# Patient Record
Sex: MALE | Race: BLACK OR AFRICAN AMERICAN | Employment: PART TIME | URBAN - METROPOLITAN AREA
[De-identification: names, ages, dates, MRNs, and addresses within clinical notes are randomized per-mention and may not be internally consistent; named-entity substitution may affect disease eponyms.]

---

## 2017-01-24 ENCOUNTER — APPOINTMENT (OUTPATIENT)
Dept: GENERAL RADIOLOGY | Age: 42
End: 2017-01-24
Attending: EMERGENCY MEDICINE
Payer: OTHER GOVERNMENT

## 2017-01-24 ENCOUNTER — APPOINTMENT (OUTPATIENT)
Dept: CT IMAGING | Age: 42
End: 2017-01-24
Attending: EMERGENCY MEDICINE
Payer: OTHER GOVERNMENT

## 2017-01-24 ENCOUNTER — HOSPITAL ENCOUNTER (EMERGENCY)
Age: 42
Discharge: HOME | End: 2017-01-24
Attending: EMERGENCY MEDICINE
Payer: OTHER GOVERNMENT

## 2017-01-24 ENCOUNTER — APPOINTMENT (OUTPATIENT)
Dept: ULTRASOUND IMAGING | Age: 42
End: 2017-01-24
Attending: EMERGENCY MEDICINE
Payer: OTHER GOVERNMENT

## 2017-01-24 VITALS
WEIGHT: 236.77 LBS | HEART RATE: 102 BPM | RESPIRATION RATE: 17 BRPM | OXYGEN SATURATION: 93 % | TEMPERATURE: 98.4 F | BODY MASS INDEX: 31.38 KG/M2 | HEIGHT: 73 IN | SYSTOLIC BLOOD PRESSURE: 119 MMHG | DIASTOLIC BLOOD PRESSURE: 76 MMHG

## 2017-01-24 DIAGNOSIS — E86.0 DEHYDRATION: ICD-10-CM

## 2017-01-24 DIAGNOSIS — K91.850 POUCHITIS (HCC): Primary | ICD-10-CM

## 2017-01-24 DIAGNOSIS — R10.84 ABDOMINAL PAIN, GENERALIZED: ICD-10-CM

## 2017-01-24 DIAGNOSIS — R79.89 ABNORMAL LFTS: ICD-10-CM

## 2017-01-24 DIAGNOSIS — R11.0 NAUSEA WITHOUT VOMITING: ICD-10-CM

## 2017-01-24 LAB
ALBUMIN SERPL BCP-MCNC: 3.4 G/DL (ref 3.5–5)
ALBUMIN/GLOB SERPL: 0.6 {RATIO} (ref 1.1–2.2)
ALP SERPL-CCNC: 595 U/L (ref 45–117)
ALT SERPL-CCNC: 92 U/L (ref 12–78)
ANION GAP BLD CALC-SCNC: 12 MMOL/L (ref 5–15)
AST SERPL W P-5'-P-CCNC: 92 U/L (ref 15–37)
BASOPHILS # BLD AUTO: 0 K/UL (ref 0–0.1)
BASOPHILS # BLD: 0 % (ref 0–1)
BILIRUB DIRECT SERPL-MCNC: 1 MG/DL (ref 0–0.2)
BILIRUB SERPL-MCNC: 2.4 MG/DL (ref 0.2–1)
BUN SERPL-MCNC: 10 MG/DL (ref 6–20)
BUN/CREAT SERPL: 10 (ref 12–20)
CALCIUM SERPL-MCNC: 8.8 MG/DL (ref 8.5–10.1)
CHLORIDE SERPL-SCNC: 103 MMOL/L (ref 97–108)
CO2 SERPL-SCNC: 26 MMOL/L (ref 21–32)
CREAT SERPL-MCNC: 0.99 MG/DL (ref 0.7–1.3)
EOSINOPHIL # BLD: 0 K/UL (ref 0–0.4)
EOSINOPHIL NFR BLD: 0 % (ref 0–7)
ERYTHROCYTE [DISTWIDTH] IN BLOOD BY AUTOMATED COUNT: 14 % (ref 11.5–14.5)
GLOBULIN SER CALC-MCNC: 6.1 G/DL (ref 2–4)
GLUCOSE SERPL-MCNC: 104 MG/DL (ref 65–100)
HCT VFR BLD AUTO: 42 % (ref 36.6–50.3)
HGB BLD-MCNC: 14.2 G/DL (ref 12.1–17)
LACTATE SERPL-SCNC: 1.3 MMOL/L (ref 0.4–2)
LIPASE SERPL-CCNC: 90 U/L (ref 73–393)
LYMPHOCYTES # BLD AUTO: 4 % (ref 12–49)
LYMPHOCYTES # BLD: 0.5 K/UL (ref 0.8–3.5)
MAGNESIUM SERPL-MCNC: 1.7 MG/DL (ref 1.6–2.4)
MCH RBC QN AUTO: 30.1 PG (ref 26–34)
MCHC RBC AUTO-ENTMCNC: 33.8 G/DL (ref 30–36.5)
MCV RBC AUTO: 89 FL (ref 80–99)
MONOCYTES # BLD: 0.5 K/UL (ref 0–1)
MONOCYTES NFR BLD AUTO: 4 % (ref 5–13)
NEUTS SEG # BLD: 11.5 K/UL (ref 1.8–8)
NEUTS SEG NFR BLD AUTO: 92 % (ref 32–75)
PLATELET # BLD AUTO: 175 K/UL (ref 150–400)
POTASSIUM SERPL-SCNC: 3.8 MMOL/L (ref 3.5–5.1)
PROT SERPL-MCNC: 9.5 G/DL (ref 6.4–8.2)
RBC # BLD AUTO: 4.72 M/UL (ref 4.1–5.7)
RBC MORPH BLD: ABNORMAL
SODIUM SERPL-SCNC: 141 MMOL/L (ref 136–145)
WBC # BLD AUTO: 12.5 K/UL (ref 4.1–11.1)

## 2017-01-24 PROCEDURE — 99284 EMERGENCY DEPT VISIT MOD MDM: CPT

## 2017-01-24 PROCEDURE — 87493 C DIFF AMPLIFIED PROBE: CPT | Performed by: SPECIALIST

## 2017-01-24 PROCEDURE — 82248 BILIRUBIN DIRECT: CPT | Performed by: EMERGENCY MEDICINE

## 2017-01-24 PROCEDURE — 74011636320 HC RX REV CODE- 636/320: Performed by: EMERGENCY MEDICINE

## 2017-01-24 PROCEDURE — 96361 HYDRATE IV INFUSION ADD-ON: CPT

## 2017-01-24 PROCEDURE — 85025 COMPLETE CBC W/AUTO DIFF WBC: CPT | Performed by: EMERGENCY MEDICINE

## 2017-01-24 PROCEDURE — 74011250636 HC RX REV CODE- 250/636: Performed by: EMERGENCY MEDICINE

## 2017-01-24 PROCEDURE — 74177 CT ABD & PELVIS W/CONTRAST: CPT

## 2017-01-24 PROCEDURE — 80053 COMPREHEN METABOLIC PANEL: CPT | Performed by: EMERGENCY MEDICINE

## 2017-01-24 PROCEDURE — 96376 TX/PRO/DX INJ SAME DRUG ADON: CPT

## 2017-01-24 PROCEDURE — 36415 COLL VENOUS BLD VENIPUNCTURE: CPT | Performed by: EMERGENCY MEDICINE

## 2017-01-24 PROCEDURE — 76705 ECHO EXAM OF ABDOMEN: CPT

## 2017-01-24 PROCEDURE — 83690 ASSAY OF LIPASE: CPT | Performed by: EMERGENCY MEDICINE

## 2017-01-24 PROCEDURE — 96375 TX/PRO/DX INJ NEW DRUG ADDON: CPT

## 2017-01-24 PROCEDURE — 96374 THER/PROPH/DIAG INJ IV PUSH: CPT

## 2017-01-24 PROCEDURE — 74022 RADEX COMPL AQT ABD SERIES: CPT

## 2017-01-24 PROCEDURE — 80074 ACUTE HEPATITIS PANEL: CPT | Performed by: EMERGENCY MEDICINE

## 2017-01-24 PROCEDURE — 83735 ASSAY OF MAGNESIUM: CPT | Performed by: EMERGENCY MEDICINE

## 2017-01-24 PROCEDURE — 83605 ASSAY OF LACTIC ACID: CPT | Performed by: EMERGENCY MEDICINE

## 2017-01-24 RX ORDER — HYDROMORPHONE HYDROCHLORIDE 1 MG/ML
1 INJECTION, SOLUTION INTRAMUSCULAR; INTRAVENOUS; SUBCUTANEOUS ONCE
Status: COMPLETED | OUTPATIENT
Start: 2017-01-24 | End: 2017-01-24

## 2017-01-24 RX ORDER — MORPHINE SULFATE 2 MG/ML
6 INJECTION, SOLUTION INTRAMUSCULAR; INTRAVENOUS
Status: COMPLETED | OUTPATIENT
Start: 2017-01-24 | End: 2017-01-24

## 2017-01-24 RX ORDER — CIPROFLOXACIN 500 MG/1
TABLET ORAL
Qty: 45 TAB | Refills: 0 | Status: SHIPPED | OUTPATIENT
Start: 2017-01-24 | End: 2017-01-24

## 2017-01-24 RX ORDER — ONDANSETRON 4 MG/1
4 TABLET, ORALLY DISINTEGRATING ORAL
Qty: 10 TAB | Refills: 0 | Status: SHIPPED | OUTPATIENT
Start: 2017-01-24 | End: 2017-03-30

## 2017-01-24 RX ORDER — ONDANSETRON 2 MG/ML
4 INJECTION INTRAMUSCULAR; INTRAVENOUS
Status: DISCONTINUED | OUTPATIENT
Start: 2017-01-24 | End: 2017-01-24 | Stop reason: HOSPADM

## 2017-01-24 RX ORDER — ONDANSETRON 2 MG/ML
4 INJECTION INTRAMUSCULAR; INTRAVENOUS
Status: COMPLETED | OUTPATIENT
Start: 2017-01-24 | End: 2017-01-24

## 2017-01-24 RX ORDER — SODIUM CHLORIDE 0.9 % (FLUSH) 0.9 %
10 SYRINGE (ML) INJECTION
Status: COMPLETED | OUTPATIENT
Start: 2017-01-24 | End: 2017-01-24

## 2017-01-24 RX ORDER — CIPROFLOXACIN 500 MG/1
TABLET ORAL
Qty: 45 TAB | Refills: 0 | Status: SHIPPED | OUTPATIENT
Start: 2017-01-24 | End: 2017-03-30

## 2017-01-24 RX ORDER — ONDANSETRON 4 MG/1
4 TABLET, ORALLY DISINTEGRATING ORAL
Qty: 10 TAB | Refills: 0 | Status: SHIPPED | OUTPATIENT
Start: 2017-01-24 | End: 2017-01-24

## 2017-01-24 RX ORDER — SODIUM CHLORIDE 9 MG/ML
50 INJECTION, SOLUTION INTRAVENOUS
Status: COMPLETED | OUTPATIENT
Start: 2017-01-24 | End: 2017-01-24

## 2017-01-24 RX ORDER — HYDROMORPHONE HYDROCHLORIDE 1 MG/ML
1 INJECTION, SOLUTION INTRAMUSCULAR; INTRAVENOUS; SUBCUTANEOUS
Status: DISCONTINUED | OUTPATIENT
Start: 2017-01-24 | End: 2017-01-24 | Stop reason: HOSPADM

## 2017-01-24 RX ADMIN — IOPAMIDOL 100 ML: 755 INJECTION, SOLUTION INTRAVENOUS at 12:19

## 2017-01-24 RX ADMIN — HYDROMORPHONE HYDROCHLORIDE 1 MG: 1 INJECTION, SOLUTION INTRAMUSCULAR; INTRAVENOUS; SUBCUTANEOUS at 12:57

## 2017-01-24 RX ADMIN — SODIUM CHLORIDE 2000 ML: 900 INJECTION, SOLUTION INTRAVENOUS at 10:54

## 2017-01-24 RX ADMIN — Medication 10 ML: at 12:20

## 2017-01-24 RX ADMIN — ONDANSETRON 4 MG: 2 INJECTION INTRAMUSCULAR; INTRAVENOUS at 10:53

## 2017-01-24 RX ADMIN — DIATRIZOATE MEGLUMINE AND DIATRIZOATE SODIUM 30 ML: 600; 100 SOLUTION ORAL; RECTAL at 10:53

## 2017-01-24 RX ADMIN — SODIUM CHLORIDE 50 ML/HR: 900 INJECTION, SOLUTION INTRAVENOUS at 12:19

## 2017-01-24 RX ADMIN — Medication 6 MG: at 10:54

## 2017-01-24 RX ADMIN — ONDANSETRON 4 MG: 2 INJECTION INTRAMUSCULAR; INTRAVENOUS at 12:57

## 2017-01-24 NOTE — ED PROVIDER NOTES
HPI Comments:   Juliet Slaughter is a 35 y.o. male with a hx of Crohn's colitis, UC, pancreatitis, PSC, and anemia presenting to the ED C/O sharp, periumbilical abd pain (5/80) which started 1 week ago and worsened today. Associated symptoms include nausea. Pt states this pain feels similar to his previous Crohn's flare ups. He was first dx'd with Crohn's 17 years ago and had a colon resection 15 years ago and then a reversal with a J-pouch. Pt is not currently taking steroids or a preventative autoimmune medication for his Crohn's. He called his GI's office earlier this morning and was told to come to the ED for evaluation. PSHx significant for appendectomy. He denies any recent unpasteurized food. Patient denies hematemesis, hematochezia, urinary symptoms, cough, SOB, CP, fever, mouth sores, testicle pain, hematuria, or any other symptoms or complaints. PCP: Joseline Vega MD  Gastroenterologist: Dr. Delbert Hameed    There are no other complaints, changes or physical findings at this time. Written by KRYSTEN Good, as dictated by Rock Paiz MD      The history is provided by the patient. No  was used.         Past Medical History:   Diagnosis Date    Chest pain 2/18/2013     as of 10/14/14 pt denies any CP since 2/13    [facility]'s [facility] ([facility]     Ill-defined condition      PSC     Iron deficiency anemia 2/18/2013    Pancreatitis     Pouchitis ([facility] 12/8/2011    PSC (primary sclerosing cholangitis)      Dr Domonique Toro [phone]    Ulcerative colitis [facility])        Past Surgical History:   Procedure Laterality Date    Hx mohs procedure       right    Pr colonoscopy w/biopsy single/multiple  12/8/2011          Pr egd transoral biopsy single/multiple  2/18/2013          Hx gi       ercp    Pr abdomen surgery proc unlisted  2002     colostomy reversal & J Pouch    Hx colectomy  2002     colostomy         Family History:   Problem Relation Age of Onset    Diabetes Mother    Andrews Barnes Hypertension Mother     Diabetes Father     Hypertension Father     No Known Problems Sister     No Known Problems Brother     No Known Problems Maternal Aunt     No Known Problems Maternal Uncle     No Known Problems Paternal Aunt     No Known Problems Paternal Uncle     No Known Problems Maternal Grandmother     No Known Problems Maternal Grandfather     No Known Problems Paternal Grandmother     No Known Problems Paternal Grandfather        Social History     Social History    Marital status: UNKNOWN     Spouse name: N/A    Number of children: N/A    Years of education: N/A     Occupational History    Not on file. Social History Main Topics    Smoking status: Never Smoker    Smokeless tobacco: Never Used    Alcohol use No    Drug use: No    Sexual activity: Yes     Partners: Female     Birth control/ protection: Condom     Other Topics Concern    Not on file     Social History Narrative         ALLERGIES: Cafergot [ergotamine-caffeine]    Review of Systems   Constitutional: Negative for fever. HENT: Negative for mouth sores. Respiratory: Negative for cough and shortness of breath. Cardiovascular: Negative for chest pain. Gastrointestinal: Positive for abdominal pain (periumbilical) and nausea. Negative for blood in stool. No hematemesis   Genitourinary: Negative. Negative for difficulty urinating, dysuria, frequency, hematuria and testicular pain. All other systems reviewed and are negative. Vitals:    01/24/17 1025 01/24/17 1245 01/24/17 1246 01/24/17 1315   BP: 134/86 117/74  119/76   Pulse: (!) 109  99 (!) 102   Resp: 15  16 17   Temp: 98.4 °F (36.9 °C)      SpO2: 100%  96% 93%   Weight:       Height:                Physical Exam     Vital signs and nursing notes reviewed    CONSTITUTIONAL: Alert, moderate distress; well-developed; well-nourished. Temperature of 98.4F. HEAD:  Normocephalic, atraumatic  EYES: PERRL; EOM's intact; Non-icteric sclera.   ENTM: Nose: no rhinorrhea; Throat: no erythema or exudate, mucous membranes moist  Neck:  Supple. trachea is midline. RESP: Chest clear, equal breath sounds. - W/R/R  CV: S1 and S2 WNL; No murmurs, gallops or rubs. 2+ radial and DP pulses bilaterally. GI: Mild distension, hypoactive bowel sounds, abd firm with generalized tenderness on palpation; No masses or organomegaly. : No costo-vertebral angle tenderness. BACK:  Non-tender, normal appearance  UPPER EXT:  Normal inspection. no joint or soft tissue swelling  LOWER EXT: No edema, no calf tenderness. NEURO: Alert and oriented x3, 5/5 strength and light touch sensation intact in bilateral upper and lower extremities. SKIN: No rashes; Warm and dry; Non-jaundice skin  PSYCH: Normal mood, normal affect     MDM  Number of Diagnoses or Management Options  Abdominal pain, generalized:   Abnormal LFTs:   Dehydration:   Nausea without vomiting:   Pouchitis [facility]):   Diagnosis management comments: 35 y.o. male with likely pouchitis in the setting of some non-compliance with medications recommended by GI who follow him for Crohn's vs. UC and primary sclerosing cholangitis. Improved with hydration and pain control here. Seen at the bedside by Dr. Pebbles Coats, his GI physician, recommends Cipro 500mg BID for 7 days, followed by once a day administration. Plan for discharge home. Amount and/or Complexity of Data Reviewed  Clinical lab tests: ordered and reviewed  Tests in the radiology section of CPT®: ordered and reviewed  Review and summarize past medical records: yes  Discuss the patient with other providers: yes (Gastroenterology)    Patient Progress  Patient progress: stable    Procedures    PROGRESS NOTE:   10:38 AM  Phoned radiology to inform them of concern for possible free air and to get pt for x-ray as soon as possible. Written by KRYSTEN Patricio, as dictated by Libby Holland MD.     PROGRESS NOTE:   11:17 AM  Pt in x-ray.   Written by Lory Leigh Greg Butler, ED Scribe, as dictated by Yoly Carrillo MD.     CONSULT NOTE:   Rebecca Paniagua MD spoke with Dr. Nydia Alejandro   Specialty: Gastroenterology  Discussed pt's hx, disposition, and available diagnostic and imaging results over the telephone. Reviewed care plans. Consulting physician agrees with plans as outlined. Dr. Nydia Alejandro will come see the pt to facilitate discharge. Written by Kendal Begum, ED Scribe, as dictated by Yoly Carrillo MD.     LABORATORY TESTS:  Recent Results (from the past 12 hour(s))   CBC WITH AUTOMATED DIFF    Collection Time: 01/24/17 10:35 AM   Result Value Ref Range    WBC 12.5 (H) 4.1 - 11.1 K/uL    RBC 4.72 4.10 - 5.70 M/uL    HGB 14.2 12.1 - 17.0 g/dL    HCT 42.0 36.6 - 50.3 %    MCV 89.0 80.0 - 99.0 FL    MCH 30.1 26.0 - 34.0 PG    MCHC 33.8 30.0 - 36.5 g/dL    RDW 14.0 11.5 - 14.5 %    PLATELET 592 697 - 084 K/uL    NEUTROPHILS 92 (H) 32 - 75 %    LYMPHOCYTES 4 (L) 12 - 49 %    MONOCYTES 4 (L) 5 - 13 %    EOSINOPHILS 0 0 - 7 %    BASOPHILS 0 0 - 1 %    ABS. NEUTROPHILS 11.5 (H) 1.8 - 8.0 K/UL    ABS. LYMPHOCYTES 0.5 (L) 0.8 - 3.5 K/UL    ABS. MONOCYTES 0.5 0.0 - 1.0 K/UL    ABS. EOSINOPHILS 0.0 0.0 - 0.4 K/UL    ABS. BASOPHILS 0.0 0.0 - 0.1 K/UL    RBC COMMENTS NORMOCYTIC, NORMOCHROMIC     METABOLIC PANEL, COMPREHENSIVE    Collection Time: 01/24/17 10:35 AM   Result Value Ref Range    Sodium 141 136 - 145 mmol/L    Potassium 3.8 3.5 - 5.1 mmol/L    Chloride 103 97 - 108 mmol/L    CO2 26 21 - 32 mmol/L    Anion gap 12 5 - 15 mmol/L    Glucose 104 (H) 65 - 100 mg/dL    BUN 10 6 - 20 MG/DL    Creatinine 0.99 0.70 - 1.30 MG/DL    BUN/Creatinine ratio 10 (L) 12 - 20      GFR est AA >60 >60 ml/min/1.73m2    GFR est non-AA >60 >60 ml/min/1.73m2    Calcium 8.8 8.5 - 10.1 MG/DL    Bilirubin, total 2.4 (H) 0.2 - 1.0 MG/DL    ALT 92 (H) 12 - 78 U/L    AST 92 (H) 15 - 37 U/L    Alk.  phosphatase 595 (H) 45 - 117 U/L    Protein, total 9.5 (H) 6.4 - 8.2 g/dL    Albumin 3.4 (L) 3.5 - 5.0 g/dL    Globulin 6.1 (H) 2.0 - 4.0 g/dL    A-G Ratio 0.6 (L) 1.1 - 2.2     LIPASE    Collection Time: 01/24/17 10:35 AM   Result Value Ref Range    Lipase 90 73 - 393 U/L   LACTIC ACID, PLASMA    Collection Time: 01/24/17 10:35 AM   Result Value Ref Range    Lactic acid 1.3 0.4 - 2.0 MMOL/L   MAGNESIUM    Collection Time: 01/24/17 10:35 AM   Result Value Ref Range    Magnesium 1.7 1.6 - 2.4 mg/dL   BILIRUBIN, DIRECT    Collection Time: 01/24/17 10:35 AM   Result Value Ref Range    Bilirubin, direct 1.0 (H) 0.0 - 0.2 MG/DL       IMAGING RESULTS:  US ABD LTD   Final Result   EXAM: US ABD LTD     INDICATION: Elevated liver enzymes.     COMPARISON: CT 1/24/2017.     TECHNIQUE: Limited abdominal ultrasound.     FINDINGS:      Liver: The liver is diffusely heterogeneous and mildly echogenic. No focal  liver lesion.      Main portal vein flow: Toward the liver.     Fluid: No ascites.     Gallbladder: There are a few small mobile gallstones. No gallbladder wall  thickening or pericholecystic fluid. Negative sonographic Park sign.      Bile ducts: Mild central intrahepatic biliary duct dilatation is again  demonstrated as on recent CT. The common bile duct measures 4 mm.     Pancreas: Not well seen due to bowel gas.     Kidneys: Right length: 11.7 cm. No hydronephrosis.     IMPRESSION  IMPRESSION:   1. There are a few small mobile gallstones without sonographic findings to  suggest acute cholecystitis. Mild central intrahepatic biliary duct dilatation  is noted as on recent CT. The common bile duct is nondilated.     2. Mild liver echogenicity and diffuse heterogeneity can be seen with hepatic  steatosis or other nonspecific liver disease. There is no focal liver mass.    Signed by      Signed Date/Time    Phone Pager     Taravarsha Bradley 1/24/2017 14:05 [phone]       CT ABD PELV W CONT   Final Result   INDICATION: Severe abdominal pain, fever, leukocytosis, Crohn's versus  ulcerative colitis inflammatory bowel disease. Previous complete colectomy and  J-pouch rectal reconstruction. Elevated liver enzymes, including alkaline  phosphatase.     COMPARISON: CT abdomen/pelvis on 11/27/2016     TECHNIQUE:   Following the uneventful intravenous administration of 100 cc Isovue-370, thin  axial images were obtained through the abdomen and pelvis. Coronal and sagittal  reconstructions were generated. Oral contrast was administered. CT dose  reduction was achieved through use of a standardized protocol tailored for this  examination and automatic exposure control for dose modulation.     FINDINGS:   LUNG BASES: Heterogeneous bibasilar partial atelectasis is increased. INCIDENTALLY IMAGED HEART AND MEDIASTINUM: Borderline cardiac size is unchanged. No pericardial effusion. LIVER: Intrahepatic biliary dilatation is unchanged. No solid hepatic mass or  evidence of hepatic abscess. GALLBLADDER: Distended. No evidence of cholecystitis. CBD is not dilated. SPLEEN: No mass. PANCREAS: No mass or ductal dilatation. ADRENALS: Unremarkable. KIDNEYS: Heterogeneous bilateral hypoattenuation and hypoenhancement are  unchanged in size and pattern. 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 No solid renal mass or hydronephrosis. STOMACH: Partially distended with enteric contrast and food products. SMALL BOWEL: Within normal limits post surgery. No evidence of active small  bowel inflammation. No fistula or abscess. COLON: Resected. J-pouch rectum is unchanged and within normal limits. APPENDIX: Resected. PERITONEUM: No ascites or pneumoperitoneum. RETROPERITONEUM: Numerous retroperitoneal lymph nodes are small and unchanged. Aorta is normal in caliber. REPRODUCTIVE ORGANS: Prostate gland is not enlarged. URINARY BLADDER: No mass or calculus. BONES: Unchanged. No evidence of sacroiliitis. ADDITIONAL COMMENTS: N/A     IMPRESSION  IMPRESSION:     1. No acute process on CT. No change. 2. Colectomy.  Postsurgical small bowel is within normal limits. 3. Unchanged heterogeneous hypoenhancement of the kidneys. In the stable chronic  setting, scarring is more likely than recurrent infection or neoplasm. 4. Unchanged intrahepatic biliary dilatation. Normal CBD. Consider proximal  biliary stricture versus chronic cholangitis. Signed by      Signed Date/Time    Phone Pager     SKYLER VALLES 1/24/2017 12:55 [phone]       XR ABD ACUTE W 1 V CHEST   Final Result   EXAM: XR ABD ACUTE W 1 V CHEST     INDICATION: Abdominal pain for one week, increasing today with nausea, history  of Crohn's     COMPARISON: 10/25/2016.     FINDINGS: The upright chest radiograph demonstrates clear lungs and normal  cardiac and mediastinal contours. There is no pleural effusion or free air under  the diaphragm.     Supine and upright views of the abdomen demonstrate a nonobstructive bowel gas  pattern. There is no free intraperitoneal air. No soft tissue masses or  pathologic calcifications are identified. The bones are within normal limits.     IMPRESSION  IMPRESSION: No acute abnormality.   Signed by      Signed Date/Time    Phone Pager     Juan Chatterjee 1/24/2017 11:22 [phone]           MEDICATIONS GIVEN:  Medications   HYDROmorphone (PF) (DILAUDID) injection 1 mg (not administered)   ondansetron (ZOFRAN) injection 4 mg (not administered)   morphine injection 6 mg (6 mg IntraVENous Given 1/24/17 1054)   ondansetron (ZOFRAN) injection 4 mg (4 mg IntraVENous Given 1/24/17 1053)   sodium chloride 0.9 % bolus infusion 2,000 mL (2,000 mL IntraVENous New Bag 1/24/17 1054)   diatrizoate meglumine-d.sodium (MD-GASTROVIEW,GASTROGRAFIN) 66-10 % contrast solution 30 mL (30 mL Oral Given 1/24/17 1053)   0.9% sodium chloride infusion (50 mL/hr IntraVENous New Bag 1/24/17 1219)   iopamidol (ISOVUE-370) 76 % injection 100 mL (100 mL IntraVENous Given 1/24/17 1219)   sodium chloride (NS) flush 10 mL (10 mL IntraVENous Given 1/24/17 1220)   HYDROmorphone (PF) (DILAUDID) injection 1 mg (1 mg IntraVENous Given 1/24/17 1257)   ondansetron (ZOFRAN) injection 4 mg (4 mg IntraVENous Given 1/24/17 1257)       IMPRESSION:  1. Pouchitis ([address]    2. Abnormal LFTs    3. Abdominal pain, generalized    4. Nausea without vomiting    5. Dehydration        PLAN:  1. Current Discharge Medication List      START taking these medications    Details   ciprofloxacin HCl (CIPRO) 500 mg tablet 500 mg PO BID X 1 week followed by 500 mg daily. Indications: pouchitis  Qty: 45 Tab, Refills: 0         CONTINUE these medications which have CHANGED    Details   ondansetron (ZOFRAN ODT) 4 mg disintegrating tablet Take 1 Tab by mouth every eight (8) hours as needed for Nausea. Qty: 10 Tab, Refills: 0         CONTINUE these medications which have NOT CHANGED    Details   oxyCODONE-acetaminophen (PERCOCET) 5-325 mg per tablet Take 1 Tab by mouth every four (4) hours as needed for Pain. Max Daily Amount: 6 Tabs. Qty: 20 Tab, Refills: 0           2. Follow-up Information     Follow up With Details Comments Shashank Gamez MD  Please see Dr. Bigg Garcia or his PA in the next 1-3 weeks. [address]   [phone]          Return to ED if worse     Discharge Note:  5:51 PM  The patient is ready for discharge. The patient's signs, symptoms, diagnosis, and discharge instruction have been discussed and the patient has conveyed their understanding. The patient is to follow up as recommended or return to the ER should their symptoms worsen. Plan has been discussed and the patient is in agreement. Written by Irineo Robles ED Scribe, as dictated by Ilsa Ibarra MD.     Attestation: This note is prepared by Irineo Robles, acting as Scribe for Ilsa Ibarra MD.    Ilsa Ibarra MD: The scribe's documentation has been prepared under my direction and personally reviewed by me in its entirety.  I confirm that the note above accurately reflects all work, treatment, procedures, and medical decision making performed by me.

## 2017-01-24 NOTE — PROGRESS NOTES
See dictated note  Check c diff.   I spoke with Dr Vielka Smallwood  [id number]    Home on cipro 500 bid x 1 week then daily    See me or Eri Washington pac within 3 weeks    [phone]    Pt aware    Laurel Garcia MD  5:27 PM  1/24/2017

## 2017-01-24 NOTE — DISCHARGE INSTRUCTIONS
You were seen here in the emergency department for abdominal pain, nausea and dehydration. Dr. Trace Yoder has recommended you start twice daily cipro for one week followed by once daily cipro. A prescription has been sent to your pharmacy. Dr. Trace Yoder would also like you to see either him or his PA, without fail in the next 1-3 weeks. Return for fever, uncontrolled pain, or other new concerning symptoms. Abdominal Pain: Care Instructions  Your Care Instructions    Abdominal pain has many possible causes. Some aren't serious and get better on their own in a few days. Others need more testing and treatment. If your pain continues or gets worse, you need to be rechecked and may need more tests to find out what is wrong. You may need surgery to correct the problem. Don't ignore new symptoms, such as fever, nausea and vomiting, urination problems, pain that gets worse, and dizziness. These may be signs of a more serious problem. Your doctor may have recommended a follow-up visit in the next 8 to 12 hours. If you are not getting better, you may need more tests or treatment. The doctor has checked you carefully, but problems can develop later. If you notice any problems or new symptoms, get medical treatment right away. Follow-up care is a key part of your treatment and safety. Be sure to make and go to all appointments, and call your doctor if you are having problems. It's also a good idea to know your test results and keep a list of the medicines you take. How can you care for yourself at home? · Rest until you feel better. · To prevent dehydration, drink plenty of fluids, enough so that your urine is light yellow or clear like water. Choose water and other caffeine-free clear liquids until you feel better. If you have kidney, heart, or liver disease and have to limit fluids, talk with your doctor before you increase the amount of fluids you drink.   · If your stomach is upset, eat mild foods, such as rice, dry toast or crackers, bananas, and applesauce. Try eating several small meals instead of two or three large ones. · Wait until 48 hours after all symptoms have gone away before you have spicy foods, alcohol, and drinks that contain caffeine. · Do not eat foods that are high in fat. · Avoid anti-inflammatory medicines such as aspirin, ibuprofen (Advil, Motrin), and naproxen (Aleve). These can cause stomach upset. Talk to your doctor if you take daily aspirin for another health problem. When should you call for help? Call 911 anytime you think you may need emergency care. For example, call if:  · You passed out (lost consciousness). · You pass maroon or very bloody stools. · You vomit blood or what looks like coffee grounds. · You have new, severe belly pain. Call your doctor now or seek immediate medical care if:  · Your pain gets worse, especially if it becomes focused in one area of your belly. · You have a new or higher fever. · Your stools are black and look like tar, or they have streaks of blood. · You have unexpected vaginal bleeding. · You have symptoms of a urinary tract infection. These may include:  ¨ Pain when you urinate. ¨ Urinating more often than usual.  ¨ Blood in your urine. · You are dizzy or lightheaded, or you feel like you may faint. Watch closely for changes in your health, and be sure to contact your doctor if:  · You are not getting better after 1 day (24 hours). Where can you learn more? Go to http://oscar-wilfrido.info/. Enter E286 in the search box to learn more about \"Abdominal Pain: Care Instructions. \"  Current as of: May 27, 2016  Content Version: 11.1  © 0762-0392 [employer]. Care instructions adapted under license by [employer] (which disclaims liability or warranty for this information).  If you have questions about a medical condition or this instruction, always ask your healthcare professional. [address] any warranty or liability for your use of this information.

## 2017-01-24 NOTE — ED NOTES
Patient resting comfortably in stretcher with call bell in reach, side rails x2, respirations even and unlabored. No complaints voiced at this time.

## 2017-01-24 NOTE — ED NOTES
MD has reviewed discharge instructions with the patient. The patient verbalized understanding. PIV removed.

## 2017-01-24 NOTE — ED TRIAGE NOTES
Patient presents ambulatory to the ED with complaint of abdominal pain, nausea, vomiting and diarrhea for about a week. Patient reports history of UC or Crohn's.

## 2017-01-25 LAB
C DIFF TOX GENS STL QL NAA+PROBE: POSITIVE
HAV IGM SERPL QL IA: NONREACTIVE
HBV CORE IGM SER QL: NONREACTIVE
HBV SURFACE AG SER QL: 0.12 INDEX
HBV SURFACE AG SER QL: NEGATIVE
HCV AB SERPL QL IA: NONREACTIVE
HCV COMMENT,HCGAC: NORMAL
SP1: NORMAL
SP2: NORMAL
SP3: NORMAL

## 2017-01-25 NOTE — CONSULTS
[address]   [address]       Name:  Frank Alejandra   MR#:  [id number]   :  1975   Account #:  [de-identified]    Date of Consultation:  2017   Date of Adm:  2017       REQUESTING PHYSICIAN: Dale Tee. Yelena Rangel MD    CHIEF COMPLAINT: I am asked to see the patient for abdominal pain   and abnormal CT scan. HISTORY OF PRESENT ILLNESS: The patient is 35years old. He is   status post ileal pouch anal anastomosis and has chronic pouchitis. He   has been prescribed floxin drugs, but does not take them reliably, last   took in November. His last colonoscopy 10/17/2014, which   demonstrated mild to moderate pouchitis in proximal pouch and severe   pouchitis in the distal pouch. Biopsies demonstrated nonspecific   chronic active enteritis in the small intestine, mild to moderate chronic   active proctitis in the pouch. The small bowel at that time was granular,   there was a stenosis with rectal examination. He also has primary sclerosing cholangitis and is followed by Dr. Maegan Madden. He describes \"it\" got worse over the last several weeks, this is lower   abdominal distention, frequent bowel movements, failure to pass flatus;   there was associated nausea; he has not been able to eat or drink   today. PAST MEDICAL/SURGICAL HISTORY: See above. Arthritis, prior joint   issues with steroids prior to the ulcerative colitis surgery. ALLERGIES: CAFERGOT. SOCIAL HISTORY: He is a nonsmoker, nondrinker, employed as a    and also is active in the community, coaches   basketball. REVIEW OF SYSTEMS: No chest pain, no shortness of breath. He felt   cold, but did not have chills or rigors. He has barely been able to eat   anything, he is nauseous even after water. PHYSICAL EXAMINATION   VITAL SIGNS: Blood pressure 119/76, pulse 102, respirations 17,   temperature 98.4.    GENERAL: Well-developed, well-nourished, appears his stated age. EYES: No icterus. Extraocular motions intact. CHEST: Clear to auscultation and percussion. CARDIOVASCULAR: Regular rate and rhythm. Tachycardia, no   abnormal sounds. ABDOMEN: Bowel sounds are present, but decreased with high-  pitched sounds and tinkles. He is full and distended in the right lower   abdomen and the left lower abdomen. RECTAL: Exam showed the stenosis of the anus. There is red/green   stool on the glove, I do not feel an impaction. I cannot insert the digital   exam more than about a centimeter past the stenosis. LABORATORY: Reviewed. White count 12.5, hemoglobin 14.2. Chemistry showed total bilirubin 2.4, total protein 9.5, albumin 3.4,   globulin 6.1, ALT 92, AST 92, alkaline phosphatase 595. The ALT,   AST, alkaline phosphatase are similar to 11/27/2016. The bilirubin is   elevated (prior below 1), BUN is 10. CT abdomen and pelvis today shows   partially distended stomach with enteric contrast and food (I note his   last meal was last evening); small bowel within normal limits, no   surgery, no active small bowel inflammation, no fistula or abscess; J-  pouch, rectum unchanged or within normal limits; final conclusions no   acute process on CT; colectomy; unchanged heterogeneous   enhancement of the kidneys, stable chronic scarring more likely   than infection or neoplasm, unchanged intrahepatic bile duct dilation,   normal common bile duct, consider proximal biliary stricture versus   chronic cholangitis, also of note the gallbladder was distended. ASSESSMENT AND PLAN   1. Recurrent pouchitis. I think pouchitis is the primary cause of his   symptoms, including constipation, diarrhea and pain. 2. Sclerosing cholangitis. 3. Elevated bilirubin. 4. Distended gallbladder, likely due to fasting. RECOMMENDATIONS   1. Check Clostridium difficile.    2. I am going to treat the pouchitis with Cipro twice a day for a week   and then once a day until we see him. 3. He will need a subsequent colonoscopy after the next visit. 4. I have encouraged him to take antibiotics and I have discussed the   mechanism of pouchitis in prior IBD patients who have undergone ileal   pouch anal anastomosis. 5. I have discussed the recommendations with Dr. Tiara Pierre. 6. We will see him in the office within 3 weeks (Miss Dalia Mendez or   myself). 7.  He will need to follow with Dr Mike Villagran  I thank Dr. Tiara Pierre for this interesting consultation.         MD MARI Dangelo / KRISTEL   D:  01/24/2017   17:35   T:  01/24/2017   18:25   Job #:  [id number]     Dr. Violeta Ramos at [address] [facility]

## 2017-01-26 NOTE — PROGRESS NOTES
Stopped Cipro and put on Flagyl. Advised the patient to contact the PCP and GI for follow up.   KASSANDRA

## 2017-02-13 ENCOUNTER — HOSPITAL ENCOUNTER (OUTPATIENT)
Dept: MRI IMAGING | Age: 42
Discharge: HOME | End: 2017-02-13
Attending: PHYSICIAN ASSISTANT
Payer: OTHER GOVERNMENT

## 2017-02-13 VITALS — BODY MASS INDEX: 30.61 KG/M2 | WEIGHT: 232 LBS

## 2017-02-13 DIAGNOSIS — K51.90: ICD-10-CM

## 2017-02-13 DIAGNOSIS — R17 ELEVATED BILIRUBIN: ICD-10-CM

## 2017-02-13 DIAGNOSIS — A04.72 CLOSTRIDIUM DIFFICILE COLITIS: ICD-10-CM

## 2017-02-13 DIAGNOSIS — R10.11 ABDOMINAL PAIN, RIGHT UPPER QUADRANT: ICD-10-CM

## 2017-02-13 DIAGNOSIS — K91.850 POUCHITIS (HCC): ICD-10-CM

## 2017-02-13 PROCEDURE — 74183 MRI ABD W/O CNTR FLWD CNTR: CPT

## 2017-02-13 PROCEDURE — 74011250636 HC RX REV CODE- 250/636: Performed by: PHYSICIAN ASSISTANT

## 2017-02-13 PROCEDURE — A9585 GADOBUTROL INJECTION: HCPCS | Performed by: PHYSICIAN ASSISTANT

## 2017-02-13 RX ADMIN — GADOBUTROL 10 ML: 604.72 INJECTION INTRAVENOUS at 09:38

## 2017-02-15 ENCOUNTER — HOSPITAL ENCOUNTER (OUTPATIENT)
Dept: GENERAL RADIOLOGY | Age: 42
Discharge: HOME | End: 2017-02-15
Payer: OTHER GOVERNMENT

## 2017-02-15 DIAGNOSIS — A04.72 INTESTINAL INFECTION DUE TO CLOSTRIDIUM DIFFICILE: ICD-10-CM

## 2017-02-15 DIAGNOSIS — K51.90 MILD CHRONIC ULCERATIVE COLITIS (HCC): ICD-10-CM

## 2017-02-15 PROCEDURE — 74020 XR ABD FLAT/ ERECT: CPT

## 2017-03-30 NOTE — PERIOP NOTES
[facility]  Ambulatory Surgery Unit  Pre-operative Instructions for Endo Procedures    Procedure Date  4/4/17            Tentative Arrival Time 7358      1. On the day of your procedure, please report to the Ambulatory Surgery Unit Registration Desk and sign in at your designated time. The Ambulatory Surgery Unit is located in [facility] on the [facility] side of the [facility] across from the [address] [place] [address]. Please have all of your health insurance cards and a photo ID. 2. You must have someone with you to drive you home, as you should not drive a car for 24 hours following anesthesia. Please make arrangements for a responsible adult friend or family member to stay with you for at least the first 24 hours after your procedure. 3. Do not have anything to eat or drink (including water, gum, mints, coffee, juice) after midnight   4/3/17. This may not apply to medications prescribed by your physician. (Please note below the special instructions with medications to take the morning of your procedure.)    4. If applicable, follow the clear liquid diet and bowel prep instructions provided by your physician's office. If you do not have this information, or have any questions, please contact your physician's office. 5. We recommend you do not drink any alcoholic beverages for 24 hours before and after your procedure. 6. Stop all Aspirin, non-steroidal anti-inflammatory drugs (i.e. Advil, Aleve), vitamins, and supplements as directed by your surgeon's office. **If you are currently taking Plavix, Coumadin, or other blood-thinning agents, contact your surgeon for instructions. **    7. In an effort to help prevent surgical site infection, we ask that you shower with an anti-bacterial soap (i.e. Dial or Safeguard) on the morning of your procedure. Do not apply any lotions, powders, or deodorants after showering. 8. Wear comfortable clothes. Wear glasses instead of contacts.  Do not bring any jewelry or money (other than copays or fees as instructed). Do not wear make-up, particularly mascara, the morning of your procedure. Wear your hair loose or down, no ponytails, buns, noe pins or clips. All body piercings must be removed. 9. You should understand that if you do not follow these instructions your procedure may be cancelled. If your physical condition changes (i.e. fever, cold or flu) please contact your surgeon as soon as possible. 10. It is important that you be on time. If a situation occurs where you may be late, or if you have any questions or problems, please call [phone]. 11. Your procedure time may be subject to change. You will receive a phone call the day prior to confirm your arrival time. Special Instructions:    MEDICATIONS TO TAKE THE MORNING OF SURGERY WITH A SIP OF WATER: none      I understand a pre-operative phone call will be made to verify my procedure time. In the event that I am not available, I give permission for a message to be left on my answering service and/or with another person?       Yes     (instructions given verbally during phone assessment- pt voiced understanding)     ___________________      ___________________      ___________________  (Signature of Patient)          (Witness)                   (Date and Time)

## 2017-04-03 ENCOUNTER — ANESTHESIA EVENT (OUTPATIENT)
Dept: SURGERY | Age: 42
End: 2017-04-03
Payer: OTHER GOVERNMENT

## 2017-04-04 ENCOUNTER — HOSPITAL ENCOUNTER (OUTPATIENT)
Age: 42
Setting detail: OUTPATIENT SURGERY
Discharge: HOME | End: 2017-04-04
Attending: SPECIALIST | Admitting: SPECIALIST
Payer: OTHER GOVERNMENT

## 2017-04-04 ENCOUNTER — ANESTHESIA (OUTPATIENT)
Dept: SURGERY | Age: 42
End: 2017-04-04
Payer: OTHER GOVERNMENT

## 2017-04-04 ENCOUNTER — SURGERY (OUTPATIENT)
Age: 42
End: 2017-04-04

## 2017-04-04 VITALS
BODY MASS INDEX: 30.62 KG/M2 | TEMPERATURE: 97.6 F | DIASTOLIC BLOOD PRESSURE: 77 MMHG | RESPIRATION RATE: 12 BRPM | SYSTOLIC BLOOD PRESSURE: 112 MMHG | HEART RATE: 78 BPM | WEIGHT: 231 LBS | HEIGHT: 73 IN | OXYGEN SATURATION: 98 %

## 2017-04-04 PROCEDURE — 88305 TISSUE EXAM BY PATHOLOGIST: CPT | Performed by: SPECIALIST

## 2017-04-04 PROCEDURE — 88342 IMHCHEM/IMCYTCHM 1ST ANTB: CPT | Performed by: SPECIALIST

## 2017-04-04 PROCEDURE — 74011000250 HC RX REV CODE- 250

## 2017-04-04 PROCEDURE — 74011250636 HC RX REV CODE- 250/636: Performed by: ANESTHESIOLOGY

## 2017-04-04 PROCEDURE — 76060000073 HC AMB SURG ANES FIRST 0.5 HR: Performed by: SPECIALIST

## 2017-04-04 PROCEDURE — 76210000040 HC AMBSU PH I REC FIRST 0.5 HR: Performed by: SPECIALIST

## 2017-04-04 PROCEDURE — 76030000002 HC AMB SURG OR TIME FIRST 0.: Performed by: SPECIALIST

## 2017-04-04 PROCEDURE — 77030019988 HC FCPS ENDOSC DISP BSC -B: Performed by: SPECIALIST

## 2017-04-04 PROCEDURE — 74011000250 HC RX REV CODE- 250: Performed by: ANESTHESIOLOGY

## 2017-04-04 PROCEDURE — 76210000046 HC AMBSU PH II REC FIRST 0.5 HR: Performed by: SPECIALIST

## 2017-04-04 PROCEDURE — 74011250636 HC RX REV CODE- 250/636

## 2017-04-04 RX ORDER — FENTANYL CITRATE 50 UG/ML
25 INJECTION, SOLUTION INTRAMUSCULAR; INTRAVENOUS
Status: DISCONTINUED | OUTPATIENT
Start: 2017-04-04 | End: 2017-04-04 | Stop reason: HOSPADM

## 2017-04-04 RX ORDER — ONDANSETRON 2 MG/ML
4 INJECTION INTRAMUSCULAR; INTRAVENOUS AS NEEDED
Status: DISCONTINUED | OUTPATIENT
Start: 2017-04-04 | End: 2017-04-04 | Stop reason: HOSPADM

## 2017-04-04 RX ORDER — SODIUM CHLORIDE, SODIUM LACTATE, POTASSIUM CHLORIDE, CALCIUM CHLORIDE 600; 310; 30; 20 MG/100ML; MG/100ML; MG/100ML; MG/100ML
25 INJECTION, SOLUTION INTRAVENOUS CONTINUOUS
Status: DISCONTINUED | OUTPATIENT
Start: 2017-04-04 | End: 2017-04-04 | Stop reason: HOSPADM

## 2017-04-04 RX ORDER — PROPOFOL 10 MG/ML
INJECTION, EMULSION INTRAVENOUS AS NEEDED
Status: DISCONTINUED | OUTPATIENT
Start: 2017-04-04 | End: 2017-04-04 | Stop reason: HOSPADM

## 2017-04-04 RX ORDER — SODIUM CHLORIDE 0.9 % (FLUSH) 0.9 %
5-10 SYRINGE (ML) INJECTION AS NEEDED
Status: DISCONTINUED | OUTPATIENT
Start: 2017-04-04 | End: 2017-04-04 | Stop reason: HOSPADM

## 2017-04-04 RX ORDER — LIDOCAINE HYDROCHLORIDE 10 MG/ML
0.1 INJECTION, SOLUTION EPIDURAL; INFILTRATION; INTRACAUDAL; PERINEURAL AS NEEDED
Status: DISCONTINUED | OUTPATIENT
Start: 2017-04-04 | End: 2017-04-04 | Stop reason: HOSPADM

## 2017-04-04 RX ORDER — LIDOCAINE HYDROCHLORIDE 20 MG/ML
INJECTION, SOLUTION EPIDURAL; INFILTRATION; INTRACAUDAL; PERINEURAL AS NEEDED
Status: DISCONTINUED | OUTPATIENT
Start: 2017-04-04 | End: 2017-04-04 | Stop reason: HOSPADM

## 2017-04-04 RX ORDER — DIPHENHYDRAMINE HYDROCHLORIDE 50 MG/ML
12.5 INJECTION, SOLUTION INTRAMUSCULAR; INTRAVENOUS AS NEEDED
Status: DISCONTINUED | OUTPATIENT
Start: 2017-04-04 | End: 2017-04-04 | Stop reason: HOSPADM

## 2017-04-04 RX ORDER — SODIUM CHLORIDE 0.9 % (FLUSH) 0.9 %
5-10 SYRINGE (ML) INJECTION EVERY 8 HOURS
Status: DISCONTINUED | OUTPATIENT
Start: 2017-04-04 | End: 2017-04-04 | Stop reason: HOSPADM

## 2017-04-04 RX ADMIN — LIDOCAINE HYDROCHLORIDE 0.1 ML: 10 INJECTION, SOLUTION EPIDURAL; INFILTRATION; INTRACAUDAL; PERINEURAL at 14:03

## 2017-04-04 RX ADMIN — PROPOFOL 100 MG: 10 INJECTION, EMULSION INTRAVENOUS at 16:17

## 2017-04-04 RX ADMIN — PROPOFOL 50 MG: 10 INJECTION, EMULSION INTRAVENOUS at 16:13

## 2017-04-04 RX ADMIN — PROPOFOL 50 MG: 10 INJECTION, EMULSION INTRAVENOUS at 16:09

## 2017-04-04 RX ADMIN — PROPOFOL 100 MG: 10 INJECTION, EMULSION INTRAVENOUS at 16:10

## 2017-04-04 RX ADMIN — SODIUM CHLORIDE, SODIUM LACTATE, POTASSIUM CHLORIDE, AND CALCIUM CHLORIDE 25 ML/HR: 600; 310; 30; 20 INJECTION, SOLUTION INTRAVENOUS at 14:02

## 2017-04-04 RX ADMIN — LIDOCAINE HYDROCHLORIDE 40 MG: 20 INJECTION, SOLUTION EPIDURAL; INFILTRATION; INTRACAUDAL; PERINEURAL at 16:07

## 2017-04-04 RX ADMIN — SODIUM CHLORIDE, SODIUM LACTATE, POTASSIUM CHLORIDE, AND CALCIUM CHLORIDE: 600; 310; 30; 20 INJECTION, SOLUTION INTRAVENOUS at 16:10

## 2017-04-04 RX ADMIN — PROPOFOL 50 MG: 10 INJECTION, EMULSION INTRAVENOUS at 16:08

## 2017-04-04 NOTE — ANESTHESIA POSTPROCEDURE EVALUATION
Post-Anesthesia Evaluation and Assessment    Patient: Oscar Silverio MRN: [id number]  SSN: [id number]    YOB: 1975  Age: 35 y.o. Sex: male       Cardiovascular Function/Vital Signs  Visit Vitals    /77    Pulse 78    Temp 36.4 °C (97.6 °F)    Resp 12    Ht 6' 0.5\" (1.842 m)    Wt 104.8 kg (231 lb)    SpO2 98%    BMI 30.9 kg/m2       Patient is status post general, total IV anesthesia anesthesia for Procedure(s):  COLONOSCOPY  ESOPHAGOGASTRODUODENOSCOPY (EGD)  ESOPHAGOGASTRODUODENAL (EGD) BIOPSY  COLON BIOPSY. Nausea/Vomiting: None    Postoperative hydration reviewed and adequate. Pain:  Pain Scale 1: Numeric (0 - 10) (04/04/17 1645)  Pain Intensity 1: 0 (04/04/17 1645)   Managed    Neurological Status:   Neuro (WDL): Within Defined Limits (04/04/17 1630)  Neuro  LUE Motor Response: Spontaneous  (04/04/17 1630)  LLE Motor Response: Spontaneous  (04/04/17 1630)  RUE Motor Response: Spontaneous  (04/04/17 1630)  RLE Motor Response: Spontaneous  (04/04/17 1630)   At baseline    Mental Status and Level of Consciousness: Arousable    Pulmonary Status:   O2 Device: Room air (04/04/17 1630)   Adequate oxygenation and airway patent    Complications related to anesthesia: None    Post-anesthesia assessment completed.  No concerns    Signed By: Leyla Galindo MD     April 4, 2017

## 2017-04-04 NOTE — IP AVS SNAPSHOT
[address] [facility] 
[phone] Patient: Mahendra Ackerman MRN: [id number] :1975 You are allergic to the following Allergen Reactions Cafergot (Ergotamine-Caffeine) Hives Recent Documentation Height Weight BMI Smoking Status 1.842 m 104.8 kg 30.9 kg/m2 Never Smoker Emergency Contacts Name Discharge Info Relation Home Work Mobile Patty Reece DISCHARGE CAREGIVER [3] Spouse [3] [phone] About your hospitalization You were admitted on:  2017 You last received care in the:  [facility] You were discharged on:  2017 Unit phone number:  [phone] Why you were hospitalized Your primary diagnosis was:  Not on File Providers Seen During Your Hospitalizations Provider Role Specialty Primary office phone Alyx Dodd MD Attending Provider Gastroenterology [phone] Your Primary Care Physician (PCP) Primary Care Physician Office Phone Office Fax Kenith Cushing [phone] Follow-up Information Follow up With Details Comments Contact Info Megan Dimas MD   [address] [place] [address] 
[phone] Current Discharge Medication List  
  
CONTINUE these medications which have NOT CHANGED Dose & Instructions Dispensing Information Comments Morning Noon Evening Bedtime XIFAXAN 200 mg tablet Generic drug:  rifAXIMin Your last dose was: Your next dose is:    
   
   
 Dose:  200 mg Take 200 mg by mouth daily. Refills:  0 Discharge Instructions Mahendra Ackerman [id number] 
1975 Procedure  Discharge Instructions:   
 
Discomfort: 
Redness at IV site- apply warm compress to area; if redness or soreness persist- contact your physician There may be a slight amount of blood passed from the rectum Gaseous discomfort- walking, belching will help relieve any discomfort You may not operate a vehicle for 24 hours You may not engage in an occupation involving machinery or appliances for rest of today You may not drink alcoholic beverages for at least 24 hours Avoid making any critical decisions for at least 24 hour DIET: 
 You may resume your normal diet today. You should not overeat or \"feast\" today as your abdomen may become distended or uncomfortable. MEDICATIONS: 
 I reconciled this list from the list you gave us when you came today for the procedure. Please clarify with me, your primary care physician and the nurse who is discharging you if we have any discrepancies. Aspirin and or non-steroidal medication (Ibuprofen, Motrin, naproxen, etc.) is ok in limited quantities. ACTIVITY: 
You may resume your normal daily activities it is recommended that you spend the remainder of the day resting -  avoid any strenuous activity. CALL M.D. ANY SIGN OF: Increasing pain, nausea, vomiting Abdominal distension (swelling) New increased bleeding (oral or rectal) Fever (chills) Pain in chest area Bloody discharge from nose or mouth Shortness of breath Follow-up Instructions: 
 Call Dr. Jeniffer Zuniga for the results of  biopsy in approximately one week Telephone #  [phone] Follow up visit as previously scheduled. We may consider suppsotories if symptoms persist.  Stay on xifaxan orally once a day Take pictures to Dr. Salas Lang MD 
4:36 PM 
4/4/2017 DO NOT TAKE SLEEPING MEDICATIONS OR ANTIANXIETY MEDICATIONS WHILE TAKING NARCOTIC PAIN MEDICATIONS,  ESPECIALLY THE NIGHT OF ANESTHESIA. CPAP PATIENTS BE SURE TO WEAR MACHINE WHENEVER NAPPING OR SLEEPING. DISCHARGE SUMMARY from Nurse The following personal items collected during your admission are returned to you: Dental Appliance: Dental Appliances: None Vision: Visual Aid: None Hearing Aid:   
Jewelry:   
Clothing:   
Other Valuables:   
Valuables sent to safe:   
 
 
PATIENT INSTRUCTIONS: 
 
 
F-face looks uneven A-arms unable to move or move even S-speech slurred or non-existent T-time-call 911 as soon as signs and symptoms begin-DO NOT go Back to bed or wait to see if you get better-TIME IS BRAIN. If you have not received your influenza and/or pneumococcal vaccine, please follow up with your primary care physician. The discharge information has been reviewed with the patient and parent. The patient and parent verbalized understanding. Discharge Orders None Introducing [facility]! Dear Kenrick Danger: Thank you for requesting a [employer] account. Our records indicate that you already have an active [employer] account. You can access your account anytime at [email or web address] Did you know that you can access your hospital and ER discharge instructions at any time in [employer]? You can also review all of your test results from your hospital stay or ER visit. Additional Information If you have questions, please visit the Frequently Asked Questions section of the [employer] website at [email or web address] Remember, [employer] is NOT to be used for urgent needs. For medical emergencies, dial 911. Now available from your iPhone and Android! General Information Please provide this summary of care documentation to your next provider. Patient Signature:  ____________________________________________________________ Date:  ____________________________________________________________  
  
Cory Cons Provider Signature:  ____________________________________________________________ Date:  ____________________________________________________________

## 2017-04-04 NOTE — PERIOP NOTES
Abdomen softer. Pictures given to family to take to his MD per Dr. Saadia Haney . Stressed post colonoscopy diet and no driving for 24 hours. Work excuse given to family with papers for 24 hours. 1700 Pt. Alert. Denies pain or chill. Discharge instructions reviewed with caregiver and patient. Allowed and answered questions. Tolerating PO fluids. Both state ready for discharge.

## 2017-04-04 NOTE — DISCHARGE INSTRUCTIONS
Ruddy Moralez  [id number]  1975              Procedure  Discharge Instructions:      Discomfort:  Redness at IV site- apply warm compress to area; if redness or soreness persist- contact your physician  There may be a slight amount of blood passed from the rectum  Gaseous discomfort- walking, belching will help relieve any discomfort  You may not operate a vehicle for 24 hours  You may not engage in an occupation involving machinery or appliances for rest of today  You may not drink alcoholic beverages for at least 24 hours  Avoid making any critical decisions for at least 24 hour  DIET:   You may resume your normal diet today. You should not overeat or \"feast\" today as your abdomen may become distended or uncomfortable. MEDICATIONS:   I reconciled this list from the list you gave us when you came today for the procedure. Please clarify with me, your primary care physician and the nurse who is discharging you if we have any discrepancies. Aspirin and or non-steroidal medication (Ibuprofen, Motrin, naproxen, etc.) is ok in limited quantities. ACTIVITY:  You may resume your normal daily activities it is recommended that you spend the remainder of the day resting -  avoid any strenuous activity. CALL M.D. ANY SIGN OF:  Increasing pain, nausea, vomiting  Abdominal distension (swelling)  New increased bleeding (oral or rectal)  Fever (chills)  Pain in chest area  Bloody discharge from nose or mouth  Shortness of breath          Follow-up Instructions:   Call Dr. Tonya Del Valle for the results of  biopsy in approximately one week  Telephone #  [phone]  Follow up visit as previously scheduled. We may consider suppsotories if symptoms persist.  Stay on xifaxan orally once a day  Take pictures to Dr. Eura Heimlich, MD  4:36 PM  4/4/2017       DO NOT TAKE SLEEPING MEDICATIONS OR ANTIANXIETY MEDICATIONS WHILE TAKING NARCOTIC PAIN MEDICATIONS,  ESPECIALLY THE NIGHT OF ANESTHESIA.     CPAP PATIENTS BE SURE TO WEAR MACHINE WHENEVER NAPPING OR SLEEPING. DISCHARGE SUMMARY from Nurse    The following personal items collected during your admission are returned to you:   Dental Appliance: Dental Appliances: None  Vision: Visual Aid: None  Hearing Aid:    Jewelry:    Clothing:    Other Valuables:    Valuables sent to safe:        PATIENT INSTRUCTIONS:    After General Anesthesia or Intravenous Sedation, for 24 hours or while taking prescription Narcotics:        Someone should be with you for the next 24 hours. For your own safety, a responsible adult must drive you home. · Limit your activities  · Recommended activity: Rest today, up with assistance today. Do not climb stairs or shower unattended for the next 24 hours. · Please start with a soft bland diet and advance as tolerated (no nausea) to regular diet. · If you have a sore throat you should try the following: fluids, warm salt water gargles, or throat lozenges. If it does not improve after several days please follow up with your primary physician. · Do not drive and operate hazardous machinery  · Do not make important personal or business decisions  · Do  not drink alcoholic beverages  · If you have not urinated within 8 hours after discharge, please contact your surgeon on call. Report the following to your surgeon:  · Excessive pain, swelling, redness or odor of or around the surgical area  · Temperature over 100.5  · Nausea and vomiting lasting longer than 4 hours or if unable to take medications  · Any signs of decreased circulation or nerve impairment to extremity: change in color, persistent  numbness, tingling, coldness or increase pain      · You will receive a Post Operative Call from one of the Recovery Room Nurses on the day after your surgery to check on you. It is very important for us to know how you are recovering after your surgery.  If you have an issue or need to speak with someone, please call your surgeon, do not wait for the post operative call. · You may receive an e-mail or letter in the mail from [employer] regarding your experience with us in the Ambulatory Surgery Unit. Your feedback is valuable to us and we appreciate your participation in the survey. · If the above instructions are not adequate, please contact Ursula Davis RN, [facility] anesthesia Nurse Manager or our Anesthesiologist, at [phone]. If you are having problems after your surgery, call the physician at his office number. · We wish you a speedy recovery ? What to do at Home:      *  Please give a list of your current medications to your Primary Care Provider. *  Please update this list whenever your medications are discontinued, doses are      changed, or new medications (including over-the-counter products) are added. *  Please carry medication information at all times in case of emergency situations. These are general instructions for a healthy lifestyle:    No smoking/ No tobacco products/ Avoid exposure to second hand smoke    Surgeon General's Warning:  Quitting smoking now greatly reduces serious risk to your health. Obesity, smoking, and sedentary lifestyle greatly increases your risk for illness    A healthy diet, regular physical exercise & weight monitoring are important for maintaining a healthy lifestyle    You may be retaining fluid if you have a history of heart failure or if you experience any of the following symptoms:  Weight gain of 3 pounds or more overnight or 5 pounds in a week, increased swelling in our hands or feet or shortness of breath while lying flat in bed. Please call your doctor as soon as you notice any of these symptoms; do not wait until your next office visit.     Recognize signs and symptoms of STROKE:    F-face looks uneven    A-arms unable to move or move even    S-speech slurred or non-existent    T-time-call 911 as soon as signs and symptoms begin-DO NOT go       Back to bed or wait to see if you get better-TIME IS BRAIN. If you have not received your influenza and/or pneumococcal vaccine, please follow up with your primary care physician. The discharge information has been reviewed with the patient and parent. The patient and parent verbalized understanding.

## 2017-04-04 NOTE — PROCEDURES
Pouchoscopy thru rectum    Indications: diarrhea  llq pain    Pre-operative Diagnosis: see above     Medications:  See anesthesia form    Post-operative Diagnosis:  Normal small intestine, ERYTHEMA IN POUCH, distal PROCTITIS      Procedure Details   Prior to the procedure its objectives, risks, consequences and alternatives were discussed with the patient who then elected to proceed. All questions were answered. Digital Rectal Exam:  was normal     The Olympus videoendoscope was inserted in the rectum and advanced to the small intestine. The scope was slowly and carefully withdrawn as the mucosa was inspected. The small intestine above the pouch appeared normal.  There was some pathcy erythema in the pouch that was not impressive. Upon removal of the scope there was some bright red erythema in the distal 2 cm, possibly in rectal remnant, distal to anastomosis. No other abnormalities were noted. Retroflexion in the pouch was negative. Photos to document the ileum, pouch and retroflexion exam were obtained. I took representative biopsies    The preparation was adequate. Estimated Blood Loss:  none    Specimens:  1) small bowel  2) pouch  3) rectum \"pouchitis\"    Findings:  Normal small bowel  Patchy erythema in pouch  Distal 2 cm of exam shows active proctitis ? rectal remnant    Complications:  none    Repeat colonoscopy is recommended in:  2 years.                Sumit Astudillo MD  4:32 PM  4/4/2017

## 2017-04-04 NOTE — PERIOP NOTES
Moore Jean Claude  1975  [id number]    Situation:  Verbal report given from: KATERYNA Mendoza RN and ROWDY Serrano CRNA  Procedure: Procedure(s):  COLONOSCOPY  ESOPHAGOGASTRODUODENOSCOPY (EGD)  ESOPHAGOGASTRODUODENAL (EGD) BIOPSY  COLON BIOPSY    Background:    Preoperative diagnosis: C-DIFF, LOWER ABD PAIN, UMBILICAL PAIN    Postoperative diagnosis: MOSAIC GASTROPATHY, SMALL ESOPHAGEAL VARICES, ERYTHEMA IN [place], PROCTITIS    :  Dr. Lisa Lindo     Assistant(s): Circ-1: Yayo Richard RN  Scrub Tech-1: Tiffany Severino    Specimens:   ID Type Source Tests Collected by Time Destination   1 : DUODENUM BIOPSY  Preservative Duodenum  Susie Alberts MD 4/4/2017 1611 Pathology   2 : STOMACH BIOPSY  Preservative Stomach  Susie Alberts MD 4/4/2017 1613 Pathology   3 : SMALL INTESTINE BIOPSY  Preservative Small Bowel  Susie Alberts MD 4/4/2017 1617 Pathology   4 : Radha Sanchez MD 4/4/2017 1617 Pathology   5 : RECTUM BIOPSY  Preservative Rectum  Susie Alberts MD 4/4/2017 1621 Pathology       Assessment:  Intra-procedure medications   Propofol        Anesthesia gave intra-procedure sedation and medications, see anesthesia flow sheet     Intravenous fluids: LR@ KVO     Vital signs stable. Pt groggy denies pain or chill    Abdominal assessment: round and tight but palpable      Recommendation:    Permission to share finding with family or friend yes    All side rails up, bed in low position, wheels locked. Nurse at bedside.

## 2017-04-04 NOTE — H&P
Pre-endoscopy H and P    [facility] The airway was assessed and docuemented. The problem list, past medical history, and medications were reviewed. We are doing this to screen for varices, look at pouchitis. Patient Active Problem List   Diagnosis Code    Acute pancreatitis K85.90    Primary sclerosing cholangitis K83.0    Pouchitis ([address] K91.850    Chest pain R07.9    Iron deficiency anemia D50.9     Social History     Social History    Marital status:      Spouse name: N/A    Number of children: N/A    Years of education: N/A     Occupational History    Not on file. Social History Main Topics    Smoking status: Never Smoker    Smokeless tobacco: Never Used    Alcohol use No    Drug use: No    Sexual activity: Yes     Partners: Female     Birth control/ protection: Condom     Other Topics Concern    Not on file     Social History Narrative     Past Medical History:   Diagnosis Date    Chest pain 2/18/2013    as of 10/14/14 pt denies any CP since 2/13    Crohn's colitis ([address]     H/O Clostridium difficile infection 01/2017    as of 3/30/17 pt denies abd pain, diarrhea > 1 week    Iron deficiency anemia 2/18/2013    Pancreatitis     P[address] 12/8/2011    PSC (primary sclerosing cholangitis)     Dr Samantha Ernandez [phone]    Ulcerative colitis [facility])      The patient has a family history of na    Prior to Admission Medications   Prescriptions Last Dose Informant Patient Reported? Taking? rifAXIMin (XIFAXAN) 200 mg tablet 3/31/2017  Yes Yes   Sig: Take 200 mg by mouth daily. Facility-Administered Medications: None           The review of systems is:  negative for shortness of breath or chest pain      The heart, lungs, and mental status were satisfactory for the administration of anesthesia sedation and for the procedure. I discussed with the patient the objectives, risks, consequences and alternatives to the procedure. Ruddy Calvin MD  4/4/2017  2:50 PM

## 2017-04-04 NOTE — PROCEDURES
Esophagogastroduodenoscopy    Indications:  Psc, evaulate for varices. Medications:  See anesthesia form    Post procedure diagnosis:  MOSAIC GASTROPATHY, SMALL ESOPHAGEAL VARICES,     Description of Procedure:    Prior to the procedure its objectives, risks, consequences and alternatives were discussed with the patient who then elected to proceed. The Olympus video endoscope was inserted under direct vision into the mouth and then into the esophagus. The mucosa of the esophagus looked normal.  There were three small varices that disappeared with insufflation. They extended from 39 cm to the z-line at 43 cm. In the stomach there was mosaic gastropathy, consistent with portal hypertension. Gastric varices were not seen. There were no other diagnostic abnormalities of the body, fundus, antrum, cardia and incisura of the stomach. This included direct and retroflexion examination. The first and second portion of the duodenum appeared normal.  I took biopsies of the duodenum and the stomach. Complications: There were no apparent complications and the patient tolerated the procedure well.         Estimated Blood Loss:  none  Specimens Removed:  Duodenum  stomach  Impressions:  No gastric varices  Likely portal gastropathy  Small distal esophageal varices without stigmata      Signed By: Sloan Xiong MD                        April 4, 2017     4:27 PM

## 2017-04-04 NOTE — ANESTHESIA PREPROCEDURE EVALUATION
Anesthetic History   No history of anesthetic complications            Review of Systems / Medical History  Patient summary reviewed, nursing notes reviewed and pertinent labs reviewed    Pulmonary  Within defined limits                 Neuro/Psych   Within defined limits           Cardiovascular  Within defined limits                Exercise tolerance: >4 METS     GI/Hepatic/Renal           Liver disease (Primary Sclerosing Cholangitis; stable)    Comments: Crohn's & Ulcerative colitis  Endo/Other        Anemia (iron def)     Other Findings            Physical Exam    Airway  Mallampati: I  TM Distance: > 6 cm  Neck ROM: normal range of motion   Mouth opening: Normal     Cardiovascular    Rhythm: regular  Rate: normal      Pertinent negatives: No murmur   Dental  No notable dental hx       Pulmonary  Breath sounds clear to auscultation               Abdominal  GI exam deferred       Other Findings            Anesthetic Plan    ASA: 2  Anesthesia type: general and total IV anesthesia          Induction: Intravenous  Anesthetic plan and risks discussed with: Patient

## 2017-05-27 ENCOUNTER — HOSPITAL ENCOUNTER (EMERGENCY)
Age: 42
Discharge: HOME | End: 2017-05-28
Attending: EMERGENCY MEDICINE
Payer: OTHER GOVERNMENT

## 2017-05-27 ENCOUNTER — APPOINTMENT (OUTPATIENT)
Dept: CT IMAGING | Age: 42
End: 2017-05-27
Attending: EMERGENCY MEDICINE
Payer: OTHER GOVERNMENT

## 2017-05-27 DIAGNOSIS — R10.84 ABDOMINAL PAIN, GENERALIZED: ICD-10-CM

## 2017-05-27 DIAGNOSIS — R11.2 NON-INTRACTABLE VOMITING WITH NAUSEA, UNSPECIFIED VOMITING TYPE: ICD-10-CM

## 2017-05-27 DIAGNOSIS — K52.9 GASTROENTERITIS, ACUTE: Primary | ICD-10-CM

## 2017-05-27 LAB
BASOPHILS # BLD AUTO: 0 K/UL (ref 0–0.1)
BASOPHILS # BLD: 1 % (ref 0–1)
EOSINOPHIL # BLD: 0.4 K/UL (ref 0–0.4)
EOSINOPHIL NFR BLD: 6 % (ref 0–7)
ERYTHROCYTE [DISTWIDTH] IN BLOOD BY AUTOMATED COUNT: 14.3 % (ref 11.5–14.5)
HCT VFR BLD AUTO: 37 % (ref 36.6–50.3)
HGB BLD-MCNC: 12.5 G/DL (ref 12.1–17)
LYMPHOCYTES # BLD AUTO: 32 % (ref 12–49)
LYMPHOCYTES # BLD: 2.1 K/UL (ref 0.8–3.5)
MCH RBC QN AUTO: 30.3 PG (ref 26–34)
MCHC RBC AUTO-ENTMCNC: 33.8 G/DL (ref 30–36.5)
MCV RBC AUTO: 89.8 FL (ref 80–99)
MONOCYTES # BLD: 0.5 K/UL (ref 0–1)
MONOCYTES NFR BLD AUTO: 8 % (ref 5–13)
NEUTS SEG # BLD: 3.6 K/UL (ref 1.8–8)
NEUTS SEG NFR BLD AUTO: 53 % (ref 32–75)
PLATELET # BLD AUTO: 165 K/UL (ref 150–400)
RBC # BLD AUTO: 4.12 M/UL (ref 4.1–5.7)
WBC # BLD AUTO: 6.6 K/UL (ref 4.1–11.1)

## 2017-05-27 PROCEDURE — 36415 COLL VENOUS BLD VENIPUNCTURE: CPT | Performed by: EMERGENCY MEDICINE

## 2017-05-27 PROCEDURE — 74177 CT ABD & PELVIS W/CONTRAST: CPT

## 2017-05-27 PROCEDURE — 96375 TX/PRO/DX INJ NEW DRUG ADDON: CPT

## 2017-05-27 PROCEDURE — 80053 COMPREHEN METABOLIC PANEL: CPT | Performed by: EMERGENCY MEDICINE

## 2017-05-27 PROCEDURE — 99284 EMERGENCY DEPT VISIT MOD MDM: CPT

## 2017-05-27 PROCEDURE — 96361 HYDRATE IV INFUSION ADD-ON: CPT

## 2017-05-27 PROCEDURE — 85025 COMPLETE CBC W/AUTO DIFF WBC: CPT | Performed by: EMERGENCY MEDICINE

## 2017-05-27 PROCEDURE — 96374 THER/PROPH/DIAG INJ IV PUSH: CPT

## 2017-05-27 RX ORDER — ONDANSETRON 2 MG/ML
4 INJECTION INTRAMUSCULAR; INTRAVENOUS
Status: COMPLETED | OUTPATIENT
Start: 2017-05-27 | End: 2017-05-28

## 2017-05-27 RX ORDER — SODIUM CHLORIDE 9 MG/ML
50 INJECTION, SOLUTION INTRAVENOUS
Status: COMPLETED | OUTPATIENT
Start: 2017-05-27 | End: 2017-05-28

## 2017-05-27 RX ORDER — MORPHINE SULFATE 2 MG/ML
4 INJECTION, SOLUTION INTRAMUSCULAR; INTRAVENOUS
Status: COMPLETED | OUTPATIENT
Start: 2017-05-27 | End: 2017-05-28

## 2017-05-27 RX ORDER — SODIUM CHLORIDE 0.9 % (FLUSH) 0.9 %
10 SYRINGE (ML) INJECTION
Status: COMPLETED | OUTPATIENT
Start: 2017-05-27 | End: 2017-05-28

## 2017-05-28 VITALS
DIASTOLIC BLOOD PRESSURE: 80 MMHG | BODY MASS INDEX: 32.01 KG/M2 | OXYGEN SATURATION: 97 % | SYSTOLIC BLOOD PRESSURE: 126 MMHG | WEIGHT: 236.33 LBS | HEIGHT: 72 IN | RESPIRATION RATE: 20 BRPM | HEART RATE: 67 BPM | TEMPERATURE: 98.5 F

## 2017-05-28 LAB
ALBUMIN SERPL BCP-MCNC: 2.8 G/DL (ref 3.5–5)
ALBUMIN/GLOB SERPL: 0.5 {RATIO} (ref 1.1–2.2)
ALP SERPL-CCNC: 595 U/L (ref 45–117)
ALT SERPL-CCNC: 109 U/L (ref 12–78)
ANION GAP BLD CALC-SCNC: 8 MMOL/L (ref 5–15)
AST SERPL W P-5'-P-CCNC: 129 U/L (ref 15–37)
BILIRUB SERPL-MCNC: 1.1 MG/DL (ref 0.2–1)
BUN SERPL-MCNC: 7 MG/DL (ref 6–20)
BUN/CREAT SERPL: 9 (ref 12–20)
CALCIUM SERPL-MCNC: 8.7 MG/DL (ref 8.5–10.1)
CHLORIDE SERPL-SCNC: 105 MMOL/L (ref 97–108)
CO2 SERPL-SCNC: 27 MMOL/L (ref 21–32)
CREAT SERPL-MCNC: 0.8 MG/DL (ref 0.7–1.3)
GLOBULIN SER CALC-MCNC: 5.8 G/DL (ref 2–4)
GLUCOSE SERPL-MCNC: 105 MG/DL (ref 65–100)
POTASSIUM SERPL-SCNC: 3.6 MMOL/L (ref 3.5–5.1)
PROT SERPL-MCNC: 8.6 G/DL (ref 6.4–8.2)
SODIUM SERPL-SCNC: 140 MMOL/L (ref 136–145)

## 2017-05-28 PROCEDURE — 74011636320 HC RX REV CODE- 636/320: Performed by: EMERGENCY MEDICINE

## 2017-05-28 PROCEDURE — 74011250636 HC RX REV CODE- 250/636: Performed by: EMERGENCY MEDICINE

## 2017-05-28 RX ORDER — HYDROCODONE BITARTRATE AND ACETAMINOPHEN 5; 325 MG/1; MG/1
1 TABLET ORAL
Qty: 20 TAB | Refills: 0 | Status: SHIPPED | OUTPATIENT
Start: 2017-05-28 | End: 2017-08-04

## 2017-05-28 RX ADMIN — Medication 4 MG: at 00:10

## 2017-05-28 RX ADMIN — Medication 10 ML: at 01:41

## 2017-05-28 RX ADMIN — ONDANSETRON HYDROCHLORIDE 4 MG: 2 INJECTION, SOLUTION INTRAMUSCULAR; INTRAVENOUS at 00:10

## 2017-05-28 RX ADMIN — SODIUM CHLORIDE 1000 ML: 900 INJECTION, SOLUTION INTRAVENOUS at 00:13

## 2017-05-28 RX ADMIN — IOPAMIDOL 100 ML: 755 INJECTION, SOLUTION INTRAVENOUS at 01:41

## 2017-05-28 RX ADMIN — SODIUM CHLORIDE 50 ML/HR: 900 INJECTION, SOLUTION INTRAVENOUS at 01:41

## 2017-05-28 RX ADMIN — DIATRIZOATE MEGLUMINE AND DIATRIZOATE SODIUM 30 ML: 600; 100 SOLUTION ORAL; RECTAL at 00:10

## 2017-05-28 NOTE — ED PROVIDER NOTES
HPI Comments: Maeve Rai is a 35 y.o. male with a pertinent PMHx of ulcerative colitis who presents ambulatory to the ED c/o cramping, RLQ and RUQ abdominal pain since this morning. Pt explains that he has J pouch for the management of his ulcerative colitis. He endorses an associated symptom of nausea. Pt notes that he only passed a BM 3 times today when he usually passes 7 or 8 per day. He denies taking any medications to relieve his symptoms. Pt states that his last ulcerative colitis flare up was a few months ago. Pt specifically denies vomiting, dysuria, fever, hematuria, recent sick contact, nor Hx of nephrolithiasis. Social hx: - Tobacco use, - EtOH use, - Illicit drug use    PCP: Christine Meier MD  Gastroenterologist: Meghann Le MD    There are no other complaints, changes or physical findings at this time. The history is provided by the patient. No  was used.         Past Medical History:   Diagnosis Date    Chest pain 2/18/2013    as of 10/14/14 pt denies any CP since 2/13    Crohn's colitis ([address]     H/O Clostridium difficile infection 01/2017    as of 3/30/17 pt denies abd pain, diarrhea > 1 week    Iron deficiency anemia 2/18/2013    Pancreatitis     Pouchitis ([address] 12/8/2011    PSC (primary sclerosing cholangitis)     Dr Venkat Deras [phone]    Ulcerative colitis [facility])        Past Surgical History:   Procedure Laterality Date    ABDOMEN SURGERY PROC UNLISTED  2002    colostomy reversal & J Pouch    COLONOSCOPY N/A 4/4/2017    COLONOSCOPY performed by [facility] at [facility] AMBULATORY [facility]    HX COLECTOMY  2002    colostomy    HX GI      ercp    HX ROTATOR CUFF REPAIR      right    DC COLONOSCOPY W/BIOPSY SINGLE/MULTIPLE  12/8/2011         DC EGD TRANSORAL BIOPSY SINGLE/MULTIPLE  2/18/2013              Family History:   Problem Relation Age of Onset    Diabetes Mother     Hypertension Mother     Diabetes Father     Hypertension Father     No Known Problems Sister     No Known Problems Brother     No Known Problems Maternal Aunt     No Known Problems Maternal Uncle     No Known Problems Paternal Aunt     No Known Problems Paternal Uncle     No Known Problems Maternal Grandmother     No Known Problems Maternal Grandfather     No Known Problems Paternal Grandmother     No Known Problems Paternal Grandfather        Social History     Social History    Marital status:      Spouse name: N/A    Number of children: N/A    Years of education: N/A     Occupational History    Not on file. Social History Main Topics    Smoking status: Never Smoker    Smokeless tobacco: Never Used    Alcohol use No    Drug use: No    Sexual activity: Yes     Partners: Female     Birth control/ protection: Condom     Other Topics Concern    Not on file     Social History Narrative         ALLERGIES: Cafergot [ergotamine-caffeine]    Review of Systems   Constitutional: Negative for chills and fever. Respiratory: Negative for cough and shortness of breath. Cardiovascular: Negative for chest pain. Gastrointestinal: Positive for abdominal pain and nausea. Negative for constipation, diarrhea and vomiting. Neurological: Negative for weakness and numbness. All other systems reviewed and are negative. Patient Vitals for the past 12 hrs:   Temp Pulse Resp BP SpO2   05/28/17 0100 - - - 126/80 97 %   05/28/17 0045 - - - 122/83 97 %   05/28/17 0030 - - - (!) 119/92 97 %   05/28/17 0001 - - - 106/74 97 %   05/27/17 2309 98.5 °F (36.9 °C) 67 20 (!) 148/103 97 %            Physical Exam   Constitutional: He is oriented to person, place, and time. He appears well-developed and well-nourished. Moderate distress secondary to stress   HENT:   Head: Normocephalic and atraumatic. Eyes: Conjunctivae and EOM are normal.   Neck: Normal range of motion. Neck supple. Cardiovascular: Normal rate and regular rhythm.     Pulmonary/Chest: Effort normal and breath sounds normal. No respiratory distress. Abdominal: Soft. He exhibits no distension. Diffuse tenderness that is worse in the RUQ and RLQ   Musculoskeletal: Normal range of motion. Neurological: He is alert and oriented to person, place, and time. Skin: Skin is warm and dry. Psychiatric: He has a normal mood and affect. Nursing note and vitals reviewed. MDM  Number of Diagnoses or Management Options  Abdominal pain, generalized:   Gastroenteritis, acute:   Non-intractable vomiting with nausea, unspecified vomiting type:   Diagnosis management comments: Patient presents with abdominal pain. DDx: Gastroenteritis, SBO, appendicitis, colitis, IBD (UC), diverticulitis, mesenteric ischemia, AAA or descending dissection, ACS, ureteral stone. Will get labs and CT Abdomen. Most likely ulcerative colitis flare. Amount and/or Complexity of Data Reviewed  Clinical lab tests: ordered and reviewed  Tests in the radiology section of CPT®: ordered and reviewed  Review and summarize past medical records: yes    Patient Progress  Patient progress: stable    ED Course       Procedures    LABORATORY TESTS:  Recent Results (from the past 12 hour(s))   CBC WITH AUTOMATED DIFF    Collection Time: 05/27/17 11:41 PM   Result Value Ref Range    WBC 6.6 4.1 - 11.1 K/uL    RBC 4.12 4.10 - 5.70 M/uL    HGB 12.5 12.1 - 17.0 g/dL    HCT 37.0 36.6 - 50.3 %    MCV 89.8 80.0 - 99.0 FL    MCH 30.3 26.0 - 34.0 PG    MCHC 33.8 30.0 - 36.5 g/dL    RDW 14.3 11.5 - 14.5 %    PLATELET 449 426 - 888 K/uL    NEUTROPHILS 53 32 - 75 %    LYMPHOCYTES 32 12 - 49 %    MONOCYTES 8 5 - 13 %    EOSINOPHILS 6 0 - 7 %    BASOPHILS 1 0 - 1 %    ABS. NEUTROPHILS 3.6 1.8 - 8.0 K/UL    ABS. LYMPHOCYTES 2.1 0.8 - 3.5 K/UL    ABS. MONOCYTES 0.5 0.0 - 1.0 K/UL    ABS. EOSINOPHILS 0.4 0.0 - 0.4 K/UL    ABS.  BASOPHILS 0.0 0.0 - 0.1 K/UL   METABOLIC PANEL, COMPREHENSIVE    Collection Time: 05/27/17 11:41 PM   Result Value Ref Range    Sodium 140 136 - 145 mmol/L    Potassium 3.6 3.5 - 5.1 mmol/L    Chloride 105 97 - 108 mmol/L    CO2 27 21 - 32 mmol/L    Anion gap 8 5 - 15 mmol/L    Glucose 105 (H) 65 - 100 mg/dL    BUN 7 6 - 20 MG/DL    Creatinine 0.80 0.70 - 1.30 MG/DL    BUN/Creatinine ratio 9 (L) 12 - 20      GFR est AA >60 >60 ml/min/1.73m2    GFR est non-AA >60 >60 ml/min/1.73m2    Calcium 8.7 8.5 - 10.1 MG/DL    Bilirubin, total 1.1 (H) 0.2 - 1.0 MG/DL    ALT (SGPT) 109 (H) 12 - 78 U/L    AST (SGOT) 129 (H) 15 - 37 U/L    Alk. phosphatase 595 (H) 45 - 117 U/L    Protein, total 8.6 (H) 6.4 - 8.2 g/dL    Albumin 2.8 (L) 3.5 - 5.0 g/dL    Globulin 5.8 (H) 2.0 - 4.0 g/dL    A-G Ratio 0.5 (L) 1.1 - 2.2         IMAGING RESULTS:    CT Results  (Last 48 hours)               05/28/17 0141  CT ABD PELV W CONT Final result    Impression:  IMPRESSION:    1. No acute disease or significant change. 2. Status post colectomy. 3. Stable hepatobiliary disease. 4. Stable renal nonspecific patchy hypoenhancement. Narrative:  INDICATION: hx of UC with abd pain        EXAM: CT Abdomen and CT Pelvis are performed with 100 mL Isovue 370contrast. CT   dose reduction was achieved through use of a standardized protocol tailored for   this examination and automatic exposure control for dose modulation. COMPARISON: 1/24/2017. FINDINGS:    There is no significant change. Previous colectomy. There is no apparent   inflammation, ascites, free air or significant adenopathy. Liver is unchanged,   with intrahepatic biliary dilation and heterogeneous enhancement, without focal   mass. CBD is not enlarged. Gallbladder is unremarkable. Pancreas shows no mass   or inflammation. Spleen is unremarkable. Adrenal glands are normal in size. Kidneys show unchanged areas of nonspecific hypoenhancement, without stone, mass   or hydronephrosis. Aorta shows no aneurysm. The bladder is midline and the distal ureters are not dilated. There is no   apparent pelvic mass. MEDICATIONS GIVEN:  Medications   diatrizoate meglumine-d.sodium (MD-GASTROVIEW,GASTROGRAFIN) 66-10 % contrast solution 30 mL (30 mL Oral Given 5/28/17 0010)   morphine injection 4 mg (4 mg IntraVENous Given 5/28/17 0010)   ondansetron (ZOFRAN) injection 4 mg (4 mg IntraVENous Given 5/28/17 0010)   sodium chloride 0.9 % bolus infusion 1,000 mL (0 mL IntraVENous IV Completed 5/28/17 0242)   0.9% sodium chloride infusion (0 mL/hr IntraVENous IV Completed 5/28/17 0150)   iopamidol (ISOVUE-370) 76 % injection 100 mL (100 mL IntraVENous Given 5/28/17 0141)   sodium chloride (NS) flush 10 mL (10 mL IntraVENous Given 5/28/17 0141)       IMPRESSION:  1. Gastroenteritis, acute    2. Abdominal pain, generalized    3. Non-intractable vomiting with nausea, unspecified vomiting type        PLAN:  1. Discharge Medication List as of 5/28/2017  2:18 AM      START taking these medications    Details   HYDROcodone-acetaminophen (NORCO) 5-325 mg per tablet Take 1 Tab by mouth every four (4) hours as needed for Pain. Max Daily Amount: 6 Tabs., Print, Disp-20 Tab, R-0           2. Follow-up Information     Follow up With Details Comments [address], [place]  [facility] wor[facility]  [phone]          Return to ED if worse     DISCHARGE NOTE  2:18 AM  The patient has been re-evaluated and is ready for discharge. Reviewed available results with patient. Counseled patient on diagnosis and care plan. Patient has expressed understanding, and all questions have been answered. Patient agrees with plan and agrees to follow up as recommended, or return to the ED if their symptoms worsen. Discharge instructions have been provided and explained to the patient, along with reasons to return to the ED. ATTESTATION:  This note is prepared by Nikolay Sahu, acting as Scribe for Stephanie Reyes M.D.     Stephanie Reyes M.D: The scribe's documentation has been prepared under my direction and personally reviewed by me in its entirety. I confirm that the note above accurately reflects all work, treatment, procedures, and medical decision making performed by me.

## 2017-05-28 NOTE — ED NOTES
Discharge instructions reviewed with pt and given by Dr Ni Co. IV discontinued with catheter intact. Taken off of monitor. Pt ambulated out of ED with steady gait after declining wheelchair ride to car. No other complaints voiced at time of discharge.

## 2017-08-04 ENCOUNTER — HOSPITAL ENCOUNTER (EMERGENCY)
Age: 42
Discharge: HOME | End: 2017-08-04
Attending: EMERGENCY MEDICINE
Payer: OTHER GOVERNMENT

## 2017-08-04 ENCOUNTER — APPOINTMENT (OUTPATIENT)
Dept: CT IMAGING | Age: 42
End: 2017-08-04
Attending: EMERGENCY MEDICINE
Payer: OTHER GOVERNMENT

## 2017-08-04 VITALS
OXYGEN SATURATION: 95 % | RESPIRATION RATE: 13 BRPM | WEIGHT: 227.29 LBS | TEMPERATURE: 98.2 F | DIASTOLIC BLOOD PRESSURE: 78 MMHG | BODY MASS INDEX: 30.79 KG/M2 | HEIGHT: 72 IN | HEART RATE: 73 BPM | SYSTOLIC BLOOD PRESSURE: 109 MMHG

## 2017-08-04 DIAGNOSIS — K83.01 PRIMARY SCLEROSING CHOLANGITIS: ICD-10-CM

## 2017-08-04 DIAGNOSIS — R10.9 ACUTE ABDOMINAL PAIN: Primary | ICD-10-CM

## 2017-08-04 DIAGNOSIS — A04.72 CLOSTRIDIUM DIFFICILE COLITIS: ICD-10-CM

## 2017-08-04 LAB
ALBUMIN SERPL BCP-MCNC: 2.5 G/DL (ref 3.5–5)
ALBUMIN/GLOB SERPL: 0.4 {RATIO} (ref 1.1–2.2)
ALP SERPL-CCNC: 921 U/L (ref 45–117)
ALT SERPL-CCNC: 124 U/L (ref 12–78)
ANION GAP BLD CALC-SCNC: 6 MMOL/L (ref 5–15)
APPEARANCE UR: ABNORMAL
AST SERPL W P-5'-P-CCNC: 194 U/L (ref 15–37)
BACTERIA URNS QL MICRO: NEGATIVE /HPF
BASOPHILS # BLD AUTO: 0 K/UL (ref 0–0.1)
BASOPHILS # BLD: 0 % (ref 0–1)
BILIRUB SERPL-MCNC: 2.9 MG/DL (ref 0.2–1)
BILIRUB UR QL CFM: POSITIVE
BUN SERPL-MCNC: 8 MG/DL (ref 6–20)
BUN/CREAT SERPL: 9 (ref 12–20)
C DIFF TOX GENS STL QL NAA+PROBE: POSITIVE
CALCIUM SERPL-MCNC: 8.6 MG/DL (ref 8.5–10.1)
CHLORIDE SERPL-SCNC: 100 MMOL/L (ref 97–108)
CO2 SERPL-SCNC: 30 MMOL/L (ref 21–32)
COLOR UR: ABNORMAL
CREAT SERPL-MCNC: 0.89 MG/DL (ref 0.7–1.3)
EOSINOPHIL # BLD: 0.5 K/UL (ref 0–0.4)
EOSINOPHIL NFR BLD: 7 % (ref 0–7)
EPITH CASTS URNS QL MICRO: ABNORMAL /LPF
ERYTHROCYTE [DISTWIDTH] IN BLOOD BY AUTOMATED COUNT: 14.3 % (ref 11.5–14.5)
GLOBULIN SER CALC-MCNC: 6.8 G/DL (ref 2–4)
GLUCOSE SERPL-MCNC: 87 MG/DL (ref 65–100)
GLUCOSE UR STRIP.AUTO-MCNC: NEGATIVE MG/DL
HCT VFR BLD AUTO: 36.6 % (ref 36.6–50.3)
HEMOCCULT STL QL: POSITIVE
HGB BLD-MCNC: 12 G/DL (ref 12.1–17)
HGB UR QL STRIP: NEGATIVE
KETONES UR QL STRIP.AUTO: ABNORMAL MG/DL
LEUKOCYTE ESTERASE UR QL STRIP.AUTO: ABNORMAL
LIPASE SERPL-CCNC: 142 U/L (ref 73–393)
LYMPHOCYTES # BLD AUTO: 23 % (ref 12–49)
LYMPHOCYTES # BLD: 1.5 K/UL (ref 0.8–3.5)
MCH RBC QN AUTO: 29.6 PG (ref 26–34)
MCHC RBC AUTO-ENTMCNC: 32.8 G/DL (ref 30–36.5)
MCV RBC AUTO: 90.4 FL (ref 80–99)
MONOCYTES # BLD: 0.3 K/UL (ref 0–1)
MONOCYTES NFR BLD AUTO: 4 % (ref 5–13)
MUCOUS THREADS URNS QL MICRO: ABNORMAL /LPF
NEUTS SEG # BLD: 4.4 K/UL (ref 1.8–8)
NEUTS SEG NFR BLD AUTO: 66 % (ref 32–75)
NITRITE UR QL STRIP.AUTO: POSITIVE
PH UR STRIP: 6 [PH] (ref 5–8)
PLATELET # BLD AUTO: 205 K/UL (ref 150–400)
POTASSIUM SERPL-SCNC: 3.5 MMOL/L (ref 3.5–5.1)
PROT SERPL-MCNC: 9.3 G/DL (ref 6.4–8.2)
PROT UR STRIP-MCNC: ABNORMAL MG/DL
RBC # BLD AUTO: 4.05 M/UL (ref 4.1–5.7)
RBC #/AREA URNS HPF: ABNORMAL /HPF (ref 0–5)
SODIUM SERPL-SCNC: 136 MMOL/L (ref 136–145)
SP GR UR REFRACTOMETRY: 1.03 (ref 1–1.03)
UA: UC IF INDICATED,UAUC: ABNORMAL
UROBILINOGEN UR QL STRIP.AUTO: 4 EU/DL (ref 0.2–1)
WBC # BLD AUTO: 6.7 K/UL (ref 4.1–11.1)
WBC URNS QL MICRO: ABNORMAL /HPF (ref 0–4)

## 2017-08-04 PROCEDURE — 87493 C DIFF AMPLIFIED PROBE: CPT | Performed by: EMERGENCY MEDICINE

## 2017-08-04 PROCEDURE — 74177 CT ABD & PELVIS W/CONTRAST: CPT

## 2017-08-04 PROCEDURE — 96374 THER/PROPH/DIAG INJ IV PUSH: CPT

## 2017-08-04 PROCEDURE — 83690 ASSAY OF LIPASE: CPT | Performed by: EMERGENCY MEDICINE

## 2017-08-04 PROCEDURE — 87045 FECES CULTURE AEROBIC BACT: CPT | Performed by: EMERGENCY MEDICINE

## 2017-08-04 PROCEDURE — 99283 EMERGENCY DEPT VISIT LOW MDM: CPT

## 2017-08-04 PROCEDURE — 80053 COMPREHEN METABOLIC PANEL: CPT | Performed by: EMERGENCY MEDICINE

## 2017-08-04 PROCEDURE — 74011250636 HC RX REV CODE- 250/636: Performed by: EMERGENCY MEDICINE

## 2017-08-04 PROCEDURE — 85025 COMPLETE CBC W/AUTO DIFF WBC: CPT | Performed by: EMERGENCY MEDICINE

## 2017-08-04 PROCEDURE — 74011636320 HC RX REV CODE- 636/320: Performed by: EMERGENCY MEDICINE

## 2017-08-04 PROCEDURE — 96375 TX/PRO/DX INJ NEW DRUG ADDON: CPT

## 2017-08-04 PROCEDURE — 96361 HYDRATE IV INFUSION ADD-ON: CPT

## 2017-08-04 PROCEDURE — 36415 COLL VENOUS BLD VENIPUNCTURE: CPT | Performed by: EMERGENCY MEDICINE

## 2017-08-04 PROCEDURE — 81001 URINALYSIS AUTO W/SCOPE: CPT | Performed by: EMERGENCY MEDICINE

## 2017-08-04 PROCEDURE — 82272 OCCULT BLD FECES 1-3 TESTS: CPT | Performed by: EMERGENCY MEDICINE

## 2017-08-04 PROCEDURE — 96376 TX/PRO/DX INJ SAME DRUG ADON: CPT

## 2017-08-04 RX ORDER — METRONIDAZOLE 500 MG/1
500 TABLET ORAL 2 TIMES DAILY
Qty: 20 TAB | Refills: 0 | Status: SHIPPED | OUTPATIENT
Start: 2017-08-04 | End: 2017-08-14

## 2017-08-04 RX ORDER — HYDROMORPHONE HYDROCHLORIDE 1 MG/ML
1 INJECTION, SOLUTION INTRAMUSCULAR; INTRAVENOUS; SUBCUTANEOUS
Status: COMPLETED | OUTPATIENT
Start: 2017-08-04 | End: 2017-08-04

## 2017-08-04 RX ORDER — SODIUM CHLORIDE 0.9 % (FLUSH) 0.9 %
10 SYRINGE (ML) INJECTION
Status: COMPLETED | OUTPATIENT
Start: 2017-08-04 | End: 2017-08-04

## 2017-08-04 RX ORDER — ONDANSETRON 2 MG/ML
4 INJECTION INTRAMUSCULAR; INTRAVENOUS
Status: COMPLETED | OUTPATIENT
Start: 2017-08-04 | End: 2017-08-04

## 2017-08-04 RX ORDER — SODIUM CHLORIDE 9 MG/ML
50 INJECTION, SOLUTION INTRAVENOUS
Status: COMPLETED | OUTPATIENT
Start: 2017-08-04 | End: 2017-08-04

## 2017-08-04 RX ORDER — DICYCLOMINE HYDROCHLORIDE 20 MG/1
20 TABLET ORAL EVERY 6 HOURS
Qty: 20 TAB | Refills: 0 | Status: SHIPPED | OUTPATIENT
Start: 2017-08-04 | End: 2017-08-09

## 2017-08-04 RX ORDER — ONDANSETRON 4 MG/1
4 TABLET, ORALLY DISINTEGRATING ORAL
Qty: 10 TAB | Refills: 0 | Status: SHIPPED | OUTPATIENT
Start: 2017-08-04 | End: 2018-01-12

## 2017-08-04 RX ORDER — OXYCODONE HYDROCHLORIDE 5 MG/1
15 TABLET ORAL
Qty: 20 TAB | Refills: 0 | Status: SHIPPED | OUTPATIENT
Start: 2017-08-04 | End: 2018-01-12

## 2017-08-04 RX ORDER — CIPROFLOXACIN 250 MG/1
TABLET, FILM COATED ORAL DAILY
COMMUNITY
End: 2017-12-21

## 2017-08-04 RX ADMIN — IOPAMIDOL 100 ML: 755 INJECTION, SOLUTION INTRAVENOUS at 10:03

## 2017-08-04 RX ADMIN — HYDROMORPHONE HYDROCHLORIDE 1 MG: 1 INJECTION, SOLUTION INTRAMUSCULAR; INTRAVENOUS; SUBCUTANEOUS at 09:21

## 2017-08-04 RX ADMIN — SODIUM CHLORIDE 50 ML/HR: 900 INJECTION, SOLUTION INTRAVENOUS at 10:04

## 2017-08-04 RX ADMIN — Medication 10 ML: at 10:04

## 2017-08-04 RX ADMIN — HYDROMORPHONE HYDROCHLORIDE 1 MG: 1 INJECTION, SOLUTION INTRAMUSCULAR; INTRAVENOUS; SUBCUTANEOUS at 11:18

## 2017-08-04 RX ADMIN — ONDANSETRON 4 MG: 2 INJECTION INTRAMUSCULAR; INTRAVENOUS at 09:21

## 2017-08-04 RX ADMIN — SODIUM CHLORIDE 1000 ML: 900 INJECTION, SOLUTION INTRAVENOUS at 09:21

## 2017-08-04 NOTE — DISCHARGE INSTRUCTIONS
Abdominal Pain: Care Instructions  Your Care Instructions    Abdominal pain has many possible causes. Some aren't serious and get better on their own in a few days. Others need more testing and treatment. If your pain continues or gets worse, you need to be rechecked and may need more tests to find out what is wrong. You may need surgery to correct the problem. Don't ignore new symptoms, such as fever, nausea and vomiting, urination problems, pain that gets worse, and dizziness. These may be signs of a more serious problem. Your doctor may have recommended a follow-up visit in the next 8 to 12 hours. If you are not getting better, you may need more tests or treatment. The doctor has checked you carefully, but problems can develop later. If you notice any problems or new symptoms, get medical treatment right away. Follow-up care is a key part of your treatment and safety. Be sure to make and go to all appointments, and call your doctor if you are having problems. It's also a good idea to know your test results and keep a list of the medicines you take. How can you care for yourself at home? · Rest until you feel better. · To prevent dehydration, drink plenty of fluids, enough so that your urine is light yellow or clear like water. Choose water and other caffeine-free clear liquids until you feel better. If you have kidney, heart, or liver disease and have to limit fluids, talk with your doctor before you increase the amount of fluids you drink. · If your stomach is upset, eat mild foods, such as rice, dry toast or crackers, bananas, and applesauce. Try eating several small meals instead of two or three large ones. · Wait until 48 hours after all symptoms have gone away before you have spicy foods, alcohol, and drinks that contain caffeine. · Do not eat foods that are high in fat. · Avoid anti-inflammatory medicines such as aspirin, ibuprofen (Advil, Motrin), and naproxen (Aleve).  These can cause stomach upset. Talk to your doctor if you take daily aspirin for another health problem. When should you call for help? Call 911 anytime you think you may need emergency care. For example, call if:  · You passed out (lost consciousness). · You pass maroon or very bloody stools. · You vomit blood or what looks like coffee grounds. · You have new, severe belly pain. Call your doctor now or seek immediate medical care if:  · Your pain gets worse, especially if it becomes focused in one area of your belly. · You have a new or higher fever. · Your stools are black and look like tar, or they have streaks of blood. · You have unexpected vaginal bleeding. · You have symptoms of a urinary tract infection. These may include:  ¨ Pain when you urinate. ¨ Urinating more often than usual.  ¨ Blood in your urine. · You are dizzy or lightheaded, or you feel like you may faint. Watch closely for changes in your health, and be sure to contact your doctor if:  · You are not getting better after 1 day (24 hours). Where can you learn more? Go to http://oscar-wilfrido.info/. Enter A313 in the search box to learn more about \"Abdominal Pain: Care Instructions. \"  Current as of: March 20, 2017  Content Version: 11.3  © 5173-7686 [employer]. Care instructions adapted under license by [employer] (which disclaims liability or warranty for this information). If you have questions about a medical condition or this instruction, always ask your healthcare professional. [place] [address] any warranty or liability for your use of this information. SVXR Activation    Thank you for requesting access to [employer]. Please follow the instructions below to securely access and download your online medical record. SVXR allows you to send messages to your doctor, view your test results, renew your prescriptions, schedule appointments, and more. How Do I Sign Up? 1.  In your internet browser, go to [email or web address]  2. Click on the First Time User? Click Here link in the Sign In box. You will be redirect to the New Member Sign Up page. 3. Enter your Nitro PDF Access Code exactly as it appears below. You will not need to use this code after youve completed the sign-up process. If you do not sign up before the expiration date, you must request a new code. MyChart Access Code: Activation code not generated  Current Nitro PDF Status: Active (This is the date your Hit the Markt access code will )    4. Enter the last four digits of your Social Security Number (xxxx) and Date of Birth (mm/dd/yyyy) as indicated and click Submit. You will be taken to the next sign-up page. 5. Create a Hit the Markt ID. This will be your Nitro PDF login ID and cannot be changed, so think of one that is secure and easy to remember. 6. Create a Nitro PDF password. You can change your password at any time. 7. Enter your Password Reset Question and Answer. This can be used at a later time if you forget your password. 8. Enter your e-mail address. You will receive e-mail notification when new information is available in [address] 9. Click Sign Up. You can now view and download portions of your medical record. 10. Click the Download Summary menu link to download a portable copy of your medical information. Additional Information    If you have questions, please visit the Frequently Asked Questions section of the Nitro PDF website at [email or web address] Remember, Nitro PDF is NOT to be used for urgent needs. For medical emergencies, dial 911.

## 2017-08-04 NOTE — PROGRESS NOTES
Case discussed with Dr. Antonino Ascencio, attending who provided care for patient. He reports the patient recently had C diff (6 mos ago) and has follow up evaluations scheduled with GI and Hepatology at [address] [place] [address]. He was discharged home on Flagyl. No further intervention necessary.

## 2017-08-04 NOTE — ED PROVIDER NOTES
HPI Comments: Arlester Rubinstein, 35 y.o. Male with PMHx significant for ulcerative colitis with partial colectomy and J pouch, presents ambulatory to [facility] ED with cc of progressively worsening cramping right-sided abdominal pain x 3 days. He notes that he has a history of chronic diarrhea, but he has had decreased fecal output in the past few days. He states that he is prescribed Cipro daily. He notes a recent C. difficile infection within the past 6 months. He reports that he attempted to notify GI of his symptoms. He denies a history of hernias or recent steroid use. He specifically denies blood in his stool or other acute complaints at this time. PCP: Noemi Kraus MD  GI: Lynnette Graham MD/Soco Stubbs M.D. Social history significant for: - Tobacco, - EtOH, - Illicit Drug Use    There are no other complaints, changes, or physical findings at this time. Written by KRYSTEN Vega, as dictated by Red Lewis MD.    The history is provided by the patient. No  was used.         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    PSC (primary sclerosing cholangitis)     Dr Wolf Cabrales [phone]    Ulcerative colitis [facility])        Past Surgical History:   Procedure Laterality Date    ABDOMEN SURGERY PROC UNLISTED  2002    colostomy reversal & J Pouch    COLONOSCOPY N/A 4/4/2017    COLONOSCOPY performed by Lynnette Graham [facility] at [facility] AMBULATORY OR    HX COLECTOMY  2002    colostomy    HX GI      ercp    HX ROTATOR CUFF REPAIR      right    NE COLONOSCOPY W/BIOPSY SINGLE/MULTIPLE  12/8/2011         NE EGD TRANSORAL BIOPSY SINGLE/MULTIPLE  2/18/2013              Family History:   Problem Relation Age of Onset    Diabetes Mother     Hypertension Mother     Diabetes Father     Hypertension Father     No Known Problems Sister     No Known Problems Brother     No Known Problems Maternal Aunt     No Known Problems Maternal Uncle     No Known Problems Paternal Aunt     No Known Problems Paternal Uncle     No Known Problems Maternal Grandmother     No Known Problems Maternal Grandfather     No Known Problems Paternal Grandmother     No Known Problems Paternal Grandfather        Social History     Social History    Marital status:      Spouse name: N/A    Number of children: N/A    Years of education: N/A     Occupational History    Not on file. Social History Main Topics    Smoking status: Never Smoker    Smokeless tobacco: Never Used    Alcohol use No    Drug use: No    Sexual activity: Yes     Partners: Female     Birth control/ protection: Condom     Other Topics Concern    Not on file     Social History Narrative         ALLERGIES: Cafergot [ergotamine-caffeine]    Review of Systems   Constitutional: Negative. Negative for chills and fever. HENT: Negative. Negative for congestion, rhinorrhea and sinus pressure. Eyes: Negative. Negative for discharge and redness. Respiratory: Negative. Negative for chest tightness and shortness of breath. Cardiovascular: Negative. Negative for chest pain. Gastrointestinal: Positive for abdominal pain and diarrhea. Negative for blood in stool. Endocrine: Negative. Genitourinary: Negative. Negative for flank pain and hematuria. Musculoskeletal: Negative. Negative for back pain. Skin: Negative. Negative for rash. Neurological: Negative. Negative for dizziness, seizures, weakness, numbness and headaches. Hematological: Negative. All other systems reviewed and are negative.       Vitals:    08/04/17 0833 08/04/17 0845   BP:  116/83   SpO2:  99%   Weight: 103.1 kg (227 lb 4.7 oz)    Height: 6' (1.829 m) Physical Exam   Constitutional: He is oriented to person, place, and time. He appears well-developed and well-nourished. He appears distressed. HENT:   Head: Normocephalic and atraumatic. Nose: Nose normal.   Mouth/Throat: No oropharyngeal exudate. Eyes: Conjunctivae and EOM are normal. Pupils are equal, round, and reactive to light. No scleral icterus. Neck: Normal range of motion. Neck supple. No JVD present. No thyromegaly present. Cardiovascular: Normal rate, regular rhythm, normal heart sounds, intact distal pulses and normal pulses. PMI is not displaced. Exam reveals no gallop and no friction rub. No murmur heard. Pulmonary/Chest: Effort normal and breath sounds normal. No stridor. No respiratory distress. He has no decreased breath sounds. He has no wheezes. He has no rhonchi. He has no rales. He exhibits no tenderness. Abdominal: Soft. Normal aorta and bowel sounds are normal. He exhibits no distension, no abdominal bruit and no mass. There is no hepatosplenomegaly. There is tenderness in the right lower quadrant, suprapubic area and left lower quadrant. There is no rigidity, no rebound, no guarding, no CVA tenderness, no tenderness at McBurney's point and negative Park's sign. No hernia. Neurological: He is alert and oriented to person, place, and time. He has normal reflexes. He displays no atrophy and no tremor. No cranial nerve deficit or sensory deficit. He exhibits normal muscle tone. He displays no seizure activity. Coordination normal. GCS eye subscore is 4. GCS verbal subscore is 5. GCS motor subscore is 6. Reflex Scores:       Patellar reflexes are 2+ on the right side and 2+ on the left side. Skin: Skin is warm. No rash noted. He is not diaphoretic. No erythema. Nursing note and vitals reviewed.        MDM  Number of Diagnoses or Management Options  Acute abdominal pain:   Clostridium difficile colitis:   Primary sclerosing cholangitis:   Diagnosis management comments:   DDx: Inflammatory bowel exacerbation, pouchitis, hernia, bowel obstruction, kidney stone, c diff, colitis  Impression/Plan: Complicated pt with hx of UC and prior colon surgery with J pouch. Pt is on chronic suppresive Cipro, and he is in the ER with 3 days progressive abd pain with intermittent loose stools. Will obtain labs, CT, and discuss results with pt's GI Dr. Blythe Epley. Amount and/or Complexity of Data Reviewed  Clinical lab tests: ordered and reviewed  Tests in the radiology section of CPT®: ordered and reviewed  Obtain history from someone other than the patient: yes  Review and summarize past medical records: yes  Discuss the patient with other providers: yes (GI)  Independent visualization of images, tracings, or specimens: yes    Risk of Complications, Morbidity, and/or Mortality  Presenting problems: moderate  Diagnostic procedures: moderate  Management options: moderate    Patient Progress  Patient progress: stable       ED Course       Procedures    Consult Note:  11:04 AM  Amirah Patel MD spoke with Mariajose Alan MD,  Specialty: GI  Discussed pt's hx, disposition, and available diagnostic and imaging results. Reviewed care plans. Consultant agrees with plans as outlined. Dr. James Medina recommends to place the patient on Flagyl, Bentyl, pain control, and to contact the patient's GI at [address] [place] [address]. Consult Note:  11:35 AM  Amirah Patel MD spoke with Candice Ho M.D.,  Specialty: GI - [facility]  Discussed pt's hx, disposition, and available diagnostic and imaging results. Reviewed care plans. Consultant agrees with plans as outlined. Dr. Colin Cunha states that the patient's labs are not far from the patient's most recent labs at [address] [place] [address]. There is a planned MRCP in 10 days. Dr. Colin Cunha will reevaluate the patient at that time. Feels the patient can be discharge. Progress Note:  11:52 AM  Discussed with the patient all of his lab and imaging results, and he conveys his understanding. Also discussed with the patient Dr. Elzbieta Ralph and Dr. Alonso Barron recommendations, and he is in agreement with the plan. Will discharge. Written by KRYSTEN Montalvo, as dictated by Zeb Henley MD.    LABORATORY TESTS:  Recent Results (from the past 12 hour(s))   CBC WITH AUTOMATED DIFF    Collection Time: 08/04/17  9:28 AM   Result Value Ref Range    WBC 6.7 4.1 - 11.1 K/uL    RBC 4.05 (L) 4.10 - 5.70 M/uL    HGB 12.0 (L) 12.1 - 17.0 g/dL    HCT 36.6 36.6 - 50.3 %    MCV 90.4 80.0 - 99.0 FL    MCH 29.6 26.0 - 34.0 PG    MCHC 32.8 30.0 - 36.5 g/dL    RDW 14.3 11.5 - 14.5 %    PLATELET 667 303 - 147 K/uL    NEUTROPHILS 66 32 - 75 %    LYMPHOCYTES 23 12 - 49 %    MONOCYTES 4 (L) 5 - 13 %    EOSINOPHILS 7 0 - 7 %    BASOPHILS 0 0 - 1 %    ABS. NEUTROPHILS 4.4 1.8 - 8.0 K/UL    ABS. LYMPHOCYTES 1.5 0.8 - 3.5 K/UL    ABS. MONOCYTES 0.3 0.0 - 1.0 K/UL    ABS. EOSINOPHILS 0.5 (H) 0.0 - 0.4 K/UL    ABS. BASOPHILS 0.0 0.0 - 0.1 K/UL   METABOLIC PANEL, COMPREHENSIVE    Collection Time: 08/04/17  9:28 AM   Result Value Ref Range    Sodium 136 136 - 145 mmol/L    Potassium 3.5 3.5 - 5.1 mmol/L    Chloride 100 97 - 108 mmol/L    CO2 30 21 - 32 mmol/L    Anion gap 6 5 - 15 mmol/L    Glucose 87 65 - 100 mg/dL    BUN 8 6 - 20 MG/DL    Creatinine 0.89 0.70 - 1.30 MG/DL    BUN/Creatinine ratio 9 (L) 12 - 20      GFR est AA >60 >60 ml/min/1.73m2    GFR est non-AA >60 >60 ml/min/1.73m2    Calcium 8.6 8.5 - 10.1 MG/DL    Bilirubin, total 2.9 (H) 0.2 - 1.0 MG/DL    ALT (SGPT) 124 (H) 12 - 78 U/L    AST (SGOT) 194 (H) 15 - 37 U/L    Alk.  phosphatase 921 (H) 45 - 117 U/L    Protein, total 9.3 (H) 6.4 - 8.2 g/dL    Albumin 2.5 (L) 3.5 - 5.0 g/dL    Globulin 6.8 (H) 2.0 - 4.0 g/dL    A-G Ratio 0.4 (L) 1.1 - 2.2     LIPASE    Collection Time: 08/04/17  9:28 AM   Result Value Ref Range    Lipase 142 73 - 393 U/L   URINALYSIS W/ REFLEX CULTURE    Collection Time: 08/04/17  9:28 AM   Result Value Ref Range    Color ORANGE Appearance CLOUDY (A) CLEAR      Specific gravity 1.027 1.003 - 1.030      pH (UA) 6.0 5.0 - 8.0      Protein TRACE (A) NEG mg/dL    Glucose NEGATIVE  NEG mg/dL    Ketone TRACE (A) NEG mg/dL    Blood NEGATIVE  NEG      Urobilinogen 4.0 (H) 0.2 - 1.0 EU/dL    Nitrites POSITIVE (A) NEG      Leukocyte Esterase SMALL (A) NEG      WBC 0-4 0 - 4 /hpf    RBC 0-5 0 - 5 /hpf    Epithelial cells FEW FEW /lpf    Bacteria NEGATIVE  NEG /hpf    UA:UC IF INDICATED CULTURE NOT INDICATED BY UA RESULT CNI      Mucus 2+ (A) NEG /lpf   BILIRUBIN, CONFIRM    Collection Time: 08/04/17  9:28 AM   Result Value Ref Range    Bilirubin UA, confirm POSITIVE (A) NEG     OCCULT BLOOD, STOOL    Collection Time: 08/04/17  9:30 AM   Result Value Ref Range    Occult blood, stool POSITIVE (A) NEG         IMAGING RESULTS:  CT Results  (Last 48 hours)               08/04/17 1009  CT ABD PELV W CONT Final result    Impression:  IMPRESSION:       1. No acute process is identified. No change. 2. Colectomy. Postsurgical small bowel is within normal limits. 3. Unchanged heterogeneous hypoenhancement of the kidneys. Stable chronic   scarring. 4. Unchanged intrahepatic biliary dilatation. Narrative:  EXAM:  CT ABD PELV W CONT   Clinical history: Ulcerative colitis history, abdominal pain   INDICATION: lower abdominal pain, h/o UC       COMPARISON: 1/24/2017, 5/28/1970       CONTRAST:  100 mL of Isovue-370. TECHNIQUE:    Following the uneventful intravenous administration of contrast, thin axial   images were obtained through the abdomen and pelvis. Coronal and sagittal   reconstructions were generated. Oral contrast was not administered. CT dose   reduction was achieved through use of a standardized protocol tailored for this   examination and automatic exposure control for dose modulation. FINDINGS:    LUNG BASES: Minimal dependent change. INCIDENTALLY IMAGED HEART AND MEDIASTINUM: Within normal limits.    LIVER: Mild intrahepatic duct dilatation. Portal vein is patent. Nodular liver   contour. GALLBLADDER: Distended. CBD is within normal limits. SPLEEN: No mass. PANCREAS: No mass or ductal dilatation. ADRENALS: Unremarkable. KIDNEYS: Heterogeneous bilateral hypoattenuation and hypoenhancement are   unchanged in size and pattern. No solid renal mass or hydronephrosis. STOMACH: Unremarkable. SMALL BOWEL: Within normal limits post surgery. No evidence of active small   bowel inflammation. Chronic endothelial fat deposition consistent with chronic   inflammatory process of the small bowel. COLON: Resected. J-pouch rectum is unchanged and within normal limits. APPENDIX: Surgically absent. PERITONEUM: No ascites or pneumoperitoneum. RETROPERITONEUM: Retroperitoneal lymph nodes and inflammatory stranding is   unchanged   REPRODUCTIVE ORGANS: Prostate within normal limits. URINARY BLADDER: No mass or calculus. BONES: Unchanged. No evidence of sacroiliitis. ADDITIONAL COMMENTS: N/A                 MEDICATIONS GIVEN:  Medications   sodium chloride 0.9 % bolus infusion 1,000 mL (1,000 mL IntraVENous New Bag 8/4/17 0921)   HYDROmorphone (PF) (DILAUDID) injection 1 mg (1 mg IntraVENous Given 8/4/17 0921)   ondansetron (ZOFRAN) injection 4 mg (4 mg IntraVENous Given 8/4/17 0921)   iopamidol (ISOVUE-370) 76 % injection 100 mL (100 mL IntraVENous Given 8/4/17 1003)   sodium chloride (NS) flush 10 mL (10 mL IntraVENous Given 8/4/17 1004)   0.9% sodium chloride infusion (0 mL/hr IntraVENous IV Completed 8/4/17 1119)   HYDROmorphone (PF) (DILAUDID) injection 1 mg (1 mg IntraVENous Given 8/4/17 1118)       IMPRESSION:  1. Acute abdominal pain    2. Primary sclerosing cholangitis        PLAN:  1. Current Discharge Medication List      START taking these medications    Details   oxyCODONE IR (ROXICODONE) 5 mg immediate release tablet Take 3 Tabs by mouth every four (4) hours as needed for Pain.  Max Daily Amount: 90 mg.  Qty: 20 Tab, Refills: 0      ondansetron (ZOFRAN ODT) 4 mg disintegrating tablet Take 1 Tab by mouth every eight (8) hours as needed for Nausea. Qty: 10 Tab, Refills: 0      metroNIDAZOLE (FLAGYL) 500 mg tablet Take 1 Tab by mouth two (2) times a day for 10 days. Qty: 20 Tab, Refills: 0      dicyclomine (BENTYL) 20 mg tablet Take 1 Tab by mouth every six (6) hours for 20 doses. Qty: 20 Tab, Refills: 0           2. Follow-up Information     Follow up With Details Comments 48 Brianna Sherman MD In 3 days  Yung 6  Nino 7 Beatriz 42      Katy Bella MD Call in 1 week  [facility] 36 [phone]      [facility] EMERGENCY DEPT  If symptoms worsen [address] [place] [address]  [phone]        Return to ED if worse     Discharge Note:  11:53 AM  The pt is ready for discharge. The pt's signs, symptoms, diagnosis, and discharge instructions have been discussed and pt has conveyed their understanding. The pt is to follow up as recommended or return to ER should their symptoms worsen. Plan has been discussed and pt is in agreement. This note is prepared by [facility], acting as a Scribe for Moris Dinh MD.    Moris Dinh MD: The scribe's documentation has been prepared under my direction and personally reviewed by me in its entirety. I confirm that the notes above accurately reflects all work, treatment, procedures, and medical decision making performed by me.

## 2017-08-04 NOTE — LETTER
[address] 
[facility] EMERGENCY DEPT 
[address] [place] [address] [facility] [phone] 
[phone] Work/School Note Date: 8/4/2017 To Whom It May concern: 
 
Murray Carbajal was seen and treated today in the emergency room by the following provider(s): 
Attending Provider: Germaine Graves MD. Murray Carbajal No work till 8/7/17 Sincerely, Germaine Graves MD

## 2017-08-04 NOTE — ED NOTES
ED provider discussed discharge with the patient and all questioned fully answered. VSS. LDA removed.

## 2017-08-04 NOTE — PROGRESS NOTES
Spoke with attending. Pt with known h/o C diff within the last 6 months or so. Was treated with Flagyl. Has follow up with Gastroenterology/Hepatology at [facility] pending. No further intervention necessary.

## 2017-08-06 LAB
BACTERIA SPEC CULT: NORMAL
C JEJUNI+C COLI AG STL QL: NEGATIVE
E COLI SXT1+2 STL IA: NEGATIVE
SERVICE CMNT-IMP: NORMAL

## 2017-12-12 ENCOUNTER — HOSPITAL ENCOUNTER (EMERGENCY)
Age: 42
Discharge: LEFT BEFORE DISCHARGE | End: 2017-12-13
Attending: EMERGENCY MEDICINE
Payer: OTHER GOVERNMENT

## 2017-12-12 VITALS
HEART RATE: 90 BPM | OXYGEN SATURATION: 100 % | DIASTOLIC BLOOD PRESSURE: 92 MMHG | WEIGHT: 232.37 LBS | BODY MASS INDEX: 31.47 KG/M2 | HEIGHT: 72 IN | TEMPERATURE: 98 F | SYSTOLIC BLOOD PRESSURE: 141 MMHG | RESPIRATION RATE: 16 BRPM

## 2017-12-12 PROCEDURE — 75810000275 HC EMERGENCY DEPT VISIT NO LEVEL OF CARE

## 2017-12-13 RX ORDER — ONDANSETRON 4 MG/1
4 TABLET, ORALLY DISINTEGRATING ORAL
Status: DISCONTINUED | OUTPATIENT
Start: 2017-12-13 | End: 2017-12-13 | Stop reason: HOSPADM

## 2017-12-13 NOTE — ED NOTES
Attempted to call pt from waiting room,  states pt walked out and will be going to [facility]. Pt will be removed from board.

## 2017-12-21 ENCOUNTER — HOSPITAL ENCOUNTER (EMERGENCY)
Age: 42
Discharge: HOME | End: 2017-12-21
Attending: EMERGENCY MEDICINE
Payer: OTHER GOVERNMENT

## 2017-12-21 VITALS
BODY MASS INDEX: 30.93 KG/M2 | RESPIRATION RATE: 18 BRPM | DIASTOLIC BLOOD PRESSURE: 76 MMHG | HEIGHT: 73 IN | HEART RATE: 71 BPM | SYSTOLIC BLOOD PRESSURE: 121 MMHG | OXYGEN SATURATION: 97 % | WEIGHT: 233.4 LBS | TEMPERATURE: 98 F

## 2017-12-21 DIAGNOSIS — K76.9 CHRONIC LIVER DISEASE: ICD-10-CM

## 2017-12-21 DIAGNOSIS — R53.1 WEAKNESS: ICD-10-CM

## 2017-12-21 DIAGNOSIS — L08.9 INFECTION OF LEFT HAND: Primary | ICD-10-CM

## 2017-12-21 LAB
ALBUMIN SERPL-MCNC: 2.6 G/DL (ref 3.5–5)
ALBUMIN/GLOB SERPL: 0.4 {RATIO} (ref 1.1–2.2)
ALP SERPL-CCNC: 665 U/L (ref 45–117)
ALT SERPL-CCNC: 73 U/L (ref 12–78)
ANION GAP SERPL CALC-SCNC: 5 MMOL/L (ref 5–15)
APPEARANCE UR: CLEAR
AST SERPL-CCNC: 111 U/L (ref 15–37)
BACTERIA URNS QL MICRO: NEGATIVE /HPF
BASOPHILS # BLD: 0 K/UL (ref 0–0.1)
BASOPHILS NFR BLD: 1 % (ref 0–1)
BILIRUB SERPL-MCNC: 1.3 MG/DL (ref 0.2–1)
BILIRUB UR QL CFM: NEGATIVE
BUN SERPL-MCNC: 8 MG/DL (ref 6–20)
BUN/CREAT SERPL: 9 (ref 12–20)
CALCIUM SERPL-MCNC: 8.3 MG/DL (ref 8.5–10.1)
CHLORIDE SERPL-SCNC: 103 MMOL/L (ref 97–108)
CO2 SERPL-SCNC: 29 MMOL/L (ref 21–32)
COLOR UR: ABNORMAL
CREAT SERPL-MCNC: 0.87 MG/DL (ref 0.7–1.3)
EOSINOPHIL # BLD: 0.5 K/UL (ref 0–0.4)
EOSINOPHIL NFR BLD: 6 % (ref 0–7)
EPITH CASTS URNS QL MICRO: ABNORMAL /LPF
ERYTHROCYTE [DISTWIDTH] IN BLOOD BY AUTOMATED COUNT: 14.1 % (ref 11.5–14.5)
GLOBULIN SER CALC-MCNC: 6.6 G/DL (ref 2–4)
GLUCOSE SERPL-MCNC: 92 MG/DL (ref 65–100)
GLUCOSE UR STRIP.AUTO-MCNC: NEGATIVE MG/DL
HCT VFR BLD AUTO: 35.5 % (ref 36.6–50.3)
HGB BLD-MCNC: 11.4 G/DL (ref 12.1–17)
HGB UR QL STRIP: NEGATIVE
HYALINE CASTS URNS QL MICRO: ABNORMAL /LPF (ref 0–5)
KETONES UR QL STRIP.AUTO: NEGATIVE MG/DL
LEUKOCYTE ESTERASE UR QL STRIP.AUTO: ABNORMAL
LIPASE SERPL-CCNC: 144 U/L (ref 73–393)
LYMPHOCYTES # BLD: 1.7 K/UL (ref 0.8–3.5)
LYMPHOCYTES NFR BLD: 21 % (ref 12–49)
MCH RBC QN AUTO: 29.2 PG (ref 26–34)
MCHC RBC AUTO-ENTMCNC: 32.1 G/DL (ref 30–36.5)
MCV RBC AUTO: 90.8 FL (ref 80–99)
MONOCYTES # BLD: 0.5 K/UL (ref 0–1)
MONOCYTES NFR BLD: 6 % (ref 5–13)
NEUTS SEG # BLD: 5.6 K/UL (ref 1.8–8)
NEUTS SEG NFR BLD: 66 % (ref 32–75)
NITRITE UR QL STRIP.AUTO: NEGATIVE
PH UR STRIP: 7.5 [PH] (ref 5–8)
PLATELET # BLD AUTO: 186 K/UL (ref 150–400)
POTASSIUM SERPL-SCNC: 3.4 MMOL/L (ref 3.5–5.1)
PROT SERPL-MCNC: 9.2 G/DL (ref 6.4–8.2)
PROT UR STRIP-MCNC: ABNORMAL MG/DL
RBC # BLD AUTO: 3.91 M/UL (ref 4.1–5.7)
RBC #/AREA URNS HPF: ABNORMAL /HPF (ref 0–5)
SODIUM SERPL-SCNC: 137 MMOL/L (ref 136–145)
SP GR UR REFRACTOMETRY: 1.03 (ref 1–1.03)
UR CULT HOLD, URHOLD: NORMAL
UROBILINOGEN UR QL STRIP.AUTO: 1 EU/DL (ref 0.2–1)
WBC # BLD AUTO: 8.3 K/UL (ref 4.1–11.1)
WBC URNS QL MICRO: ABNORMAL /HPF (ref 0–4)

## 2017-12-21 PROCEDURE — 85025 COMPLETE CBC W/AUTO DIFF WBC: CPT | Performed by: EMERGENCY MEDICINE

## 2017-12-21 PROCEDURE — 81001 URINALYSIS AUTO W/SCOPE: CPT | Performed by: EMERGENCY MEDICINE

## 2017-12-21 PROCEDURE — 74011250636 HC RX REV CODE- 250/636: Performed by: EMERGENCY MEDICINE

## 2017-12-21 PROCEDURE — 80053 COMPREHEN METABOLIC PANEL: CPT | Performed by: EMERGENCY MEDICINE

## 2017-12-21 PROCEDURE — 99285 EMERGENCY DEPT VISIT HI MDM: CPT

## 2017-12-21 PROCEDURE — 36415 COLL VENOUS BLD VENIPUNCTURE: CPT | Performed by: EMERGENCY MEDICINE

## 2017-12-21 PROCEDURE — 83690 ASSAY OF LIPASE: CPT | Performed by: EMERGENCY MEDICINE

## 2017-12-21 PROCEDURE — 74011250637 HC RX REV CODE- 250/637: Performed by: EMERGENCY MEDICINE

## 2017-12-21 PROCEDURE — 96360 HYDRATION IV INFUSION INIT: CPT

## 2017-12-21 PROCEDURE — 99284 EMERGENCY DEPT VISIT MOD MDM: CPT

## 2017-12-21 RX ORDER — HYDROMORPHONE HYDROCHLORIDE 2 MG/1
2 TABLET ORAL
Status: COMPLETED | OUTPATIENT
Start: 2017-12-21 | End: 2017-12-21

## 2017-12-21 RX ORDER — SULFAMETHOXAZOLE AND TRIMETHOPRIM 800; 160 MG/1; MG/1
1 TABLET ORAL 2 TIMES DAILY
Qty: 14 TAB | Refills: 0 | Status: SHIPPED | OUTPATIENT
Start: 2017-12-21 | End: 2017-12-28

## 2017-12-21 RX ORDER — CHLORHEXIDINE GLUCONATE 4 G/100ML
10 SOLUTION TOPICAL DAILY
Qty: 118 ML | Refills: 0 | Status: SHIPPED | OUTPATIENT
Start: 2017-12-21 | End: 2017-12-28

## 2017-12-21 RX ORDER — SULFAMETHOXAZOLE AND TRIMETHOPRIM 800; 160 MG/1; MG/1
1 TABLET ORAL
Status: COMPLETED | OUTPATIENT
Start: 2017-12-21 | End: 2017-12-21

## 2017-12-21 RX ADMIN — HYDROMORPHONE HYDROCHLORIDE 2 MG: 2 TABLET ORAL at 20:44

## 2017-12-21 RX ADMIN — SULFAMETHOXAZOLE AND TRIMETHOPRIM 1 TABLET: 800; 160 TABLET ORAL at 20:44

## 2017-12-21 RX ADMIN — SODIUM CHLORIDE 1000 ML: 900 INJECTION, SOLUTION INTRAVENOUS at 20:34

## 2017-12-21 NOTE — ED PROVIDER NOTES
HPI Comments: 38 y.o. male with past medical history significant for Chron's colitis, pancreatitis, pouchitis, chest pain, iron deficiency anemia, PSC, ulcerative colitis, and C. Dif infection who presents from home with chief complaint of hand pain. Pt was seen by his PCP 2 days ago for R-hand pain was started on Keflex at that time for an infection due to a scratch on his R-hand. He then began experiencing similar pain to a scratch on his L-hand today with fatigue and \"wasn't feeling right,\" so he went back to his PCP today and was sent to the ED based off of his blood work. He has also had an increase in urinary frequency with a dark urine color today. Pt is s/p C. Dif infection causing infections to multiple scrapes, but denies any infection drainage. He also has a hx of Crohn's disease and is experiencing his typical abd pain at this time. Pt denies fever. There are no other acute medical concerns at this time. Old Chart Review: Pt's blood work from [facility] shows WC 9, HGB 12.1, and UA unremarkable. Social hx: no tobacco use; no EtOH use  PCP: Leonora Armenta MD    Note written by Michelle Croft, as dictated by Yeni Ritter MD 7:05 PM    The history is provided by the patient. No  was used.         Past Medical History:   Diagnosis Date    Chest pain 2/18/2013    as of 10/14/14 pt denies any CP since 2/13    [address] [address]     H/O Clostridium difficile infection 01/2017    as of 3/30/17 pt denies abd pain, diarrhea > 1 week    Iron deficiency anemia 2/18/2013    Pancreatitis     Pouchitis ([facility] [address] 12/8/2011    PSC (primary sclerosing cholangitis)     Dr Terrence Goldberg [phone]    Ulcerative colitis [facility])        Past Surgical History:   Procedure Laterality Date    ABDOMEN SURGERY PROC UNLISTED  2002    colostomy reversal & J Pouch    COLONOSCOPY N/A 4/4/2017    COLONOSCOPY performed by Lakia Mcmillan MD at [facility] HX COLECTOMY  2002    colostomy  HX GI      ercp    HX ROTATOR CUFF REPAIR      right    IN COLONOSCOPY W/BIOPSY SINGLE/MULTIPLE  12/8/2011         IN EGD TRANSORAL BIOPSY SINGLE/MULTIPLE  2/18/2013              Family History:   Problem Relation Age of Onset    Diabetes Mother     Hypertension Mother     Diabetes Father     Hypertension Father     No Known Problems Sister     No Known Problems Brother     No Known Problems Maternal Aunt     No Known Problems Maternal Uncle     No Known Problems Paternal Aunt     No Known Problems Paternal Uncle     No Known Problems Maternal Grandmother     No Known Problems Maternal Grandfather     No Known Problems Paternal Grandmother     No Known Problems Paternal Grandfather        Social History     Social History    Marital status:      Spouse name: N/A    Number of children: N/A    Years of education: N/A     Occupational History    Not on file. Social History Main Topics    Smoking status: Never Smoker    Smokeless tobacco: Never Used    Alcohol use No    Drug use: No    Sexual activity: Yes     Partners: Female     Birth control/ protection: Condom     Other Topics Concern    Not on file     Social History Narrative         ALLERGIES: Cafergot [ergotamine-caffeine]    Review of Systems   Constitutional: Positive for fatigue. Negative for diaphoresis and fever. HENT: Negative for facial swelling. Eyes: Negative for visual disturbance. Respiratory: Negative for cough. Cardiovascular: Negative for chest pain. Gastrointestinal: Positive for abdominal pain. Genitourinary: Positive for frequency (increased). Negative for dysuria. Dark urine color   Musculoskeletal: Negative for joint swelling. Skin: Positive for wound (R-hand, L-hand). Negative for rash. Neurological: Negative for headaches. Hematological: Negative for adenopathy. Psychiatric/Behavioral: Negative for suicidal ideas.    All other systems reviewed and are negative. Vitals:    12/21/17 1834   BP: 118/89   Pulse: 90   Resp: 16   Temp: 98.6 °F (37 °C)   SpO2: 99%   Weight: 105.9 kg (233 lb 6.4 oz)   Height: 6' 0.5\" (1.842 m)            Physical Exam   Constitutional: He is oriented to person, place, and time. He appears well-developed and well-nourished. No distress. HENT:   Head: Normocephalic and atraumatic. Mouth/Throat: Oropharynx is clear and moist.   Eyes: Pupils are equal, round, and reactive to light. Neck: Normal range of motion. Neck supple. Cardiovascular: Normal rate, regular rhythm, normal heart sounds and intact distal pulses. Pulmonary/Chest: Effort normal and breath sounds normal. No respiratory distress. Abdominal: Soft. Bowel sounds are normal. He exhibits no distension. There is no tenderness. Musculoskeletal: Normal range of motion. He exhibits no edema. Neurological: He is alert and oriented to person, place, and time. Skin: Skin is warm and dry. Swelling and erythema to posterior L-hand. No fluctuants or drainage. Nursing note and vitals reviewed. Note written by Michelle Bahena, as dictated by Kacie Pandya MD 7:05 PM    MDM  Number of Diagnoses or Management Options  Infection of left hand:   Weakness:   Diagnosis management comments: A:  Infection L hand with \"not feeling right\" and dark colored urine for 2 days. On day #2 of keflex for infected scratch on back of R hand now with similar infection back of L hand. VS stable. No fever. No evidence of drainable fluid collection. Pt has had similar infections appear on different parts of body over past several months raising concern for MRSA colonization. P:  Labs  ivf  UA  reassess    ED Course       Procedures    Labs and urine unremarkable. LFT's at baseline. VS normal.    Abx changed to bactrim to treat infection back of L hand. Also Rx for chlorhexidine and mupiricin for possible MRSA colonization. Pt advised to f/u with PCP.   Return to ED if infection worsens.

## 2017-12-21 NOTE — ED TRIAGE NOTES
Pt. complains of fatigue and states \"I just don't feel right\". Was treated for \"an infection to my hand 2 days ago\". Note 2 wounds, 1 to each hand. States his urine is dark. Seen by MD and sent to ER for abnormal labs.

## 2017-12-22 NOTE — ED NOTES
Dr. Darrion Humphries went over discharge instructions with pt. Gave prescriptions for bactrim, skin cleanser, and bactroban. Pt ambulated out of department with steady gait.

## 2017-12-22 NOTE — DISCHARGE INSTRUCTIONS
We hope that we have addressed all of your medical concerns. The examination and treatment you received in the Emergency Department were for an emergent problem and were not intended as complete care. It is important that you follow up with your healthcare provider(s) for ongoing care. If your symptoms worsen or do not improve as expected, and you are unable to reach your usual health care provider(s), you should return to the Emergency Department. Today's healthcare is undergoing tremendous change, and patient satisfaction surveys are one of the many tools to assess the quality of medical care. You may receive a survey from the [employer] regarding your experience in the Emergency Department. I hope that your experience has been completely positive, particularly the medical care that I provided. As such, please participate in the survey; anything less than excellent does not meet my expectations or intentions. [facility] and [facility] participate in nationally recognized quality of care measures. If your blood pressure is greater than 120/80, as reported below, we urge that you seek medical care to address the potential of high blood pressure, commonly known as hypertension. Hypertension can be hereditary or can be caused by certain medical conditions, pain, stress, or \"white coat syndrome. \"       Please make an appointment with your health care provider(s) for follow up of your Emergency Department visit. VITALS:   Patient Vitals for the past 8 hrs:   Temp Pulse Resp BP SpO2   12/21/17 2105 98.1 °F (36.7 °C) 69 18 118/78 99 %   12/21/17 2100 - 64 19 118/78 99 %   12/21/17 1834 98.6 °F (37 °C) 90 16 118/89 99 %          Thank you for allowing us to provide you with medical care today. We realize that you have many choices for your emergency care needs. Please choose us in the future for any continued health care needs. Colin Frye MD    [facility], [facility].   Office: [phone]            Recent Results (from the past 24 hour(s))   CBC WITH AUTOMATED DIFF    Collection Time: 12/21/17  7:24 PM   Result Value Ref Range    WBC 8.3 4.1 - 11.1 K/uL    RBC 3.91 (L) 4.10 - 5.70 M/uL    HGB 11.4 (L) 12.1 - 17.0 g/dL    HCT 35.5 (L) 36.6 - 50.3 %    MCV 90.8 80.0 - 99.0 FL    MCH 29.2 26.0 - 34.0 PG    MCHC 32.1 30.0 - 36.5 g/dL    RDW 14.1 11.5 - 14.5 %    PLATELET 401 352 - 519 K/uL    NEUTROPHILS 66 32 - 75 %    LYMPHOCYTES 21 12 - 49 %    MONOCYTES 6 5 - 13 %    EOSINOPHILS 6 0 - 7 %    BASOPHILS 1 0 - 1 %    ABS. NEUTROPHILS 5.6 1.8 - 8.0 K/UL    ABS. LYMPHOCYTES 1.7 0.8 - 3.5 K/UL    ABS. MONOCYTES 0.5 0.0 - 1.0 K/UL    ABS. EOSINOPHILS 0.5 (H) 0.0 - 0.4 K/UL    ABS. BASOPHILS 0.0 0.0 - 0.1 K/UL   METABOLIC PANEL, COMPREHENSIVE    Collection Time: 12/21/17  7:24 PM   Result Value Ref Range    Sodium 137 136 - 145 mmol/L    Potassium 3.4 (L) 3.5 - 5.1 mmol/L    Chloride 103 97 - 108 mmol/L    CO2 29 21 - 32 mmol/L    Anion gap 5 5 - 15 mmol/L    Glucose 92 65 - 100 mg/dL    BUN 8 6 - 20 MG/DL    Creatinine 0.87 0.70 - 1.30 MG/DL    BUN/Creatinine ratio 9 (L) 12 - 20      GFR est AA >60 >60 ml/min/1.73m2    GFR est non-AA >60 >60 ml/min/1.73m2    Calcium 8.3 (L) 8.5 - 10.1 MG/DL    Bilirubin, total 1.3 (H) 0.2 - 1.0 MG/DL    ALT (SGPT) 73 12 - 78 U/L    AST (SGOT) 111 (H) 15 - 37 U/L    Alk.  phosphatase 665 (H) 45 - 117 U/L    Protein, total 9.2 (H) 6.4 - 8.2 g/dL    Albumin 2.6 (L) 3.5 - 5.0 g/dL    Globulin 6.6 (H) 2.0 - 4.0 g/dL    A-G Ratio 0.4 (L) 1.1 - 2.2     LIPASE    Collection Time: 12/21/17  7:24 PM   Result Value Ref Range    Lipase 144 73 - 393 U/L   URINALYSIS W/MICROSCOPIC    Collection Time: 12/21/17  8:53 PM   Result Value Ref Range    Color DARK YELLOW      Appearance CLEAR CLEAR      Specific gravity 1.030 1.003 - 1.030      pH (UA) 7.5 5.0 - 8.0      Protein TRACE (A) NEG mg/dL    Glucose NEGATIVE  NEG mg/dL    Ketone NEGATIVE  NEG mg/dL    Blood NEGATIVE  NEG      Urobilinogen 1.0 0.2 - 1.0 EU/dL    Nitrites NEGATIVE  NEG      Leukocyte Esterase SMALL (A) NEG      WBC 5-10 0 - 4 /hpf    RBC 0-5 0 - 5 /hpf    Epithelial cells FEW FEW /lpf    Bacteria NEGATIVE  NEG /hpf    Hyaline cast 2-5 0 - 5 /lpf   URINE CULTURE HOLD SAMPLE    Collection Time: 12/21/17  8:53 PM   Result Value Ref Range    Urine culture hold URINE ON HOLD IN MICROBIOLOGY DEPT FOR 3 DAYS     BILIRUBIN, CONFIRM    Collection Time: 12/21/17  8:53 PM   Result Value Ref Range    Bilirubin UA, confirm NEGATIVE  NEG         No results found.

## 2018-01-15 ENCOUNTER — ANESTHESIA EVENT (OUTPATIENT)
Dept: ENDOSCOPY | Age: 43
End: 2018-01-15
Payer: OTHER GOVERNMENT

## 2018-01-15 ENCOUNTER — HOSPITAL ENCOUNTER (OUTPATIENT)
Age: 43
Setting detail: OUTPATIENT SURGERY
Discharge: HOME | End: 2018-01-15
Attending: SPECIALIST | Admitting: SPECIALIST
Payer: OTHER GOVERNMENT

## 2018-01-15 ENCOUNTER — ANESTHESIA (OUTPATIENT)
Dept: ENDOSCOPY | Age: 43
End: 2018-01-15
Payer: OTHER GOVERNMENT

## 2018-01-15 VITALS
BODY MASS INDEX: 30.48 KG/M2 | HEART RATE: 63 BPM | TEMPERATURE: 98.2 F | DIASTOLIC BLOOD PRESSURE: 79 MMHG | WEIGHT: 225 LBS | HEIGHT: 72 IN | OXYGEN SATURATION: 100 % | RESPIRATION RATE: 14 BRPM | SYSTOLIC BLOOD PRESSURE: 107 MMHG

## 2018-01-15 PROCEDURE — 88305 TISSUE EXAM BY PATHOLOGIST: CPT | Performed by: SPECIALIST

## 2018-01-15 PROCEDURE — 76060000031 HC ANESTHESIA FIRST 0.5 HR: Performed by: SPECIALIST

## 2018-01-15 PROCEDURE — 74011250636 HC RX REV CODE- 250/636

## 2018-01-15 PROCEDURE — 77030009426 HC FCPS BIOP ENDOSC BSC -B: Performed by: SPECIALIST

## 2018-01-15 PROCEDURE — 74011000250 HC RX REV CODE- 250

## 2018-01-15 PROCEDURE — 74011250636 HC RX REV CODE- 250/636: Performed by: SPECIALIST

## 2018-01-15 PROCEDURE — 76040000019: Performed by: SPECIALIST

## 2018-01-15 RX ORDER — DEXTROMETHORPHAN/PSEUDOEPHED 2.5-7.5/.8
1.2 DROPS ORAL
Status: DISCONTINUED | OUTPATIENT
Start: 2018-01-15 | End: 2018-01-15 | Stop reason: HOSPADM

## 2018-01-15 RX ORDER — MIDAZOLAM HYDROCHLORIDE 1 MG/ML
.25-5 INJECTION, SOLUTION INTRAMUSCULAR; INTRAVENOUS
Status: DISCONTINUED | OUTPATIENT
Start: 2018-01-15 | End: 2018-01-15 | Stop reason: HOSPADM

## 2018-01-15 RX ORDER — PROPOFOL 10 MG/ML
INJECTION, EMULSION INTRAVENOUS AS NEEDED
Status: DISCONTINUED | OUTPATIENT
Start: 2018-01-15 | End: 2018-01-15 | Stop reason: HOSPADM

## 2018-01-15 RX ORDER — FENTANYL CITRATE 50 UG/ML
25 INJECTION, SOLUTION INTRAMUSCULAR; INTRAVENOUS
Status: DISCONTINUED | OUTPATIENT
Start: 2018-01-15 | End: 2018-01-15 | Stop reason: HOSPADM

## 2018-01-15 RX ORDER — LIDOCAINE HYDROCHLORIDE 20 MG/ML
INJECTION, SOLUTION EPIDURAL; INFILTRATION; INTRACAUDAL; PERINEURAL AS NEEDED
Status: DISCONTINUED | OUTPATIENT
Start: 2018-01-15 | End: 2018-01-15 | Stop reason: HOSPADM

## 2018-01-15 RX ORDER — ATROPINE SULFATE 0.1 MG/ML
0.5 INJECTION INTRAVENOUS
Status: DISCONTINUED | OUTPATIENT
Start: 2018-01-15 | End: 2018-01-15 | Stop reason: HOSPADM

## 2018-01-15 RX ORDER — GLUCOSAMINE SULFATE 1500 MG
POWDER IN PACKET (EA) ORAL DAILY
COMMUNITY
End: 2018-07-06

## 2018-01-15 RX ORDER — SODIUM CHLORIDE 0.9 % (FLUSH) 0.9 %
5-10 SYRINGE (ML) INJECTION AS NEEDED
Status: DISCONTINUED | OUTPATIENT
Start: 2018-01-15 | End: 2018-01-15 | Stop reason: HOSPADM

## 2018-01-15 RX ORDER — NALOXONE HYDROCHLORIDE 0.4 MG/ML
0.4 INJECTION, SOLUTION INTRAMUSCULAR; INTRAVENOUS; SUBCUTANEOUS
Status: DISCONTINUED | OUTPATIENT
Start: 2018-01-15 | End: 2018-01-15 | Stop reason: HOSPADM

## 2018-01-15 RX ORDER — SODIUM CHLORIDE 0.9 % (FLUSH) 0.9 %
5-10 SYRINGE (ML) INJECTION EVERY 8 HOURS
Status: DISCONTINUED | OUTPATIENT
Start: 2018-01-15 | End: 2018-01-15 | Stop reason: HOSPADM

## 2018-01-15 RX ORDER — CIPROFLOXACIN 250 MG/1
250 TABLET, FILM COATED ORAL EVERY 12 HOURS
COMMUNITY
End: 2018-03-05 | Stop reason: CLARIF

## 2018-01-15 RX ORDER — FLUMAZENIL 0.1 MG/ML
0.2 INJECTION INTRAVENOUS
Status: DISCONTINUED | OUTPATIENT
Start: 2018-01-15 | End: 2018-01-15 | Stop reason: HOSPADM

## 2018-01-15 RX ORDER — MIDAZOLAM HYDROCHLORIDE 1 MG/ML
1-2 INJECTION, SOLUTION INTRAMUSCULAR; INTRAVENOUS
Status: DISCONTINUED | OUTPATIENT
Start: 2018-01-15 | End: 2018-01-15 | Stop reason: HOSPADM

## 2018-01-15 RX ORDER — EPINEPHRINE 0.1 MG/ML
1 INJECTION INTRACARDIAC; INTRAVENOUS
Status: DISCONTINUED | OUTPATIENT
Start: 2018-01-15 | End: 2018-01-15 | Stop reason: HOSPADM

## 2018-01-15 RX ORDER — SODIUM CHLORIDE 9 MG/ML
100 INJECTION, SOLUTION INTRAVENOUS CONTINUOUS
Status: DISCONTINUED | OUTPATIENT
Start: 2018-01-15 | End: 2018-01-15 | Stop reason: HOSPADM

## 2018-01-15 RX ADMIN — SODIUM CHLORIDE 100 ML/HR: 900 INJECTION, SOLUTION INTRAVENOUS at 12:00

## 2018-01-15 RX ADMIN — LIDOCAINE HYDROCHLORIDE 40 MG: 20 INJECTION, SOLUTION EPIDURAL; INFILTRATION; INTRACAUDAL; PERINEURAL at 13:19

## 2018-01-15 RX ADMIN — PROPOFOL 200 MG: 10 INJECTION, EMULSION INTRAVENOUS at 13:29

## 2018-01-15 NOTE — ANESTHESIA POSTPROCEDURE EVALUATION
Post-Anesthesia Evaluation and Assessment    Patient: Ollie Knapp MRN: [id number]  SSN: [id number]    YOB: 1975  Age: 38 y.o. Sex: male       Cardiovascular Function/Vital Signs  Visit Vitals    /79    Pulse 63    Temp 36.8 °C (98.2 °F)    Resp 14    Ht 6' (1.829 m)    Wt 102.1 kg (225 lb)    SpO2 100%    BMI 30.52 kg/m2       Patient is status post total IV anesthesia, MAC anesthesia for Procedure(s):  COLONOSCOPY  COLON BIOPSY. Nausea/Vomiting: None    Postoperative hydration reviewed and adequate. Pain:  Pain Scale 1: Numeric (0 - 10) (01/15/18 1404)  Pain Intensity 1: 0 (01/15/18 1404)   Managed    Neurological Status: At baseline    Mental Status and Level of Consciousness: Arousable    Pulmonary Status:   O2 Device: Room air (01/15/18 1404)   Adequate oxygenation and airway patent    Complications related to anesthesia: None    Post-anesthesia assessment completed.  No concerns    Signed By: Aga Elise DO     January 15, 2018

## 2018-01-15 NOTE — H&P
Pre-endoscopy H and P    The patient was seen and examined in the endoscopy suite. The airway was assessed and docuemented. The problem list, past medical history, and medications were reviewed. 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 Social History Main Topics    Smoking status: Never Smoker    Smokeless tobacco: Never Used    Alcohol use No    Drug use: No    Sexual activity: Yes     Partners: Female     Birth control/ protection: Condom     Other Topics Concern    Not on file     Social History Narrative     Past Medical History:   Diagnosis Date    Chest pain 2/18/2013    as of 10/14/14 pt denies any CP since 2/13    Crohn's colitis ([facility] [address]     H/O Clostridium difficile infection 01/2017    as of 3/30/17 pt denies abd pain, diarrhea > 1 week    Iron deficiency anemia 2/18/2013    Pancreatitis     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 Taking? cholecalciferol (VITAMIN D3) 1,000 unit cap 1/8/2018 at Unknown time  Yes Yes   Sig: Take  by mouth daily. ciprofloxacin HCl (CIPRO) 250 mg tablet Not Taking at Unknown time  Yes No   Sig: Take 250 mg by mouth every twelve (12) hours. Facility-Administered Medications: None           The review of systems is:  negative for shortness of breath or chest pain      The heart, lungs, and mental status were satisfactory for the administration of anesthesia sedation and for the procedure.       I discussed with the patient the objectives, risks, consequences and alternatives to the procedure.       Gabo Barnhart MD  1/15/2018  1:17 PM

## 2018-01-15 NOTE — PROCEDURES
Colonoscopy    Indications: erythema nodosum in a patient with known ibd  Follow up pouchitis    Pre-operative Diagnosis: see above    Medications:  See anesthesia form    Post-operative Diagnosis:  Small ulcers and exudate in terminal ileum  pouchitis with ulceration of proximal pouch  proctitis      Procedure Details   Prior to the procedure its objectives, risks, consequences and alternatives were discussed with the patient who then elected to proceed. All questions were answered. Digital Rectal Exam:  was normal     The Olympus videocolonoscope was inserted in the rectum and advanced to the terminal ileum above the pouch, identified by typical landmarks. In the distal 20 cm of the ileum there numerous tiny ulcers with exudate. I suspect this is ibd, but cdiff is possible. I took biopsies. The colonoscope was slowly and carefully withdrawn as the mucosa was inspected. The pouch was mostly normal except some 1.5 cm ulceration in the oversewn area. I took pouch biopsies. Just distal to the anastomosis there appears to be a 2 cm segment of rectum. this has erythema and friability. I took biopsies. Retroflexion in the pouch was negative. Photos to document the ileocecal valve, appendiceal orifice and retroflexion exam were obtained. The preparation was adequate      Estimated Blood Loss:  none    Specimens:  Terminal ileum  Pouch  rectum    Findings:  Mild proctitis  Ulceration and exudate in the ileum  Mild pouchitis     Complications:  none    Repeat colonoscopy is recommended in:  2 years.                Isidro Harkins MD  1:31 PM  1/15/2018

## 2018-01-15 NOTE — DISCHARGE INSTRUCTIONS
Clara Davies  [id number]  1975              Procedure  Discharge Instructions:      Discomfort:  Redness at IV site- apply warm compress to area; if redness or soreness persist- contact your physician  There may be a slight amount of blood passed from the rectum  Gaseous discomfort- walking, belching will help relieve any discomfort  You may not operate a vehicle for 12 hours  You may not engage in an occupation involving machinery or appliances for rest of today  You may not drink alcoholic beverages for at least 12 hours  Avoid making any critical decisions for at least 24 hour  DIET:   You may resume your normal diet today. You should not overeat or \"feast\" today as your abdomen may become distended or uncomfortable. MEDICATIONS:   I reconciled this list from the list you gave us when you came today for the procedure. Please clarify with me, your primary care physician and the nurse who is discharging you if we have any discrepancies. Aspirin and or non-steroidal medication (Ibuprofen, Motrin, naproxen, etc.) is ok in limited quantities. ACTIVITY:  You may resume your normal daily activities it is recommended that you spend the remainder of the day resting -  avoid any strenuous activity. CALL M.D. ANY SIGN OF:  Increasing pain, nausea, vomiting  Abdominal distension (swelling)  New increased bleeding (oral or rectal)  Fever (chills)        Follow-up Instructions:   Call Dr. Lupe Obando for the results of  biopsy in approximately one week  Telephone #  [phone]  Follow up visit as previously scheduled. Eleazar Barney MD  1:36 PM  1/15/2018   [employer] Activation    Thank you for requesting access to [employer] Please follow the instructions below to securely access and download your online medical record. [employer] allows you to send messages to your doctor, view your test results, renew your prescriptions, schedule appointments, and more. How Do I Sign Up? 1.  In your internet browser, go to [email or web address]  2. Click on the First Time User? Click Here link in the Sign In box. You will be redirect to the New Member Sign Up page. 3. Enter your [employer] Access Code exactly as it appears below. You will not need to use this code after youve completed the sign-up process. If you do not sign up before the expiration date, you must request a new code. MyChart Access Code: Activation code not generated  Current [employer] Status: Active (This is the date your "[employer]t access code will )    4. Enter the last four digits of your Social Security Number (xxxx) and Date of Birth (mm/dd/yyyy) as indicated and click Submit. You will be taken to the next sign-up page. 5. Create a "[employer]t ID. This will be your X[employer] login ID and cannot be changed, so think of one that is secure and easy to remember. 6. Create a [employer] password. You can change your password at any time. 7. Enter your Password Reset Question and Answer. This can be used at a later time if you forget your password. 8. Enter your e-mail address. You will receive e-mail notification when new information is available in [address] 9. Click Sign Up. You can now view and download portions of your medical record. 10. Click the Download Summary menu link to download a portable copy of your medical information. Additional Information    If you have questions, please visit the Frequently Asked Questions section of the [employer] website at [email or web address] Remember, [employer] is NOT to be used for urgent needs. For medical emergencies, dial 911.

## 2018-01-15 NOTE — IP AVS SNAPSHOT
[address] [facility] 
[phone] Patient: Pamela Bryant MRN: [id number] :1975 About your hospitalization You were admitted on:  January 15, 2018 You last received care in the:  [facility] ENDOSCOPY You were discharged on:  January 15, 2018 Why you were hospitalized Your primary diagnosis was:  Not on File Follow-up Information Follow up With Details Comments Contact Greg Ellington MD   [facility] [address] 
[phone] Discharge Orders None A check karthik indicates which time of day the medication should be taken. My Medications CONTINUE taking these medications Instructions Each Dose to Equal  
 Morning Noon Evening Bedtime CIPRO 250 mg tablet Generic drug:  ciprofloxacin HCl Your last dose was: Your next dose is: Take 250 mg by mouth every twelve (12) hours. 250 mg  
    
   
   
   
  
 VITAMIN D3 1,000 unit Cap Generic drug:  cholecalciferol Your last dose was: Your next dose is: Take  by mouth daily. Discharge Instructions Pamela Bryant [id number] 
1975 Procedure  Discharge Instructions:   
 
Discomfort: 
Redness at IV site- apply warm compress to area; if redness or soreness persist- contact your physician There may be a slight amount of blood passed from the rectum Gaseous discomfort- walking, belching will help relieve any discomfort You may not operate a vehicle for 12 hours You may not engage in an occupation involving machinery or appliances for rest of today You may not drink alcoholic beverages for at least 12 hours Avoid making any critical decisions for at least 24 hour DIET: 
 You may resume your normal diet today. You should not overeat or \"feast\" today as your abdomen may become distended or uncomfortable. MEDICATIONS: 
 I reconciled this list from the list you gave us when you came today for the procedure. Please clarify with me, your primary care physician and the nurse who is discharging you if we have any discrepancies. Aspirin and or non-steroidal medication (Ibuprofen, Motrin, naproxen, etc.) is ok in limited quantities. ACTIVITY: 
You may resume your normal daily activities it is recommended that you spend the remainder of the day resting -  avoid any strenuous activity. CALL M.D. ANY SIGN OF: Increasing pain, nausea, vomiting Abdominal distension (swelling) New increased bleeding (oral or rectal) Fever (chills) Follow-up Instructions: 
 Call Dr. Lokesh Wagner for the results of  biopsy in approximately one week Telephone #  [phone] Follow up visit as previously scheduled. Corky Calloway MD 
1:36 PM 
1/15/2018 [employer] Activation Thank you for requesting access to [employer]. Please follow the instructions below to securely access and download your online medical record. [employer] allows you to send messages to your doctor, view your test results, renew your prescriptions, schedule appointments, and more. How Do I Sign Up? 1. In your internet browser, go to [email or web address] 
2. Click on the First Time User? Click Here link in the Sign In box. You will be redirect to the New Member Sign Up page. 3. Enter your [employer] Access Code exactly as it appears below. You will not need to use this code after youve completed the sign-up process. If you do not sign up before the expiration date, you must request a new code. [employer] Access Code: Activation code not generated Current [employer] Status: Active (This is the date your [employer] access code will ) 4. Enter the last four digits of your Social Security Number (xxxx) and Date of Birth (mm/dd/yyyy) as indicated and click Submit. You will be taken to the next sign-up page. 5. Create a HITbills ID. This will be your HIT[employer]ills login ID and cannot be changed, so think of one that is secure and easy to remember. 6. Create a H[employer]ills password. You can change your password at any time. 7. Enter your Password Reset Question and Answer. This can be used at a later time if you forget your password. 8. Enter your e-mail address. You will receive e-mail notification when new information is available in [address] 9. Click Sign Up. You can now view and download portions of your medical record. 10. Click the Download Summary menu link to download a portable copy of your medical information. Additional Information If you have questions, please visit the Frequently Asked Questions section of the HIT[employer]ills website at [email or web address] Remember, [employer] is NOT to be used for urgent needs. For medical emergencies, dial 911. Introducing [facility]! Dear Blake Barthel: Thank you for requesting a HITbills account. Our records indicate that you already have an active HITbills account. You can access your account anytime at [email or web address] Did you know that you can access your hospital and ER discharge instructions at any time in HITbills? You can also review all of your test results from your hospital stay or ER visit. Additional Information If you have questions, please visit the Frequently Asked Questions section of the HIT[employer]ills website at [email or web address] Remember, [employer] is NOT to be used for urgent needs. For medical emergencies, dial 911. Now available from your iPhone and Android! Providers Seen During Your Hospitalization Provider Specialty Primary office phone Janeth Samuel MD Gastroenterology [phone] Your Primary Care Physician (PCP) Primary Care Physician Office Phone Office Fax Jarred Turcios [phone] You are allergic to the following Allergen Reactions Cafergot (Ergotamine-Caffeine) Hives Recent Documentation Height Weight BMI Smoking Status 1.829 m 102.1 kg 30.52 kg/m2 Never Smoker Emergency Contacts Name Discharge Info Relation Home Work Mobile Patty Reece DISCHARGE CAREGIVER [3] Spouse [3] [phone] Patient Belongings The following personal items are in your possession at time of discharge: 
  Dental Appliances: None  Visual Aid: Contacts Please provide this summary of care documentation to your next provider. Signatures-by signing, you are acknowledging that this After Visit Summary has been reviewed with you and you have received a copy. Patient Signature:  ____________________________________________________________ Date:  ____________________________________________________________  
  
[facility] Provider Signature:  ____________________________________________________________ Date:  ____________________________________________________________

## 2018-01-15 NOTE — PERIOP NOTES
Endoscope was pre-cleaned at bedside immediately following procedure by [facility]. Endo tech.

## 2018-01-15 NOTE — ANESTHESIA PREPROCEDURE EVALUATION
Anesthetic History   No history of anesthetic complications            Review of Systems / Medical History  Patient summary reviewed, nursing notes reviewed and pertinent labs reviewed    Pulmonary  Within defined limits                 Neuro/Psych   Within defined limits           Cardiovascular  Within defined limits                Exercise tolerance: >4 METS     GI/Hepatic/Renal  Within defined limits              Endo/Other  Within defined limits           Other Findings   Comments: Crohn's colitis     PSC           Physical Exam    Airway  Mallampati: II  TM Distance: 4 - 6 cm  Neck ROM: normal range of motion   Mouth opening: Normal     Cardiovascular  Regular rate and rhythm,  S1 and S2 normal,  no murmur, click, rub, or gallop             Dental  No notable dental hx       Pulmonary  Breath sounds clear to auscultation               Abdominal  GI exam deferred       Other Findings            Anesthetic Plan    ASA: 2  Anesthesia type: total IV anesthesia and MAC          Induction: Intravenous  Anesthetic plan and risks discussed with: Patient

## 2018-01-23 ENCOUNTER — HOSPITAL ENCOUNTER (OUTPATIENT)
Dept: CT IMAGING | Age: 43
Discharge: HOME | End: 2018-01-23
Attending: SPECIALIST
Payer: OTHER GOVERNMENT

## 2018-01-23 DIAGNOSIS — K91.850 POUCHITIS (HCC): ICD-10-CM

## 2018-01-23 DIAGNOSIS — L52 ERYTHEMA NODOSUM: ICD-10-CM

## 2018-01-23 DIAGNOSIS — K51.20 CHRONIC ULCERATIVE PROCTITIS WITHOUT COMPLICATIONS (HCC): ICD-10-CM

## 2018-01-23 PROCEDURE — 74011636320 HC RX REV CODE- 636/320: Performed by: SPECIALIST

## 2018-01-23 PROCEDURE — 74011250636 HC RX REV CODE- 250/636: Performed by: SPECIALIST

## 2018-01-23 PROCEDURE — 74177 CT ABD & PELVIS W/CONTRAST: CPT

## 2018-01-23 PROCEDURE — 74011000255 HC RX REV CODE- 255: Performed by: SPECIALIST

## 2018-01-23 RX ORDER — SODIUM CHLORIDE 9 MG/ML
50 INJECTION, SOLUTION INTRAVENOUS
Status: COMPLETED | OUTPATIENT
Start: 2018-01-23 | End: 2018-01-23

## 2018-01-23 RX ORDER — SODIUM CHLORIDE 0.9 % (FLUSH) 0.9 %
10 SYRINGE (ML) INJECTION
Status: COMPLETED | OUTPATIENT
Start: 2018-01-23 | End: 2018-01-23

## 2018-01-23 RX ADMIN — BARIUM SULFATE 450 ML: 1 SUSPENSION ORAL at 13:53

## 2018-01-23 RX ADMIN — SODIUM CHLORIDE 50 ML/HR: 900 INJECTION, SOLUTION INTRAVENOUS at 13:42

## 2018-01-23 RX ADMIN — Medication 10 ML: at 13:41

## 2018-01-23 RX ADMIN — IOPAMIDOL 100 ML: 755 INJECTION, SOLUTION INTRAVENOUS at 13:38

## 2018-02-13 ENCOUNTER — APPOINTMENT (OUTPATIENT)
Dept: CT IMAGING | Age: 43
End: 2018-02-13
Attending: EMERGENCY MEDICINE
Payer: OTHER GOVERNMENT

## 2018-02-13 ENCOUNTER — HOSPITAL ENCOUNTER (EMERGENCY)
Age: 43
Discharge: HOME | End: 2018-02-13
Attending: EMERGENCY MEDICINE
Payer: OTHER GOVERNMENT

## 2018-02-13 VITALS
OXYGEN SATURATION: 96 % | WEIGHT: 233.25 LBS | BODY MASS INDEX: 31.59 KG/M2 | TEMPERATURE: 97.8 F | RESPIRATION RATE: 16 BRPM | HEART RATE: 74 BPM | HEIGHT: 72 IN | DIASTOLIC BLOOD PRESSURE: 57 MMHG | SYSTOLIC BLOOD PRESSURE: 103 MMHG

## 2018-02-13 DIAGNOSIS — R10.84 ABDOMINAL PAIN, GENERALIZED: Primary | ICD-10-CM

## 2018-02-13 DIAGNOSIS — Z87.19 H/O CROHN'S DISEASE: ICD-10-CM

## 2018-02-13 LAB
ALBUMIN SERPL-MCNC: 2.7 G/DL (ref 3.5–5)
ALBUMIN/GLOB SERPL: 0.5 {RATIO} (ref 1.1–2.2)
ALP SERPL-CCNC: 552 U/L (ref 45–117)
ALT SERPL-CCNC: 70 U/L (ref 12–78)
ANION GAP SERPL CALC-SCNC: 5 MMOL/L (ref 5–15)
APPEARANCE UR: CLEAR
AST SERPL-CCNC: 141 U/L (ref 15–37)
BACTERIA URNS QL MICRO: NEGATIVE /HPF
BASOPHILS # BLD: 0 K/UL (ref 0–0.1)
BASOPHILS NFR BLD: 0 % (ref 0–1)
BILIRUB SERPL-MCNC: 1.3 MG/DL (ref 0.2–1)
BILIRUB UR QL CFM: POSITIVE
BUN SERPL-MCNC: 8 MG/DL (ref 6–20)
BUN/CREAT SERPL: 10 (ref 12–20)
CALCIUM SERPL-MCNC: 8.5 MG/DL (ref 8.5–10.1)
CHLORIDE SERPL-SCNC: 105 MMOL/L (ref 97–108)
CO2 SERPL-SCNC: 29 MMOL/L (ref 21–32)
COLOR UR: ABNORMAL
CREAT SERPL-MCNC: 0.83 MG/DL (ref 0.7–1.3)
DIFFERENTIAL METHOD BLD: ABNORMAL
EOSINOPHIL # BLD: 0.3 K/UL (ref 0–0.4)
EOSINOPHIL NFR BLD: 4 % (ref 0–7)
EPITH CASTS URNS QL MICRO: ABNORMAL /LPF
ERYTHROCYTE [DISTWIDTH] IN BLOOD BY AUTOMATED COUNT: 14.3 % (ref 11.5–14.5)
GLOBULIN SER CALC-MCNC: 6 G/DL (ref 2–4)
GLUCOSE SERPL-MCNC: 86 MG/DL (ref 65–100)
GLUCOSE UR STRIP.AUTO-MCNC: NEGATIVE MG/DL
HCT VFR BLD AUTO: 33.7 % (ref 36.6–50.3)
HGB BLD-MCNC: 11.2 G/DL (ref 12.1–17)
HGB UR QL STRIP: NEGATIVE
HYALINE CASTS URNS QL MICRO: ABNORMAL /LPF (ref 0–5)
IMM GRANULOCYTES # BLD: 0 K/UL (ref 0–0.04)
IMM GRANULOCYTES NFR BLD AUTO: 0 % (ref 0–0.5)
KETONES UR QL STRIP.AUTO: NEGATIVE MG/DL
LACTATE SERPL-SCNC: 0.9 MMOL/L (ref 0.4–2)
LEUKOCYTE ESTERASE UR QL STRIP.AUTO: ABNORMAL
LIPASE SERPL-CCNC: 144 U/L (ref 73–393)
LYMPHOCYTES # BLD: 2 K/UL (ref 0.8–3.5)
LYMPHOCYTES NFR BLD: 28 % (ref 12–49)
MCH RBC QN AUTO: 29.8 PG (ref 26–34)
MCHC RBC AUTO-ENTMCNC: 33.2 G/DL (ref 30–36.5)
MCV RBC AUTO: 89.6 FL (ref 80–99)
MONOCYTES # BLD: 0.6 K/UL (ref 0–1)
MONOCYTES NFR BLD: 8 % (ref 5–13)
NEUTS SEG # BLD: 4.2 K/UL (ref 1.8–8)
NEUTS SEG NFR BLD: 59 % (ref 32–75)
NITRITE UR QL STRIP.AUTO: NEGATIVE
NRBC # BLD: 0 K/UL (ref 0–0.01)
NRBC BLD-RTO: 0 PER 100 WBC
PH UR STRIP: 6.5 [PH] (ref 5–8)
PLATELET # BLD AUTO: 149 K/UL (ref 150–400)
PMV BLD AUTO: 10.4 FL (ref 8.9–12.9)
POTASSIUM SERPL-SCNC: 3.1 MMOL/L (ref 3.5–5.1)
PROT SERPL-MCNC: 8.7 G/DL (ref 6.4–8.2)
PROT UR STRIP-MCNC: ABNORMAL MG/DL
RBC # BLD AUTO: 3.76 M/UL (ref 4.1–5.7)
RBC #/AREA URNS HPF: ABNORMAL /HPF (ref 0–5)
SODIUM SERPL-SCNC: 139 MMOL/L (ref 136–145)
SP GR UR REFRACTOMETRY: 1.02 (ref 1–1.03)
UA: UC IF INDICATED,UAUC: ABNORMAL
UROBILINOGEN UR QL STRIP.AUTO: 4 EU/DL (ref 0.2–1)
WBC # BLD AUTO: 7.2 K/UL (ref 4.1–11.1)
WBC URNS QL MICRO: ABNORMAL /HPF (ref 0–4)

## 2018-02-13 PROCEDURE — 74177 CT ABD & PELVIS W/CONTRAST: CPT

## 2018-02-13 PROCEDURE — 83605 ASSAY OF LACTIC ACID: CPT | Performed by: EMERGENCY MEDICINE

## 2018-02-13 PROCEDURE — 83690 ASSAY OF LIPASE: CPT | Performed by: EMERGENCY MEDICINE

## 2018-02-13 PROCEDURE — 80053 COMPREHEN METABOLIC PANEL: CPT | Performed by: EMERGENCY MEDICINE

## 2018-02-13 PROCEDURE — 81001 URINALYSIS AUTO W/SCOPE: CPT | Performed by: EMERGENCY MEDICINE

## 2018-02-13 PROCEDURE — 99283 EMERGENCY DEPT VISIT LOW MDM: CPT

## 2018-02-13 PROCEDURE — 96361 HYDRATE IV INFUSION ADD-ON: CPT

## 2018-02-13 PROCEDURE — 85025 COMPLETE CBC W/AUTO DIFF WBC: CPT | Performed by: EMERGENCY MEDICINE

## 2018-02-13 PROCEDURE — 96374 THER/PROPH/DIAG INJ IV PUSH: CPT

## 2018-02-13 PROCEDURE — 74011250636 HC RX REV CODE- 250/636: Performed by: EMERGENCY MEDICINE

## 2018-02-13 PROCEDURE — 74011636320 HC RX REV CODE- 636/320: Performed by: EMERGENCY MEDICINE

## 2018-02-13 PROCEDURE — 74011250637 HC RX REV CODE- 250/637: Performed by: EMERGENCY MEDICINE

## 2018-02-13 PROCEDURE — 36415 COLL VENOUS BLD VENIPUNCTURE: CPT | Performed by: EMERGENCY MEDICINE

## 2018-02-13 PROCEDURE — 96375 TX/PRO/DX INJ NEW DRUG ADDON: CPT

## 2018-02-13 RX ORDER — ONDANSETRON 2 MG/ML
4 INJECTION INTRAMUSCULAR; INTRAVENOUS
Status: COMPLETED | OUTPATIENT
Start: 2018-02-13 | End: 2018-02-13

## 2018-02-13 RX ORDER — DICYCLOMINE HYDROCHLORIDE 20 MG/1
20 TABLET ORAL
Status: COMPLETED | OUTPATIENT
Start: 2018-02-13 | End: 2018-02-13

## 2018-02-13 RX ORDER — FENTANYL CITRATE 50 UG/ML
50 INJECTION, SOLUTION INTRAMUSCULAR; INTRAVENOUS ONCE
Status: COMPLETED | OUTPATIENT
Start: 2018-02-13 | End: 2018-02-13

## 2018-02-13 RX ORDER — SODIUM CHLORIDE 0.9 % (FLUSH) 0.9 %
10 SYRINGE (ML) INJECTION
Status: COMPLETED | OUTPATIENT
Start: 2018-02-13 | End: 2018-02-13

## 2018-02-13 RX ORDER — ONDANSETRON 4 MG/1
4 TABLET, ORALLY DISINTEGRATING ORAL
Qty: 10 TAB | Refills: 0 | Status: SHIPPED | OUTPATIENT
Start: 2018-02-13 | End: 2018-03-05 | Stop reason: CLARIF

## 2018-02-13 RX ORDER — SODIUM CHLORIDE 9 MG/ML
50 INJECTION, SOLUTION INTRAVENOUS
Status: COMPLETED | OUTPATIENT
Start: 2018-02-13 | End: 2018-02-13

## 2018-02-13 RX ORDER — DICYCLOMINE HYDROCHLORIDE 10 MG/1
10 CAPSULE ORAL 4 TIMES DAILY
Qty: 20 CAP | Refills: 0 | Status: SHIPPED | OUTPATIENT
Start: 2018-02-13 | End: 2018-02-18

## 2018-02-13 RX ORDER — KETOROLAC TROMETHAMINE 30 MG/ML
30 INJECTION, SOLUTION INTRAMUSCULAR; INTRAVENOUS
Status: DISCONTINUED | OUTPATIENT
Start: 2018-02-13 | End: 2018-02-13

## 2018-02-13 RX ORDER — KETOROLAC TROMETHAMINE 30 MG/ML
15 INJECTION, SOLUTION INTRAMUSCULAR; INTRAVENOUS
Status: COMPLETED | OUTPATIENT
Start: 2018-02-13 | End: 2018-02-13

## 2018-02-13 RX ADMIN — DICYCLOMINE HYDROCHLORIDE 20 MG: 20 TABLET ORAL at 01:19

## 2018-02-13 RX ADMIN — IOPAMIDOL 100 ML: 755 INJECTION, SOLUTION INTRAVENOUS at 01:53

## 2018-02-13 RX ADMIN — SODIUM CHLORIDE 50 ML/HR: 900 INJECTION, SOLUTION INTRAVENOUS at 01:54

## 2018-02-13 RX ADMIN — ONDANSETRON HYDROCHLORIDE 4 MG: 2 INJECTION, SOLUTION INTRAMUSCULAR; INTRAVENOUS at 01:17

## 2018-02-13 RX ADMIN — KETOROLAC TROMETHAMINE 15 MG: 30 INJECTION, SOLUTION INTRAMUSCULAR at 01:18

## 2018-02-13 RX ADMIN — FENTANYL CITRATE 50 MCG: 50 INJECTION, SOLUTION INTRAMUSCULAR; INTRAVENOUS at 02:03

## 2018-02-13 RX ADMIN — SODIUM CHLORIDE 1000 ML: 900 INJECTION, SOLUTION INTRAVENOUS at 01:17

## 2018-02-13 RX ADMIN — Medication 10 ML: at 01:53

## 2018-02-13 NOTE — ED PROVIDER NOTES
EMERGENCY DEPARTMENT HISTORY AND PHYSICAL EXAM      Date: 2/13/2018  Patient Name: Clara Davies    History of Presenting Illness     Chief Complaint   Patient presents with    Abdominal Pain     Pt ambulatory to triage with c/o abdominal pain for a couple of hours. Hx of Chrohn's and PCS. Reports nausea, denies vomiting. History Provided By: Patient    HPI: Clara Davies is a 38 y.o. 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 Pt reports associated nausea with the pain. Of note, the pt notes he is followed by Dr. Lupe Obando (GI) and has a J pouch. He states he was evaluated for his PCS over the weekend with a CT scan, noting he has a follow-up appointment with Dr. Lupe Obando tomorrow to review the results. Pt specifically denies any fever, congestion, cough, shortness of breath, chest pain, blood in the stool / melena, vomiting, diarrhea, dysuria, or urinary frequency. PCP: MD Geoff Maier MD    PMHx: Significant for Pancreatitis, PCS, UC and Crohn's  PSHx: Significant for Colostomy, Colonoscopy  Social Hx: -tobacco, -EtOH, -Illicit Drugs     There are no other complaints, changes, or physical findings at this time. Current Outpatient Prescriptions   Medication Sig Dispense Refill    dicyclomine (BENTYL) 10 mg capsule Take 1 Cap by mouth four (4) times daily for 5 days. 20 Cap 0    ondansetron (ZOFRAN ODT) 4 mg disintegrating tablet Take 1 Tab by mouth every eight (8) hours as needed for Nausea. 10 Tab 0    ciprofloxacin HCl (CIPRO) 250 mg tablet Take 250 mg by mouth every twelve (12) hours.  cholecalciferol (VITAMIN D3) 1,000 unit cap Take  by mouth daily.          Past History     Past Medical History:  Past Medical History:   Diagnosis Date    Chest pain 2/18/2013    as of 10/14/14 pt denies any CP since 2/13    Crohn's colitis ([facility] [address]     H/O Clostridium difficile infection 01/2017    as of 3/30/17 pt denies abd pain, diarrhea > 1 week    Iron deficiency anemia 2/18/2013    Pancreatitis     Pouchitis ([address] 12/8/2011    PSC (primary sclerosing cholangitis)     Dr Rhoda Vázquez [phone]    Ulcerative colitis [facility])        Past Surgical History:  Past Surgical History:   Procedure Laterality Date    ABDOMEN SURGERY PROC UNLISTED  2002    colostomy reversal & J Pouch    COLONOSCOPY N/A 4/4/2017    COLON[facility]hl [facility]    COLONOSCOPY N/A 1/15/2018    COL[facility]  1/15/2018         HX COLECTOMY  2002    colostomy    HX GI      ercp    HX ROTATOR CUFF REPAIR      right    [place] COLONOSCOPY W/BIOPSY SINGLE/MULTIPLE  12/8/2011         [place] EGD TRANSORAL BIOPSY SINGLE/MULTIPLE  2/18/2013            Family History:  Family History   Problem Relation Age of Onset    Diabetes Mother     Hypertension Mother     Diabetes Father     Hypertension Father     No Known Problems Sister     No Known Problems Brother     No Known Problems Maternal Aunt     No Known Problems Maternal Uncle     No Known Problems Paternal Aunt     No Known Problems Paternal Uncle     No Known Problems Maternal Grandmother     No Known Problems Maternal Grandfather     No Known Problems Paternal Grandmother     No Known Problems Paternal Grandfather        Social History:  Social History   Substance Use Topics    Smoking status: Never Smoker    Smokeless tobacco: Never Used    Alcohol use No       Allergies: Allergies   Allergen Reactions    Cafergot [Ergotamine-Caffeine] Hives         Review of Systems   Review of Systems   Constitutional: Negative for chills and fever. HENT: Negative. Eyes: Negative. Respiratory: Negative for cough, chest tightness and shortness of breath. Cardiovascular: Negative for chest pain and leg swelling. Gastrointestinal: Positive for abdominal pain and nausea. Negative for blood in stool, diarrhea and vomiting. Endocrine: Negative. Genitourinary: Negative for difficulty urinating and dysuria. Musculoskeletal: Negative for myalgias. Skin: Negative. Neurological: Negative. Psychiatric/Behavioral: Negative. All other systems reviewed and are negative. Physical Exam   Physical Exam   Constitutional: He is oriented to person, place, and time. He appears well-developed and well-nourished. No distress. HENT:   Head: Normocephalic and atraumatic. Nose: Nose normal.   Mouth/Throat: No oropharyngeal exudate. Eyes: Conjunctivae and EOM are normal. Pupils are equal, round, and reactive to light. Neck: Normal range of motion. Neck supple. No JVD present. Cardiovascular: Normal rate, regular rhythm, normal heart sounds and intact distal pulses. Exam reveals no friction rub. No murmur heard. Pulmonary/Chest: Effort normal and breath sounds normal. No stridor. No respiratory distress. He has no wheezes. He has no rales. Abdominal: Soft. Bowel sounds are normal. He exhibits no distension. There is generalized tenderness. There is no rebound. Musculoskeletal: Normal range of motion. He exhibits no tenderness. Neurological: He is alert and oriented to person, place, and time. No cranial nerve deficit. Skin: Skin is warm and dry. No rash noted. He is not diaphoretic. Psychiatric: He has a normal mood and affect. His speech is normal and behavior is normal. Judgment and thought content normal. Cognition and memory are normal.   Nursing note and vitals reviewed.         Diagnostic Study Results     Labs -     Recent Results (from the past 12 hour(s))   CBC WITH AUTOMATED DIFF    Collection Time: 02/13/18 12:58 AM   Result Value Ref Range    WBC 7.2 4.1 - 11.1 K/uL    RBC 3.76 (L) 4.10 - 5.70 M/uL    HGB 11.2 (L) 12.1 - 17.0 g/dL    HCT 33.7 (L) 36.6 - 50.3 %    MCV 89.6 80.0 - 99.0 FL MCH 29.8 26.0 - 34.0 PG    MCHC 33.2 30.0 - 36.5 g/dL    RDW 14.3 11.5 - 14.5 %    PLATELET 377 (L) 411 - 400 K/uL    MPV 10.4 8.9 - 12.9 FL    NRBC 0.0 0  WBC    ABSOLUTE NRBC 0.00 0.00 - 0.01 K/uL    NEUTROPHILS 59 32 - 75 %    LYMPHOCYTES 28 12 - 49 %    MONOCYTES 8 5 - 13 %    EOSINOPHILS 4 0 - 7 %    BASOPHILS 0 0 - 1 %    IMMATURE GRANULOCYTES 0 0.0 - 0.5 %    ABS. NEUTROPHILS 4.2 1.8 - 8.0 K/UL    ABS. LYMPHOCYTES 2.0 0.8 - 3.5 K/UL    ABS. MONOCYTES 0.6 0.0 - 1.0 K/UL    ABS. EOSINOPHILS 0.3 0.0 - 0.4 K/UL    ABS. BASOPHILS 0.0 0.0 - 0.1 K/UL    ABS. IMM. GRANS. 0.0 0.00 - 0.04 K/UL    DF AUTOMATED     METABOLIC PANEL, COMPREHENSIVE    Collection Time: 02/13/18 12:58 AM   Result Value Ref Range    Sodium 139 136 - 145 mmol/L    Potassium 3.1 (L) 3.5 - 5.1 mmol/L    Chloride 105 97 - 108 mmol/L    CO2 29 21 - 32 mmol/L    Anion gap 5 5 - 15 mmol/L    Glucose 86 65 - 100 mg/dL    BUN 8 6 - 20 MG/DL    Creatinine 0.83 0.70 - 1.30 MG/DL    BUN/Creatinine ratio 10 (L) 12 - 20      GFR est AA >60 >60 ml/min/1.73m2    GFR est non-AA >60 >60 ml/min/1.73m2    Calcium 8.5 8.5 - 10.1 MG/DL    Bilirubin, total 1.3 (H) 0.2 - 1.0 MG/DL    ALT (SGPT) 70 12 - 78 U/L    AST (SGOT) 141 (H) 15 - 37 U/L    Alk.  phosphatase 552 (H) 45 - 117 U/L    Protein, total 8.7 (H) 6.4 - 8.2 g/dL    Albumin 2.7 (L) 3.5 - 5.0 g/dL    Globulin 6.0 (H) 2.0 - 4.0 g/dL    A-G Ratio 0.5 (L) 1.1 - 2.2     LIPASE    Collection Time: 02/13/18 12:58 AM   Result Value Ref Range    Lipase 144 73 - 393 U/L   LACTIC ACID    Collection Time: 02/13/18  1:15 AM   Result Value Ref Range    Lactic acid 0.9 0.4 - 2.0 MMOL/L   URINALYSIS W/ REFLEX CULTURE    Collection Time: 02/13/18  1:18 AM   Result Value Ref Range    Color DARK YELLOW      Appearance CLEAR CLEAR      Specific gravity 1.025 1.003 - 1.030      pH (UA) 6.5 5.0 - 8.0      Protein TRACE (A) NEG mg/dL    Glucose NEGATIVE  NEG mg/dL    Ketone NEGATIVE  NEG mg/dL    Blood NEGATIVE  NEG Urobilinogen 4.0 (H) 0.2 - 1.0 EU/dL    Nitrites NEGATIVE  NEG      Leukocyte Esterase TRACE (A) NEG      WBC 0-4 0 - 4 /hpf    RBC 0-5 0 - 5 /hpf    Epithelial cells FEW FEW /lpf    Bacteria NEGATIVE  NEG /hpf    UA:UC IF INDICATED CULTURE NOT INDICATED BY UA RESULT CNI      Hyaline cast 0-2 0 - 5 /lpf   BILIRUBIN, CONFIRM    Collection Time: 02/13/18  1:18 AM   Result Value Ref Range    Bilirubin UA, confirm POSITIVE (A) NEG         Radiologic Studies -   CT ABD PELV W CONT   Final Result        CT Results  (Last 48 hours)               02/13/18 0201  CT ABD PELV W CONT Final result    Impression:   impression: No acute changes. TECHNIQUE:    Following the uneventful intravenous administration of contrast, thin axial   images were obtained through the abdomen and pelvis. Coronal and sagittal   reconstructions were generated. Oral contrast was not administered. CT dose   reduction was achieved through use of a standardized protocol tailored for this   examination and automatic exposure control for dose modulation. Narrative:  EXAM:  CT ABD PELV W CONT       INDICATION: Abdominal pain; h/o crohns, abd pain       COMPARISON: January 23       CONTRAST:  100 cc of Isovue-370        liver and spleen are unchanged. Liver is somewhat nodular and spleen is   slightly prominent in size. The gallbladder is not distended. The pancreas   appears unremarkable. There has been prior right lower quadrant surgery. There   is no definite evidence to suggest obstruction. No free air or free fluid. The   bladder is midline and unfilled. Medical Decision Making   I am the first provider for this patient. I reviewed the vital signs, available nursing notes, past medical history, past surgical history, family history and social history. Vital Signs-Reviewed the patient's vital signs.   Patient Vitals for the past 12 hrs:   Temp Pulse Resp BP SpO2   02/13/18 0330 - - - - 96 %   02/13/18 0300 - 74 16 107/66 95 %   02/13/18 0216 - 85 18 113/76 97 %   02/13/18 0017 97.8 °F (36.6 °C) 84 16 (!) 136/91 100 %       Pulse Oximetry Analysis - 100% on RA    Cardiac Monitor:   Rate: 85 bpm  Rhythm: Normal Sinus Rhythm      Records Reviewed: Nursing Notes, Old Medical Records, Previous Radiology Studies and Previous Laboratory Studies    Provider Notes (Medical Decision Making):     DDX:  crohns flare, sbo, colitis, uti, pyelo    Plan:  Labs, ct , analgesic, antiemetic    Impression:  abd pain    ED Course:   Initial assessment performed. The patients presenting problems have been discussed, and they are in agreement with the care plan formulated and outlined with them. I have encouraged them to ask questions as they arise throughout their visit. I reviewed our electronic medical record system for any past medical records that were available that may contribute to the patients current condition, the nursing notes and and vital signs from today's visit    Nursing notes will be reviewed as they become available in realtime while the pt has been in the ED. Lata Louis MD    I personally reviewed pt's imaging. Official read by radiology listed below. Lata Louis MD    PROGRESS NOTE:  3:25 AM  Pt reevaluated. Pt reports to be feeling better. Will order lactate, given pt's hx of pancreatitis. Will continue to monitor. Written by Wilian Regalado ED Scribe, as dictated by Lata Louis MD      4:19 AM  Progress note:  Pt noted to be feeling better, ready for discharge. Discussed lab and imaging findings with pt and/or family, specifically noting negative ct findings. Pt will follow up as instructed. All questions have been answered, pt voiced understanding and agreement with plan. If narcotics were prescribed, pt was advised not to drive or operate heavy machinery. If abx were prescribed, pt advised that diarrhea and rash are possible side effects of the medications.      Specific return precautions provided in addition to instructions for pt to return to the ED immediately should sx worsen at any time. Essie Pérez MD      PLAN:  1. Current Discharge Medication List      START taking these medications    Details   dicyclomine (BENTYL) 10 mg capsule Take 1 Cap by mouth four (4) times daily for 5 days. Qty: 20 Cap, Refills: 0      ondansetron (ZOFRAN ODT) 4 mg disintegrating tablet Take 1 Tab by mouth every eight (8) hours as needed for Nausea. Qty: 10 Tab, Refills: 0           2. Follow-up Information     Follow up With Details Comments [address], [place] Schedule an appointment as soon as possible for a visit in 2 days  [facility]  [phone]          Return to ED if worse     Disposition:    4:19 AM   The patient's results have been reviewed with family and/or caregiver. They verbally convey their understanding and agreement of the patient's signs, symptoms, diagnosis, treatment and prognosis and additionally agree to follow up as recommended in the discharge instructions or to return to the Emergency Room should the patient's condition change prior to their follow-up appointment. The family and/or caregiver verbally agrees with the care-plan and all of their questions have been answered. The discharge instructions have also been provided to the them with educational information regarding the patient's diagnosis as well a list of reasons why the patient would want to return to the ER prior to their follow-up appointment should their condition change. Essie Pérez MD      Diagnosis     Clinical Impression:   1. Abdominal pain, generalized    2. H/O Crohn's disease        Attestations: This note is prepared by Chiara Garcia, acting as Scribe for MD Essie English MD : The scribe's documentation has been prepared under my direction and personally reviewed by me in its entirety.  I confirm that the note above accurately reflects all work, treatment, procedures, and medical decision making performed by me. This note will not be viewable in [address]

## 2018-02-13 NOTE — LETTER
[address] 
[facility] EMERGENCY DEPT 
[address] [place] [address] [phone] 
[phone] Work/School Note Date: 2/13/2018 To Whom It May concern: 
 
Ivonne Toro was seen and treated today in the emergency room by the following provider(s): 
Attending Provider: Sofia Kaiser MD. Ivonne Toro may return to work on 2/14/18.  
 
Sincerely, 
 
 
 
 
Sofia Kaiser MD

## 2018-02-13 NOTE — ED NOTES
Assumed care of pt from triage. Pt with abdominal pain, hx crohn's disease. Able to position self for comfort. Side rails up. Visitor bedside. Call bell within reach.

## 2018-02-13 NOTE — DISCHARGE INSTRUCTIONS
Abdominal Pain: Care Instructions  Your Care Instructions    Abdominal pain has many possible causes. Some aren't serious and get better on their own in a few days. Others need more testing and treatment. If your pain continues or gets worse, you need to be rechecked and may need more tests to find out what is wrong. You may need surgery to correct the problem. Don't ignore new symptoms, such as fever, nausea and vomiting, urination problems, pain that gets worse, and dizziness. These may be signs of a more serious problem. Your doctor may have recommended a follow-up visit in the next 8 to 12 hours. If you are not getting better, you may need more tests or treatment. The doctor has checked you carefully, but problems can develop later. If you notice any problems or new symptoms, get medical treatment right away. Follow-up care is a key part of your treatment and safety. Be sure to make and go to all appointments, and call your doctor if you are having problems. It's also a good idea to know your test results and keep a list of the medicines you take. How can you care for yourself at home? · Rest until you feel better. · To prevent dehydration, drink plenty of fluids, enough so that your urine is light yellow or clear like water. Choose water and other caffeine-free clear liquids until you feel better. If you have kidney, heart, or liver disease and have to limit fluids, talk with your doctor before you increase the amount of fluids you drink. · If your stomach is upset, eat mild foods, such as rice, dry toast or crackers, bananas, and applesauce. Try eating several small meals instead of two or three large ones. · Wait until 48 hours after all symptoms have gone away before you have spicy foods, alcohol, and drinks that contain caffeine. · Do not eat foods that are high in fat. · Avoid anti-inflammatory medicines such as aspirin, ibuprofen (Advil, Motrin), and naproxen (Aleve).  These can cause stomach upset. Talk to your doctor if you take daily aspirin for another health problem. When should you call for help? Call 911 anytime you think you may need emergency care. For example, call if:  ? · You passed out (lost consciousness). ? · You pass maroon or very bloody stools. ? · You vomit blood or what looks like coffee grounds. ? · You have new, severe belly pain. ?Call your doctor now or seek immediate medical care if:  ? · Your pain gets worse, especially if it becomes focused in one area of your belly. ? · You have a new or higher fever. ? · Your stools are black and look like tar, or they have streaks of blood. ? · You have unexpected vaginal bleeding. ? · You have symptoms of a urinary tract infection. These may include:  ¨ Pain when you urinate. ¨ Urinating more often than usual.  ¨ Blood in your urine. ? · You are dizzy or lightheaded, or you feel like you may faint. ? Watch closely for changes in your health, and be sure to contact your doctor if:  ? · You are not getting better after 1 day (24 hours). Where can you learn more? Go to http://oscar-wilfrido.info/. Enter O038 in the search box to learn more about \"Abdominal Pain: Care Instructions. \"  Current as of: March 20, 2017  Content Version: 11.4  © 5092-5343 [employer]. Care instructions adapted under license by [employer] (which disclaims liability or warranty for this information). If you have questions about a medical condition or this instruction, always ask your healthcare professional. [place] [address] any warranty or liability for your use of this information.

## 2018-03-05 ENCOUNTER — HOSPITAL ENCOUNTER (OUTPATIENT)
Dept: INTERVENTIONAL RADIOLOGY/VASCULAR | Age: 43
Discharge: HOME | End: 2018-03-05
Attending: INTERNAL MEDICINE
Payer: OTHER GOVERNMENT

## 2018-03-05 VITALS
BODY MASS INDEX: 30.88 KG/M2 | OXYGEN SATURATION: 100 % | TEMPERATURE: 97.7 F | RESPIRATION RATE: 20 BRPM | WEIGHT: 228 LBS | DIASTOLIC BLOOD PRESSURE: 68 MMHG | SYSTOLIC BLOOD PRESSURE: 110 MMHG | HEART RATE: 78 BPM | HEIGHT: 72 IN

## 2018-03-05 DIAGNOSIS — M30.0 POLYARTERITIS NODOSA (HCC): ICD-10-CM

## 2018-03-05 PROCEDURE — C1892 INTRO/SHEATH,FIXED,PEEL-AWAY: HCPCS

## 2018-03-05 PROCEDURE — 74011636320 HC RX REV CODE- 636/320: Performed by: RADIOLOGY

## 2018-03-05 PROCEDURE — 77030010324 HC TBNG CNTRST MRTM -A

## 2018-03-05 PROCEDURE — 77030009378 HC DEV TORQ OLCT F/GWIRE MANIP COOK -A

## 2018-03-05 PROCEDURE — C1760 CLOSURE DEV, VASC: HCPCS

## 2018-03-05 PROCEDURE — C1769 GUIDE WIRE: HCPCS

## 2018-03-05 PROCEDURE — C1894 INTRO/SHEATH, NON-LASER: HCPCS

## 2018-03-05 PROCEDURE — 36252 INS CATH REN ART 1ST BILAT: CPT

## 2018-03-05 PROCEDURE — 77030021533 HC CATH ANGI DX PRFMA MRTM -A

## 2018-03-05 PROCEDURE — 74011250636 HC RX REV CODE- 250/636: Performed by: RADIOLOGY

## 2018-03-05 PROCEDURE — C1887 CATHETER, GUIDING: HCPCS

## 2018-03-05 PROCEDURE — 74011000250 HC RX REV CODE- 250: Performed by: RADIOLOGY

## 2018-03-05 RX ORDER — CEFAZOLIN SODIUM/WATER 2 G/20 ML
2 SYRINGE (ML) INTRAVENOUS ONCE
Status: COMPLETED | OUTPATIENT
Start: 2018-03-05 | End: 2018-03-05

## 2018-03-05 RX ORDER — HYDROMORPHONE HYDROCHLORIDE 2 MG/ML
1 INJECTION, SOLUTION INTRAMUSCULAR; INTRAVENOUS; SUBCUTANEOUS ONCE
Status: COMPLETED | OUTPATIENT
Start: 2018-03-05 | End: 2018-03-05

## 2018-03-05 RX ORDER — SODIUM CHLORIDE 9 MG/ML
25 INJECTION, SOLUTION INTRAVENOUS CONTINUOUS
Status: DISCONTINUED | OUTPATIENT
Start: 2018-03-05 | End: 2018-03-09 | Stop reason: HOSPADM

## 2018-03-05 RX ORDER — MIDAZOLAM HYDROCHLORIDE 1 MG/ML
5 INJECTION, SOLUTION INTRAMUSCULAR; INTRAVENOUS
Status: DISCONTINUED | OUTPATIENT
Start: 2018-03-05 | End: 2018-03-09 | Stop reason: HOSPADM

## 2018-03-05 RX ORDER — FENTANYL CITRATE 50 UG/ML
100 INJECTION, SOLUTION INTRAMUSCULAR; INTRAVENOUS
Status: DISCONTINUED | OUTPATIENT
Start: 2018-03-05 | End: 2018-03-09 | Stop reason: HOSPADM

## 2018-03-05 RX ORDER — LIDOCAINE HYDROCHLORIDE 20 MG/ML
20 INJECTION, SOLUTION INFILTRATION; PERINEURAL ONCE
Status: COMPLETED | OUTPATIENT
Start: 2018-03-05 | End: 2018-03-05

## 2018-03-05 RX ORDER — HEPARIN SODIUM 200 [USP'U]/100ML
800 INJECTION, SOLUTION INTRAVENOUS ONCE
Status: COMPLETED | OUTPATIENT
Start: 2018-03-05 | End: 2018-03-05

## 2018-03-05 RX ADMIN — HYDROMORPHONE HYDROCHLORIDE 1 MG: 2 INJECTION INTRAMUSCULAR; INTRAVENOUS; SUBCUTANEOUS at 12:17

## 2018-03-05 RX ADMIN — LIDOCAINE HYDROCHLORIDE 10 ML: 20 INJECTION, SOLUTION INFILTRATION; PERINEURAL at 11:52

## 2018-03-05 RX ADMIN — HEPARIN SODIUM IN SODIUM CHLORIDE 1000 UNITS: 200 INJECTION INTRAVENOUS at 11:52

## 2018-03-05 RX ADMIN — MIDAZOLAM 1 MG: 1 INJECTION INTRAMUSCULAR; INTRAVENOUS at 11:28

## 2018-03-05 RX ADMIN — FENTANYL CITRATE 50 MCG: 50 INJECTION, SOLUTION INTRAMUSCULAR; INTRAVENOUS at 11:08

## 2018-03-05 RX ADMIN — FENTANYL CITRATE 25 MCG: 50 INJECTION, SOLUTION INTRAMUSCULAR; INTRAVENOUS at 11:33

## 2018-03-05 RX ADMIN — IOPAMIDOL 97 ML: 755 INJECTION, SOLUTION INTRAVENOUS at 11:52

## 2018-03-05 RX ADMIN — SODIUM CHLORIDE 25 ML/HR: 900 INJECTION, SOLUTION INTRAVENOUS at 10:30

## 2018-03-05 RX ADMIN — MIDAZOLAM 2 MG: 1 INJECTION INTRAMUSCULAR; INTRAVENOUS at 11:08

## 2018-03-05 RX ADMIN — Medication 2 G: at 10:35

## 2018-03-05 RX ADMIN — FENTANYL CITRATE 25 MCG: 50 INJECTION, SOLUTION INTRAMUSCULAR; INTRAVENOUS at 11:27

## 2018-03-05 NOTE — DISCHARGE INSTRUCTIONS
[facility]  Interventional Radiology/Special Procedures  Unilateral Renal Angiogram Discharge Instructions    Physician:  Dr. Monse Franklin    Date:  3/5/2018    A friend or family member must remain with you for the next 24 hours. Increase your fluid intake for the next 24 hours. You may return to work in 24-48 hours, depending on your type of work. Resume your previous diet and follow your medication reconciliation list.    Do not drive a vehicle or sign legal documents for the next 24 hours. You have had an x-ray study of your blood vessels. Some bleeding could occur from the puncture site during the next 48 hours. Limit your walking. Do not engage in any vigorous physical activity for at least 48 hours. If you see any bleeding from the puncture site, apply pressure to the area immediately and ask for assistance. Hold pressure for at least 15 minutes. If the bleeding does not stop or the puncture site becomes swollen, have someone take you to the nearest medical facility immediately. CONTINUE TO HOLD PRESSURE TO THE PUNCTURE SITE. PLEASE NOTIFY [facility] [phone] [phone]. For any other questions related to your procedure, call [phone], and ask to speak with a nurse. If you have minor leg discomfort, you may take a non-aspirin medication. However if you have SEVERE leg pain, numbness, tingling or change in the color of your leg, call the hospital or your doctor immediately. Leave the dressing in place for the next 3 days. After 3 days, you may remove the dressing and wash the area normally. NO baths, swimming or hot tubs while the dressing remains in place. Showering is permissible. Be sure to follow up with your doctor as soon as possible. Call and make an appointment if you do not already have one.

## 2018-03-05 NOTE — IP AVS SNAPSHOT
[address] 
[phone] Patient: Rhys Lesch MRN: [id number] :1975 About your hospitalization You were admitted on:  2018 You last received care in the:  [facility] RAD ANGIO IR You were discharged on:  2018 Why you were hospitalized Your primary diagnosis was:  Not on File Follow-up Information None Discharge Orders None A check karthik indicates which time of day the medication should be taken. My Medications ASK your doctor about these medications Instructions Each Dose to Equal  
 Morning Noon Evening Bedtime VITAMIN D3 1,000 unit Cap Generic drug:  cholecalciferol Your last dose was: Your next dose is: Take  by mouth daily. XIFAXAN 200 mg tablet Generic drug:  rifAXIMin Your last dose was: Your next dose is: Take 500 mg by mouth three (3) times daily. 500 mg Discharge Instructions [facility] Interventional Radiology/Special Procedures Unilateral Renal Angiogram Discharge Instructions Physician:  Dr. To James Date:  3/5/2018 A friend or family member must remain with you for the next 24 hours. Increase your fluid intake for the next 24 hours. You may return to work in 24-48 hours, depending on your type of work. Resume your previous diet and follow your medication reconciliation list. 
 
Do not drive a vehicle or sign legal documents for the next 24 hours. You have had an x-ray study of your blood vessels. Some bleeding could occur from the puncture site during the next 48 hours. Limit your walking. Do not engage in any vigorous physical activity for at least 48 hours.  
 
If you see any bleeding from the puncture site, apply pressure to the area immediately and ask for assistance. Hold pressure for at least 15 minutes. If the bleeding does not stop or the puncture site becomes swollen, have someone take you to the nearest medical facility immediately. CONTINUE TO HOLD PRESSURE TO THE PUNCTURE SITE. PLEASE NOTIFY Bianca De La Cruz [phone]. For any other questions related to your procedure, call [phone], and ask to speak with a nurse. If you have minor leg discomfort, you may take a non-aspirin medication. However if you have SEVERE leg pain, numbness, tingling or change in the color of your leg, call the hospital or your doctor immediately. Leave the dressing in place for the next 3 days. After 3 days, you may remove the dressing and wash the area normally. NO baths, swimming or hot tubs while the dressing remains in place. Showering is permissible. Be sure to follow up with your doctor as soon as possible. Call and make an appointment if you do not already have one. Introducing [facility]! Dear Emelia Severance: Thank you for requesting a [employer] account. Our records indicate that you already have an active [employer] account. You can access your account anytime at [email or web address] Did you know that you can access your hospital and ER discharge instructions at any time in [employer]? You can also review all of your test results from your hospital stay or ER visit. Additional Information If you have questions, please visit the Frequently Asked Questions section of the [employer] website at [email or web address] Remember, [employer] is NOT to be used for urgent needs. For medical emergencies, dial 911. Now available from your iPhone and Android! Unresulted Labs-Please follow up with your PCP about these lab tests Order Current Status IR RENAL 1ST ORDER BI In process Providers Seen During Your Hospitalization Provider Specialty Primary office phone Sheng Garrido MD Nephrology [phone] Your Primary Care Physician (PCP) Primary Care Physician Office Phone Office Fax Wesley Kendall [phone] You are allergic to the following Allergen Reactions Cafergot (Ergotamine-Caffeine) Hives Recent Documentation Height Weight BMI Smoking Status 1.829 m 103.4 kg 30.92 kg/m2 Never Smoker Emergency Contacts Name Discharge Info Relation Home Work Mobile Maynor ReecePatty DISCHARGE CAREGIVER [3] Spouse [3] [phone] Patient Belongings The following personal items are in your possession at time of discharge: 
                             
 
  
  
 Please provide this summary of care documentation to your next provider. Signatures-by signing, you are acknowledging that this After Visit Summary has been reviewed with you and you have received a copy. Patient Signature:  ____________________________________________________________ Date:  ____________________________________________________________  
  
[facility] Provider Signature:  ____________________________________________________________ Date:  ____________________________________________________________

## 2018-03-05 NOTE — H&P
Radiology History and Physical    Patient: Benigno Valverde 38 y.o. male       Chief Complaint: No chief complaint on file. 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/2011    PSC (primary sclerosing cholangitis)     Dr Leah Fairchild [phone]    Ulcerative colitis ([facility]      Family History   Problem Relation Age of Onset    Diabetes Mother     Hypertension Mother     Diabetes Father     Hypertension Father     No Known Problems Sister     No Known Problems Brother     No Known Problems Maternal Aunt     No Known Problems Maternal Uncle     No Known Problems Paternal Aunt     No Known Problems Paternal Uncle     No Known Problems Maternal Grandmother     No Known Problems Maternal Grandfather     No Known Problems Paternal Grandmother     No Known Problems Paternal Grandfather      Social History     Social History    Marital status:      Spouse name: N/A    Number of children: N/A    Years of education: N/A     Occupational History    Not on file. Social History Main Topics    Smoking status: Never Smoker    Smokeless tobacco: Never Used    Alcohol use No    Drug use: No    Sexual activity: Yes     Partners: Female     Birth control/ protection: Condom     Other Topics Concern    Not on file     Social History Narrative       Allergies: Allergies   Allergen Reactions    Cafergot [Ergotamine-Caffeine] Hives       Current Medications:  Current Outpatient Prescriptions   Medication Sig    rifAXIMin (XIFAXAN) 200 mg tablet Take 500 mg by mouth three (3) times daily.  cholecalciferol (VITAMIN D3) 1,000 unit cap Take  by mouth daily. Current Facility-Administered Medications   Medication Dose Route Frequency    fentaNYL citrate (PF) injection 100 mcg  100 mcg IntraVENous Multiple    midazolam (VERSED) injection 5 mg  5 mg IntraVENous Multiple    0.9% sodium chloride infusion  25 mL/hr IntraVENous CONTINUOUS        Physical Exam:  Blood pressure 110/68, pulse 78, temperature 97.7 °F (36.5 °C), resp. rate 20, height 6' (1.829 m), weight 103.4 kg (228 lb), SpO2 100 %. LUNG: clear to auscultation bilaterally, HEART: regular rate and rhythm, S1, S2 normal, no murmur, click, rub or gallop      Alerts:    Hospital Problems  Date Reviewed: 1/15/2018    None          Laboratory:    No results for input(s): HGB, HCT, WBC, PLT, INR, BUN, CREA, K, CRCLT, HGBEXT, HCTEXT, PLTEXT in the last 72 hours. No lab exists for component: PTT, PT, INREXT      Plan of Care/Planned Procedure:  Risks, benefits, and alternatives reviewed with patient and he agrees to proceed with the procedure.      Plan is for renal arteriogram       Carmenza Le MD

## 2018-03-05 NOTE — PROGRESS NOTES
Pain now rated 2 out of 10 to abdomen. Dressing remains dry and intact to right groin. Pedal pulses plus 2 to right pedal and dorsal pulse.

## 2018-06-24 ENCOUNTER — HOSPITAL ENCOUNTER (EMERGENCY)
Age: 43
Discharge: LEFT BEFORE DISCHARGE | End: 2018-06-24
Attending: EMERGENCY MEDICINE
Payer: OTHER GOVERNMENT

## 2018-06-24 VITALS
WEIGHT: 236.55 LBS | BODY MASS INDEX: 32.04 KG/M2 | RESPIRATION RATE: 18 BRPM | OXYGEN SATURATION: 100 % | SYSTOLIC BLOOD PRESSURE: 129 MMHG | TEMPERATURE: 98.3 F | HEIGHT: 72 IN | HEART RATE: 78 BPM | DIASTOLIC BLOOD PRESSURE: 83 MMHG

## 2018-06-24 LAB
APPEARANCE UR: CLEAR
BACTERIA URNS QL MICRO: NEGATIVE /HPF
BILIRUB UR QL CFM: NEGATIVE
COLOR UR: NORMAL
EPITH CASTS URNS QL MICRO: NORMAL /LPF
GLUCOSE UR STRIP.AUTO-MCNC: NEGATIVE MG/DL
HGB UR QL STRIP: NEGATIVE
HYALINE CASTS URNS QL MICRO: NORMAL /LPF (ref 0–5)
KETONES UR QL STRIP.AUTO: NEGATIVE MG/DL
LEUKOCYTE ESTERASE UR QL STRIP.AUTO: NEGATIVE
NITRITE UR QL STRIP.AUTO: NEGATIVE
PH UR STRIP: 5.5 [PH] (ref 5–8)
PROT UR STRIP-MCNC: NEGATIVE MG/DL
RBC #/AREA URNS HPF: NORMAL /HPF (ref 0–5)
SP GR UR REFRACTOMETRY: 1.03 (ref 1–1.03)
UA: UC IF INDICATED,UAUC: NORMAL
UROBILINOGEN UR QL STRIP.AUTO: 1 EU/DL (ref 0.2–1)
WBC URNS QL MICRO: NORMAL /HPF (ref 0–4)

## 2018-06-24 PROCEDURE — 75810000275 HC EMERGENCY DEPT VISIT NO LEVEL OF CARE

## 2018-06-24 PROCEDURE — 81001 URINALYSIS AUTO W/SCOPE: CPT | Performed by: PHYSICIAN ASSISTANT

## 2018-06-24 RX ORDER — SODIUM CHLORIDE 0.9 % (FLUSH) 0.9 %
5-10 SYRINGE (ML) INJECTION AS NEEDED
Status: DISCONTINUED | OUTPATIENT
Start: 2018-06-24 | End: 2018-06-24 | Stop reason: HOSPADM

## 2018-06-24 RX ORDER — SODIUM CHLORIDE 0.9 % (FLUSH) 0.9 %
5-10 SYRINGE (ML) INJECTION EVERY 8 HOURS
Status: DISCONTINUED | OUTPATIENT
Start: 2018-06-24 | End: 2018-06-24 | Stop reason: HOSPADM

## 2018-06-24 NOTE — ED NOTES
Called patient back to reasses vital signs and discuss wait time, bed placement and wait for results. Registration states that the patient left.

## 2018-07-06 ENCOUNTER — HOSPITAL ENCOUNTER (EMERGENCY)
Age: 43
Discharge: HOME | End: 2018-07-06
Attending: EMERGENCY MEDICINE
Payer: OTHER GOVERNMENT

## 2018-07-06 VITALS
HEART RATE: 84 BPM | HEIGHT: 72 IN | RESPIRATION RATE: 16 BRPM | OXYGEN SATURATION: 95 % | TEMPERATURE: 98.2 F | WEIGHT: 235.23 LBS | DIASTOLIC BLOOD PRESSURE: 63 MMHG | SYSTOLIC BLOOD PRESSURE: 115 MMHG | BODY MASS INDEX: 31.86 KG/M2

## 2018-07-06 DIAGNOSIS — L88 PYODERMA GANGRENOSA: Primary | ICD-10-CM

## 2018-07-06 DIAGNOSIS — L03.116 CELLULITIS OF LEFT LOWER EXTREMITY: ICD-10-CM

## 2018-07-06 LAB
ALBUMIN SERPL-MCNC: 2.8 G/DL (ref 3.5–5)
ALBUMIN/GLOB SERPL: 0.6 {RATIO} (ref 1.1–2.2)
ALP SERPL-CCNC: 440 U/L (ref 45–117)
ALT SERPL-CCNC: 96 U/L (ref 12–78)
ANION GAP SERPL CALC-SCNC: 7 MMOL/L (ref 5–15)
AST SERPL-CCNC: 85 U/L (ref 15–37)
BASOPHILS # BLD: 0 K/UL (ref 0–0.1)
BASOPHILS NFR BLD: 0 % (ref 0–1)
BILIRUB SERPL-MCNC: 1.4 MG/DL (ref 0.2–1)
BUN SERPL-MCNC: 8 MG/DL (ref 6–20)
BUN/CREAT SERPL: 9 (ref 12–20)
CALCIUM SERPL-MCNC: 8.5 MG/DL (ref 8.5–10.1)
CHLORIDE SERPL-SCNC: 105 MMOL/L (ref 97–108)
CO2 SERPL-SCNC: 28 MMOL/L (ref 21–32)
CREAT SERPL-MCNC: 0.89 MG/DL (ref 0.7–1.3)
DIFFERENTIAL METHOD BLD: ABNORMAL
EOSINOPHIL # BLD: 0.2 K/UL (ref 0–0.4)
EOSINOPHIL NFR BLD: 3 % (ref 0–7)
ERYTHROCYTE [DISTWIDTH] IN BLOOD BY AUTOMATED COUNT: 15.8 % (ref 11.5–14.5)
ERYTHROCYTE [SEDIMENTATION RATE] IN BLOOD: 60 MM/HR (ref 0–15)
GLOBULIN SER CALC-MCNC: 4.9 G/DL (ref 2–4)
GLUCOSE SERPL-MCNC: 90 MG/DL (ref 65–100)
HCT VFR BLD AUTO: 36.8 % (ref 36.6–50.3)
HGB BLD-MCNC: 12.1 G/DL (ref 12.1–17)
IMM GRANULOCYTES # BLD: 0 K/UL (ref 0–0.04)
IMM GRANULOCYTES NFR BLD AUTO: 0 % (ref 0–0.5)
LYMPHOCYTES # BLD: 1.9 K/UL (ref 0.8–3.5)
LYMPHOCYTES NFR BLD: 25 % (ref 12–49)
MCH RBC QN AUTO: 29.8 PG (ref 26–34)
MCHC RBC AUTO-ENTMCNC: 32.9 G/DL (ref 30–36.5)
MCV RBC AUTO: 90.6 FL (ref 80–99)
MONOCYTES # BLD: 0.6 K/UL (ref 0–1)
MONOCYTES NFR BLD: 7 % (ref 5–13)
NEUTS SEG # BLD: 4.9 K/UL (ref 1.8–8)
NEUTS SEG NFR BLD: 65 % (ref 32–75)
NRBC # BLD: 0 K/UL (ref 0–0.01)
NRBC BLD-RTO: 0 PER 100 WBC
PLATELET # BLD AUTO: 167 K/UL (ref 150–400)
PMV BLD AUTO: 10.4 FL (ref 8.9–12.9)
POTASSIUM SERPL-SCNC: 3.5 MMOL/L (ref 3.5–5.1)
PROT SERPL-MCNC: 7.7 G/DL (ref 6.4–8.2)
RBC # BLD AUTO: 4.06 M/UL (ref 4.1–5.7)
SODIUM SERPL-SCNC: 140 MMOL/L (ref 136–145)
WBC # BLD AUTO: 7.6 K/UL (ref 4.1–11.1)

## 2018-07-06 PROCEDURE — 74011250637 HC RX REV CODE- 250/637: Performed by: EMERGENCY MEDICINE

## 2018-07-06 PROCEDURE — 74011000258 HC RX REV CODE- 258: Performed by: EMERGENCY MEDICINE

## 2018-07-06 PROCEDURE — 74011000250 HC RX REV CODE- 250: Performed by: EMERGENCY MEDICINE

## 2018-07-06 PROCEDURE — 85652 RBC SED RATE AUTOMATED: CPT | Performed by: EMERGENCY MEDICINE

## 2018-07-06 PROCEDURE — 96375 TX/PRO/DX INJ NEW DRUG ADDON: CPT

## 2018-07-06 PROCEDURE — 74011250636 HC RX REV CODE- 250/636: Performed by: EMERGENCY MEDICINE

## 2018-07-06 PROCEDURE — 85025 COMPLETE CBC W/AUTO DIFF WBC: CPT | Performed by: EMERGENCY MEDICINE

## 2018-07-06 PROCEDURE — 99283 EMERGENCY DEPT VISIT LOW MDM: CPT

## 2018-07-06 PROCEDURE — 80053 COMPREHEN METABOLIC PANEL: CPT | Performed by: EMERGENCY MEDICINE

## 2018-07-06 PROCEDURE — 36415 COLL VENOUS BLD VENIPUNCTURE: CPT | Performed by: EMERGENCY MEDICINE

## 2018-07-06 PROCEDURE — 96365 THER/PROPH/DIAG IV INF INIT: CPT

## 2018-07-06 RX ORDER — OXYCODONE HYDROCHLORIDE 5 MG/1
5 TABLET ORAL
Status: COMPLETED | OUTPATIENT
Start: 2018-07-06 | End: 2018-07-06

## 2018-07-06 RX ORDER — OXYCODONE HYDROCHLORIDE 5 MG/1
TABLET ORAL
Qty: 15 TAB | Refills: 0 | Status: SHIPPED | OUTPATIENT
Start: 2018-07-06 | End: 2018-07-24

## 2018-07-06 RX ORDER — MORPHINE SULFATE 4 MG/ML
4 INJECTION INTRAVENOUS ONCE
Status: COMPLETED | OUTPATIENT
Start: 2018-07-06 | End: 2018-07-06

## 2018-07-06 RX ORDER — PREDNISONE 10 MG/1
TABLET ORAL
Qty: 42 TAB | Refills: 0 | Status: SHIPPED | OUTPATIENT
Start: 2018-07-06 | End: 2018-07-20

## 2018-07-06 RX ORDER — LIDOCAINE HCL 3 %
LOTION (ML) TOPICAL 3 TIMES DAILY
Qty: 118 ML | Refills: 0 | Status: SHIPPED | OUTPATIENT
Start: 2018-07-06 | End: 2018-07-24

## 2018-07-06 RX ADMIN — METHYLPREDNISOLONE SODIUM SUCCINATE 250 MG: 125 INJECTION, POWDER, FOR SOLUTION INTRAMUSCULAR; INTRAVENOUS at 16:48

## 2018-07-06 RX ADMIN — DOXYCYCLINE 100 MG: 100 INJECTION, POWDER, LYOPHILIZED, FOR SOLUTION INTRAVENOUS at 16:49

## 2018-07-06 RX ADMIN — OXYCODONE HYDROCHLORIDE 5 MG: 5 TABLET ORAL at 16:43

## 2018-07-06 RX ADMIN — MORPHINE SULFATE 4 MG: 4 INJECTION INTRAVENOUS at 18:07

## 2018-07-06 NOTE — ED NOTES
Patient's wound on left leg wrapped by Jose Moffett RN prior to patient being discharged. Patient tolerated well.  Patient neurovascular in check following

## 2018-07-06 NOTE — DISCHARGE INSTRUCTIONS
Cellulitis: Care Instructions  Your Care Instructions    Cellulitis is a skin infection. It often occurs after a break in the skin from a scrape, cut, bite, or puncture, or after a rash. The doctor has checked you carefully, but problems can develop later. If you notice any problems or new symptoms, get medical treatment right away. Follow-up care is a key part of your treatment and safety. Be sure to make and go to all appointments, and call your doctor if you are having problems. It's also a good idea to know your test results and keep a list of the medicines you take. How can you care for yourself at home? · Take your antibiotics as directed. Do not stop taking them just because you feel better. You need to take the full course of antibiotics. · Prop up the infected area on pillows to reduce pain and swelling. Try to keep the area above the level of your heart as often as you can. · If your doctor told you how to care for your wound, follow your doctor's instructions. If you did not get instructions, follow this general advice:  ¨ Wash the wound with clean water 2 times a day. Don't use hydrogen peroxide or alcohol, which can slow healing. ¨ You may cover the wound with a thin layer of petroleum jelly, such as Vaseline, and a nonstick bandage. ¨ Apply more petroleum jelly and replace the bandage as needed. · Be safe with medicines. Take pain medicines exactly as directed. ¨ If the doctor gave you a prescription medicine for pain, take it as prescribed. ¨ If you are not taking a prescription pain medicine, ask your doctor if you can take an over-the-counter medicine. To prevent cellulitis in the future  · Try to prevent cuts, scrapes, or other injuries to your skin. Cellulitis most often occurs where there is a break in the skin. · If you get a scrape, cut, mild burn, or bite, wash the wound with clean water as soon as you can to help avoid infection.  Don't use hydrogen peroxide or alcohol, which can slow healing. · If you have swelling in your legs (edema), support stockings and good skin care may help prevent leg sores and cellulitis. · Take care of your feet, especially if you have diabetes or other conditions that increase the risk of infection. Wear shoes and socks. Do not go barefoot. If you have athlete's foot or other skin problems on your feet, talk to your doctor about how to treat them. When should you call for help? Call your doctor now or seek immediate medical care if:  ? · You have signs that your infection is getting worse, such as:  ¨ Increased pain, swelling, warmth, or redness. ¨ Red streaks leading from the area. ¨ Pus draining from the area. ¨ A fever. ? · You get a rash. ? Watch closely for changes in your health, and be sure to contact your doctor if:  ? · You are not getting better after 1 day (24 hours). ? · You do not get better as expected. Where can you learn more? Go to http://oscar-wilfrido.info/. Krysten Weston in the search box to learn more about \"Cellulitis: Care Instructions. \"  Current as of: October 13, 2016  Content Version: 11.4  © 6368-6116 [employer]. Care instructions adapted under license by [employer] (which disclaims liability or warranty for this information). If you have questions about a medical condition or this instruction, always ask your healthcare professional. [place] [address] any warranty or liability for your use of this information.

## 2018-07-06 NOTE — ED NOTES
MD Jose reviewed discharge instructions with the patient and spouse. The patient and spouse verbalized understanding. Patient discharged home with stable vitals. Patient ambulatory out of ED with discharge instructions in hand. Opportunity for questions and clarification provided. No further complaints noted at this time.

## 2018-07-07 NOTE — ED PROVIDER NOTES
EMERGENCY DEPARTMENT HISTORY AND PHYSICAL EXAM      Date: 7/6/2018  Patient Name: Dmitriy Stewart    History of Presenting Illness     Chief Complaint   Patient presents with    Wound Check     To L ankle x 2 weeks, sent over by GI doctor. Hx of pyoderma gangrenosum     History Provided By: Patient    HPI: Dmitriy Stewart, 38 y.o. male with PMHx significant for pouchitis, ulcerative colitis, pancreatitis, crohns disease, presents ambulatory to the ED with cc of gradually worsening moderate left ankle wound and redness alongside nausea, presenting 2 weeks ago. Per pt, he noticed the wound 2 weeks ago but it worsened over the last 4 days, doubling in size since yesterday. Pt reports to having seen his GI doctor today and was advised to visit ED for further evaluation. He notes to having had similar episodes with gangrenosum but never worsening to this extent. Pt endorses to taking prednisone for 1 week but states that he ran out yesterday. He specifically denies any vomiting, abdominal pain, fevers, chills, HA, CP, or diarrhea. Chief Complaint: wound  Duration: 2 weeks  Timing:  Gradual and Worsening  Location: left ankle  Quality: N/A  Severity: Moderate  Modifying Factors: denies any modifying factors  Associated Symptoms: nausea    There are no other complaints, changes, or physical findings at this time. PCP: Giovanni Reeves MD    Current Outpatient Prescriptions   Medication Sig Dispense Refill    predniSONE (DELTASONE) 10 mg tablet Take as follows   Day 1-2: 6 tabs PO -   Day 3-4: 5 tabs PO -   Day 5-6: 4 tabs PO -   Day 7-8: 3 tabs PO -   Day 9-10: 2 tabs PO -   Day 11-12: 1 tab PO    Dispense 42 tabs 42 Tab 0    oxyCODONE IR (ROXICODONE) 5 mg immediate release tablet 1-2 tabs every 4-6 hours as needed for pain 15 Tab 0    doxycycline calcium 50 mg/5 mL syrp oral syrup Take 10 mL by mouth two (2) times a day for 7 days. 140 mL 0    lidocaine hcl 3 % lotion Apply  to affected area three (3) times daily.  [facility] mL 0    rifAXIMin (XIFAXAN) 200 mg tablet Take 500 mg by mouth three (3) times daily. Past History     Past Medical History:  Past Medical History:   Diagnosis Date    Chest pain 2/18/2013    as of 10/14/14 pt denies any CP since 2/13    [facility]'s colitis ([facility]     H/O Clostridium difficile infection 01/2017    as of 3/30/17 pt denies abd pain, diarrhea > 1 week    Iron deficiency anemia 2/18/2013    Pancreatitis     Pouchitis ([facility] 12/8/2011    PSC (primary sclerosing cholangitis)     Dr Angel Roberson [phone]    Ulcerative colitis [facility])      Past Surgical History:  Past Surgical History:   Procedure Laterality Date    ABDOMEN SURGERY PROC UNLISTED  2002    colostomy reversal & J Pouch    COLONOSCOPY N/A 4/4/2017    COLONOSCOPY performed by Nidhi Agosto MD at [facility] AMBULATORY OR    COLONOSCOPY N/A 1/15/2018    COLONOSCOPY performed by Nidhi Agosto MD at [facility] ENDOSCOPY    [address]  1/15/2018         HX COLECTOMY  2002    colostomy    HX GI      ercp    HX ROTATOR CUFF REPAIR      right    [place] COLONOSCOPY W/BIOPSY SINGLE/MULTIPLE  12/8/2011         WI EGD TRANSORAL BIOPSY SINGLE/MULTIPLE  2/18/2013          Family History:  Family History   Problem Relation Age of Onset    Diabetes Mother     Hypertension Mother     Diabetes Father     Hypertension Father     No Known Problems Sister     No Known Problems Brother     No Known Problems Maternal Aunt     No Known Problems Maternal Uncle     No Known Problems Paternal Aunt     No Known Problems Paternal Uncle     No Known Problems Maternal Grandmother     No Known Problems Maternal Grandfather     No Known Problems Paternal Grandmother     No Known Problems Paternal Grandfather      Social History:  Social History   Substance Use Topics    Smoking status: Never Smoker    Smokeless tobacco: Never Used    Alcohol use No     Allergies:   Allergies   Allergen Reactions    Cafergot [Ergotamine-Caffeine] Hives     Review of Systems   Review of Systems   Constitutional: Negative for chills and fever. HENT: Negative for congestion and sore throat. Eyes: Negative for visual disturbance. Respiratory: Negative for cough and shortness of breath. Cardiovascular: Negative for chest pain and leg swelling. Gastrointestinal: Negative for abdominal pain, blood in stool, diarrhea and nausea. Endocrine: Negative for polyuria. Genitourinary: Negative for dysuria and testicular pain. Musculoskeletal: Negative for arthralgias, joint swelling and myalgias. Skin: Positive for wound (Left ankle, redness). Negative for rash. Allergic/Immunologic: Negative for immunocompromised state. Neurological: Negative for weakness and headaches. Hematological: Does not bruise/bleed easily. Psychiatric/Behavioral: Negative for confusion. Physical Exam   Physical Exam   Constitutional: He is oriented to person, place, and time. He appears well-developed and well-nourished. HENT:   Head: Normocephalic and atraumatic. Moist mucous membranes   Eyes: Conjunctivae are normal. Pupils are equal, round, and reactive to light. Right eye exhibits no discharge. Left eye exhibits no discharge. Neck: Normal range of motion. Neck supple. No tracheal deviation present. Cardiovascular: Normal rate, regular rhythm and normal heart sounds. No murmur heard. Pulmonary/Chest: Effort normal and breath sounds normal. No respiratory distress. He has no wheezes. He has no rales. Abdominal: Soft. Bowel sounds are normal. There is no tenderness. There is no rebound and no guarding. Musculoskeletal: Normal range of motion. He exhibits no edema, tenderness or deformity. Neurological: He is alert and oriented to person, place, and time. Skin: Skin is warm and dry. No rash noted. No erythema.    L medial malleolus wound with skin break down, surrounding erythema   No crepitus  Purulent drainage on bandage, no on wound drainage  Granulation tissue on wound     Psychiatric: His behavior is normal.   Nursing note and vitals reviewed. Diagnostic Study Results     Labs -  Recent Results (from the past 12 hour(s))   CBC WITH AUTOMATED DIFF    Collection Time: 07/06/18  4:52 PM   Result Value Ref Range    WBC 7.6 4.1 - 11.1 K/uL    RBC 4.06 (L) 4.10 - 5.70 M/uL    HGB 12.1 12.1 - 17.0 g/dL    HCT 36.8 36.6 - 50.3 %    MCV 90.6 80.0 - 99.0 FL    MCH 29.8 26.0 - 34.0 PG    MCHC 32.9 30.0 - 36.5 g/dL    RDW 15.8 (H) 11.5 - 14.5 %    PLATELET 875 980 - 820 K/uL    MPV 10.4 8.9 - 12.9 FL    NRBC 0.0 0  WBC    ABSOLUTE NRBC 0.00 0.00 - 0.01 K/uL    NEUTROPHILS 65 32 - 75 %    LYMPHOCYTES 25 12 - 49 %    MONOCYTES 7 5 - 13 %    EOSINOPHILS 3 0 - 7 %    BASOPHILS 0 0 - 1 %    IMMATURE GRANULOCYTES 0 0.0 - 0.5 %    ABS. NEUTROPHILS 4.9 1.8 - 8.0 K/UL    ABS. LYMPHOCYTES 1.9 0.8 - 3.5 K/UL    ABS. MONOCYTES 0.6 0.0 - 1.0 K/UL    ABS. EOSINOPHILS 0.2 0.0 - 0.4 K/UL    ABS. BASOPHILS 0.0 0.0 - 0.1 K/UL    ABS. IMM. GRANS. 0.0 0.00 - 0.04 K/UL    DF AUTOMATED     METABOLIC PANEL, COMPREHENSIVE    Collection Time: 07/06/18  4:52 PM   Result Value Ref Range    Sodium 140 136 - 145 mmol/L    Potassium 3.5 3.5 - 5.1 mmol/L    Chloride 105 97 - 108 mmol/L    CO2 28 21 - 32 mmol/L    Anion gap 7 5 - 15 mmol/L    Glucose 90 65 - 100 mg/dL    BUN 8 6 - 20 MG/DL    Creatinine 0.89 0.70 - 1.30 MG/DL    BUN/Creatinine ratio 9 (L) 12 - 20      GFR est AA >60 >60 ml/min/1.73m2    GFR est non-AA >60 >60 ml/min/1.73m2    Calcium 8.5 8.5 - 10.1 MG/DL    Bilirubin, total 1.4 (H) 0.2 - 1.0 MG/DL    ALT (SGPT) 96 (H) 12 - 78 U/L    AST (SGOT) 85 (H) 15 - 37 U/L    Alk.  phosphatase 440 (H) 45 - 117 U/L    Protein, total 7.7 6.4 - 8.2 g/dL    Albumin 2.8 (L) 3.5 - 5.0 g/dL    Globulin 4.9 (H) 2.0 - 4.0 g/dL    A-G Ratio 0.6 (L) 1.1 - 2.2     SED RATE (ESR)    Collection Time: 07/06/18  4:52 PM   Result Value Ref Range    Sed rate, automated 60 (H) 0 - 15 mm/hr     Radiologic Studies -   No orders to display     Medical Decision Making   I am the first provider for this patient. I reviewed the vital signs, available nursing notes, past medical history, past surgical history, family history and social history. Vital Signs-Reviewed the patient's vital signs. Patient Vitals for the past 12 hrs:   Temp Pulse Resp BP SpO2   07/06/18 1901 - - - 115/63 95 %   07/06/18 1800 - - - 122/75 98 %   07/06/18 1336 98.2 °F (36.8 °C) 84 16 116/84 98 %     Pulse Oximetry Analysis - 95% on RA    Records Reviewed: Nursing Notes, Old Medical Records and Previous Laboratory Studies    Provider Notes (Medical Decision Making):   Consistent with likely grangrenosum, with surrounding cellulitis. Will treat with antibiotics and steroids. ED Course:   Initial assessment performed. The patients presenting problems have been discussed, and they are in agreement with the care plan formulated and outlined with them. I have encouraged them to ask questions as they arise throughout their visit. Critical Care Time: 0    Disposition:  Discharge Note:  6:53 PM  The pt is ready for discharge. The pt's signs, symptoms, diagnosis, and discharge instructions have been discussed and pt has conveyed their understanding. The pt is to follow up as recommended or return to ER should their symptoms worsen. Plan has been discussed and pt is in agreement. PLAN:  1. Discharge Medication List as of 7/6/2018  6:53 PM      START taking these medications    Details   predniSONE (DELTASONE) 10 mg tablet Take as follows   Day 1-2: 6 tabs PO -   Day 3-4: 5 tabs PO -   Day 5-6: 4 tabs PO -   Day 7-8: 3 tabs PO -   Day 9-10: 2 tabs PO -   Day 11-12: 1 tab PO    Dispense 42 tabs, Print, Disp-42 Tab, R-0      oxyCODONE IR (ROXICODONE) 5 mg immediate release tablet 1-2 tabs every 4-6 hours as needed for pain, Print, Disp-15 Tab, R-0      doxycycline calcium 50 mg/5 mL syrp oral syrup Take 10 mL by mouth two (2) times a day for 7 days. , Normal, Disp-140 mL, R-0      lidocaine hcl 3 % lotion Apply  to affected area three (3) times daily. , Normal, Disp-118 mL, R-0         CONTINUE these medications which have NOT CHANGED    Details   rifAXIMin (XIFAXAN) 200 mg tablet Take 500 mg by mouth three (3) times daily. , Historical Med           2. Follow-up Information     Follow up With Details Comments [address], [place] Schedule an appointment as soon as possible for a visit  [facility]  [phone]      \[facility]\"" EMERGENCY DEPT  If symptoms worsen [address] [facility] [address] [phone]    \[facility]\"" OP WOUND CARE   [address] [facility] [address]  [phone]        Return to ED if worse   Diagnosis     Clinical Impression:   1. Pyoderma gangrenosa    2. Cellulitis of left lower extremity      Attestations: This note is prepared by Vance Trevino, acting as a Scribe for [employer], DO. [employer], DO: The scribe's documentation has been prepared under my direction and personally reviewed by me in its entirety. I confirm that the notes above accurately reflects all work, treatment, procedures, and medical decision making performed by me.

## 2018-07-13 ENCOUNTER — HOSPITAL ENCOUNTER (OUTPATIENT)
Dept: WOUND CARE | Age: 43
Discharge: HOME | End: 2018-07-13
Payer: OTHER GOVERNMENT

## 2018-07-13 VITALS
DIASTOLIC BLOOD PRESSURE: 79 MMHG | RESPIRATION RATE: 16 BRPM | SYSTOLIC BLOOD PRESSURE: 142 MMHG | TEMPERATURE: 97.8 F | HEART RATE: 77 BPM

## 2018-07-13 PROCEDURE — 99215 OFFICE O/P EST HI 40 MIN: CPT

## 2018-07-13 NOTE — WOUND CARE
Visit Vitals  /79 (BP 1 Location: Right arm, BP Patient Position: Sitting)  Pulse 77  Temp 97.8 °F (36.6 °C)  Resp 16  
 
 
 07/13/18 0926 Wound Leg Lower Medial  
Date First Assessed/Time First Assessed: 07/13/18 0926   POA: Yes  Wound Type: Other  Location: Leg Lower  Orientation: Medial  
DRESSING STATUS Clean, dry, and intact; Removed DRESSING TYPE 4 x 4;Gauze wrap ([place]) Non-Pressure Injury Partial thickness (epider/derm) Wound Length (cm) 2.6 cm Wound Width (cm) 3.3 cm Wound Depth (cm) 0.2 Wound Surface area (cm^2) 8.58 cm^2 Condition of Base [place];Slough Drainage Amount  Small Drainage Color Serosanguinous Wound Odor None Periwound Skin Condition Intact Cleansing and Cleansing Agents  Normal saline

## 2018-07-13 NOTE — WOUND CARE
Discharge Condition:Stable  Ambulatory Status:Ambulatory   Discharge Destination:Home   Transportation: Private Auto   Accompanied by: Self

## 2018-07-13 NOTE — H&P
[address]   HISTORY AND PHYSICAL      Varinder Jeffrey  MR#: [id number]  : 1975  ACCOUNT #: [de-identified]   ADMIT DATE: 2018    HISTORY OF PRESENT ILLNESS:  The patient is a 43-year-old gentleman sent by the [facility] Emergency Room for a multiple week history of an ulcer to the left ankle. He has biopsy proven pyoderma gangrenosum and Crohn's disease and is followed by Dr. Luisito Abel. He was placed on a high tapering dose of steroids by the emergency room and sent for further evaluation of the wound and wound care. His primary care physician is Dr. Delaney Garnica PAST MEDICAL HISTORY:  Remarkable for Crohn's, history of Clostridium difficile, iron deficiency anemia, pancreatitis, pouchitis, primary sclerosing cholangitis, ulcerative colitis. PAST SURGICAL HISTORY:  Multiple abdominal procedures including a J-pouch. FAMILY HISTORY:  Remarkable for diabetes in mother and father along with hypertension. SOCIAL HISTORY:  Never smoked, does not use alcohol. Former [employer]. ALLERGIES:  ALLERGIC TO CAFERGOT. MEDICATIONS:  Presently include prednisone, oxycodone, doxycycline and rifaximin. PHYSICAL EXAMINATION:  GENERAL:  He is awake, alert, a very robust healthy gentleman. VITAL SIGNS:  Temperature of 97.8, pulse 77, respirations 16, blood pressure 142/89. EXTREMITIES:  Focused exam on the left lower extremity shows normal hair growth down the leg, excellent dorsalis pedis pulse. No signs of venous insufficiency. The wound measures 2.6 x 3.3 x 0.2 and is clean with healthy granulation tissue. I see no signs of infection, odor or drainage, although last week he says the leg was markedly swollen and painful and prompted his trip to the emergency room. ASSESSMENT:  Pyoderma gangrenosum, L08.0; Crohn's disease, K50.919; and chronic non-pressure ulcer, left lower leg limited to skin breakdown, L97.321.     PLAN:  I have discussed with him treatments and he is on his tapering dose of steroids, but has not returned to his immune suppressant Crohn's medications. I think this was discontinued while he had an active infection, but I believe this could be restarted along with the prednisone. I have discussed with him because of the pain using a topical over-the-counter 4% lidocaine product which I think will topically help as the 2% Xylocaine that we placed on him today seemed to help. I have added a topical steroid cream, mometasone 0.1% to be applied with wound care and we will use Aquacel Ag and a mild circumferential gauze dressing. I do not see the need for any compression support or need for any vascular studies at this point. Discussed the signs and symptoms of infection with him and he knows well from his previous episode. I will see him in 2 weeks since the diagnosis has been made and he is welcome to call with any questions or concerns in the meantime. We have answered all of his questions and he will continue his active lifestyle as much as possible and keep the dressing dry with bathing.       MD FREDDY Villanueva/DEVIKA  D: 07/13/2018 10:19     T: 07/13/2018 10:37  JOB #: [id number]  CC: Earnestine Frausto MD  CC: Lindsay Baker [facility]

## 2018-07-20 ENCOUNTER — HOSPITAL ENCOUNTER (OUTPATIENT)
Dept: WOUND CARE | Age: 43
Discharge: HOME | End: 2018-07-20
Payer: OTHER GOVERNMENT

## 2018-07-20 VITALS
TEMPERATURE: 98.2 F | SYSTOLIC BLOOD PRESSURE: 129 MMHG | DIASTOLIC BLOOD PRESSURE: 86 MMHG | RESPIRATION RATE: 16 BRPM | HEART RATE: 90 BPM

## 2018-07-20 PROBLEM — L08.0 PYODERMA: Status: ACTIVE | Noted: 2018-07-20

## 2018-07-20 PROBLEM — K50.919 CROHN'S DISEASE WITH COMPLICATION (HCC): Status: ACTIVE | Noted: 2018-07-20

## 2018-07-20 PROBLEM — L97.321 NON-PRESSURE CHRONIC ULCER OF LEFT ANKLE, LIMITED TO BREAKDOWN OF SKIN (HCC): Status: ACTIVE | Noted: 2018-07-20

## 2018-07-20 PROCEDURE — 97597 DBRDMT OPN WND 1ST 20 CM/<: CPT

## 2018-07-20 PROCEDURE — 74011000250 HC RX REV CODE- 250: Performed by: OTOLARYNGOLOGY

## 2018-07-20 RX ORDER — LIDOCAINE HYDROCHLORIDE 20 MG/ML
JELLY TOPICAL
Status: COMPLETED | OUTPATIENT
Start: 2018-07-20 | End: 2018-07-20

## 2018-07-20 RX ADMIN — LIDOCAINE HYDROCHLORIDE 5 ML: 20 JELLY TOPICAL at 09:14

## 2018-07-20 NOTE — PROGRESS NOTES
[address] [facility]  WOUND CARE PROGRESS NOTE    Andrey Prieto  MR#: [id number]  : 1975  ACCOUNT #: [de-identified]   DATE OF SERVICE: 2018    SUBJECTIVE:  Patient comes for a second visit for an ulcer to the left lower leg limited to skin breakdown secondary to pyoderma gangrenosum and Crohn's disease, L97.321, L08.0 and  K50.919. He has finished his steroids orally and plans to see Dr. Manuela Gates early next week for followup. We are using a topical steroid cream and Aquacel Ag and a mild compression. He states that he is doing well with no fevers, chills or night sweats. PHYSICAL EXAMINATION:  He is awake and alert. No complaints. Temperature 98.2, pulse 90, respirations 16, blood pressure 129/86. The wound is improved and today measures 2.4 x 3 x 0.2 with some persistent slough, but less than last week. No signs of infection are noted. After discussing risks and benefits, obtaining informed consent, identifying the patient and taking a timeout to discuss selective debridement, Xylocaine gel was applied and a 5 mm ring curette used to remove slough in multiple areas, increasing the depth about 1 mm. Bleeding was controlled with gentle pressure. PLAN:  He will continue with the topical steroid cream and Aquacel Ag and see him back in 1 week. He tolerated the procedure well with much less pain. Discharged in stable condition.       MD FREDDY Gutierrez / BROWN  D: 2018 09:38     T: 2018 09:52  JOB #: [id number]

## 2018-07-20 NOTE — WOUND CARE
Visit Vitals  /86 (BP 1 Location: Right arm, BP Patient Position: Sitting)  Pulse 90  Temp 98.2 °F (36.8 °C)  Resp 16  
 
 
 07/20/18 0914 Wound Leg Lower Medial  
Date First Assessed/Time First Assessed: 07/13/18 0926   POA: Yes  Wound Type: Other  Location: Leg Lower  Orientation: Medial  
DRESSING STATUS Clean, dry, and intact; Removed DRESSING TYPE 4 x 4 Wound Length (cm) 2.4 cm Wound Width (cm) 3 cm Wound Depth (cm) 0.2 Wound Surface area (cm^2) 7.2 cm^2 Condition of Base [place];Slough Drainage Amount  Small Drainage Color Serosanguinous Wound Odor None Periwound Skin Condition Intact Cleansing and Cleansing Agents  Normal saline

## 2018-07-24 ENCOUNTER — APPOINTMENT (OUTPATIENT)
Dept: MRI IMAGING | Age: 43
DRG: 864 | End: 2018-07-24
Attending: HOSPITALIST
Payer: OTHER GOVERNMENT

## 2018-07-24 ENCOUNTER — APPOINTMENT (OUTPATIENT)
Dept: CT IMAGING | Age: 43
DRG: 864 | End: 2018-07-24
Attending: EMERGENCY MEDICINE
Payer: OTHER GOVERNMENT

## 2018-07-24 ENCOUNTER — HOSPITAL ENCOUNTER (INPATIENT)
Age: 43
LOS: 2 days | Discharge: HOME | DRG: 864 | End: 2018-07-26
Attending: EMERGENCY MEDICINE | Admitting: HOSPITALIST
Payer: OTHER GOVERNMENT

## 2018-07-24 ENCOUNTER — APPOINTMENT (OUTPATIENT)
Dept: GENERAL RADIOLOGY | Age: 43
DRG: 864 | End: 2018-07-24
Attending: EMERGENCY MEDICINE
Payer: OTHER GOVERNMENT

## 2018-07-24 ENCOUNTER — APPOINTMENT (OUTPATIENT)
Dept: CT IMAGING | Age: 43
DRG: 864 | End: 2018-07-24
Attending: HOSPITALIST
Payer: OTHER GOVERNMENT

## 2018-07-24 DIAGNOSIS — R40.4 TRANSIENT ALTERATION OF AWARENESS: Primary | ICD-10-CM

## 2018-07-24 PROBLEM — R50.9 FEVER: Status: ACTIVE | Noted: 2018-07-24

## 2018-07-24 PROBLEM — R51.9 HEADACHE: Status: ACTIVE | Noted: 2018-07-24

## 2018-07-24 LAB
ALBUMIN SERPL-MCNC: 2.9 G/DL (ref 3.5–5)
ALBUMIN/GLOB SERPL: 0.5 {RATIO} (ref 1.1–2.2)
ALP SERPL-CCNC: 503 U/L (ref 45–117)
ALT SERPL-CCNC: 98 U/L (ref 12–78)
ANION GAP SERPL CALC-SCNC: 5 MMOL/L (ref 5–15)
APPEARANCE CSF: CLEAR
APPEARANCE CSF: CLEAR
APPEARANCE UR: CLEAR
AST SERPL-CCNC: 70 U/L (ref 15–37)
ATRIAL RATE: 92 BPM
BACTERIA URNS QL MICRO: NEGATIVE /HPF
BASOPHILS # BLD: 0 K/UL (ref 0–0.1)
BASOPHILS NFR BLD: 0 % (ref 0–1)
BILIRUB DIRECT SERPL-MCNC: 0.7 MG/DL (ref 0–0.2)
BILIRUB SERPL-MCNC: 1.5 MG/DL (ref 0.2–1)
BILIRUB UR QL: NEGATIVE
BUN SERPL-MCNC: 9 MG/DL (ref 6–20)
BUN/CREAT SERPL: 9 (ref 12–20)
CALCIUM SERPL-MCNC: 9.1 MG/DL (ref 8.5–10.1)
CALCULATED P AXIS, ECG09: 51 DEGREES
CALCULATED R AXIS, ECG10: 4 DEGREES
CALCULATED T AXIS, ECG11: 16 DEGREES
CHLORIDE SERPL-SCNC: 102 MMOL/L (ref 97–108)
CK SERPL-CCNC: 62 U/L (ref 39–308)
CO2 SERPL-SCNC: 29 MMOL/L (ref 21–32)
COLOR CSF: COLORLESS
COLOR CSF: COLORLESS
COLOR UR: NORMAL
CREAT SERPL-MCNC: 1.01 MG/DL (ref 0.7–1.3)
DIAGNOSIS, 93000: NORMAL
DIFFERENTIAL METHOD BLD: ABNORMAL
EOSINOPHIL # BLD: 0.2 K/UL (ref 0–0.4)
EOSINOPHIL NFR BLD: 1 % (ref 0–7)
EPITH CASTS URNS QL MICRO: NORMAL /LPF
ERYTHROCYTE [DISTWIDTH] IN BLOOD BY AUTOMATED COUNT: 16.1 % (ref 11.5–14.5)
GLOBULIN SER CALC-MCNC: 5.3 G/DL (ref 2–4)
GLUCOSE BLD STRIP.AUTO-MCNC: 114 MG/DL (ref 65–100)
GLUCOSE CSF-MCNC: 71 MG/DL (ref 40–70)
GLUCOSE SERPL-MCNC: 108 MG/DL (ref 65–100)
GLUCOSE UR STRIP.AUTO-MCNC: NEGATIVE MG/DL
HCT VFR BLD AUTO: 38.7 % (ref 36.6–50.3)
HGB BLD-MCNC: 12.6 G/DL (ref 12.1–17)
HGB UR QL STRIP: NEGATIVE
HYALINE CASTS URNS QL MICRO: NORMAL /LPF (ref 0–5)
IMM GRANULOCYTES # BLD: 0.1 K/UL (ref 0–0.04)
IMM GRANULOCYTES NFR BLD AUTO: 0 % (ref 0–0.5)
INR BLD: 1 (ref 0.9–1.2)
KETONES UR QL STRIP.AUTO: NEGATIVE MG/DL
LACTATE SERPL-SCNC: 1 MMOL/L (ref 0.4–2)
LEUKOCYTE ESTERASE UR QL STRIP.AUTO: NEGATIVE
LYMPHOCYTES # BLD: 1.2 K/UL (ref 0.8–3.5)
LYMPHOCYTES NFR BLD: 10 % (ref 12–49)
MCH RBC QN AUTO: 29.4 PG (ref 26–34)
MCHC RBC AUTO-ENTMCNC: 32.6 G/DL (ref 30–36.5)
MCV RBC AUTO: 90.4 FL (ref 80–99)
MONOCYTES # BLD: 0.9 K/UL (ref 0–1)
MONOCYTES NFR BLD: 8 % (ref 5–13)
NEUTS SEG # BLD: 9.5 K/UL (ref 1.8–8)
NEUTS SEG NFR BLD: 80 % (ref 32–75)
NITRITE UR QL STRIP.AUTO: NEGATIVE
NRBC # BLD: 0 K/UL (ref 0–0.01)
NRBC BLD-RTO: 0 PER 100 WBC
P-R INTERVAL, ECG05: 154 MS
PH UR STRIP: 7 [PH] (ref 5–8)
PLATELET # BLD AUTO: 157 K/UL (ref 150–400)
PMV BLD AUTO: 11.2 FL (ref 8.9–12.9)
POTASSIUM SERPL-SCNC: 3.4 MMOL/L (ref 3.5–5.1)
PROT CSF-MCNC: 27 MG/DL (ref 15–45)
PROT SERPL-MCNC: 8.2 G/DL (ref 6.4–8.2)
PROT UR STRIP-MCNC: NEGATIVE MG/DL
Q-T INTERVAL, ECG07: 314 MS
QRS DURATION, ECG06: 78 MS
QTC CALCULATION (BEZET), ECG08: 388 MS
RBC # BLD AUTO: 4.28 M/UL (ref 4.1–5.7)
RBC # CSF: 27 /CU MM
RBC # CSF: 9 /CU MM
RBC #/AREA URNS HPF: NORMAL /HPF (ref 0–5)
SERVICE CMNT-IMP: ABNORMAL
SODIUM SERPL-SCNC: 136 MMOL/L (ref 136–145)
SP GR UR REFRACTOMETRY: 1.02 (ref 1–1.03)
TROPONIN I SERPL-MCNC: <0.05 NG/ML
TUBE # CSF: 1
TUBE # CSF: 2
TUBE # CSF: 2
TUBE # CSF: 4
UA: UC IF INDICATED,UAUC: NORMAL
UROBILINOGEN UR QL STRIP.AUTO: 1 EU/DL (ref 0.2–1)
VENTRICULAR RATE, ECG03: 92 BPM
WBC # BLD AUTO: 11.8 K/UL (ref 4.1–11.1)
WBC # CSF: 0 /CU MM (ref 0–5)
WBC # CSF: 1 /CU MM (ref 0–5)
WBC URNS QL MICRO: NORMAL /HPF (ref 0–4)

## 2018-07-24 PROCEDURE — 74011000258 HC RX REV CODE- 258: Performed by: EMERGENCY MEDICINE

## 2018-07-24 PROCEDURE — 96366 THER/PROPH/DIAG IV INF ADDON: CPT

## 2018-07-24 PROCEDURE — 96375 TX/PRO/DX INJ NEW DRUG ADDON: CPT

## 2018-07-24 PROCEDURE — 74011000250 HC RX REV CODE- 250: Performed by: EMERGENCY MEDICINE

## 2018-07-24 PROCEDURE — 93005 ELECTROCARDIOGRAM TRACING: CPT

## 2018-07-24 PROCEDURE — 74011250637 HC RX REV CODE- 250/637: Performed by: EMERGENCY MEDICINE

## 2018-07-24 PROCEDURE — 70450 CT HEAD/BRAIN W/O DYE: CPT

## 2018-07-24 PROCEDURE — 74011636320 HC RX REV CODE- 636/320: Performed by: EMERGENCY MEDICINE

## 2018-07-24 PROCEDURE — 77030018836 HC SOL IRR NACL ICUM -A

## 2018-07-24 PROCEDURE — 74011250636 HC RX REV CODE- 250/636

## 2018-07-24 PROCEDURE — 77030011255 HC DSG AQUACEL AG BMS -A

## 2018-07-24 PROCEDURE — 87327 CRYPTOCOCCUS NEOFORM AG IA: CPT | Performed by: HOSPITALIST

## 2018-07-24 PROCEDURE — 71045 X-RAY EXAM CHEST 1 VIEW: CPT

## 2018-07-24 PROCEDURE — 86695 HERPES SIMPLEX TYPE 1 TEST: CPT | Performed by: EMERGENCY MEDICINE

## 2018-07-24 PROCEDURE — 87493 C DIFF AMPLIFIED PROBE: CPT | Performed by: HOSPITALIST

## 2018-07-24 PROCEDURE — 74011250636 HC RX REV CODE- 250/636: Performed by: HOSPITALIST

## 2018-07-24 PROCEDURE — 84157 ASSAY OF PROTEIN OTHER: CPT | Performed by: EMERGENCY MEDICINE

## 2018-07-24 PROCEDURE — 87040 BLOOD CULTURE FOR BACTERIA: CPT | Performed by: EMERGENCY MEDICINE

## 2018-07-24 PROCEDURE — 65660000000 HC RM CCU STEPDOWN

## 2018-07-24 PROCEDURE — 96365 THER/PROPH/DIAG IV INF INIT: CPT

## 2018-07-24 PROCEDURE — 36415 COLL VENOUS BLD VENIPUNCTURE: CPT | Performed by: EMERGENCY MEDICINE

## 2018-07-24 PROCEDURE — 96367 TX/PROPH/DG ADDL SEQ IV INF: CPT

## 2018-07-24 PROCEDURE — 85025 COMPLETE CBC W/AUTO DIFF WBC: CPT | Performed by: EMERGENCY MEDICINE

## 2018-07-24 PROCEDURE — 74011000258 HC RX REV CODE- 258: Performed by: HOSPITALIST

## 2018-07-24 PROCEDURE — 82550 ASSAY OF CK (CPK): CPT | Performed by: EMERGENCY MEDICINE

## 2018-07-24 PROCEDURE — 74177 CT ABD & PELVIS W/CONTRAST: CPT

## 2018-07-24 PROCEDURE — 83605 ASSAY OF LACTIC ACID: CPT | Performed by: EMERGENCY MEDICINE

## 2018-07-24 PROCEDURE — 99285 EMERGENCY DEPT VISIT HI MDM: CPT

## 2018-07-24 PROCEDURE — 87205 SMEAR GRAM STAIN: CPT | Performed by: EMERGENCY MEDICINE

## 2018-07-24 PROCEDURE — 86788 WEST NILE VIRUS AB IGM: CPT | Performed by: INTERNAL MEDICINE

## 2018-07-24 PROCEDURE — 82945 GLUCOSE OTHER FLUID: CPT | Performed by: EMERGENCY MEDICINE

## 2018-07-24 PROCEDURE — 87498 ENTEROVIRUS PROBE&REVRS TRNS: CPT | Performed by: INTERNAL MEDICINE

## 2018-07-24 PROCEDURE — 81001 URINALYSIS AUTO W/SCOPE: CPT | Performed by: EMERGENCY MEDICINE

## 2018-07-24 PROCEDURE — 70553 MRI BRAIN STEM W/O & W/DYE: CPT

## 2018-07-24 PROCEDURE — 86592 SYPHILIS TEST NON-TREP QUAL: CPT | Performed by: INTERNAL MEDICINE

## 2018-07-24 PROCEDURE — 80076 HEPATIC FUNCTION PANEL: CPT | Performed by: HOSPITALIST

## 2018-07-24 PROCEDURE — 82962 GLUCOSE BLOOD TEST: CPT

## 2018-07-24 PROCEDURE — A9575 INJ GADOTERATE MEGLUMI 0.1ML: HCPCS | Performed by: HOSPITALIST

## 2018-07-24 PROCEDURE — 85610 PROTHROMBIN TIME: CPT

## 2018-07-24 PROCEDURE — 80048 BASIC METABOLIC PNL TOTAL CA: CPT | Performed by: EMERGENCY MEDICINE

## 2018-07-24 PROCEDURE — 84484 ASSAY OF TROPONIN QUANT: CPT | Performed by: EMERGENCY MEDICINE

## 2018-07-24 PROCEDURE — 94762 N-INVAS EAR/PLS OXIMTRY CONT: CPT

## 2018-07-24 PROCEDURE — 74011250636 HC RX REV CODE- 250/636: Performed by: EMERGENCY MEDICINE

## 2018-07-24 PROCEDURE — 62270 DX LMBR SPI PNXR: CPT

## 2018-07-24 PROCEDURE — 89050 BODY FLUID CELL COUNT: CPT | Performed by: EMERGENCY MEDICINE

## 2018-07-24 PROCEDURE — 74011250637 HC RX REV CODE- 250/637: Performed by: HOSPITALIST

## 2018-07-24 RX ORDER — MIDAZOLAM HYDROCHLORIDE 1 MG/ML
INJECTION, SOLUTION INTRAMUSCULAR; INTRAVENOUS
Status: COMPLETED
Start: 2018-07-24 | End: 2018-07-24

## 2018-07-24 RX ORDER — FENTANYL CITRATE 50 UG/ML
50 INJECTION, SOLUTION INTRAMUSCULAR; INTRAVENOUS
Status: COMPLETED | OUTPATIENT
Start: 2018-07-24 | End: 2018-07-24

## 2018-07-24 RX ORDER — SODIUM CHLORIDE 0.9 % (FLUSH) 0.9 %
5-10 SYRINGE (ML) INJECTION EVERY 8 HOURS
Status: DISCONTINUED | OUTPATIENT
Start: 2018-07-24 | End: 2018-07-26 | Stop reason: HOSPADM

## 2018-07-24 RX ORDER — MIDAZOLAM HYDROCHLORIDE 1 MG/ML
2 INJECTION, SOLUTION INTRAMUSCULAR; INTRAVENOUS
Status: COMPLETED | OUTPATIENT
Start: 2018-07-24 | End: 2018-07-24

## 2018-07-24 RX ORDER — LORAZEPAM 2 MG/ML
1 INJECTION INTRAMUSCULAR ONCE
Status: COMPLETED | OUTPATIENT
Start: 2018-07-24 | End: 2018-07-24

## 2018-07-24 RX ORDER — SODIUM CHLORIDE AND POTASSIUM CHLORIDE .9; .15 G/100ML; G/100ML
SOLUTION INTRAVENOUS CONTINUOUS
Status: DISCONTINUED | OUTPATIENT
Start: 2018-07-24 | End: 2018-07-26 | Stop reason: HOSPADM

## 2018-07-24 RX ORDER — SODIUM CHLORIDE 0.9 % (FLUSH) 0.9 %
10 SYRINGE (ML) INJECTION
Status: COMPLETED | OUTPATIENT
Start: 2018-07-24 | End: 2018-07-24

## 2018-07-24 RX ORDER — ONDANSETRON 2 MG/ML
4 INJECTION INTRAMUSCULAR; INTRAVENOUS
Status: DISCONTINUED | OUTPATIENT
Start: 2018-07-24 | End: 2018-07-26 | Stop reason: HOSPADM

## 2018-07-24 RX ORDER — VANCOMYCIN 2 GRAM/500 ML IN 0.9 % SODIUM CHLORIDE INTRAVENOUS
2000 ONCE
Status: COMPLETED | OUTPATIENT
Start: 2018-07-24 | End: 2018-07-24

## 2018-07-24 RX ORDER — SODIUM CHLORIDE 9 MG/ML
50 INJECTION, SOLUTION INTRAVENOUS
Status: COMPLETED | OUTPATIENT
Start: 2018-07-24 | End: 2018-07-24

## 2018-07-24 RX ORDER — VANCOMYCIN HYDROCHLORIDE
1250 EVERY 8 HOURS
Status: DISCONTINUED | OUTPATIENT
Start: 2018-07-24 | End: 2018-07-25

## 2018-07-24 RX ORDER — SODIUM CHLORIDE 0.9 % (FLUSH) 0.9 %
5-10 SYRINGE (ML) INJECTION AS NEEDED
Status: DISCONTINUED | OUTPATIENT
Start: 2018-07-24 | End: 2018-07-26 | Stop reason: HOSPADM

## 2018-07-24 RX ORDER — ACETAMINOPHEN 325 MG/1
650 TABLET ORAL
Status: DISCONTINUED | OUTPATIENT
Start: 2018-07-24 | End: 2018-07-26 | Stop reason: HOSPADM

## 2018-07-24 RX ORDER — GADOTERATE MEGLUMINE 376.9 MG/ML
20 INJECTION INTRAVENOUS
Status: COMPLETED | OUTPATIENT
Start: 2018-07-24 | End: 2018-07-24

## 2018-07-24 RX ORDER — MORPHINE SULFATE 2 MG/ML
2 INJECTION, SOLUTION INTRAMUSCULAR; INTRAVENOUS
Status: DISCONTINUED | OUTPATIENT
Start: 2018-07-24 | End: 2018-07-24

## 2018-07-24 RX ORDER — ACETAMINOPHEN 500 MG
1000 TABLET ORAL
Status: COMPLETED | OUTPATIENT
Start: 2018-07-24 | End: 2018-07-24

## 2018-07-24 RX ORDER — MORPHINE SULFATE 4 MG/ML
2 INJECTION INTRAVENOUS
Status: DISCONTINUED | OUTPATIENT
Start: 2018-07-24 | End: 2018-07-26 | Stop reason: HOSPADM

## 2018-07-24 RX ORDER — LIDOCAINE HYDROCHLORIDE AND EPINEPHRINE 20; 10 MG/ML; UG/ML
4.5 INJECTION, SOLUTION INFILTRATION; PERINEURAL
Status: COMPLETED | OUTPATIENT
Start: 2018-07-24 | End: 2018-07-24

## 2018-07-24 RX ORDER — ENOXAPARIN SODIUM 100 MG/ML
40 INJECTION SUBCUTANEOUS DAILY
Status: DISCONTINUED | OUTPATIENT
Start: 2018-07-25 | End: 2018-07-26 | Stop reason: HOSPADM

## 2018-07-24 RX ORDER — OXYCODONE AND ACETAMINOPHEN 5; 325 MG/1; MG/1
1 TABLET ORAL
Status: DISCONTINUED | OUTPATIENT
Start: 2018-07-24 | End: 2018-07-26 | Stop reason: HOSPADM

## 2018-07-24 RX ADMIN — Medication 10 ML: at 12:47

## 2018-07-24 RX ADMIN — LORAZEPAM 1 MG: 2 INJECTION INTRAMUSCULAR; INTRAVENOUS at 18:54

## 2018-07-24 RX ADMIN — SODIUM CHLORIDE AND POTASSIUM CHLORIDE: 9; 1.49 INJECTION, SOLUTION INTRAVENOUS at 12:47

## 2018-07-24 RX ADMIN — VANCOMYCIN HYDROCHLORIDE 1250 MG: 10 INJECTION, POWDER, LYOPHILIZED, FOR SOLUTION INTRAVENOUS at 14:30

## 2018-07-24 RX ADMIN — GADOTERATE MEGLUMINE 20 ML: 376.9 INJECTION INTRAVENOUS at 20:23

## 2018-07-24 RX ADMIN — LIDOCAINE HYDROCHLORIDE,EPINEPHRINE BITARTRATE 90 MG: 20; .01 INJECTION, SOLUTION INFILTRATION; PERINEURAL at 03:36

## 2018-07-24 RX ADMIN — IOPAMIDOL 100 ML: 755 INJECTION, SOLUTION INTRAVENOUS at 11:19

## 2018-07-24 RX ADMIN — Medication 10 ML: at 11:19

## 2018-07-24 RX ADMIN — VANCOMYCIN HYDROCHLORIDE 1250 MG: 10 INJECTION, POWDER, LYOPHILIZED, FOR SOLUTION INTRAVENOUS at 22:14

## 2018-07-24 RX ADMIN — ACYCLOVIR SODIUM 880 MG: 50 INJECTION, SOLUTION INTRAVENOUS at 12:47

## 2018-07-24 RX ADMIN — DIATRIZOATE MEGLUMINE AND DIATRIZOATE SODIUM 30 ML: 600; 100 SOLUTION ORAL; RECTAL at 08:40

## 2018-07-24 RX ADMIN — CEFTRIAXONE SODIUM 2 G: 2 INJECTION, POWDER, FOR SOLUTION INTRAMUSCULAR; INTRAVENOUS at 16:50

## 2018-07-24 RX ADMIN — FENTANYL CITRATE 50 MCG: 50 INJECTION, SOLUTION INTRAMUSCULAR; INTRAVENOUS at 03:53

## 2018-07-24 RX ADMIN — Medication 1 CAPSULE: at 14:30

## 2018-07-24 RX ADMIN — Medication 10 ML: at 22:14

## 2018-07-24 RX ADMIN — MIDAZOLAM HYDROCHLORIDE 2 MG: 1 INJECTION, SOLUTION INTRAMUSCULAR; INTRAVENOUS at 04:06

## 2018-07-24 RX ADMIN — Medication 10 ML: at 14:31

## 2018-07-24 RX ADMIN — CEFTRIAXONE 2 G: 2 INJECTION, POWDER, FOR SOLUTION INTRAMUSCULAR; INTRAVENOUS at 03:37

## 2018-07-24 RX ADMIN — SODIUM CHLORIDE 50 ML/HR: 900 INJECTION, SOLUTION INTRAVENOUS at 11:19

## 2018-07-24 RX ADMIN — ACYCLOVIR SODIUM 880 MG: 50 INJECTION, SOLUTION INTRAVENOUS at 18:07

## 2018-07-24 RX ADMIN — ACETAMINOPHEN 1000 MG: 500 TABLET ORAL at 03:36

## 2018-07-24 RX ADMIN — VANCOMYCIN HYDROCHLORIDE 2000 MG: 10 INJECTION, POWDER, LYOPHILIZED, FOR SOLUTION INTRAVENOUS at 04:41

## 2018-07-24 NOTE — PROGRESS NOTES
Bedside shift change report given to OLINDA Roberts (oncoming nurse) by Avel Humphreys RN (offgoing nurse). Report included the following information SBAR, Intake/Output, MAR, Accordion and Recent Results. Zone Phone:   [phone]      Significant changes during shift:  Admitted        Patient Information    Lorenzo Orosco  38 y.o.  7/24/2018  2:15 AM by Babatunde Cisneros MD. Lorenzo Orosco was admitted from Home    Problem List    Patient Active Problem List    Diagnosis Date Noted    Transient alteration of awareness 07/24/2018    Fever 07/24/2018    Headache 07/24/2018    Pyoderma 07/20/2018    Crohn's disease with complication ([address] 03/10/1893    Non-pressure chronic ulcer of left ankle, limited to breakdown of skin ([address] 07/20/2018    Chest pain 02/18/2013    Iron deficiency anemia 02/18/2013    Pouchitis ([address] 12/08/2011    Acute pancreatitis 01/09/2011    Primary sclerosing cholangitis 01/09/2011     Past Medical History:   Diagnosis Date    Arthritis     Chest pain 2/18/2013    as of 10/14/14 pt denies any CP since 2/13    Crohn's colitis ([address]     Dr. Gela Brandon H/O Clostridium difficile infection 01/2017    as of 3/30/17 pt denies abd pain, diarrhea > 1 week    Iron deficiency anemia 2/18/2013    Pancreatitis     Polyarteritis nodosa ([address]     Pouchitis ([address] 12/8/2011    PSC (primary sclerosing cholangitis)     Dr Khan Levels [id number]    Pyoderma gangrenosum          Core Measures:    CVA: Yes Yes    Activity Status:    OOB to Chair Yes  Ambulated this shift Yes   Bed Rest No    DVT prophylaxis:    DVT prophylaxis Med- No  DVT prophylaxis SCD or CONCHA- No     Wounds: (If Applicable)    Wounds- Yes    Location Left ankle    Patient Safety:    Falls Score Total Score: 1  Safety Level_______  Bed Alarm On? No  Sitter?  No    Plan for upcoming shift: Antibiotics, Antivirals, MRI        Discharge Plan: No TBD    Active Consults:  IP CONSULT TO INFECTIOUS DISEASES

## 2018-07-24 NOTE — ED NOTES
Pt. Did not go to CT as he had not finished PO contrast.  Patient reminded to finish contrast at this time.

## 2018-07-24 NOTE — CONSULTS
Infectious Disease Consult Note    Reason for Consult: CNS infection   Date of Consultation: July 24, 2018  Date of Admission: 7/24/2018  Referring Physician: Juanito Bahena       HPI:    Mr Lulu Stover is a 38year old gentleman wt hx of PSC, Crohns, Pyoderma gangrenosum , questionable Polyarteris nodosa? , S/P ileal pouch anal anastomosis, and has chronic pouchitis per GI notes in past, CDI , pancreatitis,  who came to the ER for headache, chills and also found to disoriented and staring into space. He does not recall all the events but says that headache is better. Says she has been on steroids with healing for Pyoderma of LE. Says steroids stopped about a week or so ago. Denied any other immunosuppressants. Says he did not have neck pain but had intense headache all over his head. Denied any vision changes. Says at baseline due to his GI illness, he has nausea and loose stools. He says he can have anywhere between 4-5 soft to loose stools a day to 13 when he has Crohns flare. Says he feels a lot better now. Denied any other symptoms to me. On presentation, found to have mild temperature elevation up to 100.5, mild leukocytosis up to 11.8. He chronically has LFT elevations and had MRCP in 2017 read as  \"Biliary findings are most compatible with chronic cholangitis\". \"Cirrhosis and splenomegaly are new since 2015\"  He had LP done and CT head, abd, pelvis. He was started on Vancomycin, Ceftriaxone and Acyclovir. He says he feels a lot better today overall. Denied any new medications recently.          Past Medical History:  Past Medical History:   Diagnosis Date    Arthritis     Chest pain 2/18/2013    as of 10/14/14 pt denies any CP since 2/13    Crohn's colitis ([facility] [address]     Dr. Yayo Redding H/O Clostridium difficile infection 01/2017    as of 3/30/17 pt denies abd pain, diarrhea > 1 week    Iron deficiency anemia 2/18/2013    Pancreatitis     Polyarteritis nodosa ([address]     Pouchitis ([facility] [address] 12/8/2011    [facility] (primary sclerosing cholangitis)     Dr Mak Hidden [phone]    Pyoderma gangrenosum          Surgical History:  Past Surgical History:   Procedure Laterality Date    ABDOMEN SURGERY PROC UNLISTED  2002    colostomy reversal & J Pouch    COLONOSCOPY N/A 4/4/2017    COLONOSCOPY performed by Bogdan Banegas [facility] at [facility] AM[facility] OR    COLONOSCOPY N/A 1/15/2018    COLONOSCOPY performed by Bogdan Banegas MD at [address] [place] [address]  1/15/2018         HX COLECTOMY  2002    colostomy    HX GI      ercp    HX ROTATOR CUFF REPAIR      right    HI COLONOSCOPY W/BIOPSY SINGLE/MULTIPLE  12/8/2011         HI EGD TRANSORAL BIOPSY SINGLE/MULTIPLE  2/18/2013              Family History:   Family History   Problem Relation Age of Onset    Diabetes Mother     Hypertension Mother     Arthritis-osteo Mother     Diabetes Father     Hypertension Father     Stroke Father     Arthritis-osteo Father     No Known Problems Sister     No Known Problems Brother     No Known Problems Maternal Aunt     No Known Problems Maternal Uncle     No Known Problems Paternal Aunt     No Known Problems Paternal Uncle     No Known Problems Maternal Grandmother     No Known Problems Maternal Grandfather     No Known Problems Paternal Grandmother     No Known Problems Paternal Grandfather          Social History:     · Living Situation: at home wt wife, 3 children , 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   · Tobacco:denied   · Alcohol:denied   · Illicit Drugs:denied   · Sexual History: , wt wife   · Travel History: denied   · Exposures:   · Outdoor/Hiking: denied  · Animal/Pet: denied direct exposure but  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, does not always wear mask, glove per him   · Construction: denied  · Hot Tub: denied   · Brackish/stagnant exposure: denied  · TB exposure: denied  · Sick Contacts: denied     Allergies:   Allergies   Allergen Reactions    Cafergot [Ergotamine-Caffeine] Hives         Review of Systems:     Gen: + chills and fever    HEENT: Negative for headache, vision changes, ear ache or discharge, tingling,  nasal discharge, swelling, lumps in neck, sores on tongue   CV:  Negative for chest pain, dyspnea on exertion, leg edema   Lungs: Negative for shortness of breath, cough, wheezing   Abdomen: Negative for abdominal pain, + chronic diarrhea and nausea    Genitourinary: Negative for genital pain or genital discharge     Neuro: headache, disorientation, staring in space     Skin: Negative for rash, sores/open wounds   Musculoskeletal: Negative for joint pain, joint swelling, joint erythema    Endocrine: Negative for high or low blood sugars, heat or cold intolerance    Psych: Negative for manic behavior     Medications:    Current Facility-Administered Medications:     vancomycin (VANCOCIN) 1250 mg in  ml infusion, 1,250 mg, IntraVENous, Q8H, Bertram Sánchez MD, Last Rate: 125 mL/hr at 07/24/18 1430, 1,250 mg at 07/24/18 1430    LORazepam (ATIVAN) injection 1 mg, 1 mg, IntraVENous, ONCE, Elza Blankenship MD    sodium chloride (NS) flush 5-10 mL, 5-10 mL, IntraVENous, Q8H, Elza Blankenship MD, 10 mL at 07/24/18 1431    sodium chloride (NS) flush 5-10 mL, 5-10 mL, IntraVENous, PRN, Elza Blankenship MD    0.9% sodium chloride with KCl 20 mEq/L infusion, , IntraVENous, CONTINUOUS, Elza Blankenship MD, Last Rate: 130 mL/hr at 07/24/18 1247    acetaminophen (TYLENOL) tablet 650 mg, 650 mg, Oral, Q6H PRN, Elza Blankenship MD    oxyCODONE-acetaminophen (PERCOCET) 5-325 mg per tablet 1 Tab, 1 Tab, Oral, Q6H PRN, Elza Blankenship MD    ondansetron [facility]) injection 4 mg, 4 mg, IntraVENous, Q6H PRN, Elza Blankenship MD    acyclovir (ZOVIRAX) 880 mg in 0.9% sodium chloride 250 mL IVPB, 880 mg, IntraVENous, Q8H, Elza Blankenship MD, 880 mg at 07/24/18 1247    cefTRIAXone (ROCEPHIN) 2 g in 0.9% sodium chloride (MBP/ADV) 50 mL, 2 g, IntraVENous, Q12H, Elza Blankenship MD, Last Rate: 100 mL/hr at 07/24/18 1650, 2 g at 07/24/18 1650    lactobac ac& pc-s.therm-b.anim (DEION Q/RISAQUAD), 1 Cap, Oral, DAILY, Leander Catalan MD, 1 Cap at 07/24/18 1430    morphine injection 2 mg, 2 mg, IntraVENous, Q4H PRN, Leander Catalan MD    [START ON 7/25/2018] Vancomycin Trough, 1 Each, Other, ONCE, Leander Catalan MD        Physical Exam:    Vitals: Patient Vitals for the past 24 hrs:   Temp Pulse Resp BP SpO2   07/24/18 1437 98.8 °F (37.1 °C) 78 16 106/71 99 %   07/24/18 1220 98.7 °F (37.1 °C) 80 16 117/81 99 %   07/24/18 1100 98 °F (36.7 °C) 84 18 118/67 100 %   07/24/18 1000 - 76 19 120/76 100 %   07/24/18 0900 - 73 15 109/68 100 %   07/24/18 0830 98.4 °F (36.9 °C) 74 15 115/68 99 %   07/24/18 0800 98.2 °F (36.8 °C) 72 16 116/67 100 %   07/24/18 0700 - 84 15 118/64 97 %   07/24/18 0630 - 90 14 119/66 96 %   07/24/18 0600 - 95 15 120/66 96 %   07/24/18 0530 - 96 15 124/64 96 %   07/24/18 0500 - (!) 108 21 128/69 96 %   07/24/18 0430 - (!) 101 14 111/67 95 %   07/24/18 0400 - 96 20 131/61 99 %   07/24/18 0330 - 89 22 128/79 97 %   07/24/18 0308 (!) 100.5 °F (38.1 °C) - - - -   07/24/18 0300 - 94 17 126/80 98 %   07/24/18 0216 99.3 °F (37.4 °C) 94 16 128/79 100 %   ·   · GEN: NAD  · HEENT: Normocephalic, atraumatic, no nuchal rigidity, no scleral icterus,  no cervica  lymphadenopathy, no sinus tenderness, no thrush  · CV: S1, S2 heard regularly, no murmurs, thrills or rubs heard   · Lungs: Clear to auscultation bilaterally  · Abdomen: soft, non distended, non tender,, no CVA tenderness    · Genitourinary: no wing  · Extremities: no edema  · Neuro: Alert, oriented to self, place and situation, moves all extremities to commands, verbal   · Skin: no rash  · Psych: good affect, good eye contact, non tearful     Labs:   Recent Results (from the past 24 hour(s))   GLUCOSE, POC    Collection Time: 07/24/18  2:30 AM   Result Value Ref Range    Glucose (POC) 114 (H) 65 - 100 mg/dL    Performed by Bogdan Nugent FUNCTION PANEL    Collection Time: 07/24/18  2:30 AM   Result Value Ref Range    Protein, total 8.2 6.4 - 8.2 g/dL    Albumin 2.9 (L) 3.5 - 5.0 g/dL    Globulin 5.3 (H) 2.0 - 4.0 g/dL    A-G Ratio 0.5 (L) 1.1 - 2.2      Bilirubin, total 1.5 (H) 0.2 - 1.0 MG/DL    Bilirubin, direct 0.7 (H) 0.0 - 0.2 MG/DL    Alk. phosphatase 503 (H) 45 - 117 U/L    AST (SGOT) 70 (H) 15 - 37 U/L    ALT (SGPT) 98 (H) 12 - 78 U/L   POC INR    Collection Time: 07/24/18  2:31 AM   Result Value Ref Range    INR (POC) 1.0 <1.2     CBC WITH AUTOMATED DIFF    Collection Time: 07/24/18  2:35 AM   Result Value Ref Range    WBC 11.8 (H) 4.1 - 11.1 K/uL    RBC 4.28 4.10 - 5.70 M/uL    HGB 12.6 12.1 - 17.0 g/dL    HCT 38.7 36.6 - 50.3 %    MCV 90.4 80.0 - 99.0 FL    MCH 29.4 26.0 - 34.0 PG    MCHC 32.6 30.0 - 36.5 g/dL    RDW 16.1 (H) 11.5 - 14.5 %    PLATELET 089 361 - 628 K/uL    MPV 11.2 8.9 - 12.9 FL    NRBC 0.0 0  WBC    ABSOLUTE NRBC 0.00 0.00 - 0.01 K/uL    NEUTROPHILS 80 (H) 32 - 75 %    LYMPHOCYTES 10 (L) 12 - 49 %    MONOCYTES 8 5 - 13 %    EOSINOPHILS 1 0 - 7 %    BASOPHILS 0 0 - 1 %    IMMATURE GRANULOCYTES 0 0.0 - 0.5 %    ABS. NEUTROPHILS 9.5 (H) 1.8 - 8.0 K/UL    ABS. LYMPHOCYTES 1.2 0.8 - 3.5 K/UL    ABS. MONOCYTES 0.9 0.0 - 1.0 K/UL    ABS. EOSINOPHILS 0.2 0.0 - 0.4 K/UL    ABS. BASOPHILS 0.0 0.0 - 0.1 K/UL    ABS. IMM.  GRANS. 0.1 (H) 0.00 - 0.04 K/UL    DF AUTOMATED     METABOLIC PANEL, BASIC    Collection Time: 07/24/18  2:35 AM   Result Value Ref Range    Sodium 136 136 - 145 mmol/L    Potassium 3.4 (L) 3.5 - 5.1 mmol/L    Chloride 102 97 - 108 mmol/L    CO2 29 21 - 32 mmol/L    Anion gap 5 5 - 15 mmol/L    Glucose 108 (H) 65 - 100 mg/dL    BUN 9 6 - 20 MG/DL    Creatinine 1.01 0.70 - 1.30 MG/DL    BUN/Creatinine ratio 9 (L) 12 - 20      GFR est AA >60 >60 ml/min/1.73m2    GFR est non-AA >60 >60 ml/min/1.73m2    Calcium 9.1 8.5 - 10.1 MG/DL   CK    Collection Time: 07/24/18  2:35 AM   Result Value Ref Range    CK 62 39 - 308 U/L   TROPONIN I    Collection Time: 07/24/18  2:35 AM   Result Value Ref Range    Troponin-I, Qt. <0.05 <0.05 ng/mL   EKG, 12 LEAD, INITIAL    Collection Time: 07/24/18  2:51 AM   Result Value Ref Range    Ventricular Rate 92 BPM    Atrial Rate 92 BPM    P-R Interval 154 ms    QRS Duration 78 ms    Q-T Interval 314 ms    QTC Calculation (Bezet) 388 ms    Calculated P Axis 51 degrees    Calculated R Axis 4 degrees    Calculated T Axis 16 degrees    Diagnosis       Normal sinus rhythm  Normal ECG  Confirmed by Michael Gonzalez ([id number]) on 7/24/2018 3:03:41 PM     URINALYSIS W/ REFLEX CULTURE    Collection Time: 07/24/18  3:21 AM   Result Value Ref Range    Color DARK YELLOW      Appearance CLEAR CLEAR      Specific gravity 1.019 1.003 - 1.030      pH (UA) 7.0 5.0 - 8.0      Protein NEGATIVE  NEG mg/dL    Glucose NEGATIVE  NEG mg/dL    Ketone NEGATIVE  NEG mg/dL    Bilirubin NEGATIVE  NEG      Blood NEGATIVE  NEG      Urobilinogen 1.0 0.2 - 1.0 EU/dL    Nitrites NEGATIVE  NEG      Leukocyte Esterase NEGATIVE  NEG      WBC 0-4 0 - 4 /hpf    RBC 0-5 0 - 5 /hpf    Epithelial cells FEW FEW /lpf    Bacteria NEGATIVE  NEG /hpf    UA:UC IF INDICATED CULTURE NOT INDICATED BY UA RESULT CNI      Hyaline cast 0-2 0 - 5 /lpf   CULTURE, BLOOD, PAIRED    Collection Time: 07/24/18  3:21 AM   Result Value Ref Range    Special Requests: NO SPECIAL REQUESTS      Culture result: NO GROWTH AFTER 4 HOURS     LACTIC ACID    Collection Time: 07/24/18  3:21 AM   Result Value Ref Range    Lactic acid 1.0 0.4 - 2.0 MMOL/L   CULTURE, CSF W GRAM STAIN    Collection Time: 07/24/18  4:53 AM   Result Value Ref Range    Special Requests: NO SPECIAL REQUESTS      GRAM STAIN NO ORGANISMS SEEN      Culture result: PENDING    CELL COUNT, CSF    Collection Time: 07/24/18  4:54 AM   Result Value Ref Range    CSF TUBE NO. 4      CSF COLOR COLORLESS COL      CSF APPEARANCE CLEAR CLEAR      CSF RBCS 9 (H) 0 /cu mm    CSF WBCS 0 0 - 5 /cu mm   CELL COUNT, CSF    Collection Time: 07/24/18  4:55 AM   Result Value Ref Range    CSF TUBE NO. 1      CSF COLOR COLORLESS COL      CSF APPEARANCE CLEAR CLEAR      CSF RBCS 27 (H) 0 /cu mm    CSF WBCS 1 0 - 5 /cu mm   GLUCOSE, CSF    Collection Time: 07/24/18  4:55 AM   Result Value Ref Range    Tube No. 2      Glucose,CSF 71 (H) 40 - 70 MG/DL   PROTEIN, CSF    Collection Time: 07/24/18  4:55 AM   Result Value Ref Range    Tube No. 2      Protein,CSF 27 15 - 45 MG/DL       Microbiology Data:       Blood: 7/24 pending    CSF 7/24 pending      CSF : WBC 1, protein 27, glucose 71 , clear, RBC 27           Imaging:   CTA/P  FINDINGS:   There is new gallbladder distention without overt inflammation. No CBD dilation.     Exam is otherwise stable. Liver has cirrhotic morphology with nonuniform  dilation of bile ducts; see discussion of this appearance on CT Enterography  1/23/2018.     Portal venous hypertension is again suggested with mild splenomegaly again  noted.     No bowel inflammation or dilation. No ascites or pneumoperitoneum. Small stable  nonspecific retroperitoneal nodes.     Stable heterogeneous parenchymal enhancement of the kidneys, nonspecific,  without apparent mass, stone or hydronephrosis. Ureters are not dilated. Aorta  is not aneurysmal. Adrenals are not enlarged. Pancreas is unremarkable. Bladder  is unremarkable. No pelvic mass.     IMPRESSION  IMPRESSION:  1. New gallbladder distention. 2. Stable abnormal hepatic appearance as discussed. 3. No bowel inflammation. CT head  FINDINGS:  The ventricles and sulci are normal in size, shape and configuration and  midline. There is no significant white matter disease. There is no intracranial  hemorrhage, extra-axial collection, mass, mass effect or midline shift. The  basilar cisterns are open. No acute infarct is identified. The bone windows  demonstrate no abnormalities. Mucous retention cyst versus polyp in the left  sphenoid sinus.   .  IMPRESSION  IMPRESSION: 1. No evidence of acute intracranial abnormality by this modality. Procedures:  LP     Assessment / Plan:     Mr Earnest Wilcox is a 38year old gentleman wt hx of PSC, Crohns, Pyoderma gangrenosum , questionable Polyarteris nodosa? , S/P ileal pouch anal anastomosis, and has chronic pouchitis per GI notes in past, CDI , pancreatitis,  who came to the ER for headache, chills and also found to disoriented and staring into space. He does not recall all the events but says that headache is better. Says she has been on steroids with healing for Pyoderma of LE. Says steroids stopped about a week or so ago. Denied any other immunosuppressants. Says he did not have neck pain but had intense headache all over his head. Denied any vision changes. Says at baseline due to his GI illness, he has nausea and loose stools. He says he can have anywhere between 4-5 soft to loose stools a day to 13 when he has Crohns flare. Says he feels a lot better now. Denied any other symptoms to me. On presentation, found to have mild temperature elevation up to 100.5, mild leukocytosis up to 11.8. He chronically has LFT elevations and had MRCP in 2017 read as  \"Biliary findings are most compatible with chronic cholangitis\". \"Cirrhosis and splenomegaly are new since 2015\"  He had LP done and CT head, abd, pelvis. He was started on Vancomycin, Ceftriaxone and Acyclovir. He says he feels a lot better today overall. Denied any new medications recently.      1) Possible encephalitis, seizures   LP data not too impressive for bacterial meningitis   DDX for viral etiologies include HSV, Enterovirus, [place] virus , LMCV   He is clinically improved already   If CSF cultures negative after 24 hours, would DC antibiotics   IF HSV PCR CSF negative, DC Acyclovir   MRI, EEG and neuro workup per primary team   Monitor renal function closely   Add Enterovirus PCR, [place] ab to CSF, VDRL  if available for testing   Check RPR and HIV ( consent obtained)     2) Hx of crohns, pyoderma gangrenosum   Was on steroids, now stopped     3) PSC     4) Possible PAN per notes     5) DVT px     Dread Beck DO  5:37 PM

## 2018-07-24 NOTE — ED NOTES
TRANSFER - OUT REPORT:    Verbal report given to [address] [place] [address] (name) on [place] [facility]  being transferred to Neuro (unit) for routine progression of care       Report consisted of patients Situation, Background, Assessment and   Recommendations(SBAR). Information from the following report(s) SBAR, Kardex, ED Summary, [facility] and Recent Results was reviewed with the receiving nurse. Lines:   Peripheral IV 07/24/18 Right Antecubital (Active)   Site Assessment Clean, dry, & intact 7/24/2018  8:00 AM   Phlebitis Assessment 0 7/24/2018  8:00 AM   Infiltration Assessment 0 7/24/2018  8:00 AM   Dressing Status Clean, dry, & intact 7/24/2018  8:00 AM   Dressing Type Tape;Transparent 7/24/2018  8:00 AM        Opportunity for questions and clarification was provided.

## 2018-07-24 NOTE — WOUND CARE
Wound Care Consult: Chart reviewed and patient assessed for his left leg wound that was present on admission. Pt. Has been treated in the [address] [place] [address], [address] 2 appointments and seeing Dr. Clary Hyatt. Pt. 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 Assessment: The wound on the lower left leg is healing well with mostly pink epithelial tissue in the wound bed and the edges are flat. Pt. Has completed his course of Prednisone (which is the main medication that treats this condition). Recommended wound care for Pyoderma Gangrenosum wound on the lower left leg: Cleanse with NS and wipe once with a 4x4. Completely wet a square of the Hydrofera blue with NS and squeeze out and place on the wound. Cover with a NS Moistened  4x4 and then a bulky amount of 4x4's to cover it / pad it. Wrap with Cosme and tape to secure.    Sienna Brown RN, BSN, CWON ([id number])

## 2018-07-24 NOTE — ED PROVIDER NOTES
EMERGENCY DEPARTMENT HISTORY AND PHYSICAL EXAM 
 
 
Date: 7/24/2018 Patient Name: Deyanira Cleary History of Presenting Illness Chief Complaint Patient presents with  
 Headache Patient ambulatory to triage with steady gait and complain of headache. spouse states that patient was \"starring off in space\" for thirty minutes onset 0030 this morning History Provided By: Patient and Patient's Wife HPI: Deyanira Cleary, 38 y.o. male with PMHx significant for PSC, Crohn's colitis, pancreatitis, presents ambulatory to the ED with cc of gradual onset HA, after pt had a ~30-45 minute episode of \"staring off in to space and rubbing his right arm repeatedly. Pt reports that he has no memory of episode. Wife reports that when she tried to speak to pt during episode, he had no idea who she was or where they were. Pt reports that his Crohn's disease has been acting up all day on 07/23/2018. Wife reports that during episode, pt was confused and kept stating, Yolanda Daigle mom is going to come and take me home. \"  Wife reports that at [address] on 07/23/2018 pt was last seen at baseline. Pt reports that his HA has been onset for the whole day of 07/23/2018. Pt reports constant nausea for past ~several days. Pt reports a wound on his left leg due to Crohn's, and is treating wound with Prednisone. Pt denies urinating on himself during episode. Pt reports gradual onset abdominal pain. Pt reports his right arm was \"tingling\" shortly before and after episode. He specifically denies any fevers, chills, vomiting, chest pain, shortness of breath, rash, diarrhea, sweating or weight loss. There are no other complaints, changes, or physical findings at this time. PCP: Javi Fine MD 
 
Current Facility-Administered Medications Medication Dose Route Frequency Provider Last Rate Last Dose  vancomycin (VANCOCIN) 2000 mg in  ml infusion  2,000 mg IntraVENous ONCE Bertram Sparks  mL/hr at 07/24/18 4632 2,000 mg at 07/24/18 0441  
 vancomycin (VANCOCIN) 1250 mg in  ml infusion  1,250 mg IntraVENous Q8H Bertram Nicole MD      
 
Current Outpatient Prescriptions Medication Sig Dispense Refill  oxyCODONE IR (ROXICODONE) 5 mg immediate release tablet 1-2 tabs every 4-6 hours as needed for pain 15 Tab 0  
 lidocaine hcl 3 % lotion Apply  to affected area three (3) times daily. 118 mL 0  
 rifAXIMin (XIFAXAN) 200 mg tablet Take 500 mg by mouth three (3) times daily. Past History Past Medical History: 
Past Medical History:  
Diagnosis Date  Arthritis  Chest pain 2/18/2013  
 as of 10/14/14 pt denies any CP since 2/13  Crohn's colitis ([facility] [address]  H/O Clostridium difficile infection 01/2017  
 as of 3/30/17 pt denies abd pain, diarrhea > 1 week  Iron deficiency anemia 2/18/2013  Pancreatitis  Pouchitis ([facility] [address] 12/8/2011  PSC (primary sclerosing cholangitis) Dr Refugio Bennett [phone]  Ulcerative colitis ([facility] [address] Past Surgical History: 
Past Surgical History:  
Procedure Laterality Date  ABDOMEN SURGERY PROC UNLISTED  2002  
 colostomy reversal & J Pouch  COLONOSCOPY N/A 4/4/2017 COLONOSCOPY performed by Juan Logan MD at [place] [address] COLONOSCOPY N/A 1/15/2018 COLONOSCOPY performed by Juan Logan MD at \[facility]\"" ENDOSCOPY  COLONOSCOPY,DIAGNOSTIC  1/15/2018  HX COLECTOMY  2002  
 colostomy  HX GI    
 ercp  HX ROTATOR CUFF REPAIR    
 right  AL COLONOSCOPY W/BIOPSY SINGLE/MULTIPLE  12/8/2011  AL EGD TRANSORAL BIOPSY SINGLE/MULTIPLE  2/18/2013 Family History: 
Family History Problem Relation Age of Onset  Diabetes Mother  Hypertension Mother [address] Arthritis-osteo Mother  Diabetes Father  Hypertension Father  Stroke Father  Arthritis-osteo Father  No Known Problems Sister  No Known Problems Brother  No Known Problems Maternal Aunt  No Known Problems Maternal Uncle  No Known Problems Paternal Aunt  No Known Problems Paternal Uncle  No Known Problems Maternal Grandmother  No Known Problems Maternal Grandfather  No Known Problems Paternal Grandmother  No Known Problems Paternal Grandfather Social History: 
Social History Substance Use Topics  Smoking status: Never Smoker  Smokeless tobacco: Never Used  Alcohol use No  
 
 
Allergies: Allergies Allergen Reactions  Cafergot [Ergotamine-Caffeine] Hives Review of Systems Review of Systems Constitutional: Negative for chills and fever. HENT: Negative for congestion. Eyes: Negative for visual disturbance. Respiratory: Negative for chest tightness. Cardiovascular: Negative for chest pain and leg swelling. Gastrointestinal: Positive for abdominal pain and nausea. Negative for vomiting. Endocrine: Negative for polyuria. Genitourinary: Negative for dysuria and frequency. Musculoskeletal: Negative for myalgias. Skin: Negative for color change. Allergic/Immunologic: Negative for immunocompromised state. Neurological: Positive for headaches (lft side). Negative for numbness. Physical Exam  
Physical Exam  
Nursing note and vitals reviewed. General appearance: non-toxic, mild malaise Eyes: PERRL, EOMI, conjunctiva normal, anicteric sclera HEENT: mucous membranes moist, oropharynx is clear, neck is supple but does clearly exacerbate HA Pulmonary: clear to auscultation bilaterally Cardiac: normal rate and regular rhythm, no murmurs, gallops, or rubs, 2+ peripheral pulses, no carotid bruits, cap refill < 2 seconds Abdomen: soft, nontender, nondistended, bowel sounds present MSK: no lower extremity edema Neuro: Alert, answers questions appropriately, 5/5 strength in all 4 extremities, VFF, finger to nose normal, light touch intact in all four extremities, cranial nerves II-XII intact Diagnostic Study Results Labs - Recent Results (from the past 12 hour(s)) GLUCOSE, POC Collection Time: 07/24/18  2:30 AM  
Result Value Ref Range Glucose (POC) 114 (H) 65 - 100 mg/dL Performed by Berry Brittle   
POC INR Collection Time: 07/24/18  2:31 AM  
Result Value Ref Range INR (POC) 1.0 <1.2 CBC WITH AUTOMATED DIFF Collection Time: 07/24/18  2:35 AM  
Result Value Ref Range WBC 11.8 (H) 4.1 - 11.1 K/uL  
 RBC 4.28 4.10 - 5.70 M/uL  
 HGB 12.6 12.1 - 17.0 g/dL HCT 38.7 36.6 - 50.3 % MCV 90.4 80.0 - 99.0 FL  
 MCH 29.4 26.0 - 34.0 PG  
 MCHC 32.6 30.0 - 36.5 g/dL  
 RDW 16.1 (H) 11.5 - 14.5 % PLATELET 656 480 - 403 K/uL MPV 11.2 8.9 - 12.9 FL  
 NRBC 0.0 0  WBC ABSOLUTE NRBC 0.00 0.00 - 0.01 K/uL NEUTROPHILS 80 (H) 32 - 75 % LYMPHOCYTES 10 (L) 12 - 49 % MONOCYTES 8 5 - 13 % EOSINOPHILS 1 0 - 7 % BASOPHILS 0 0 - 1 % IMMATURE GRANULOCYTES 0 0.0 - 0.5 % ABS. NEUTROPHILS 9.5 (H) 1.8 - 8.0 K/UL  
 ABS. LYMPHOCYTES 1.2 0.8 - 3.5 K/UL  
 ABS. MONOCYTES 0.9 0.0 - 1.0 K/UL  
 ABS. EOSINOPHILS 0.2 0.0 - 0.4 K/UL  
 ABS. BASOPHILS 0.0 0.0 - 0.1 K/UL  
 ABS. IMM. GRANS. 0.1 (H) 0.00 - 0.04 K/UL  
 DF AUTOMATED METABOLIC PANEL, BASIC Collection Time: 07/24/18  2:35 AM  
Result Value Ref Range Sodium 136 136 - 145 mmol/L Potassium 3.4 (L) 3.5 - 5.1 mmol/L Chloride 102 97 - 108 mmol/L  
 CO2 29 21 - 32 mmol/L Anion gap 5 5 - 15 mmol/L Glucose 108 (H) 65 - 100 mg/dL BUN 9 6 - 20 MG/DL Creatinine 1.01 0.70 - 1.30 MG/DL  
 BUN/Creatinine ratio 9 (L) 12 - 20 GFR est AA >60 >60 ml/min/1.73m2 GFR est non-AA >60 >60 ml/min/1.73m2 Calcium 9.1 8.5 - 10.1 MG/DL  
CK Collection Time: 07/24/18  2:35 AM  
Result Value Ref Range CK 62 39 - 308 U/L  
TROPONIN I Collection Time: 07/24/18  2:35 AM  
Result Value Ref Range Troponin-I, Qt. <0.05 <0.05 ng/mL EKG, 12 LEAD, INITIAL Collection Time: 07/24/18  2:51 AM  
Result Value Ref Range  Ventricular Rate 92 BPM  
 Atrial Rate 92 BPM  
 P-R Interval 154 ms QRS Duration 78 ms Q-T Interval 314 ms QTC Calculation (Bezet) 388 ms Calculated P Axis 51 degrees Calculated R Axis 4 degrees Calculated T Axis 16 degrees Diagnosis Normal sinus rhythm Normal ECG When compared with ECG of 24-OCT-2016 23:42, No significant change was found URINALYSIS W/ REFLEX CULTURE Collection Time: 07/24/18  3:21 AM  
Result Value Ref Range Color DARK YELLOW Appearance CLEAR CLEAR Specific gravity 1.019 1.003 - 1.030    
 pH (UA) 7.0 5.0 - 8.0 Protein NEGATIVE  NEG mg/dL Glucose NEGATIVE  NEG mg/dL Ketone NEGATIVE  NEG mg/dL Bilirubin NEGATIVE  NEG Blood NEGATIVE  NEG Urobilinogen 1.0 0.2 - 1.0 EU/dL Nitrites NEGATIVE  NEG Leukocyte Esterase NEGATIVE  NEG    
 WBC 0-4 0 - 4 /hpf  
 RBC 0-5 0 - 5 /hpf Epithelial cells FEW FEW /lpf Bacteria NEGATIVE  NEG /hpf  
 UA:UC IF INDICATED CULTURE NOT INDICATED BY UA RESULT CNI Hyaline cast 0-2 0 - 5 /lpf LACTIC ACID Collection Time: 07/24/18  3:21 AM  
Result Value Ref Range Lactic acid 1.0 0.4 - 2.0 MMOL/L  
CELL COUNT, CSF Collection Time: 07/24/18  4:54 AM  
Result Value Ref Range CSF TUBE NO. 4    
 CSF COLOR COLORLESS COL    
 CSF APPEARANCE CLEAR CLEAR    
 CSF RBCS 9 (H) 0 /cu mm  
 CSF WBCS 0 0 - 5 /cu mm CELL COUNT, CSF Collection Time: 07/24/18  4:55 AM  
Result Value Ref Range CSF TUBE NO. 1    
 CSF COLOR COLORLESS COL    
 CSF APPEARANCE CLEAR CLEAR    
 CSF RBCS 27 (H) 0 /cu mm  
 CSF WBCS 1 0 - 5 /cu mm GLUCOSE, CSF Collection Time: 07/24/18  4:55 AM  
Result Value Ref Range Tube No. 2    
 Glucose,CSF 71 (H) 40 - 70 MG/DL PROTEIN, CSF Collection Time: 07/24/18  4:55 AM  
Result Value Ref Range Tube No. 2    
 Protein,CSF 27 15 - 45 MG/DL Radiologic Studies -  
XR CHEST PORT Final Result CT CODE NEURO HEAD WO CONTRAST Final Result CT Results  (Last 48 hours) 07/24/18 0245  CT CODE NEURO HEAD WO CONTRAST Final result Impression:  IMPRESSION:   
1. No evidence of acute intracranial abnormality by this modality. Narrative:  EXAM:  CT CODE NEURO HEAD WO CONTRAST INDICATION:   headache, confusion Additional history: Headache. Episode of \"staring off into space and \"(absence  
like seizure) at 0030 hours. COMPARISON: None. .  
TECHNIQUE:   
Unenhanced CT of the head was performed using 5 mm images. Coronal and sagittal  
reformats were produced. Brain and bone windows were generated. CT dose reduction was achieved through use of a standardized protocol tailored  
for this examination and automatic exposure control for dose modulation. Nacho Reasoner FINDINGS:  
The ventricles and sulci are normal in size, shape and configuration and  
midline. There is no significant white matter disease. There is no intracranial  
hemorrhage, extra-axial collection, mass, mass effect or midline shift. The  
basilar cisterns are open. No acute infarct is identified. The bone windows  
demonstrate no abnormalities. Mucous retention cyst versus polyp in the left  
sphenoid sinus. .  
  
  
 
CXR Results  (Last 48 hours) 07/24/18 0315  XR CHEST PORT Final result Impression:  IMPRESSION:  
1. No radiographic evidence of acute cardiopulmonary disease. Narrative:  INDICATION: . CVA Additional history: Possible seizure. COMPARISON: Previous chest xray, 1/24/2017. LIMITATIONS: Portable technique. Nacho Reasoner FINDINGS: Single frontal view of the chest.   
.  
Lines/tubes/surgical: Cardiac monitor leads overly the patient. Heart/mediastinum: Unremarkable. Lungs/pleura:  No focal consolidation or mass. No visualized pleural effusion or  
pneumothorax. Additional Comments: None. .  
  
  
 
 
 
Medical Decision Making I am the first provider for this patient.  
 
I reviewed the vital signs, available nursing notes, past medical history, past surgical history, family history and social history. Vital Signs-Reviewed the patient's vital signs. Patient Vitals for the past 12 hrs: 
 Temp Pulse Resp BP SpO2  
07/24/18 0600 - 95 15 120/66 96 %  
07/24/18 0530 - 96 15 124/64 96 %  
07/24/18 0500 - (!) 108 21 128/69 96 %  
07/24/18 0430 - (!) 101 14 111/67 95 %  
07/24/18 0400 - 96 20 131/61 99 % 07/24/18 0330 - 89 22 128/79 97 %  
07/24/18 0308 (!) 100.5 °F (38.1 °C) - - - -  
07/24/18 0300 - 94 17 126/80 98 %  
07/24/18 0216 99.3 °F (37.4 °C) 94 16 128/79 100 % Pulse Oximetry Analysis - 100% on RA Cardiac Monitor:  
Rate: 94 bpm 
Rhythm: Normal Sinus Rhythm EKG interpretation: (8205) Rhythm: normal sinus rhythm; and regular . Rate (approx.): 92; Axis: normal; [place] interval: 154; QRS interval: 78; QTc: 388; ST/T wave: normal; Other findings: syncope/stroke. Written by Wilian Adler ED Scribe, as dictated by Olivia Neumann. Kaveh Galvan MD. Records Reviewed: Nursing Notes and Old Medical Records Provider Notes (Medical Decision Making): DDx: encephalitis, meningitis, seizure, metabolic disorder, consider CVA although low suspicion for ischemia ED Course:  
Initial assessment performed. The patients presenting problems have been discussed, and they are in agreement with the care plan formulated and outlined with them. I have encouraged them to ask questions as they arise throughout their visit. Progress Note: 
3:09 AM 
Pt has a temperature of 100.5 F rectally. Pt is febrile and will need a lumbar puncture. Procedure Note - Lumbar Puncture:  
4:40 AM 
Performed by Vilma Romero MD . Immediately prior to the procedure, the patient was reevaluated and found suitable for the planned procedure and any planned medications. Immediately prior to the procedure a time out was called to verify the correct patient, procedure, equipment, staff, and marking as appropriate. The site prepped with ChloraPrep and Betadine. Anesthesia was obtained with 19mLs 2% lidocaine and with epinephrine. Patient position: left lateral sideline Intervertebral space: L3-L4 A 20 gauge spinal needle was introduced with 5 attempts. CSF was collected, clear in appearance, and sent for analysis. Sterile dressing applied. Estimated blood loss: <2mls The procedure took 46-60 minutes, and pt tolerated well. Consult Note: 
5:00 AM 
Jacob Fischer MD spoke with Keith Dorman MD, Specialty: Hospitalist 
Discussed pt's hx, disposition, and available diagnostic and imaging results. Reviewed care plans. Consultant agrees with plans as outlined. Keith Dorman MD will admit the pt. Critical Care Time:  
0 minutes Disposition: 
Admit Note: 
5:09 AM 
Pt is being admitted by Keith Dorman MD. The results of their tests and reason(s) for their admission have been discussed with pt and/or available family. They convey agreement and understanding for the need to be admitted and for admission diagnosis. Return to ED if worse Diagnosis Clinical Impression: 1. Transient alteration of awareness Attestations: This note is prepared by Brigette Adler, acting as Scribe for Jacob Fischer MD. Jacob Fischer MD: The scribe's documentation has been prepared under my direction and personally reviewed by me in its entirety. I confirm that the note above accurately reflects all work, treatment, procedures, and medical decision making performed by me.

## 2018-07-24 NOTE — IP AVS SNAPSHOT
[address] [facility] 
[phone] Patient: Nanette Faith MRN: [id number] :1975 About your hospitalization You were admitted on:  2018 You last received care in the:  [facility] 3 NEUROSCIENCE TELEMETRY You were discharged on:  2018 Why you were hospitalized Your primary diagnosis was:  Transient Alteration Of Awareness Your diagnoses also included:  Fever, Headache, Primary Sclerosing Cholangitis, Pyoderma Follow-up Information Follow up With Details Comments Contact Info Delaney Whitfield MD Go on 2018 Please follow up on 2018 at 2:20 arriving 15 minutes early to register with current list of medication [address] [facility] 
[phone] Guido Guevara DO In 2 weeks Office will call to schedule follow up appointment [address] [facility] 
[phone] Your Scheduled Appointments 2018  9:00 AM EDT  
WOUND CARE FOLLOW UP with Jesus Martines MD, [facility] WOUND CARE 1 [facility] OP WOUND CARE [place] ([address]) [address] [facility] [address] [id number] [phone] Discharge Orders None A check karthik indicates which time of day the medication should be taken. My Medications Notice You have not been prescribed any medications. Discharge Instructions None [facility] Announcement We are excited to announce that we are making your provider's discharge notes available to you in Coolstuff. You will see these notes when they are completed and signed by the physician that discharged you from your recent hospital stay.   If you have any questions or concerns about any information you see in Coolstuff, please call the Health Information Department where you were seen or reach out to your Primary Care Provider for more information about your plan of care. Introducing [facility] [place] [facility]! Dear Rikki Castelan: Thank you for requesting a Users[employer] account. Our records indicate that you already have an active Users[employer] account. You can access your account anytime at [email or web address] Did you know that you can access your hospital and ER discharge instructions at any time in Userscout? You can also review all of your test results from your hospital stay or ER visit. Additional Information If you have questions, please visit the Frequently Asked Questions section of the Userscout website at [email or web address] Remember, Userscout is NOT to be used for urgent needs. For medical emergencies, dial 911. Now available from your iPhone and Android! Introducing Simone Marks As a [facility] patient, I wanted to make you aware of our electronic visit tool called Simone Marks. [employer] allows you to connect within minutes with a medical provider 24 hours a day, seven days a week via a mobile device or tablet or logging into a secure website from your computer. You can access Simone Marks from anywhere in the [place]. A virtual visit might be right for you when you have a simple condition and feel like you just dont want to get out of bed, or cant get away from work for an appointment, when your regular [facility] provider is not available (evenings, weekends or holidays), or when youre out of town and need minor care. Electronic visits cost only $49 and if the [employer] provider determines a prescription is needed to treat your condition, one can be electronically transmitted to a nearby pharmacy*. Please take a moment to enroll today if you have not already done so. The enrollment process is free and takes just a few minutes.   To enroll, please download the [employer] link to your tablet or phone, or visit [email or web address] to enroll on your computer. And, as an [address] [place] [address] patient with a [employer] account, the results of your visits will be scanned into your electronic medical record and your primary care provider will be able to view the scanned results. We urge you to continue to see your regular [facility] provider for your ongoing medical care. And while your primary care provider may not be the one available when you seek a iKnowl virtual visit, the peace of mind you get from getting a real diagnosis real time can be priceless. For more information on iKnow[employer], view our Frequently Asked Questions (FAQs) at [email or web address]. Sincerely, 
 
Humble Diaz MD 
Chief Medical Officer Everett Neha Daniel *:  certain medications cannot be prescribed via [employer]Knowl Unresulted Labs-Please follow up with your PCP about these lab tests Order Current Status VDRL, CSF In process [place] VIRUS AB, IGG/IGM, CSF In process CULTURE, BLOOD, PAIRED Preliminary result CULTURE, CSF W GRAM STAIN Preliminary result Providers Seen During Your Hospitalization Provider Specialty Primary office phone Louisa Barrera. Jasmin Martin MD Emergency Medicine [phone] Aurora Saucedo MD Internal Medicine [phone] Xenia Sanabria MD Internal Medicine [phone] Des Morales MD Internal Medicine [phone] Your Primary Care Physician (PCP) Primary Care Physician Office Phone Office Fax Dafne Mohamud [phone] You are allergic to the following Allergen Reactions Cafergot (Ergotamine-Caffeine) Hives Recent Documentation Height Weight BMI Smoking Status 1.829 m 105.3 kg 31.48 kg/m2 Never Smoker Emergency Contacts Name Discharge Info Relation Home Work Mobile Patty Reece DISCHARGE CAREGIVER [3] Spouse [3] [phone] Patient Belongings The following personal items are in your possession at time of discharge: 
  Dental Appliances: None  Visual Aid: Contacts      Home Medications: None   Jewelry: With patient  Clothing: At bedside    Other Valuables: At bedside Please provide this summary of care documentation to your next provider. Signatures-by signing, you are acknowledging that this After Visit Summary has been reviewed with you and you have received a copy. Patient Signature:  ____________________________________________________________ Date:  ____________________________________________________________  
  
[facility] Provider Signature:  ____________________________________________________________ Date:  ____________________________________________________________

## 2018-07-24 NOTE — ED NOTES
Report received from Brenda Darby [facility]. ANA, Donn, ED Summary, MAR and Recent Results was discussed.     Opal Landeros RN

## 2018-07-24 NOTE — ED NOTES
Pt provided with warm blanket per request at this time. Pt given PO contrast for CT.  Pt encouraged to drink

## 2018-07-24 NOTE — H&P
Hospitalist Admission Note NAME: Alberto Hunt :  1975 MRN:  [id number] Date/Time:  2018 8:01 AM 
 
Patient PCP: Leonora Armenta MD  
GI:  Dr. Germania Poole 
______________________________________________________________________ Assessment & Plan: 
Fever Headache Transient alteration of awareness concerning for possible absence seizure 
--Unclear at this time whether this is isolated to CNS infection or systemic. Patient immunocompromised, just recently finished 1.5 week course of prednisone 1 week ago for pyoderma gangrenosum ulcer --prelim LP result looks benign  
--continue rocephin and vancomycin empirically. Add acyclovir 
--HSV PCR pending. Add cryptococcal Ag. Follow up blood and CSF culture --consult ID 
--continue droplet isolation for now 
--get MRI brain with and without contrast, EEG, neurology consult. Crohn's disease S/p colectomy with reversal 
Primary sclerosing cholangitis --increased abdominal pain and increased stool frequency 
--add on LFTs 
--check CT abdomen/pelvis --may need GI consult 
--check stool for C. Diff since has hx of C. Diff Pyoderma gangrenosum ulcer in left lower leg x 2 months 
--s/p course of prednisone 1 week ago and wound improving. Follow with wound care clinic. --consult wound care. Ulcer does not look infected. 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 
--bilateral renal arteriogram 3/18 with abnormal appearance of the small distal lobar and interlobar arteries of both kidneys with patchy peripheral hypoperfusion, right greater than left. Mild right renal cortical scarring. No dominant pseudoaneurysm. 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 --not yet seeing rheumatologist 
 
Body mass index is 31.48 kg/(m^2). Code:  full DVT prophylaxis: SCD. No lovenox today due to LP, can start tomorrow Surrogate decision maker:  wife Subjective: CHIEF COMPLAINT:  Confusion, headache HISTORY OF PRESENT ILLNESS:    
Adeel Chaudhry is a 38 y.o.  male with Crohn's disease s/p remote subtotal colectomy with colostomy reversal, pyoderma gangrenosum ulcer in left lower leg past few months followed at wound care clinic which has been improving after debridement and recent course of prednisone, PSC, possible PAN, hx C. Diff, hx pancreatitis who presents after gradual onset of headache yesterday, associated with chills. Last night wife noted patient disoriented for 30-40 minutes, not interacting, rubbing right arm and staring into space. No tonic clonic jerks. Patient did not recognize wife or daughter. Past week increased abdominal pain, +nausea, no vomiting, decreased appetite yesterday. No weight loss. Increased stool frequency x 24 hours, did not check for blood in stool. No dysuria, cough, SOB, sorethroat, neck pain. No travel or sick contacts at home. No recent antibiotic use. Works as  (residential and commercial). We were asked to admit for work up and evaluation of the above problems. Past Medical History:  
Diagnosis Date  Arthritis  Chest pain 2/18/2013  
 as of 10/14/14 pt denies any CP since 2/13  Crohn's colitis ([address] Dr. Cabrera Layer  H/O Clostridium difficile infection 01/2017  
 as of 3/30/17 pt denies abd pain, diarrhea > 1 week  Iron deficiency anemia 2/18/2013  Pancreatitis  Polyarteritis nodosa ([address]  Pouchitis ([address] 12/8/2011  PSC (primary sclerosing cholangitis) Dr Miguel Gupta [phone]  Pyoderma gangrenosum Past Surgical History:  
Procedure Laterality Date  ABDOMEN SURGERY PROC UNLISTED  2002  
 colostomy reversal & J Pouch  COLONOSCOPY N/A 4/4/2017 COLONOSCOPY performed by Kenji Harrisno MD at . [address] COLONOSCOPY N/A 1/15/2018 COLONOSCOPY performed by Kenji Harrison MD at [facility] ENDOSCOPY  COLONOSCOPY,DIAGNOSTIC  1/15/2018  HX COLECTOMY  2002 colostomy  HX GI    
 ercp  HX ROTATOR CUFF REPAIR    
 right  LA COLONOSCOPY W/BIOPSY SINGLE/MULTIPLE  12/8/2011  LA EGD TRANSORAL BIOPSY SINGLE/MULTIPLE  2/18/2013 Social History Substance Use Topics  Smoking status: Never Smoker  Smokeless tobacco: Never Used  Alcohol use No  
 Drug use:  Denies. Family History Problem Relation Age of Onset  Diabetes Mother  Hypertension Mother [employer] Arthritis-osteo Mother  Diabetes Father  Hypertension Father  Stroke Father  Arthritis-osteo Father  No Known Problems Sister  No Known Problems Brother  No Known Problems Maternal Aunt  No Known Problems Maternal Uncle  No Known Problems Paternal Aunt  No Known Problems Paternal Uncle  No Known Problems Maternal Grandmother  No Known Problems Maternal Grandfather  No Known Problems Paternal Grandmother  No Known Problems Paternal Grandfather Allergies Allergen Reactions  Cafergot [Ergotamine-Caffeine] Hives Prior to Admission medications Not on File REVIEW OF SYSTEMS:  POSITIVE= Bold. Negative = normal text General:  fever, chills, sweats, generalized weakness, weight loss/gain, loss of appetite Eyes:  blurred vision, eye pain, loss of vision, diplopia Ear Nose and Throat:  rhinorrhea, pharyngitis Respiratory:   cough, sputum production, SOB, wheezing, JARRELL, pleuritic pain 
Cardiology:  chest pain, palpitations, orthopnea, PND, edema, syncope Gastrointestinal:  abdominal pain, N/V, dysphagia, diarrhea, constipation, bleeding Genitourinary:  frequency, urgency, dysuria, hematuria, incontinence Muskuloskeletal :  arthralgia, myalgia Hematology:  easy bruising, bleeding, lymphadenopathy Dermatological:  rash, ulceration, pruritis Endocrine:  hot flashes or polydipsia Neurological:  headache, dizziness, confusion, focal weakness, paresthesia, memory loss, gait disturbance Psychological: anxiety, depression, agitation Objective: VITALS:   
Visit Vitals  /64  Pulse 84  Temp (!) 100.5 °F (38.1 °C)  Resp 15  Ht 6' (1.829 m)  Wt 105.3 kg (232 lb 2.3 oz)  SpO2 97%  BMI 31.48 kg/m2 Temp (24hrs), Av.9 °F (37.7 °C), Min:99.3 °F (37.4 °C), Max:100.5 °F (38.1 °C) Body mass index is 31.48 kg/(m^2). PHYSICAL EXAM: 
 
General:    Overweight male, awake, cooperative, no distress, appears stated age. HEENT: Atraumatic, anicteric sclerae, pink conjunctivae No oral ulcers, mucosa dry, throat clear. Hearing intact. Neck:  Supple, symmetrical,  thyroid: non tender Lungs:   Clear to auscultation bilaterally. No Wheezing or Rhonchi. No rales. Chest wall:  No tenderness  No Accessory muscle use. Heart:   Regular  rhythm,  No  murmur   No gallop. No edema. Abdomen:   Soft, mild diffuse tenderness, +scar mid abdomen. Not distended. Bowel sounds normal. No masses Extremities: No cyanosis. No clubbing Skin:     Not pale Not Jaundiced  2-3cm ulcer in left lower leg above ankle, no drainage, odor or redness or tenderness Psych:  Good insight. Not depressed. Not anxious or agitated. Neurologic: EOMs intact. No facial asymmetry. No aphasia or slurred speech. Symmetrical strength, Alert and oriented X 3. Peripheral pulse: Bilateral, DP, 2+ Capillary refill:  normal 
 
IMAGING RESULTS: 
 []       I have personally reviewed the actual   []     CXR  []     CT scan CXR: 
CT : 
EKG:  SR 92, normal 
 ________________________________________________________________________ Care Plan discussed with: 
  Comments Patient y Family  y   
RN Care Manager Consultant:     
________________________________________________________________________ Prophylaxis: 
GI none DVT SCD  
________________________________________________________________________ Recommended Disposition:  
Home with Family y HH/PT/OT/RN   
SNF/LTC   
[facility] ________________________________________________________________________ Code Status: 
Full Code y  
DNR/DNI   
________________________________________________________________________ TOTAL TIME:  50 minutes Comments  
 y Reviewed previous records  
______________________________________________________________________ Noe Lee MD 
 
 
Procedures: see electronic medical records for all procedures/Xrays and details which were not copied into this note but were reviewed prior to creation of Plan. LAB DATA REVIEWED:   
Recent Results (from the past 24 hour(s)) GLUCOSE, POC Collection Time: 07/24/18  2:30 AM  
Result Value Ref Range Glucose (POC) 114 (H) 65 - 100 mg/dL Performed by Cortez Mitchell   
POC INR Collection Time: 07/24/18  2:31 AM  
Result Value Ref Range INR (POC) 1.0 <1.2 CBC WITH AUTOMATED DIFF Collection Time: 07/24/18  2:35 AM  
Result Value Ref Range WBC 11.8 (H) 4.1 - 11.1 K/uL  
 RBC 4.28 4.10 - 5.70 M/uL  
 HGB 12.6 12.1 - 17.0 g/dL HCT 38.7 36.6 - 50.3 % MCV 90.4 80.0 - 99.0 FL  
 MCH 29.4 26.0 - 34.0 PG  
 MCHC 32.6 30.0 - 36.5 g/dL  
 RDW 16.1 (H) 11.5 - 14.5 % PLATELET 952 733 - 998 K/uL MPV 11.2 8.9 - 12.9 FL  
 NRBC 0.0 0  WBC ABSOLUTE NRBC 0.00 0.00 - 0.01 K/uL NEUTROPHILS 80 (H) 32 - 75 % LYMPHOCYTES 10 (L) 12 - 49 % MONOCYTES 8 5 - 13 % EOSINOPHILS 1 0 - 7 % BASOPHILS 0 0 - 1 % IMMATURE GRANULOCYTES 0 0.0 - 0.5 % ABS. NEUTROPHILS 9.5 (H) 1.8 - 8.0 K/UL  
 ABS. LYMPHOCYTES 1.2 0.8 - 3.5 K/UL  
 ABS. MONOCYTES 0.9 0.0 - 1.0 K/UL  
 ABS. EOSINOPHILS 0.2 0.0 - 0.4 K/UL  
 ABS. BASOPHILS 0.0 0.0 - 0.1 K/UL  
 ABS. IMM. GRANS. 0.1 (H) 0.00 - 0.04 K/UL  
 DF AUTOMATED METABOLIC PANEL, BASIC Collection Time: 07/24/18  2:35 AM  
Result Value Ref Range Sodium 136 136 - 145 mmol/L Potassium 3.4 (L) 3.5 - 5.1 mmol/L Chloride 102 97 - 108 mmol/L  
 CO2 29 21 - 32 mmol/L  Anion gap 5 5 - 15 mmol/L  
 Glucose 108 (H) 65 - 100 mg/dL BUN 9 6 - 20 MG/DL Creatinine 1.01 0.70 - 1.30 MG/DL  
 BUN/Creatinine ratio 9 (L) 12 - 20 GFR est AA >60 >60 ml/min/1.73m2 GFR est non-AA >60 >60 ml/min/1.73m2 Calcium 9.1 8.5 - 10.1 MG/DL  
CK Collection Time: 07/24/18  2:35 AM  
Result Value Ref Range CK 62 39 - 308 U/L  
TROPONIN I Collection Time: 07/24/18  2:35 AM  
Result Value Ref Range Troponin-I, Qt. <0.05 <0.05 ng/mL EKG, 12 LEAD, INITIAL Collection Time: 07/24/18  2:51 AM  
Result Value Ref Range Ventricular Rate 92 BPM  
 Atrial Rate 92 BPM  
 P-R Interval 154 ms QRS Duration 78 ms Q-T Interval 314 ms QTC Calculation (Bezet) 388 ms Calculated P Axis 51 degrees Calculated R Axis 4 degrees Calculated T Axis 16 degrees Diagnosis Normal sinus rhythm Normal ECG When compared with ECG of 24-OCT-2016 23:42, No significant change was found URINALYSIS W/ REFLEX CULTURE Collection Time: 07/24/18  3:21 AM  
Result Value Ref Range Color DARK YELLOW Appearance CLEAR CLEAR Specific gravity 1.019 1.003 - 1.030    
 pH (UA) 7.0 5.0 - 8.0 Protein NEGATIVE  NEG mg/dL Glucose NEGATIVE  NEG mg/dL Ketone NEGATIVE  NEG mg/dL Bilirubin NEGATIVE  NEG Blood NEGATIVE  NEG Urobilinogen 1.0 0.2 - 1.0 EU/dL Nitrites NEGATIVE  NEG Leukocyte Esterase NEGATIVE  NEG    
 WBC 0-4 0 - 4 /hpf  
 RBC 0-5 0 - 5 /hpf Epithelial cells FEW FEW /lpf Bacteria NEGATIVE  NEG /hpf  
 UA:UC IF INDICATED CULTURE NOT INDICATED BY UA RESULT CNI Hyaline cast 0-2 0 - 5 /lpf LACTIC ACID Collection Time: 07/24/18  3:21 AM  
Result Value Ref Range Lactic acid 1.0 0.4 - 2.0 MMOL/L  
CULTURE, CSF W GRAM STAIN Collection Time: 07/24/18  4:53 AM  
Result Value Ref Range Special Requests: NO SPECIAL REQUESTS    
 GRAM STAIN NO ORGANISMS SEEN Culture result: PENDING   
CELL COUNT, CSF  Collection Time: 07/24/18  4:54 AM  
Result Value Ref Range CSF TUBE NO. 4    
 CSF COLOR COLORLESS COL    
 CSF APPEARANCE CLEAR CLEAR    
 CSF RBCS 9 (H) 0 /cu mm  
 CSF WBCS 0 0 - 5 /cu mm CELL COUNT, CSF Collection Time: 07/24/18  4:55 AM  
Result Value Ref Range CSF TUBE NO. 1    
 CSF COLOR COLORLESS COL    
 CSF APPEARANCE CLEAR CLEAR    
 CSF RBCS 27 (H) 0 /cu mm  
 CSF WBCS 1 0 - 5 /cu mm GLUCOSE, CSF Collection Time: 07/24/18  4:55 AM  
Result Value Ref Range Tube No. 2    
 Glucose,CSF 71 (H) 40 - 70 MG/DL PROTEIN, CSF Collection Time: 07/24/18  4:55 AM  
Result Value Ref Range  Tube No. 2    
 Protein,CSF 27 15 - 45 MG/DL

## 2018-07-24 NOTE — PROGRESS NOTES
TRANSFER - IN REPORT:    Verbal report received from Jeri Lackey RN(name) on Danna Aparicio  being received from ED(unit) for routine progression of care      Report consisted of patients Situation, Background, Assessment and   Recommendations(SBAR). Information from the following report(s) SBAR, Intake/Output, MAR, Accordion and Recent Results was reviewed with the receiving nurse. Opportunity for questions and clarification was provided. Assessment completed upon patients arrival to unit and care assumed.

## 2018-07-24 NOTE — PROGRESS NOTES
Pharmacy Automatic Renal Dosing Protocol - Antimicrobials    Indication for Antimicrobials: Meningitis      Current Regimen of Each Antimicrobial:  Acyclovir 776 mg IV every 8 hours (Start Date ; Day # 1)  Ceftriaxone 2 g IV every 12 hours (Start Date ; Day # 1)  Vancomycin 1250 mg IV every 8 hours (Start Date ; Day # 1)    Previous Antimicrobial Therapy:  None    Goal Level: VANCOMYCIN TROUGH GOAL RANGE  Vancomycin Trough: 15 - 20 mcg/mL    Date Dose & Interval Measured (mcg/mL) Extrapolated (mcg/mL)                       Significant Cultures:    Blood: NGTD x 4 hours- pending   CSF: pending    Radiology / Imaging results: (X-ray, CT scan or MRI):    CT neuro: No evidence of acute intracranial abnormality by this modality    Paralysis, amputations, malnutrition: None documented     Labs:  Recent Labs      18   0235   CREA  1.01   BUN  9   WBC  11.8*     Temp (24hrs), Av.1 °F (37.3 °C), Min:98.2 °F (36.8 °C), Max:100.5 °F (38.1 °C)    Creatinine Clearance (mL/min) or Dialysis: > 100 mL/min     Impression/Plan:   · Will change acyclovir to 880 mg IV every 8 hours given that the patient's BMI is > 30 per renal dosing protocol. · Will continue current regimen for vancomycin and ceftriaxone as appropriate for indication. · Please keep a close eye on the patient's renal function. The dose of vancomycin is estimated to give a trough of 19.3 mcg/mL and it looks like the patient SCr is usually lower so the dose should still be appropriate if the renal function improves. But if it worsens even slightly the dose will likely need to be adjusted. · Antimicrobial stop date pending     Pharmacy will follow daily and adjust medications as appropriate for renal function and/or serum levels. Thank you,  Timothy Whitaker PHARMD    Recommended duration of therapy  [email or web address]/[facility]/Pharmacy/Clinical%[email or web address]. docx    Renal Dosing  [email or web address]/[place]/[facility]/Pharmacy/Clinical%20Companion/Renal%[email or web address]. pdf

## 2018-07-25 LAB
ALBUMIN SERPL-MCNC: 2.3 G/DL (ref 3.5–5)
ALBUMIN/GLOB SERPL: 0.5 {RATIO} (ref 1.1–2.2)
ALP SERPL-CCNC: 395 U/L (ref 45–117)
ALT SERPL-CCNC: 86 U/L (ref 12–78)
ANION GAP SERPL CALC-SCNC: 8 MMOL/L (ref 5–15)
AST SERPL-CCNC: 75 U/L (ref 15–37)
BASOPHILS # BLD: 0 K/UL (ref 0–0.1)
BASOPHILS NFR BLD: 0 % (ref 0–1)
BILIRUB SERPL-MCNC: 1.7 MG/DL (ref 0.2–1)
BUN SERPL-MCNC: 6 MG/DL (ref 6–20)
BUN/CREAT SERPL: 8 (ref 12–20)
C DIFF TOX GENS STL QL NAA+PROBE: NEGATIVE
CALCIUM SERPL-MCNC: 7.9 MG/DL (ref 8.5–10.1)
CHLORIDE SERPL-SCNC: 107 MMOL/L (ref 97–108)
CO2 SERPL-SCNC: 24 MMOL/L (ref 21–32)
CREAT SERPL-MCNC: 0.78 MG/DL (ref 0.7–1.3)
CRYPTOC AG CSF QL IA: NEGATIVE
DATE LAST DOSE: ABNORMAL
DIFFERENTIAL METHOD BLD: ABNORMAL
ENTEROVIRUS BY PCR, UENPT: NORMAL
EOSINOPHIL # BLD: 0.1 K/UL (ref 0–0.4)
EOSINOPHIL NFR BLD: 2 % (ref 0–7)
ERYTHROCYTE [DISTWIDTH] IN BLOOD BY AUTOMATED COUNT: 15.6 % (ref 11.5–14.5)
GLOBULIN SER CALC-MCNC: 4.3 G/DL (ref 2–4)
GLUCOSE SERPL-MCNC: 93 MG/DL (ref 65–100)
HCT VFR BLD AUTO: 32.1 % (ref 36.6–50.3)
HERPES SIMPLEX VIRUS, CSF, UHSPT: NORMAL
HGB BLD-MCNC: 10.5 G/DL (ref 12.1–17)
HIV 1+2 AB+HIV1 P24 AG SERPL QL IA: NONREACTIVE
HIV12 RESULT COMMENT, HHIVC: NORMAL
IMM GRANULOCYTES # BLD: 0 K/UL (ref 0–0.04)
IMM GRANULOCYTES NFR BLD AUTO: 0 % (ref 0–0.5)
LYMPHOCYTES # BLD: 1.4 K/UL (ref 0.8–3.5)
LYMPHOCYTES NFR BLD: 17 % (ref 12–49)
MAGNESIUM SERPL-MCNC: 1.7 MG/DL (ref 1.6–2.4)
MCH RBC QN AUTO: 29.3 PG (ref 26–34)
MCHC RBC AUTO-ENTMCNC: 32.7 G/DL (ref 30–36.5)
MCV RBC AUTO: 89.7 FL (ref 80–99)
MONOCYTES # BLD: 0.7 K/UL (ref 0–1)
MONOCYTES NFR BLD: 9 % (ref 5–13)
NEUTS SEG # BLD: 6.1 K/UL (ref 1.8–8)
NEUTS SEG NFR BLD: 72 % (ref 32–75)
NRBC # BLD: 0 K/UL (ref 0–0.01)
NRBC BLD-RTO: 0 PER 100 WBC
PHOSPHATE SERPL-MCNC: 2.5 MG/DL (ref 2.6–4.7)
PLATELET # BLD AUTO: 126 K/UL (ref 150–400)
PMV BLD AUTO: 11.8 FL (ref 8.9–12.9)
POTASSIUM SERPL-SCNC: 3.4 MMOL/L (ref 3.5–5.1)
PROT SERPL-MCNC: 6.6 G/DL (ref 6.4–8.2)
RBC # BLD AUTO: 3.58 M/UL (ref 4.1–5.7)
REPORTED DOSE,DOSE: ABNORMAL UNITS
REPORTED DOSE/TIME,TMG: ABNORMAL
RPR SER QL: NONREACTIVE
SODIUM SERPL-SCNC: 139 MMOL/L (ref 136–145)
VANCOMYCIN TROUGH SERPL-MCNC: 15.9 UG/ML (ref 5–10)
WBC # BLD AUTO: 8.4 K/UL (ref 4.1–11.1)

## 2018-07-25 PROCEDURE — 95816 EEG AWAKE AND DROWSY: CPT | Performed by: HOSPITALIST

## 2018-07-25 PROCEDURE — 80053 COMPREHEN METABOLIC PANEL: CPT | Performed by: HOSPITALIST

## 2018-07-25 PROCEDURE — 74011250636 HC RX REV CODE- 250/636: Performed by: EMERGENCY MEDICINE

## 2018-07-25 PROCEDURE — 74011250636 HC RX REV CODE- 250/636: Performed by: HOSPITALIST

## 2018-07-25 PROCEDURE — 36415 COLL VENOUS BLD VENIPUNCTURE: CPT | Performed by: HOSPITALIST

## 2018-07-25 PROCEDURE — 87389 HIV-1 AG W/HIV-1&-2 AB AG IA: CPT | Performed by: HOSPITALIST

## 2018-07-25 PROCEDURE — 74011000258 HC RX REV CODE- 258: Performed by: HOSPITALIST

## 2018-07-25 PROCEDURE — 86592 SYPHILIS TEST NON-TREP QUAL: CPT | Performed by: HOSPITALIST

## 2018-07-25 PROCEDURE — 85025 COMPLETE CBC W/AUTO DIFF WBC: CPT | Performed by: HOSPITALIST

## 2018-07-25 PROCEDURE — 65660000000 HC RM CCU STEPDOWN

## 2018-07-25 PROCEDURE — 83735 ASSAY OF MAGNESIUM: CPT | Performed by: HOSPITALIST

## 2018-07-25 PROCEDURE — 74011250636 HC RX REV CODE- 250/636: Performed by: INTERNAL MEDICINE

## 2018-07-25 PROCEDURE — 84100 ASSAY OF PHOSPHORUS: CPT | Performed by: HOSPITALIST

## 2018-07-25 PROCEDURE — 80202 ASSAY OF VANCOMYCIN: CPT | Performed by: HOSPITALIST

## 2018-07-25 PROCEDURE — 74011250637 HC RX REV CODE- 250/637: Performed by: HOSPITALIST

## 2018-07-25 RX ORDER — VANCOMYCIN 1.75 GRAM/500 ML IN 0.9 % SODIUM CHLORIDE INTRAVENOUS
1750 EVERY 8 HOURS
Status: DISCONTINUED | OUTPATIENT
Start: 2018-07-25 | End: 2018-07-26

## 2018-07-25 RX ADMIN — ACYCLOVIR SODIUM 880 MG: 50 INJECTION, SOLUTION INTRAVENOUS at 01:09

## 2018-07-25 RX ADMIN — VANCOMYCIN HYDROCHLORIDE 1750 MG: 10 INJECTION, POWDER, LYOPHILIZED, FOR SOLUTION INTRAVENOUS at 14:28

## 2018-07-25 RX ADMIN — Medication 10 ML: at 16:33

## 2018-07-25 RX ADMIN — ENOXAPARIN SODIUM 40 MG: 40 INJECTION SUBCUTANEOUS at 09:41

## 2018-07-25 RX ADMIN — ACYCLOVIR SODIUM 880 MG: 50 INJECTION, SOLUTION INTRAVENOUS at 09:43

## 2018-07-25 RX ADMIN — Medication 10 ML: at 21:45

## 2018-07-25 RX ADMIN — ACYCLOVIR SODIUM 880 MG: 50 INJECTION, SOLUTION INTRAVENOUS at 19:17

## 2018-07-25 RX ADMIN — SODIUM CHLORIDE AND POTASSIUM CHLORIDE: 9; 1.49 INJECTION, SOLUTION INTRAVENOUS at 19:17

## 2018-07-25 RX ADMIN — Medication 1 CAPSULE: at 09:42

## 2018-07-25 RX ADMIN — CEFTRIAXONE SODIUM 2 G: 2 INJECTION, POWDER, FOR SOLUTION INTRAMUSCULAR; INTRAVENOUS at 18:07

## 2018-07-25 RX ADMIN — CEFTRIAXONE SODIUM 2 G: 2 INJECTION, POWDER, FOR SOLUTION INTRAMUSCULAR; INTRAVENOUS at 02:36

## 2018-07-25 RX ADMIN — VANCOMYCIN HYDROCHLORIDE 1750 MG: 10 INJECTION, POWDER, LYOPHILIZED, FOR SOLUTION INTRAVENOUS at 21:45

## 2018-07-25 RX ADMIN — VANCOMYCIN HYDROCHLORIDE 1250 MG: 10 INJECTION, POWDER, LYOPHILIZED, FOR SOLUTION INTRAVENOUS at 04:23

## 2018-07-25 NOTE — CONSULTS
DATE OF CONSULTATION: 7/25/2018    CONSULTED BY: Talia Michelle MD    Chief Complaint   Patient presents with    Headache     Patient ambulatory to triage with steady gait and complain of headache. spouse states that patient was \"starring off in space\" for thirty minutes onset 0030 this morning       Reason for Consult  I have been asked to see the patient in neurological consultation to render advice and opinion regarding spell of altered consciousness. HISTORY OF PRESENT ILLNESS  Clarissa Wesley, 38 y.o. male with PMHx significant for PSC, Crohn's colitis, pancreatitis, presents ambulatory to the ED with cc of gradual onset HA, after pt had a ~30-45 minute episode of \"staring off in to space and rubbing his right arm repeatedly. Pt reports that he has no memory of episode. Wife reports that when she tried to speak to pt during episode, he had no idea who she was or where they were. Pt reports that his Crohn's disease has been acting up all day on 07/23/2018. Wife reports that during episode, pt was confused and kept stating, [facility] mom is going to come and take me home. \"  Wife reports that at [address] on 07/23/2018 pt was last seen at baseline. Pt reports that his HA has been onset for the whole day of 07/23/2018. Pt reports constant nausea for past ~several days. Pt reports a wound on his left leg due to Crohn's, and is treating wound with Prednisone. Pt denies urinating no himself during episode. Pt reports gradual onset abdominal pain. Pt reports his right arm was \"tingling\" shortly before and after episode. He specifically denies any fevers, chills, vomiting, chest pain, shortness of breath, rash, diarrhea, sweating or weight loss. CT HEAD W/O and MRI BRAIN W/O were unremarkable, CSF unremarkable, EEG Pending.     He is normal now according to family with him         ROS  A ten system review of constitutional, cardiovascular, respiratory, musculoskeletal, endocrine, skin, SHEENT, genitourinary, psychiatric and neurologic systems was obtained and is unremarkable except as stated in HPI     PMH  Past Medical History:   Diagnosis Date    Arthritis     Chest pain 2/18/2013    as of 10/14/14 pt denies any CP since 2/13    Crohn's colitis ([facility]     Dr. Kay Horan H/O Clostridium difficile infection 01/2017    as of 3/30/17 pt denies abd pain, diarrhea > 1 week    Iron deficiency anemia 2/18/2013    Pancreatitis     Polyarteritis nodosa ([facility]     Pouchitis ([facility] [address] 12/8/2011    PSC (primary sclerosing cholangitis)     Dr Israel Lan [phone]    Pyoderma gangrenosum        FH  Family History   Problem Relation Age of Onset    Diabetes Mother     Hypertension Mother     Arthritis-osteo Mother     Diabetes Father     Hypertension Father     Stroke Father     Arthritis-osteo Father     No Known Problems Sister     No Known Problems Brother     No Known Problems Maternal Aunt     No Known Problems Maternal Uncle     No Known Problems Paternal Aunt     No Known Problems Paternal Uncle     No Known Problems Maternal Grandmother     No Known Problems Maternal Grandfather     No Known Problems Paternal Grandmother     No Known Problems Paternal Grandfather        SH  Social History     Social History    Marital status:      Spouse name: N/A    Number of children: 3    Years of education: N/A     Occupational History          Social History Main Topics    Smoking status: Never Smoker    Smokeless tobacco: Never Used    Alcohol use No    Drug use: No    Sexual activity: Yes     Partners: Female     Birth control/ protection: Condom     Other Topics Concern    None     Social History Narrative       ALLERGIES  Allergies   Allergen Reactions    Cafergot [Ergotamine-Caffeine] Hives       PHYSICAL EXAM  EXAMINATION:   Patient Vitals for the past 24 hrs:   Temp Pulse Resp BP SpO2   07/25/18 0735 98.1 °F (36.7 °C) 84 18 115/73 97 %   07/25/18 0355 98.8 °F (37.1 °C) 82 18 (!) 84/52 97 %   07/25/18 0109 98.9 °F (37.2 °C) 80 18 118/54 98 %   07/24/18 2106 99.1 °F (37.3 °C) 89 18 115/69 100 %   07/24/18 1437 98.8 °F (37.1 °C) 78 16 106/71 99 %   07/24/18 1220 98.7 °F (37.1 °C) 80 16 117/81 99 %   07/24/18 1100 98 °F (36.7 °C) 84 18 118/67 100 %   07/24/18 1000 - 76 19 120/76 100 %        General:   Physical Exam   CONSTITUTIONAL: Oriented to person, place, and time, appears well-developed and well-nourished. No distress. Neurological Examination:   Mental Status:  AAO x3. Speech is fluent. Follows commands, has normal fund of knowledge, attention, short term recall, comprehension and insight. Cranial Nerves: Visual fields are full. PERRL, Extraocular movements are full. Facial sensation intact V1- V3. Facial movement intact, symmetric. Hearing intact to conversation. Palate elevates symmetrically. Shoulder shrug symmetric. Tongue midline. Motor: Strength is 5/5 in all 4 ext. Normal tone. No atrophy. Sensation: Normal to light touch    Reflexes: DTRs 2+ throughout. Plantar responses downgoing.      Coordination/Cerebellar: Intact to finger-nose-finger     Gait: NT      LAB DATA REVIEWED:    Results for orders placed or performed during the hospital encounter of 07/24/18   CULTURE, BLOOD, PAIRED   Result Value Ref Range    Special Requests: NO SPECIAL REQUESTS      Culture result: NO GROWTH 1 DAY     CULTURE, CSF W GRAM STAIN   Result Value Ref Range    Special Requests: NO SPECIAL REQUESTS      GRAM STAIN NO ORGANISMS SEEN      Culture result: PENDING    CBC WITH AUTOMATED DIFF   Result Value Ref Range    WBC 11.8 (H) 4.1 - 11.1 K/uL    RBC 4.28 4.10 - 5.70 M/uL    HGB 12.6 12.1 - 17.0 g/dL    HCT 38.7 36.6 - 50.3 %    MCV 90.4 80.0 - 99.0 FL    MCH 29.4 26.0 - 34.0 PG    MCHC 32.6 30.0 - 36.5 g/dL    RDW 16.1 (H) 11.5 - 14.5 %    PLATELET 028 934 - 300 K/uL    MPV 11.2 8.9 - 12.9 FL    NRBC 0.0 0  WBC    ABSOLUTE NRBC 0.00 0.00 - 0.01 K/uL    NEUTROPHILS 80 (H) 32 - 75 %    LYMPHOCYTES 10 (L) 12 - 49 %    MONOCYTES 8 5 - 13 %    EOSINOPHILS 1 0 - 7 %    BASOPHILS 0 0 - 1 %    IMMATURE GRANULOCYTES 0 0.0 - 0.5 %    ABS. NEUTROPHILS 9.5 (H) 1.8 - 8.0 K/UL    ABS. LYMPHOCYTES 1.2 0.8 - 3.5 K/UL    ABS. MONOCYTES 0.9 0.0 - 1.0 K/UL    ABS. EOSINOPHILS 0.2 0.0 - 0.4 K/UL    ABS. BASOPHILS 0.0 0.0 - 0.1 K/UL    ABS. IMM.  GRANS. 0.1 (H) 0.00 - 0.04 K/UL    DF AUTOMATED     METABOLIC PANEL, BASIC   Result Value Ref Range    Sodium 136 136 - 145 mmol/L    Potassium 3.4 (L) 3.5 - 5.1 mmol/L    Chloride 102 97 - 108 mmol/L    CO2 29 21 - 32 mmol/L    Anion gap 5 5 - 15 mmol/L    Glucose 108 (H) 65 - 100 mg/dL    BUN 9 6 - 20 MG/DL    Creatinine 1.01 0.70 - 1.30 MG/DL    BUN/Creatinine ratio 9 (L) 12 - 20      GFR est AA >60 >60 ml/min/1.73m2    GFR est non-AA >60 >60 ml/min/1.73m2    Calcium 9.1 8.5 - 10.1 MG/DL   CK   Result Value Ref Range    CK 62 39 - 308 U/L   TROPONIN I   Result Value Ref Range    Troponin-I, Qt. <0.05 <0.05 ng/mL   URINALYSIS W/ REFLEX CULTURE   Result Value Ref Range    Color DARK YELLOW      Appearance CLEAR CLEAR      Specific gravity 1.019 1.003 - 1.030      pH (UA) 7.0 5.0 - 8.0      Protein NEGATIVE  NEG mg/dL    Glucose NEGATIVE  NEG mg/dL    Ketone NEGATIVE  NEG mg/dL    Bilirubin NEGATIVE  NEG      Blood NEGATIVE  NEG      Urobilinogen 1.0 0.2 - 1.0 EU/dL    Nitrites NEGATIVE  NEG      Leukocyte Esterase NEGATIVE  NEG      WBC 0-4 0 - 4 /hpf    RBC 0-5 0 - 5 /hpf    Epithelial cells FEW FEW /lpf    Bacteria NEGATIVE  NEG /hpf    UA:UC IF INDICATED CULTURE NOT INDICATED BY UA RESULT CNI      Hyaline cast 0-2 0 - 5 /lpf   LACTIC ACID   Result Value Ref Range    Lactic acid 1.0 0.4 - 2.0 MMOL/L   CELL COUNT, CSF   Result Value Ref Range    CSF TUBE NO. 1      CSF COLOR COLORLESS COL      CSF APPEARANCE CLEAR CLEAR      CSF RBCS 27 (H) 0 /cu mm    CSF WBCS 1 0 - 5 /cu mm   CELL COUNT, CSF   Result Value Ref Range    CSF TUBE NO. 4      CSF COLOR COLORLESS COL CSF APPEARANCE CLEAR CLEAR      CSF RBCS 9 (H) 0 /cu mm    CSF WBCS 0 0 - 5 /cu mm   GLUCOSE, CSF   Result Value Ref Range    Tube No. 2      Glucose,CSF 71 (H) 40 - 70 MG/DL   PROTEIN, CSF   Result Value Ref Range    Tube No. 2      Protein,CSF 27 15 - 45 MG/DL   HEPATIC FUNCTION PANEL   Result Value Ref Range    Protein, total 8.2 6.4 - 8.2 g/dL    Albumin 2.9 (L) 3.5 - 5.0 g/dL    Globulin 5.3 (H) 2.0 - 4.0 g/dL    A-G Ratio 0.5 (L) 1.1 - 2.2      Bilirubin, total 1.5 (H) 0.2 - 1.0 MG/DL    Bilirubin, direct 0.7 (H) 0.0 - 0.2 MG/DL    Alk. phosphatase 503 (H) 45 - 117 U/L    AST (SGOT) 70 (H) 15 - 37 U/L    ALT (SGPT) 98 (H) 12 - 78 U/L   CBC WITH AUTOMATED DIFF   Result Value Ref Range    WBC 8.4 4.1 - 11.1 K/uL    RBC 3.58 (L) 4.10 - 5.70 M/uL    HGB 10.5 (L) 12.1 - 17.0 g/dL    HCT 32.1 (L) 36.6 - 50.3 %    MCV 89.7 80.0 - 99.0 FL    MCH 29.3 26.0 - 34.0 PG    MCHC 32.7 30.0 - 36.5 g/dL    RDW 15.6 (H) 11.5 - 14.5 %    PLATELET 599 (L) 259 - 400 K/uL    MPV 11.8 8.9 - 12.9 FL    NRBC 0.0 0  WBC    ABSOLUTE NRBC 0.00 0.00 - 0.01 K/uL    NEUTROPHILS 72 32 - 75 %    LYMPHOCYTES 17 12 - 49 %    MONOCYTES 9 5 - 13 %    EOSINOPHILS 2 0 - 7 %    BASOPHILS 0 0 - 1 %    IMMATURE GRANULOCYTES 0 0.0 - 0.5 %    ABS. NEUTROPHILS 6.1 1.8 - 8.0 K/UL    ABS. LYMPHOCYTES 1.4 0.8 - 3.5 K/UL    ABS. MONOCYTES 0.7 0.0 - 1.0 K/UL    ABS. EOSINOPHILS 0.1 0.0 - 0.4 K/UL    ABS. BASOPHILS 0.0 0.0 - 0.1 K/UL    ABS. IMM.  GRANS. 0.0 0.00 - 0.04 K/UL    DF AUTOMATED     MAGNESIUM   Result Value Ref Range    Magnesium 1.7 1.6 - 2.4 mg/dL   PHOSPHORUS   Result Value Ref Range    Phosphorus 2.5 (L) 2.6 - 4.7 MG/DL   METABOLIC PANEL, COMPREHENSIVE   Result Value Ref Range    Sodium 139 136 - 145 mmol/L    Potassium 3.4 (L) 3.5 - 5.1 mmol/L    Chloride 107 97 - 108 mmol/L    CO2 24 21 - 32 mmol/L    Anion gap 8 5 - 15 mmol/L    Glucose 93 65 - 100 mg/dL    BUN 6 6 - 20 MG/DL    Creatinine 0.78 0.70 - 1.30 MG/DL    BUN/Creatinine ratio 8 (L) 12 - 20      GFR est AA >60 >60 ml/min/1.73m2    GFR est non-AA >60 >60 ml/min/1.73m2    Calcium 7.9 (L) 8.5 - 10.1 MG/DL    Bilirubin, total 1.7 (H) 0.2 - 1.0 MG/DL    ALT (SGPT) 86 (H) 12 - 78 U/L    AST (SGOT) 75 (H) 15 - 37 U/L    Alk. phosphatase 395 (H) 45 - 117 U/L    Protein, total 6.6 6.4 - 8.2 g/dL    Albumin 2.3 (L) 3.5 - 5.0 g/dL    Globulin 4.3 (H) 2.0 - 4.0 g/dL    A-G Ratio 0.5 (L) 1.1 - 2.2     VANCOMYCIN, TROUGH   Result Value Ref Range    Vancomycin,trough 15.9 (H) 5.0 - 10.0 ug/mL    Reported dose date: NOT PROVIDED      Reported dose time: NOT PROVIDED      Reported dose: NOT PROVIDED UNITS   HIV 1/2 AG/AB, 4TH GENERATION,W RFLX CONFIRM   Result Value Ref Range    HIV 1/2 Interpretation NONREACTIVE NR      HIV 1/2 result comment SEE NOTE     GLUCOSE, POC   Result Value Ref Range    Glucose (POC) 114 (H) 65 - 100 mg/dL    Performed by [facility]    POC INR   Result Value Ref Range    INR (POC) 1.0 <1.2     EKG, 12 LEAD, INITIAL   Result Value Ref Range    Ventricular Rate 92 BPM    Atrial Rate 92 BPM    P-R Interval 154 ms    QRS Duration 78 ms    Q-T Interval 314 ms    QTC Calculation (Bezet) 388 ms    Calculated P Axis 51 degrees    Calculated R Axis 4 degrees    Calculated T Axis 16 degrees    Diagnosis       Normal sinus rhythm  Normal ECG  Confirmed by Kinjal Nguyen ([id number]) on 7/24/2018 3:03:41 PM          Imaging review:    CT HEAD W/O 724/18    FINDINGS:  The ventricles and sulci are normal in size, shape and configuration and  midline. There is no significant white matter disease. There is no intracranial  hemorrhage, extra-axial collection, mass, mass effect or midline shift. The  basilar cisterns are open. No acute infarct is identified. The bone windows  demonstrate no abnormalities. Mucous retention cyst versus polyp in the left  sphenoid sinus. .  IMPRESSION  IMPRESSION:   1. No evidence of acute intracranial abnormality by this modality.     MRI BRAIN W/O 7/24/18     FINDINGS: Intra-axial morphology, signal and enhancement are normal. The  vascular flow-voids at the base of the brain are normal in conspicuity. There is  no extra-axial collection, mass or enhancement abnormality demonstrated. Sella,  optic chiasm, posterior fossa, and orbits are normal. Several small mucosal  retention cysts are shown in the maxillary sinuses bilaterally and left sphenoid  sinus.     IMPRESSION  IMPRESSION: Normal study. HOME MEDS  None       CURRENT MEDS  Current Facility-Administered Medications   Medication Dose Route Frequency    vancomycin (VANCOCIN) 1750 mg in  ml infusion  1,750 mg IntraVENous Q8H    sodium chloride (NS) flush 5-10 mL  5-10 mL IntraVENous Q8H    0.9% sodium chloride with KCl 20 mEq/L infusion   IntraVENous CONTINUOUS    acyclovir (ZOVIRAX) 880 mg in 0.9% sodium chloride 250 mL IVPB  880 mg IntraVENous Q8H    cefTRIAXone (ROCEPHIN) 2 g in 0.9% sodium chloride (MBP/ADV) 50 mL  2 g IntraVENous Q12H    lactobac ac& pc-s.therm-b.anim (DEION Q/RISAQUAD)  1 Cap Oral DAILY    enoxaparin (LOVENOX) injection 40 mg  40 mg SubCUTAneous DAILY       IMPRESSION:RECOMMENDATIONS:  Yovana Meza is a 38 y.o. male who presents with 3 hour spell of confusion for which he has no memory. He has multiple medical problems mostly autoimmune, MRI Brain Nl, LP normal.. His lack of memory for the episode is unusual, may have been partial complex seizure activity, he certainly has enough medical problems to generate one, will check EEG, if normal will not empirically put on trial of anticonvulsants unless he has further spells. Cory Degroot. Luciano Reynolds MD  Neurologist    This note will not be viewable in [address]

## 2018-07-25 NOTE — ROUTINE PROCESS
Bedside shift change report given to jean marie RN (oncoming nurse) by Chilo Shaikh RN (offgoing nurse). Report included the following information SBAR, Intake/Output, MAR, Accordion and Recent Results.     Zone Phone:   [phone]        Significant changes during shift:  none           Patient Information     Steven Shine  38 y.o.  7/24/2018  2:15 AM by Elza Blankenship MD. Steven Shine was admitted from Home     Problem List          Patient Active Problem List     Diagnosis Date Noted    Transient alteration of awareness 07/24/2018    Fever 07/24/2018    Headache 07/24/2018    Pyoderma 07/20/2018    Crohn's disease with complication ([address] 85/08/3925    Non-pressure chronic ulcer of left ankle, limited to breakdown of skin ([address] 07/20/2018    Chest pain 02/18/2013    Iron deficiency anemia 02/18/2013    Pouchitis ([address] 12/08/2011    Acute pancreatitis 01/09/2011    Primary sclerosing cholangitis 01/09/2011           Past Medical History:   Diagnosis Date    Arthritis      Chest pain 2/18/2013     as of 10/14/14 pt denies any CP since 2/13    Crohn's colitis ([address]       Dr. Gauri Carrillo H/O Clostridium difficile infection 01/2017     as of 3/30/17 pt denies abd pain, diarrhea > 1 week    Iron deficiency anemia 2/18/2013    Pancreatitis      Polyarteritis nodosa ([address]      Pouchitis ([address] 12/8/2011    PSC (primary sclerosing cholangitis)       Dr Chyna Astorga [phone]    Pyoderma gangrenosum              Core Measures:     CVA: Yes Yes     Activity Status:     OOB to Chair Yes  Ambulated this shift Yes   Bed Rest No     DVT prophylaxis:     DVT prophylaxis Med- No  DVT prophylaxis SCD or CONCHA- No      Wounds: (If Applicable)     Wounds- Yes     Location Left ankle     Patient Safety:     Falls Score Total Score: 1  Safety Level_______  Bed Alarm On? No  Sitter?  No     Plan for upcoming shift: Antibiotics, Antivirals, MRI           Discharge Plan: No TBD     Active Consults:  IP CONSULT TO INFECTIOUS DISEASES

## 2018-07-25 NOTE — PROGRESS NOTES
Bedside shift change report given to OLINDA Roberts (oncoming nurse) by Rita Odonnell RN (offgoing nurse). Report included the following information SBAR, Intake/Output, MAR, Accordion and Recent Results.     Zone Phone:   [phone]        Significant changes during shift:  Off droplet precautions           Patient Information     Norman Ochoa  38 y.o.  7/24/2018  2:15 AM by Demi Nice MD. Norman Ochoa was admitted from Home     Problem List          Patient Active Problem List     Diagnosis Date Noted    Transient alteration of awareness 07/24/2018    Fever 07/24/2018    Headache 07/24/2018    Pyoderma 07/20/2018    Crohn's disease with complication ([address] 17/37/7727    Non-pressure chronic ulcer of left ankle, limited to breakdown of skin ([address] 07/20/2018    Chest pain 02/18/2013    Iron deficiency anemia 02/18/2013    Pouchitis ([address] 12/08/2011    Acute pancreatitis 01/09/2011    Primary sclerosing cholangitis 01/09/2011           Past Medical History:   Diagnosis Date    Arthritis      Chest pain 2/18/2013     as of 10/14/14 pt denies any CP since 2/13    Crohn's colitis ([address]       Dr. Ravinder Galeas H/O Clostridium difficile infection 01/2017     as of 3/30/17 pt denies abd pain, diarrhea > 1 week    Iron deficiency anemia 2/18/2013    Pancreatitis      Polyarteritis nodosa ([address]      Pouchitis ([address] 12/8/2011    PSC (primary sclerosing cholangitis)       Dr Refugio Bennett [phone]    Pyoderma gangrenosum              Core Measures:     CVA: Yes Yes     Activity Status:     OOB to Chair Yes  Ambulated this shift Yes   Bed Rest No     DVT prophylaxis:     DVT prophylaxis Med- No  DVT prophylaxis SCD or CONCHA- No      Wounds: (If Applicable)     Wounds- Yes     Location Left ankle     Patient Safety:     Falls Score Total Score: 1  Safety Level_______  Bed Alarm On? No  Sitter?  No     Plan for upcoming shift: Antibiotics, Antivirals           Discharge Plan: No TBD     Active Consults:  IP CONSULT TO INFECTIOUS DISEASES

## 2018-07-25 NOTE — PROGRESS NOTES
Infectious Disease Progress Note       Subjective:   Mr Walter Tsai seen this am. Doing better, no headache, chills, fever. Has chronically loose stools. Is getting EEG today        ROS: 10 point ROS obtained and pertinent positives include above. All others negative.          Objective:    Vitals:   Patient Vitals for the past 24 hrs:   Temp Pulse Resp BP SpO2   07/25/18 0735 98.1 °F (36.7 °C) 84 18 115/73 97 %   07/25/18 0355 98.8 °F (37.1 °C) 82 18 (!) 84/52 97 %   07/25/18 0109 98.9 °F (37.2 °C) 80 18 118/54 98 %   07/24/18 2106 99.1 °F (37.3 °C) 89 18 115/69 100 %   07/24/18 1437 98.8 °F (37.1 °C) 78 16 106/71 99 %   07/24/18 1220 98.7 °F (37.1 °C) 80 16 117/81 99 %       Physical Exam:  Gen: No apparent distress  HEENT:  Normocephalic, atraumatic, no scleral icterus, no thrush,   CV: S1,2 heard regularly, no lower extremity edema    Lungs: Clear to auscultation bilaterally, no wheezing  Abdomen: soft, non tender, non distended   Skin: no rash   Psych: good affect, good eye contact, non tearful  Neuro: alert, oriented to time,  place, and situation, moves all extremities to commands, verbal  Musculoskeletal:  No joint edema, erythema or tenderness noted   Ext: superficial lesions that appear well healed from pervious pyoderma         Medications:    Current Facility-Administered Medications:     vancomycin (VANCOCIN) 1750 mg in  ml infusion, 1,750 mg, IntraVENous, Q8H, Amy Min DO    [START ON 7/26/2018] PHARMACY INFORMATION NOTE, 1 Each, Other, ONCE, Amy Min DO    sodium chloride (NS) flush 5-10 mL, 5-10 mL, IntraVENous, Q8H, Glen Vera MD, 10 mL at 07/24/18 2214    sodium chloride (NS) flush 5-10 mL, 5-10 mL, IntraVENous, PRN, Glen Vera MD    0.9% sodium chloride with KCl 20 mEq/L infusion, , IntraVENous, CONTINUOUS, Glen Vera MD, Last Rate: 130 mL/hr at 07/24/18 1247    acetaminophen (TYLENOL) tablet 650 mg, 650 mg, Oral, Q6H PRN, Glen Vera MD  [facility] oxyCODONE-acetaminophen (PERCOCET) 5-325 mg per tablet 1 Tab, 1 Tab, Oral, Q6H PRN, Kylee Pedersen MD    [facility]ansetron [facility]) injection 4 mg, 4 mg, IntraVENous, Q6H PRN, Kylee Pedersen MD    acyclovir (ZOVIRAX) 880 mg in 0.9% sodium chloride 250 mL IVPB, 880 mg, IntraVENous, Q8H, Kylee Pedersen MD, 880 mg at 07/25/18 5548    cefTRIAXone (ROCEPHIN) 2 g in 0.9% sodium chloride (MBP/ADV) 50 mL, 2 g, IntraVENous, Q12H, Kylee Pedersen MD, Last Rate: 100 mL/hr at 07/25/18 0236, 2 g at 07/25/18 0236    lactobac ac& pc-s.therm-b.anim (DEION Q/RISAQUAD), 1 Cap, Oral, DAILY, Kylee Pedersen MD, 1 Cap at 07/25/18 7076    morphine injection 2 mg, 2 mg, IntraVENous, Q4H PRN, Kylee Pedersen MD    enoxaparin (LOVENOX) injection 40 mg, 40 mg, SubCUTAneous, DAILY, Kylee Pedersen MD, 40 mg at 07/25/18 0941      Labs:  Recent Results (from the past 24 hour(s))   CBC WITH AUTOMATED DIFF    Collection Time: 07/25/18  2:39 AM   Result Value Ref Range    WBC 8.4 4.1 - 11.1 K/uL    RBC 3.58 (L) 4.10 - 5.70 M/uL    HGB 10.5 (L) 12.1 - 17.0 g/dL    HCT 32.1 (L) 36.6 - 50.3 %    MCV 89.7 80.0 - 99.0 FL    MCH 29.3 26.0 - 34.0 PG    MCHC 32.7 30.0 - 36.5 g/dL    RDW 15.6 (H) 11.5 - 14.5 %    PLATELET 232 (L) 206 - 400 K/uL    MPV 11.8 8.9 - 12.9 FL    NRBC 0.0 0  WBC    ABSOLUTE NRBC 0.00 0.00 - 0.01 K/uL    NEUTROPHILS 72 32 - 75 %    LYMPHOCYTES 17 12 - 49 %    MONOCYTES 9 5 - 13 %    EOSINOPHILS 2 0 - 7 %    BASOPHILS 0 0 - 1 %    IMMATURE GRANULOCYTES 0 0.0 - 0.5 %    ABS. NEUTROPHILS 6.1 1.8 - 8.0 K/UL    ABS. LYMPHOCYTES 1.4 0.8 - 3.5 K/UL    ABS. MONOCYTES 0.7 0.0 - 1.0 K/UL    ABS. EOSINOPHILS 0.1 0.0 - 0.4 K/UL    ABS. BASOPHILS 0.0 0.0 - 0.1 K/UL    ABS. IMM.  GRANS. 0.0 0.00 - 0.04 K/UL    DF AUTOMATED     MAGNESIUM    Collection Time: 07/25/18  2:39 AM   Result Value Ref Range    Magnesium 1.7 1.6 - 2.4 mg/dL   PHOSPHORUS    Collection Time: 07/25/18  2:39 AM   Result Value Ref Range    Phosphorus 2.5 (L) 2.6 - 4.7 MG/DL METABOLIC PANEL, COMPREHENSIVE    Collection Time: 07/25/18  2:39 AM   Result Value Ref Range    Sodium 139 136 - 145 mmol/L    Potassium 3.4 (L) 3.5 - 5.1 mmol/L    Chloride 107 97 - 108 mmol/L    CO2 24 21 - 32 mmol/L    Anion gap 8 5 - 15 mmol/L    Glucose 93 65 - 100 mg/dL    BUN 6 6 - 20 MG/DL    Creatinine 0.78 0.70 - 1.30 MG/DL    BUN/Creatinine ratio 8 (L) 12 - 20      GFR est AA >60 >60 ml/min/1.73m2    GFR est non-AA >60 >60 ml/min/1.73m2    Calcium 7.9 (L) 8.5 - 10.1 MG/DL    Bilirubin, total 1.7 (H) 0.2 - 1.0 MG/DL    ALT (SGPT) 86 (H) 12 - 78 U/L    AST (SGOT) 75 (H) 15 - 37 U/L    Alk.  phosphatase 395 (H) 45 - 117 U/L    Protein, total 6.6 6.4 - 8.2 g/dL    Albumin 2.3 (L) 3.5 - 5.0 g/dL    Globulin 4.3 (H) 2.0 - 4.0 g/dL    A-G Ratio 0.5 (L) 1.1 - 2.2     VANCOMYCIN, TROUGH    Collection Time: 07/25/18  2:39 AM   Result Value Ref Range    Vancomycin,trough 15.9 (H) 5.0 - 10.0 ug/mL    Reported dose date: NOT PROVIDED      Reported dose time: NOT PROVIDED      Reported dose: NOT PROVIDED UNITS   HIV 1/2 AG/AB, 4TH GENERATION,W RFLX CONFIRM    Collection Time: 07/25/18  4:45 AM   Result Value Ref Range    HIV 1/2 Interpretation NONREACTIVE NR      HIV 1/2 result comment SEE NOTE             Micro:     Blood: 7/24            Component Results      Component Value Ref Range & Units Status     Special Requests: NO SPECIAL REQUESTS   Preliminary     Culture result: NO GROWTH 1 DAY   Preliminary       Result History        CSF 7/24  Component Results      Component Value Ref Range & Units Status     Special Requests: NO SPECIAL REQUESTS   Preliminary     GRAM STAIN NO ORGANISMS SEEN   Preliminary     Culture result:    Preliminary     Culture performed on Unspun Fluid     Culture result: NO GROWTH 1 DAY   Preliminary       Result History      CULTURE, CSF W GRAM STAIN on 7/25/2018     HSV, VDRL, [place] pending   HIV NR      Imaging     MRI brain   FINDINGS: Intra-axial morphology, signal and enhancement are normal. The  vascular flow-voids at the base of the brain are normal in conspicuity. There is  no extra-axial collection, mass or enhancement abnormality demonstrated. Sella,  optic chiasm, posterior fossa, and orbits are normal. Several small mucosal  retention cysts are shown in the maxillary sinuses bilaterally and left sphenoid  sinus.     IMPRESSION  IMPRESSION: Normal study.         CTA/P  FINDINGS:   There is new gallbladder distention without overt inflammation. No CBD dilation.      Exam is otherwise stable. Liver has cirrhotic morphology with nonuniform  dilation of bile ducts; see discussion of this appearance on CT Enterography  1/23/2018.      Portal venous hypertension is again suggested with mild splenomegaly again  noted.      No bowel inflammation or dilation. No ascites or pneumoperitoneum. Small stable  nonspecific retroperitoneal nodes.      Stable heterogeneous parenchymal enhancement of the kidneys, nonspecific,  without apparent mass, stone or hydronephrosis. Ureters are not dilated. Aorta  is not aneurysmal. Adrenals are not enlarged. Pancreas is unremarkable. Bladder  is unremarkable. No pelvic mass.      IMPRESSION  IMPRESSION:  1. New gallbladder distention. 2. Stable abnormal hepatic appearance as discussed. 3. No bowel inflammation.     CT head  FINDINGS:  The ventricles and sulci are normal in size, shape and configuration and  midline. There is no significant white matter disease. There is no intracranial  hemorrhage, extra-axial collection, mass, mass effect or midline shift.  The  basilar cisterns are open. No acute infarct is identified. The bone windows  demonstrate no abnormalities. Mucous retention cyst versus polyp in the left  sphenoid sinus. .  IMPRESSION  IMPRESSION:   1.  No evidence of acute intracranial abnormality by this modality.     Procedures:  LP      Assessment / Plan:      Mr Vinay Bateman is a 38year old gentleman wt hx of PSC, Crohns, Pyoderma gangrenosum , questionable Polyarteris nodosa? , S/P ileal pouch anal anastomosis, and has chronic pouchitis per GI notes in past, CDI , pancreatitis,  who came to the ER for headache, chills and also found to disoriented and staring into space. He does not recall all the events but says that headache is better. Says she has been on steroids with healing for Pyoderma of LE. Says steroids stopped about a week or so ago. Denied any other immunosuppressants. Says he did not have neck pain but had intense headache all over his head. Denied any vision changes. Says at baseline due to his GI illness, he has nausea and loose stools. He says he can have anywhere between 4-5 soft to loose stools a day to 13 when he has Crohns flare. Says he feels a lot better now. Denied any other symptoms to me.     On presentation, found to have mild temperature elevation up to 100.5, mild leukocytosis up to 11.8. He chronically has LFT elevations and had MRCP in 2017 read as  \"Biliary findings are most compatible with chronic cholangitis\". \"Cirrhosis and splenomegaly are new since 2015\"  He had LP done and CT head, abd, pelvis. He was started on Vancomycin, Ceftriaxone and Acyclovir.       He says he feels a lot better today overall. Denied any new medications recently.       1) Possible encephalitis, seizures   LP data not  impressive for bacterial meningitis   DDX for viral etiologies include HSV, Enterovirus, west nile virus , LMCV   He is clinically improved already   If CSF cultures ( bacterial)negative after 24 hours, would discontinue antibiotics   IF HSV PCR CSF negative, DC Acyclovir   MRI negative    EEG and neuro workup per primary team /neurology   Monitor renal function closely   Added Enterovirus PCR, [place] ab to CSF, VDRL  if available for testing and pending   HIV NR   RPR pending      2) Hx of crohns, pyoderma gangrenosum   Was on steroids, now stopped   Leg lesions do not appear with any cellulitis around them and healed appearing      3) PSC      4) Possible PAN per notes      5) DVT px       Amy Min,    11:20 AM

## 2018-07-25 NOTE — PROGRESS NOTES
Hospitalist Progress Note NAME: Jaylyn Felipe :  1975 MRN:  [id number] Interim Hospital Summary: 38 y.o. male whom presented on 2018 with Assessment / Plan: 
Fever Headache Transient alteration of awareness concerning for possible absence seizure - symptoms resolved - pt recently completed course of steroids --possible encephalitis --prelim LP result looks benign  
--continue rocephin and vancomycin, acyclovir 
--HSV PCR pending. Add cryptococcal Ag. Follow up blood and CSF culture 
-- ID and Neuro following - MRI brain neg 
-- f/u EEG 
  
Crohn's disease S/p colectomy with reversal 
Primary sclerosing cholangitis --pt states abd pain and loose stools are at his usual and no worsened symptoms --elevated LFTs, chronic 
--CT abdomen/pelvis with some gallbladdr distention w/o inflammation 
--may need GI consult 
--check stool for C. Diff since has hx of C. Diff 
  
Pyoderma gangrenosum ulcer in left lower leg x 2 months 
--s/p course of prednisone 1 week ago and wound improving. Follow with wound care clinic. --consult wound care. Ulcer does not look infected. 
  
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 
--bilateral renal arteriogram 3/18 with abnormal appearance of the small distal lobar and interlobar arteries of both kidneys with patchy peripheral hypoperfusion, right greater than left. Mild right renal cortical scarring. No dominant pseudoaneurysm. 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 --not yet seeing rheumatologist 
  
Body mass index is 31.48 kg/(m^2). 
  
Code:  full DVT prophylaxis: SCD. Surrogate decision maker:  wife Subjective: Chief Complaint / Reason for Physician Visit \"headache, AMS\". Discussed with RN events overnight. Pt reports feeling better; asking about going home Review of Systems: 
Symptom Y/N Comments  Symptom Y/N Comments Fever/Chills n Chest Pain n   
Poor Appetite n   Edema Cough    Abdominal Pain Sputum n   Joint Pain SOB/JARRELL    Pruritis/Rash Nausea/vomit ny   Tolerating PT/OT Diarrhea    Tolerating Diet Constipation    Other Could NOT obtain due to:   
 
Objective: VITALS:  
Last 24hrs VS reviewed since prior progress note. Most recent are: 
Patient Vitals for the past 24 hrs: 
 Temp Pulse Resp BP SpO2  
07/25/18 1502 98.4 °F (36.9 °C) 84 18 119/75 98 %  
07/25/18 1126 98.3 °F (36.8 °C) 84 18 120/73 98 %  
07/25/18 0735 98.1 °F (36.7 °C) 84 18 115/73 97 %  
07/25/18 0355 98.8 °F (37.1 °C) 82 18 (!) 84/52 97 %  
07/25/18 0109 98.9 °F (37.2 °C) 80 18 118/54 98 %  
07/24/18 2106 99.1 °F (37.3 °C) 89 18 115/69 100 % No intake or output data in the 24 hours ending 07/25/18 1540 PHYSICAL EXAM: 
General: WD, WN. Alert, cooperative, no acute distress   
EENT:  EOMI. Anicteric sclerae. MMM Resp:  CTA bilaterally, no wheezing or rales. No accessory muscle use CV:  Regular  rhythm,  No edema GI:  Soft, Non distended, Non tender.  +Bowel sounds Neurologic:  Alert and oriented X 3, normal speech, Psych:   Good insight. Not anxious nor agitated Skin:  No rashes. No jaundice Reviewed most current lab test results and cultures  YES Reviewed most current radiology test results   YES Review and summation of old records today    NO Reviewed patient's current orders and MAR    YES 
PMH/ reviewed - no change compared to H&P 
________________________________________________________________________ Care Plan discussed with: 
  Comments Patient Family RN Care Manager Consultant Multidiciplinary team rounds were held today with , nursing, pharmacist and clinical coordinator. Patient's plan of care was discussed; medications were reviewed and discharge planning was addressed. ________________________________________________________________________ Total NON critical care TIME:  25   Minutes Total CRITICAL CARE TIME Spent:   Minutes non procedure based Comments >50% of visit spent in counseling and coordination of care y   
________________________________________________________________________ Adeline Sanches MD  
 
Procedures: see electronic medical records for all procedures/Xrays and details which were not copied into this note but were reviewed prior to creation of Plan. LABS: 
I reviewed today's most current labs and imaging studies. Pertinent labs include: 
Recent Labs  
   07/25/18 0239 07/24/18 0235 WBC  8.4  11.8* HGB  10.5*  12.6 HCT  32.1*  38.7 PLT  126*  157 Recent Labs  
   07/25/18 0239 07/24/18 0235 07/24/18 0231 07/24/18 0230 NA  139  136   --    --   
K  3.4*  3.4*   --    --   
CL  107  102   --    --   
CO2  24  29   --    --   
GLU  93  108*   --    --   
BUN  6  9   --    --   
CREA  0.78  1.01   --    --   
CA  7.9*  9.1   --    --   
MG  1.7   --    --    --   
PHOS  2.5*   --    --    --   
ALB  2.3*   --    --   2.9* TBILI  1.7*   --    --   1.5* SGOT  75*   --    --   70* ALT  86*   --    --   98* INR   --    --   1.0   --

## 2018-07-26 ENCOUNTER — TELEPHONE (OUTPATIENT)
Dept: FAMILY MEDICINE CLINIC | Age: 43
End: 2018-07-26

## 2018-07-26 VITALS
WEIGHT: 232.14 LBS | TEMPERATURE: 98.4 F | OXYGEN SATURATION: 98 % | HEIGHT: 72 IN | DIASTOLIC BLOOD PRESSURE: 87 MMHG | HEART RATE: 63 BPM | SYSTOLIC BLOOD PRESSURE: 116 MMHG | RESPIRATION RATE: 18 BRPM | BODY MASS INDEX: 31.44 KG/M2

## 2018-07-26 LAB
ANION GAP SERPL CALC-SCNC: 7 MMOL/L (ref 5–15)
BUN SERPL-MCNC: 4 MG/DL (ref 6–20)
BUN/CREAT SERPL: 5 (ref 12–20)
CALCIUM SERPL-MCNC: 7.8 MG/DL (ref 8.5–10.1)
CHLORIDE SERPL-SCNC: 107 MMOL/L (ref 97–108)
CO2 SERPL-SCNC: 24 MMOL/L (ref 21–32)
CREAT SERPL-MCNC: 0.77 MG/DL (ref 0.7–1.3)
GLUCOSE SERPL-MCNC: 84 MG/DL (ref 65–100)
POTASSIUM SERPL-SCNC: 3.7 MMOL/L (ref 3.5–5.1)
REAGIN AB CSF QL: NON REACTIVE
SODIUM SERPL-SCNC: 138 MMOL/L (ref 136–145)

## 2018-07-26 PROCEDURE — 36415 COLL VENOUS BLD VENIPUNCTURE: CPT | Performed by: EMERGENCY MEDICINE

## 2018-07-26 PROCEDURE — 74011250637 HC RX REV CODE- 250/637: Performed by: HOSPITALIST

## 2018-07-26 PROCEDURE — 74011250636 HC RX REV CODE- 250/636: Performed by: INTERNAL MEDICINE

## 2018-07-26 PROCEDURE — 80048 BASIC METABOLIC PNL TOTAL CA: CPT | Performed by: EMERGENCY MEDICINE

## 2018-07-26 PROCEDURE — 74011250636 HC RX REV CODE- 250/636: Performed by: HOSPITALIST

## 2018-07-26 PROCEDURE — 74011000258 HC RX REV CODE- 258: Performed by: HOSPITALIST

## 2018-07-26 RX ADMIN — VANCOMYCIN HYDROCHLORIDE 1750 MG: 10 INJECTION, POWDER, LYOPHILIZED, FOR SOLUTION INTRAVENOUS at 04:05

## 2018-07-26 RX ADMIN — ACYCLOVIR SODIUM 880 MG: 50 INJECTION, SOLUTION INTRAVENOUS at 09:45

## 2018-07-26 RX ADMIN — Medication 1 CAPSULE: at 08:08

## 2018-07-26 RX ADMIN — Medication 10 ML: at 04:05

## 2018-07-26 RX ADMIN — ACYCLOVIR SODIUM 880 MG: 50 INJECTION, SOLUTION INTRAVENOUS at 01:15

## 2018-07-26 RX ADMIN — CEFTRIAXONE SODIUM 2 G: 2 INJECTION, POWDER, FOR SOLUTION INTRAMUSCULAR; INTRAVENOUS at 02:41

## 2018-07-26 NOTE — DISCHARGE SUMMARY
Hospitalist Discharge Summary     Patient ID:  Yovana Meza  [id number]  38 y.o.  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  male with Crohn's disease s/p remote subtotal colectomy with colostomy reversal, pyoderma gangrenosum ulcer in left lower leg past few months followed at wound care clinic which has been improving after debridement and recent course of prednisone, PSC, possible PAN, hx C. Diff, hx pancreatitis who presents after gradual onset of headache yesterday, associated with chills. Last night wife noted patient disoriented for 30-40 minutes, not interacting, rubbing right arm and staring into space. No tonic clonic jerks. Patient did not recognize wife or daughter. Past week increased abdominal pain, +nausea, no vomiting, decreased appetite yesterday. No weight loss. Increased stool frequency x 24 hours, did not check for blood in stool. No dysuria, cough, SOB, sorethroat, neck pain. No travel or sick contacts at home. No recent antibiotic use. Works as  (residential and commercial).    We were asked to admit for work up and evaluation of the above problems.      ______________________________________________________________________  DISCHARGE SUMMARY/HOSPITAL COURSE: for full details see H&P, daily progress notes, labs, consult notes. Fever resolved  Headache resolved  Transient alteration of awareness concerning for possible absence seizure  - symptoms resolved  - pt recently completed course of steroids  --possible encephalitis  --prelim LP result looks benign   --continue rocephin and vancomycin, acyclovir  --HSV PCR pending.  Add cryptococcal Ag.  Follow up blood and CSF culture  -- ID and Neuro following  - MRI brain neg  -- f/u EEG negative      Per ID  Possible encephalitis, seizures   LP data not  impressive for bacterial meningitis   DDX for viral etiologies include HSV, Enterovirus, [place] nile virus , LMCV   He is clinically improved already   If CSF cultures ( bacterial)negative after 24 hours, would discontinue antibiotics   IF HSV PCR CSF negative, DC Acyclovir   MRI negative    EEG and neuro workup per primary team /neurology   Monitor renal function closely   Added Enterovirus PCR, [place] ab to CSF, VDRL  if available for testing and pending   HIV NR   RPR pending   Will f/u w ID 2 weeks        Chronic Anemia will f/u with gi  Crohn's disease  S/p colectomy with reversal  Primary sclerosing cholangitis  --pt states abd pain and loose stools are at his usual and no worsened symptoms  --elevated LFTs, chronic  --CT abdomen/pelvis with some gallbladdr distention w/o inflammation  - GI?liver clinics in week-  --check stool for C. Diff since has hx of C. Diff negative      Pyoderma gangrenosum ulcer in left lower leg x 2 months  --s/p course of prednisone 1 week ago and wound improving.  Follow with wound care clinic. --consult wound care. Fariba Darling does not look infected.      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 Mild right renal cortical scarring. No dominant pseudoaneurysm.  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 --not yet seeing rheumatologist will f/uw with pcp week      Body mass index is 31.48 kg/(m^2).     Code:  full  DVT prophylaxis: SCD. Surrogate decision maker:  wife              _______________________________________________________________________  Patient seen and examined by me on discharge day. Pertinent Findings:  Gen:    Not in distress Insists on going home today  Chest: Clear lungs  CVS:   Regular rhythm. No edema  Abd:  Soft, not distended, not tender  Neuro:  Alert, o x3   _______________________________________________________________________  DISCHARGE MEDICATIONS:   There are no discharge medications for this patient. My Recommended Diet, Activity, Wound Care, and follow-up labs are listed in the patient's Discharge Insturctions which I have personally completed and reviewed.     ______________________________________________________________________    Risk of deterioration: low    Condition at Discharge:  Stable  ______________________________________________________________________    Disposition  home  ______________________________________________________________________    Care Plan discussed with:   Patient,cm,RN,consulatnt    ______________________________________________________________________    Code Status: full code  ______________________________________________________________________      Follow up with:   PCP : Dom Henry MD  Follow-up Information     Follow up With Details [id number], [place] In 1 week  [facility]  [phone]      Miguel Lopez DO In 2 weeks  [address] [phone]                Total time in minutes spent coordinating this discharge (includes going over instructions, follow-up, prescriptions, and preparing report for sign off to her PCP) :  30  minutes    Signed:  Mary Ann Alcantara MD

## 2018-07-26 NOTE — PROGRESS NOTES
Problem: Falls - Risk of  Goal: *Absence of Falls  Document Steve Fall Risk and appropriate interventions in the flowsheet.    Outcome: Progressing Towards Goal  Fall Risk Interventions:            Medication Interventions: Patient to call before getting OOB

## 2018-07-26 NOTE — PROGRESS NOTES
I have reviewed discharge instructions with the patient. The patient verbalized understanding. Patient leaving via car with daughter to private residence.  No changes in VS or assessment upon discharge

## 2018-07-26 NOTE — TELEPHONE ENCOUNTER
Called and spoke with [place]. She states patient has already left. She was informed patient does not need follow up appointment.

## 2018-07-26 NOTE — PROGRESS NOTES
Pt is a 38 y.o  [place] male admitted with Transient Alteration of Awareness, Fever and Headache. Pt was alert, oriented resting in bed with no distress. Demographic information verified and all is correct. Pt lives with his wife and 3 children in a 2 story home with a couple of steps to the entrance. Prior to admission, pt was independent with his ADL's and IADL's. Pt was also driving and working. Denies having DME or having rehab and home health. Preferred pharmacy is [facility] on [address] and [address] [place] [address]. Pt's wife can transport pt home at discharge. F/u appt made and documented on the discharge paperwork. Reason for Admission:   Transient Alteration of Awareness                   RRAT Score:          8           Plan for utilizing home health:      no                    Likelihood of Readmission:  low                         Transition of Care Plan:        Home with f/u appts    Care Management Interventions  PCP Verified by CM: Yes (Dr. Jasmyne Tapia)  Mode of Transport at Discharge:  Other (see comment) (pt's family can transport by car)  Hospital Transport Time of Discharge: [address] (CM Consult): Discharge Planning  Discharge Durable Medical Equipment: No  Physical Therapy Consult: No  Occupational Therapy Consult: No  Speech Therapy Consult: No  Current Support Network: Lives with Spouse, Own Home (lives with his wife and 3 children in a 2 story home with steps)  Confirm Follow Up Transport: Family  Plan discussed with Pt/Family/Caregiver: Yes  Discharge Location  Discharge Placement: [facility] [employer] [address] [place], [address] [facility]

## 2018-07-26 NOTE — TELEPHONE ENCOUNTER
Akhil Lofton from the [facility] is calling to get this patient schedule for a hospital f/u/ Please contact [phone]

## 2018-07-26 NOTE — PROGRESS NOTES
Bedside shift change report given to Jg Blackwell RN (oncoming nurse) by Giancarlo Aguirre RN (offgoing nurse). Report included the following information SBAR, Intake/Output, MAR, Accordion and Recent Results.      Zone Phone:   [phone]          Significant changes during shift:  none              Patient Information      Clarissa Wesley  38 y.o.  7/24/2018  2:15 Jasmyne Boykin MD. Rigo Giles admitted from [facility]      Problem List              Patient Active Problem List      Diagnosis Date Noted    Transient alteration of awareness 07/24/2018    Fever 07/24/2018    Headache 07/24/2018    Pyoderma 07/20/2018    Crohn's disease with complication ([address] 48/58/5151    Non-pressure chronic ulcer of left ankle, limited to breakdown of skin ([address] 07/20/2018    Chest pain 02/18/2013    Iron deficiency anemia 02/18/2013    Pouchitis ([address] 12/08/2011    Acute pancreatitis 01/09/2011    Primary sclerosing cholangitis 01/09/2011               Past Medical History:   Diagnosis Date    Arthritis       Chest pain 2/18/2013      as of 10/14/14 pt denies any CP since 2/13    Crohn's colitis ([address] Pastor Francisk Dr. Nik Arteaga H/O Clostridium difficile infection 01/2017      as of 3/30/17 pt denies abd pain, diarrhea > 1 week    Iron deficiency anemia 2/18/2013    Pancreatitis       Polyarteritis nodosa ([address]       Pouchitis ([address] 12/8/2011    PSC (primary sclerosing cholangitis)         Dr Eden Rockwell [phone]    Pyoderma gangrenosum                  Core Measures:      CVA: Yes Yes      Activity Status:      OOB to Chair Yes  Ambulated this shift Yes   Bed Rest No      DVT prophylaxis:      DVT prophylaxis Med- No  DVT prophylaxis SCD or CONCHA- No       Wounds: (If Applicable)      Wounds- Yes      Location Left ankle      Patient Safety:      Falls Score Total Score: 1  Safety Level_______  Bed Alarm On? No  Sitter?  No      Plan for upcoming shift: Antibiotics, Antivirals              Discharge Plan: No TBD      Active Consults:  IP CONSULT TO INFECTIOUS DISEASES

## 2018-07-26 NOTE — PROCEDURES
Jordan Srivastava  MR#: [id number]  : 1975  ACCOUNT #: [de-identified]   DATE OF SERVICE: 2018    DESCRIPTION OF PROCEDURE:  The electrodes were applied in accordance with International 10-20 system of electrode placement. EEG was reviewed in both bipolar and referential montages. This is an awake EEG. FINDINGS:  The background is a symmetric 8-9 Hz alpha rhythm, which is best seen in the posterior leads. Photic stimulation produces bilateral occipital driving. Hyperventilation was not performed. IMPRESSION:  This is a normal electroencephalogram.  The absence of seizures on an electroencephalogram does not eliminate a diagnosis of epilepsy, clinical correlation is advised.       MD ZITA German / [place]  D: 2018 10:19     T: 2018 11:40  JOB #: [id number]

## 2018-07-27 LAB
WNV IGG SPEC QL IA: NEGATIVE
WNV IGM CSF QL IA: NEGATIVE

## 2018-07-29 LAB
BACTERIA SPEC CULT: NORMAL
SERVICE CMNT-IMP: NORMAL

## 2018-07-31 LAB
BACTERIA SPEC CULT: NORMAL
GRAM STN SPEC: NORMAL
SERVICE CMNT-IMP: NORMAL

## 2018-08-22 ENCOUNTER — HOSPITAL ENCOUNTER (INPATIENT)
Age: 43
LOS: 3 days | Discharge: HOME | DRG: 393 | End: 2018-08-25
Attending: EMERGENCY MEDICINE | Admitting: INTERNAL MEDICINE
Payer: OTHER GOVERNMENT

## 2018-08-22 ENCOUNTER — APPOINTMENT (OUTPATIENT)
Dept: ULTRASOUND IMAGING | Age: 43
DRG: 393 | End: 2018-08-22
Attending: EMERGENCY MEDICINE
Payer: OTHER GOVERNMENT

## 2018-08-22 DIAGNOSIS — K91.850 POUCHITIS (HCC): ICD-10-CM

## 2018-08-22 DIAGNOSIS — R10.11 ABDOMINAL PAIN, RIGHT UPPER QUADRANT: Primary | ICD-10-CM

## 2018-08-22 DIAGNOSIS — R74.8 ELEVATED LIVER ENZYMES: ICD-10-CM

## 2018-08-22 PROBLEM — K83.1 BILIARY OBSTRUCTION: Status: ACTIVE | Noted: 2018-08-22

## 2018-08-22 LAB
ALBUMIN SERPL-MCNC: 2.8 G/DL (ref 3.5–5)
ALBUMIN/GLOB SERPL: 0.6 {RATIO} (ref 1.1–2.2)
ALP SERPL-CCNC: 578 U/L (ref 45–117)
ALT SERPL-CCNC: 95 U/L (ref 12–78)
ANION GAP SERPL CALC-SCNC: 8 MMOL/L (ref 5–15)
APPEARANCE UR: CLEAR
AST SERPL-CCNC: 132 U/L (ref 15–37)
BACTERIA URNS QL MICRO: NEGATIVE /HPF
BASOPHILS # BLD: 0 K/UL (ref 0–0.1)
BASOPHILS NFR BLD: 1 % (ref 0–1)
BILIRUB SERPL-MCNC: 2.1 MG/DL (ref 0.2–1)
BILIRUB UR QL CFM: NEGATIVE
BUN SERPL-MCNC: 6 MG/DL (ref 6–20)
BUN/CREAT SERPL: 8 (ref 12–20)
CALCIUM SERPL-MCNC: 8.3 MG/DL (ref 8.5–10.1)
CHLORIDE SERPL-SCNC: 105 MMOL/L (ref 97–108)
CO2 SERPL-SCNC: 26 MMOL/L (ref 21–32)
COLOR UR: ABNORMAL
CREAT SERPL-MCNC: 0.79 MG/DL (ref 0.7–1.3)
DIFFERENTIAL METHOD BLD: ABNORMAL
EOSINOPHIL # BLD: 0.5 K/UL (ref 0–0.4)
EOSINOPHIL NFR BLD: 9 % (ref 0–7)
EPITH CASTS URNS QL MICRO: ABNORMAL /LPF
ERYTHROCYTE [DISTWIDTH] IN BLOOD BY AUTOMATED COUNT: 15.1 % (ref 11.5–14.5)
GLOBULIN SER CALC-MCNC: 5 G/DL (ref 2–4)
GLUCOSE SERPL-MCNC: 86 MG/DL (ref 65–100)
GLUCOSE UR STRIP.AUTO-MCNC: NEGATIVE MG/DL
HCT VFR BLD AUTO: 35.3 % (ref 36.6–50.3)
HGB BLD-MCNC: 11.6 G/DL (ref 12.1–17)
HGB UR QL STRIP: NEGATIVE
IMM GRANULOCYTES # BLD: 0 K/UL (ref 0–0.04)
IMM GRANULOCYTES NFR BLD AUTO: 0 % (ref 0–0.5)
KETONES UR QL STRIP.AUTO: ABNORMAL MG/DL
LEUKOCYTE ESTERASE UR QL STRIP.AUTO: ABNORMAL
LYMPHOCYTES # BLD: 1.3 K/UL (ref 0.8–3.5)
LYMPHOCYTES NFR BLD: 23 % (ref 12–49)
MCH RBC QN AUTO: 29.3 PG (ref 26–34)
MCHC RBC AUTO-ENTMCNC: 32.9 G/DL (ref 30–36.5)
MCV RBC AUTO: 89.1 FL (ref 80–99)
MONOCYTES # BLD: 0.5 K/UL (ref 0–1)
MONOCYTES NFR BLD: 9 % (ref 5–13)
MUCOUS THREADS URNS QL MICRO: ABNORMAL /LPF
NEUTS SEG # BLD: 3.3 K/UL (ref 1.8–8)
NEUTS SEG NFR BLD: 58 % (ref 32–75)
NITRITE UR QL STRIP.AUTO: NEGATIVE
NRBC # BLD: 0 K/UL (ref 0–0.01)
NRBC BLD-RTO: 0 PER 100 WBC
PH UR STRIP: 5 [PH] (ref 5–8)
PLATELET # BLD AUTO: 116 K/UL (ref 150–400)
PMV BLD AUTO: 11.2 FL (ref 8.9–12.9)
POTASSIUM SERPL-SCNC: 3.4 MMOL/L (ref 3.5–5.1)
PROT SERPL-MCNC: 7.8 G/DL (ref 6.4–8.2)
PROT UR STRIP-MCNC: NEGATIVE MG/DL
RBC # BLD AUTO: 3.96 M/UL (ref 4.1–5.7)
RBC #/AREA URNS HPF: ABNORMAL /HPF (ref 0–5)
SODIUM SERPL-SCNC: 139 MMOL/L (ref 136–145)
SP GR UR REFRACTOMETRY: 1.03 (ref 1–1.03)
UA: UC IF INDICATED,UAUC: ABNORMAL
UROBILINOGEN UR QL STRIP.AUTO: 1 EU/DL (ref 0.2–1)
WBC # BLD AUTO: 5.6 K/UL (ref 4.1–11.1)
WBC URNS QL MICRO: ABNORMAL /HPF (ref 0–4)

## 2018-08-22 PROCEDURE — 74011250636 HC RX REV CODE- 250/636: Performed by: EMERGENCY MEDICINE

## 2018-08-22 PROCEDURE — 36415 COLL VENOUS BLD VENIPUNCTURE: CPT | Performed by: EMERGENCY MEDICINE

## 2018-08-22 PROCEDURE — 99284 EMERGENCY DEPT VISIT MOD MDM: CPT

## 2018-08-22 PROCEDURE — 80053 COMPREHEN METABOLIC PANEL: CPT | Performed by: EMERGENCY MEDICINE

## 2018-08-22 PROCEDURE — 81001 URINALYSIS AUTO W/SCOPE: CPT | Performed by: EMERGENCY MEDICINE

## 2018-08-22 PROCEDURE — 76705 ECHO EXAM OF ABDOMEN: CPT

## 2018-08-22 PROCEDURE — 96361 HYDRATE IV INFUSION ADD-ON: CPT

## 2018-08-22 PROCEDURE — 85025 COMPLETE CBC W/AUTO DIFF WBC: CPT | Performed by: EMERGENCY MEDICINE

## 2018-08-22 PROCEDURE — 96374 THER/PROPH/DIAG INJ IV PUSH: CPT

## 2018-08-22 PROCEDURE — 65270000029 HC RM PRIVATE

## 2018-08-22 RX ORDER — METRONIDAZOLE 500 MG/1
500 TABLET ORAL 3 TIMES DAILY
Status: ON HOLD | COMMUNITY
End: 2018-08-25

## 2018-08-22 RX ORDER — KETOROLAC TROMETHAMINE 30 MG/ML
15 INJECTION, SOLUTION INTRAMUSCULAR; INTRAVENOUS ONCE
Status: COMPLETED | OUTPATIENT
Start: 2018-08-22 | End: 2018-08-22

## 2018-08-22 RX ADMIN — KETOROLAC TROMETHAMINE 15 MG: 30 INJECTION, SOLUTION INTRAMUSCULAR at 20:14

## 2018-08-22 RX ADMIN — SODIUM CHLORIDE 1000 ML: 900 INJECTION, SOLUTION INTRAVENOUS at 20:13

## 2018-08-22 NOTE — IP AVS SNAPSHOT
[address] [facility] 
[phone] Patient: Clarissa Wesley MRN: [id number] :1975 About your hospitalization You were admitted on:  2018 You last received care in the:  [facility] 2 GENERAL SURGERY You were discharged on:  2018 Why you were hospitalized Your primary diagnosis was:  Pouchitis (Hcc) Your diagnoses also included:  Biliary Obstruction, Crohn's Disease With Complication (Hcc), Primary Sclerosing Cholangitis, Rlq Abdominal Pain, Pyoderma, Iron Deficiency Anemia, Lft Elevation Follow-up Information Follow up With Details Comments Contact Info Erwin Brown MD Schedule an appointment as soon as possible for a visit If symptoms worsen [address] [facility] 
[phone] Your Scheduled Appointments  COLONOSCOPY with Martina Vicente MD  
[facility] ENDOSCOPY (RI OR PRE ASSESSMENT) [address] [facility]  
[phone] Discharge Orders None A check karthik indicates which time of day the medication should be taken. My Medications CONTINUE taking these medications Instructions Each Dose to Equal  
 Morning Noon Evening Bedtime  
 metroNIDAZOLE 500 mg tablet Commonly known as:  FLAGYL Your last dose was: Your next dose is: Take 1 Tab by mouth three (3) times daily for 14 days. Indications: Clostridium difficile infection 500 mg Where to Get Your Medications Information on where to get these meds will be given to you by the nurse or doctor. ! Ask your nurse or doctor about these medications  
  metroNIDAZOLE 500 mg tablet Discharge Instructions Patient Discharge Instructions Clarissa Wesley / [id number] : 1975 Admitted 2018 Discharged: 2018 Primary Diagnoses Problem List as of 8/25/2018  Date Reviewed: 7/24/2018 Codes Class Noted - Resolved RLQ abdominal pain LFT elevation Biliary obstruction Pyoderma Crohn's disease with complication ([facility] Iron deficiency anemia * (Principal)Pouchitis ([facility] (Chronic) Primary sclerosing cholangitis (Chronic) Take Home Medications · It is important that you take the medication exactly as they are prescribed. · Keep your medication in the bottles provided by the pharmacist and keep a list of the medication names, dosages, and times to be taken in your wallet. · Do not take other medications without consulting your doctor. What to do at [facility] Recommended diet: Resume previous diet Recommended activity: Activity as tolerated If you experience worse symptoms, please follow up with your PCP or GI specialist. 
 
Follow-up with your PCP in a few weeks Information obtained by : 
I understand that if any problems occur once I am at home I am to contact my physician. I understand and acknowledge receipt of the instructions indicated above. Physician's or R.N.'s Signature                                                                  Date/Time Patient or Representative Signature                                                          Date/Time [employer] Announcement We are excited to announce that we are making your provider's discharge notes available to you in [employer]. You will see these notes when they are completed and signed by the physician that discharged you from your recent hospital stay.   If you have any questions or concerns about any information you see in [employer], please call the Health Information Department where you were seen or reach out to your Primary Care Provider for more information about your plan of care. Introducing [facility]! Dear Smitha Pereira: Thank you for requesting a [employer] account. Our records indicate that you already have an active [employer] account. You can access your account anytime at [email or web address] Did you know that you can access your hospital and ER discharge instructions at any time in [employer]? You can also review all of your test results from your hospital stay or ER visit. Additional Information If you have questions, please visit the Frequently Asked Questions section of the [employer] website at [email or web address] Remember, [employer] is NOT to be used for urgent needs. For medical emergencies, dial 911. Now available from your iPhone and Android! Introducing Simone Marks As a [facility] patient, I wanted to make you aware of our electronic visit tool called [employer]. [facility] 24/7 allows you to connect within minutes with a medical provider 24 hours a day, seven days a week via a mobile device or tablet or logging into a secure website from your computer. You can access Simone Marks from anywhere in the [place]. A virtual visit might be right for you when you have a simple condition and feel like you just dont want to get out of bed, or cant get away from work for an appointment, when your regular [facility] provider is not available (evenings, weekends or holidays), or when youre out of town and need minor care. Electronic visits cost only $49 and if the [facility] 24/7 provider determines a prescription is needed to treat your condition, one can be electronically transmitted to a nearby pharmacy*. Please take a moment to enroll today if you have not already done so.   The enrollment process is free and takes just a few minutes. To enroll, please download the [employer] 24/7 link to your tablet or phone, or visit [email or web address] to enroll on your computer. And, as an [address] [place] [address] patient with a [employer] account, the results of your visits will be scanned into your electronic medical record and your primary care provider will be able to view the scanned results. We urge you to continue to see your regular [employer] provider for your ongoing medical care. And while your primary care provider may not be the one available when you seek a [employer] virtual visit, the peace of mind you get from getting a real diagnosis real time can be priceless. For more information on [employer], view our Frequently Asked Questions (FAQs) at [email or web address]. Sincerely, 
 
Theodore Chavez MD 
Chief Medical Officer Carolina Daniel *:  certain medications cannot be prescribed via [employer] Providers Seen During Your Hospitalization Provider Specialty Primary office phone Thais Mcdonald MD Emergency Medicine [phone] Delma Atkins MD Internal Medicine [phone] Your Primary Care Physician (PCP) Primary Care Physician Office Phone Office Fax Mervatyariel Delacruzald [phone] You are allergic to the following Allergen Reactions Cafergot (Ergotamine-Caffeine) Hives Recent Documentation Height Weight BMI Smoking Status 1.829 m 106.9 kg 31.96 kg/m2 Never Smoker Emergency Contacts Name Discharge Info Relation Home Work Mobile Patty Reece DISCHARGE CAREGIVER [3] Spouse [3] [phone] Patient Belongings The following personal items are in your possession at time of discharge: 
  Dental Appliances: None  Visual Aid: Contacts, Glasses      Home Medications: None   Jewelry: None  Clothing:  At bedside    Other Valuables: Cell Phone Please provide this summary of care documentation to your next provider. Signatures-by signing, you are acknowledging that this After Visit Summary has been reviewed with you and you have received a copy. Patient Signature:  ____________________________________________________________ Date:  ____________________________________________________________  
  
Bynum Shove Provider Signature:  ____________________________________________________________ Date:  ____________________________________________________________

## 2018-08-22 NOTE — IP AVS SNAPSHOT
[address] [place] [address] 
[phone] Patient: Sivakumar Allen MRN: [id number] :1975 A check karthik indicates which time of day the medication should be taken. My Medications CONTINUE taking these medications Instructions Each Dose to Equal  
 Morning Noon Evening Bedtime  
 metroNIDAZOLE 500 mg tablet Commonly known as:  FLAGYL Your last dose was: Your next dose is: Take 1 Tab by mouth three (3) times daily for 14 days. Indications: Clostridium difficile infection 500 mg Where to Get Your Medications Information on where to get these meds will be given to you by the nurse or doctor. ! Ask your nurse or doctor about these medications  
  metroNIDAZOLE 500 mg tablet

## 2018-08-23 ENCOUNTER — APPOINTMENT (OUTPATIENT)
Dept: CT IMAGING | Age: 43
DRG: 393 | End: 2018-08-23
Attending: INTERNAL MEDICINE
Payer: OTHER GOVERNMENT

## 2018-08-23 PROBLEM — R10.31 RLQ ABDOMINAL PAIN: Status: ACTIVE | Noted: 2018-08-23

## 2018-08-23 PROBLEM — L97.321 NON-PRESSURE CHRONIC ULCER OF LEFT ANKLE, LIMITED TO BREAKDOWN OF SKIN (HCC): Status: RESOLVED | Noted: 2018-07-20 | Resolved: 2018-08-23

## 2018-08-23 PROBLEM — R40.4 TRANSIENT ALTERATION OF AWARENESS: Status: RESOLVED | Noted: 2018-07-24 | Resolved: 2018-08-23

## 2018-08-23 PROBLEM — R79.89 LFT ELEVATION: Status: ACTIVE | Noted: 2018-08-23

## 2018-08-23 PROBLEM — R51.9 HEADACHE: Status: RESOLVED | Noted: 2018-07-24 | Resolved: 2018-08-23

## 2018-08-23 PROBLEM — R50.9 FEVER: Status: RESOLVED | Noted: 2018-07-24 | Resolved: 2018-08-23

## 2018-08-23 LAB
ALBUMIN SERPL-MCNC: 2.4 G/DL (ref 3.5–5)
ALBUMIN/GLOB SERPL: 0.5 {RATIO} (ref 1.1–2.2)
ALP SERPL-CCNC: 506 U/L (ref 45–117)
ALT SERPL-CCNC: 80 U/L (ref 12–78)
ANION GAP SERPL CALC-SCNC: 8 MMOL/L (ref 5–15)
AST SERPL-CCNC: 107 U/L (ref 15–37)
BASOPHILS # BLD: 0 K/UL (ref 0–0.1)
BASOPHILS NFR BLD: 1 % (ref 0–1)
BILIRUB SERPL-MCNC: 2.2 MG/DL (ref 0.2–1)
BUN SERPL-MCNC: 7 MG/DL (ref 6–20)
BUN/CREAT SERPL: 10 (ref 12–20)
CALCIUM SERPL-MCNC: 7.9 MG/DL (ref 8.5–10.1)
CHLORIDE SERPL-SCNC: 108 MMOL/L (ref 97–108)
CO2 SERPL-SCNC: 24 MMOL/L (ref 21–32)
CREAT SERPL-MCNC: 0.68 MG/DL (ref 0.7–1.3)
CRP SERPL-MCNC: 2.25 MG/DL (ref 0–0.6)
DIFFERENTIAL METHOD BLD: ABNORMAL
EOSINOPHIL # BLD: 0.5 K/UL (ref 0–0.4)
EOSINOPHIL NFR BLD: 10 % (ref 0–7)
ERYTHROCYTE [DISTWIDTH] IN BLOOD BY AUTOMATED COUNT: 15 % (ref 11.5–14.5)
ERYTHROCYTE [SEDIMENTATION RATE] IN BLOOD: 46 MM/HR (ref 0–15)
GLOBULIN SER CALC-MCNC: 4.5 G/DL (ref 2–4)
GLUCOSE SERPL-MCNC: 84 MG/DL (ref 65–100)
HCT VFR BLD AUTO: 33.7 % (ref 36.6–50.3)
HGB BLD-MCNC: 11.3 G/DL (ref 12.1–17)
IMM GRANULOCYTES # BLD: 0 K/UL (ref 0–0.04)
IMM GRANULOCYTES NFR BLD AUTO: 0 % (ref 0–0.5)
LIPASE SERPL-CCNC: 143 U/L (ref 73–393)
LYMPHOCYTES # BLD: 1.4 K/UL (ref 0.8–3.5)
LYMPHOCYTES NFR BLD: 28 % (ref 12–49)
MCH RBC QN AUTO: 29.4 PG (ref 26–34)
MCHC RBC AUTO-ENTMCNC: 33.5 G/DL (ref 30–36.5)
MCV RBC AUTO: 87.8 FL (ref 80–99)
MONOCYTES # BLD: 0.4 K/UL (ref 0–1)
MONOCYTES NFR BLD: 8 % (ref 5–13)
NEUTS SEG # BLD: 2.6 K/UL (ref 1.8–8)
NEUTS SEG NFR BLD: 52 % (ref 32–75)
NRBC # BLD: 0 K/UL (ref 0–0.01)
NRBC BLD-RTO: 0 PER 100 WBC
PLATELET # BLD AUTO: 109 K/UL (ref 150–400)
PMV BLD AUTO: 11.6 FL (ref 8.9–12.9)
POTASSIUM SERPL-SCNC: 3.5 MMOL/L (ref 3.5–5.1)
PROT SERPL-MCNC: 6.9 G/DL (ref 6.4–8.2)
RBC # BLD AUTO: 3.84 M/UL (ref 4.1–5.7)
SODIUM SERPL-SCNC: 140 MMOL/L (ref 136–145)
WBC # BLD AUTO: 5 K/UL (ref 4.1–11.1)

## 2018-08-23 PROCEDURE — 74011250636 HC RX REV CODE- 250/636: Performed by: INTERNAL MEDICINE

## 2018-08-23 PROCEDURE — 65270000029 HC RM PRIVATE

## 2018-08-23 PROCEDURE — 94760 N-INVAS EAR/PLS OXIMETRY 1: CPT

## 2018-08-23 PROCEDURE — 86140 C-REACTIVE PROTEIN: CPT

## 2018-08-23 PROCEDURE — 74011636320 HC RX REV CODE- 636/320: Performed by: INTERNAL MEDICINE

## 2018-08-23 PROCEDURE — 85025 COMPLETE CBC W/AUTO DIFF WBC: CPT

## 2018-08-23 PROCEDURE — 87449 NOS EACH ORGANISM AG IA: CPT | Performed by: EMERGENCY MEDICINE

## 2018-08-23 PROCEDURE — 74011000255 HC RX REV CODE- 255: Performed by: INTERNAL MEDICINE

## 2018-08-23 PROCEDURE — 36415 COLL VENOUS BLD VENIPUNCTURE: CPT

## 2018-08-23 PROCEDURE — 87493 C DIFF AMPLIFIED PROBE: CPT | Performed by: EMERGENCY MEDICINE

## 2018-08-23 PROCEDURE — 83690 ASSAY OF LIPASE: CPT

## 2018-08-23 PROCEDURE — 74177 CT ABD & PELVIS W/CONTRAST: CPT

## 2018-08-23 PROCEDURE — 80053 COMPREHEN METABOLIC PANEL: CPT

## 2018-08-23 PROCEDURE — 85652 RBC SED RATE AUTOMATED: CPT

## 2018-08-23 PROCEDURE — 74011250637 HC RX REV CODE- 250/637: Performed by: INTERNAL MEDICINE

## 2018-08-23 RX ORDER — FAMOTIDINE 20 MG/1
20 TABLET, FILM COATED ORAL 2 TIMES DAILY
Status: DISCONTINUED | OUTPATIENT
Start: 2018-08-23 | End: 2018-08-25 | Stop reason: HOSPADM

## 2018-08-23 RX ORDER — METRONIDAZOLE 500 MG/100ML
500 INJECTION, SOLUTION INTRAVENOUS EVERY 8 HOURS
Status: DISCONTINUED | OUTPATIENT
Start: 2018-08-23 | End: 2018-08-25

## 2018-08-23 RX ORDER — BARIUM SULFATE 20 MG/ML
900 SUSPENSION ORAL
Status: COMPLETED | OUTPATIENT
Start: 2018-08-23 | End: 2018-08-23

## 2018-08-23 RX ORDER — SODIUM CHLORIDE 0.9 % (FLUSH) 0.9 %
5-10 SYRINGE (ML) INJECTION AS NEEDED
Status: DISCONTINUED | OUTPATIENT
Start: 2018-08-23 | End: 2018-08-25 | Stop reason: HOSPADM

## 2018-08-23 RX ORDER — SODIUM CHLORIDE 0.9 % (FLUSH) 0.9 %
10 SYRINGE (ML) INJECTION
Status: COMPLETED | OUTPATIENT
Start: 2018-08-23 | End: 2018-08-23

## 2018-08-23 RX ORDER — SODIUM CHLORIDE 0.9 % (FLUSH) 0.9 %
5-10 SYRINGE (ML) INJECTION EVERY 8 HOURS
Status: DISCONTINUED | OUTPATIENT
Start: 2018-08-23 | End: 2018-08-25 | Stop reason: HOSPADM

## 2018-08-23 RX ORDER — METRONIDAZOLE 250 MG/1
500 TABLET ORAL 3 TIMES DAILY
Status: DISCONTINUED | OUTPATIENT
Start: 2018-08-23 | End: 2018-08-23

## 2018-08-23 RX ORDER — SODIUM CHLORIDE 9 MG/ML
50 INJECTION, SOLUTION INTRAVENOUS
Status: COMPLETED | OUTPATIENT
Start: 2018-08-23 | End: 2018-08-23

## 2018-08-23 RX ORDER — SODIUM CHLORIDE AND POTASSIUM CHLORIDE .9; .15 G/100ML; G/100ML
SOLUTION INTRAVENOUS CONTINUOUS
Status: DISCONTINUED | OUTPATIENT
Start: 2018-08-23 | End: 2018-08-25 | Stop reason: HOSPADM

## 2018-08-23 RX ORDER — ADHESIVE BANDAGE
30 BANDAGE TOPICAL DAILY PRN
Status: DISCONTINUED | OUTPATIENT
Start: 2018-08-23 | End: 2018-08-25 | Stop reason: HOSPADM

## 2018-08-23 RX ORDER — HEPARIN SODIUM 5000 [USP'U]/ML
5000 INJECTION, SOLUTION INTRAVENOUS; SUBCUTANEOUS EVERY 8 HOURS
Status: DISCONTINUED | OUTPATIENT
Start: 2018-08-23 | End: 2018-08-25 | Stop reason: HOSPADM

## 2018-08-23 RX ORDER — CIPROFLOXACIN 500 MG/1
500 TABLET ORAL EVERY 12 HOURS
Status: DISCONTINUED | OUTPATIENT
Start: 2018-08-23 | End: 2018-08-25

## 2018-08-23 RX ORDER — CIPROFLOXACIN 500 MG/1
500 TABLET ORAL EVERY 12 HOURS
Status: DISCONTINUED | OUTPATIENT
Start: 2018-08-23 | End: 2018-08-23

## 2018-08-23 RX ORDER — DIPHENHYDRAMINE HCL 25 MG
25 CAPSULE ORAL
Status: DISCONTINUED | OUTPATIENT
Start: 2018-08-23 | End: 2018-08-25 | Stop reason: HOSPADM

## 2018-08-23 RX ADMIN — METRONIDAZOLE 500 MG: 500 INJECTION, SOLUTION INTRAVENOUS at 11:08

## 2018-08-23 RX ADMIN — Medication 10 ML: at 13:24

## 2018-08-23 RX ADMIN — SODIUM CHLORIDE AND POTASSIUM CHLORIDE: 9; 1.49 INJECTION, SOLUTION INTRAVENOUS at 03:14

## 2018-08-23 RX ADMIN — Medication 10 ML: at 06:31

## 2018-08-23 RX ADMIN — IOPAMIDOL 100 ML: 755 INJECTION, SOLUTION INTRAVENOUS at 12:35

## 2018-08-23 RX ADMIN — HEPARIN SODIUM 5000 UNITS: 5000 INJECTION INTRAVENOUS; SUBCUTANEOUS at 16:58

## 2018-08-23 RX ADMIN — FAMOTIDINE 20 MG: 20 TABLET, FILM COATED ORAL at 09:02

## 2018-08-23 RX ADMIN — SODIUM CHLORIDE AND POTASSIUM CHLORIDE: 9; 1.49 INJECTION, SOLUTION INTRAVENOUS at 15:17

## 2018-08-23 RX ADMIN — CIPROFLOXACIN 500 MG: 500 TABLET, FILM COATED ORAL at 09:00

## 2018-08-23 RX ADMIN — BARIUM SULFATE 900 ML: 20 SUSPENSION ORAL at 09:03

## 2018-08-23 RX ADMIN — HEPARIN SODIUM 5000 UNITS: 5000 INJECTION INTRAVENOUS; SUBCUTANEOUS at 03:11

## 2018-08-23 RX ADMIN — METRONIDAZOLE 500 MG: 500 INJECTION, SOLUTION INTRAVENOUS at 20:47

## 2018-08-23 RX ADMIN — CIPROFLOXACIN 500 MG: 500 TABLET, FILM COATED ORAL at 20:47

## 2018-08-23 RX ADMIN — Medication 10 ML: at 03:14

## 2018-08-23 RX ADMIN — SODIUM CHLORIDE 50 ML/HR: 900 INJECTION, SOLUTION INTRAVENOUS at 12:35

## 2018-08-23 RX ADMIN — Medication 10 ML: at 12:35

## 2018-08-23 RX ADMIN — Medication 10 ML: at 21:16

## 2018-08-23 RX ADMIN — METRONIDAZOLE 500 MG: 500 INJECTION, SOLUTION INTRAVENOUS at 06:31

## 2018-08-23 RX ADMIN — HEPARIN SODIUM 5000 UNITS: 5000 INJECTION INTRAVENOUS; SUBCUTANEOUS at 09:02

## 2018-08-23 NOTE — PROGRESS NOTES
Gi    I have seen and examined patient. Full consult to be performed. Await ct result from today.     Dinora Cooper MD  9:04 AM  8/23/2018

## 2018-08-23 NOTE — PROGRESS NOTES
Gi    Ct: IMPRESSION  IMPRESSION:  1. Redemonstration of cirrhotic morphology of the liver with central  intrahepatic biliary prominence. 2.  Distended gallbladder with wall edema. Gallbladder wall edema may be  secondary to third spacing, commonly seen in cirrhotic patients. 3.  No evidence of bowel inflammation.       I spoke to patient by phone    Rec:  Clear liquids and hida in am    Abi Garner MD  5:51 PM.  8/23/2018

## 2018-08-23 NOTE — CDMP QUERY
Dr. Carey Rosenbaum :    Patient is noted to have a BMI of 31.96. Please clarify if this patient is:     =>Morbidly obese (BMI ³ 40)  =>Obese (BMI 30 - 39.9)  =>Overweight (BMI 25 - 29.9)  =>Other explanation of clinical findings  =>Clinically Undetermined (no explanation for clinical findings)    Presentation: 42 BM admitted with abd pain and possible pouchitis, 235 lbs = BMI 31.96    REFERENCE:  The [address] [place] [address] has issued a statement indicating that, \"Individuals who are overweight, obese, or morbidly obese are at an increased risk for certain medical conditions when compared to persons of normal weight. Therefore, these conditions are always clinically significant and reportable when documented by the provider. Please clarify and document your clinical opinion in the progress notes and discharge summary, including the definitive and or presumptive diagnosis, (suspected or probable), related to the above clinical findings. Please include clinical findings supporting your diagnosis. Thank [email or web address]  [phone]

## 2018-08-23 NOTE — CONSULTS
[address]  MR#: [id number]  : 1975  ACCOUNT #: [de-identified]   DATE OF SERVICE: 2018    REQUESTING PHYSICIAN:      REASON FOR CONSULTATION:  I am asked to see by Dr. Jose Carlos Grant for flare of inflammatory bowel disease. HISTORY OF PRESENT ILLNESS:  I have known him since . He has a history of ulcerative colitis and is status post ileal pouch anal anastomosis. He also has sclerosing cholangitis and is followed at [facility]. He has had multiple visits or pouchitis with diarrhea, abdominal pain and loose stools. I last saw him in the office on 2018 for a flare. Included in this flare was pyoderma gangrenosum that is being managed by the wound care clinic. We elected to treat his pouchitis with Xifaxan recognizing he was likely to be elevated to biologics if he did not respond and that the alternative to biologics would be a pouchectomy and ileostomy. I ordered [facility] serology and a consultation with Dr. Peggy Vaughn at [facility] Colorectal Surgery. Over the last several days, he has gotten worse. Instead of 10 bowel movements a day. He has had more than 20  He has had right-sided abdominal pain and when he is walked or gone over bumps in the car the pain has increased. He felt cold, but there has been no fever. I sent him to the ER to be further assessed. 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 Nonsmoker, nondrinker. FAMILY HISTORY:  Diabetes, hypertension. ALLERGIES:  CAFERGOT. REVIEW OF SYSTEMS:  Negative except as above. Also, negative at the time of the last visit 2018. PHYSICAL EXAMINATION:  VITAL SIGNS:  Temperature 97.6, pulse 54, blood pressure 123/66, respirations 16. GENERAL:  Well-developed, well-nourished, no acute distress. EYES:  No icterus. Extraocular motions intact. ENMT:  Lips, teeth, gums, oropharynx unremarkable.   CHEST: Clear to auscultation. No use of accessory muscles. HEART:  Regular rate and rhythm, normal S1, S2.  ABDOMEN:  Bowel sounds present. There is tenderness in the right lower quadrant and some distention, but there is no rebound tenderness. Bowel sounds are present. EXTREMITIES:  No edema. NEUROLOGIC:  Alert and oriented. PSYCHIATRIC:  No evidence of abnormal mood or disorientation. LABORATORY DATA:  White count 5, hemoglobin 11.3. Sodium 140, potassium 3.5, chloride 108, CO2 of 24, total bilirubin 2.2, albumin 2.4.  C-reactive protein 2.25. Stool test for C. diff is pending. It was positive in the fall of 2017 and that precipitated a flare at that time. Cross sectional imaging most recent 07/24/2018 new gallbladder distention. Stable abnormal hepatic appearance as discussed. No bowel inflammation. Portal venous hypertension present with mild splenomegaly. HIDA scan in 2012 was negative. IMPRESSION AND REPORT AND PLAN:  1. Right lower quadrant abdominal pain. 2.  Distended gallbladder on CT. 3.  Pouchitis with flare. COMMENT:    1. We will see about the persistence of the distended gallbladder with today's CT imaging. The question about biologics versus ileostomy will need to be discussed. A careful management of any ileal stoma is important because he could develop peristomal varices with his liver disease and he has evidence of portal hypertension at this time, which would make laparoscopic management difficult. I favor ileostomy in this point and he and his wife are discussing this further. We will give him Xifaxan for his pouchitis. Advance his diet if the CT is negative and follow from there. 2.  I appreciate this interesting consultation.       MD WILLIAM SinghK / PHIL  D: 08/23/2018 10:33     T: 08/23/2018 11:34  JOB #: [id number]  CC: Joanie Montenegro MD  CC: Karel Grewal MD  [phone]  CC: Bela MENDES

## 2018-08-23 NOTE — PROGRESS NOTES
General Surgery End of Shift Nursing Note    Bedside shift change report given to [facility] RN (oncoming nurse) by Swapna Anders. Kash Hammonds RN (offgoing nurse). Report included the following information SBAR, Kardex, Intake/Output, MAR and Recent Results. Shift worked:   7a to 3p   Summary of shift:    Had ct scan today and awaiting stool study   Issues for physician to address:   none     Number times ambulated in hallway past shift: in room    Number of times OOB to chair past shift: 1    Pain Management:  Current medication: none  Patient states pain is manageable on current pain medication: YES    GI:    Current diet:  DIET NPO    Tolerating current diet: YES  Passing flatus: YES  Last Bowel Movement: today   Appearance: loose    Respiratory:    Incentive Spirometer at bedside: YES  Patient instructed on use: YES    Patient Safety:    Falls Score: 1  Bed Alarm On? No  Sitter?  No    Ludwin Clark RN

## 2018-08-23 NOTE — ROUTINE PROCESS
TRANSFER - OUT REPORT:    Verbal report given to Vitaly on Clarissa Wesley  being transferred to 2119 for routine progression of care       Report consisted of patients Situation, Background, Assessment and   Recommendations(SBAR). Information from the following report(s) SBAR, Kardex, ED Summary and MAR was reviewed with the receiving nurse. Lines:   Peripheral IV 08/22/18 Left Antecubital (Active)   Site Assessment Clean, dry, & intact 8/22/2018  7:17 PM   Phlebitis Assessment 0 8/22/2018  7:17 PM   Infiltration Assessment 0 8/22/2018  7:17 PM   Dressing Status Clean, dry, & intact 8/22/2018  7:17 PM        Opportunity for questions and clarification was provided.       Patient transported with:   [place]

## 2018-08-23 NOTE — PROGRESS NOTES
[facility]    Medical Progress Note      NAME: Wesley Rowland   :  1975  MRM:  [id number]    Date/Time: 2018  12:49 PM       Assessment and Plan:     Pouchitis / Pyoderma / RLQ abdominal pain - POA. Appreciate GI consult. GI will coordinate with surgery regarding plan. Empiric cipro and flagyl. NO SIRS criteria. Primary sclerosing cholangitis / Biliary obstruction / LFT elevation - POA, followed by [facility] and appears relatively stable. Crohn's disease with complication - POA, long standing and post resection of large bowel. Iron deficiency anemia - POA due to poor absorption and loss. Monitor. Subjective:     Chief Complaint:  Abd pain    ROS:  (bold if positive, if negative)    Abd Pain  Tolerating PT   NPO        Objective:     Last 24hrs VS reviewed since prior progress note.  Most recent are:    Visit Vitals    /78 (BP 1 Location: Right arm)    Pulse 62    Temp 97.6 °F (36.4 °C)    Resp 18    Ht 6' (1.829 m)    Wt 106.9 kg (235 lb 10.8 oz)    SpO2 98%    BMI 31.96 kg/m2     SpO2 Readings from Last 6 Encounters:   18 98%   18 98%   18 95%   18 100%   18 100%   18 96%          Intake/Output Summary (Last 24 hours) at 18 1249  Last data filed at 18 1108   Gross per 24 hour   Intake           773.33 ml   Output                0 ml   Net           773.33 ml        Physical Exam:    Gen:  Well-developed, well-nourished, in no acute distress  HEENT:  Pink conjunctivae, PERRL, hearing intact to voice, moist mucous membranes  Neck:  Supple, without masses, thyroid non-tender  Resp:  No accessory muscle use, clear breath sounds without wheezes rales or rhonchi  Card:  No murmurs, normal S1, S2 without thrills, bruits or peripheral edema  Abd:  Soft, non-tender, non-distended, normoactive bowel sounds are present, no mass  Lymph:  No cervical or inguinal adenopathy  Musc:  No cyanosis or clubbing  Skin:  No rashes or ulcers, skin turgor is good  Neuro:  Cranial nerves are grossly intact, no focal motor weakness, follows commands appropriately  Psych:  Good insight, oriented to person, place and time, alert    Telemetry reviewed:   normal sinus rhythm  __________________________________________________________________  Medications Reviewed: (see below)  Medications:     Current Facility-Administered Medications   Medication Dose Route Frequency    sodium chloride (NS) flush 5-10 mL  5-10 mL IntraVENous Q8H    sodium chloride (NS) flush 5-10 mL  5-10 mL IntraVENous PRN    0.9% sodium chloride with KCl 20 mEq/L infusion   IntraVENous CONTINUOUS    diphenhydrAMINE (BENADRYL) capsule 25 mg  25 mg Oral Q4H PRN    magnesium hydroxide (MILK OF MAGNESIA) 400 mg/5 mL oral suspension 30 mL  30 mL Oral DAILY PRN    heparin (porcine) injection 5,000 Units  5,000 Units SubCUTAneous Q8H    famotidine (PEPCID) tablet 20 mg  20 mg Oral BID    metroNIDAZOLE (FLAGYL) IVPB premix 500 mg  500 mg IntraVENous Q8H    ciprofloxacin HCl (CIPRO) tablet 500 mg  500 mg Oral Q12H        Lab Data Reviewed: (see below)  Lab Review:     Recent Labs      08/23/18 0315 08/22/18 1915   WBC  5.0  5.6   HGB  11.3*  11.6*   HCT  33.7*  35.3*   PLT  109*  116*     Recent Labs      08/23/18 0315 08/22/18 1915   NA  140  139   K  3.5  3.4*   CL  108  105   CO2  24  26   GLU  84  86   BUN  7  6   CREA  0.68*  0.79   CA  7.9*  8.3*   ALB  2.4*  2.8*   TBILI  2.2*  2.1*   SGOT  107*  132*   ALT  80*  95*     Lab Results   Component Value Date/Time    Glucose (POC) 114 (H) 07/24/2018 02:30 AM     No results for input(s): PH, PCO2, PO2, HCO3, FIO2 in the last 72 hours. No results for input(s): INR in the last 72 hours.     No lab exists for component: INREXT  All Micro Results     Procedure Component Value Units Date/Time    C. DIFFICILE AG & TOXIN A/B [[id number]] Collected:  08/23/18 4583    Order Status:  Completed Specimen:  Stool from Stool Updated: 08/23/18 0719          I have reviewed notes of prior 24hr.     Other pertinent lab: none    Total time spent with patient: [address] [facility] discussed with: Patient, Family, Nursing Staff, Consultant/Specialist and >50% of time spent in counseling and coordination of care    Discussed:  Care Plan    Prophylaxis:  H2B/PPI    Disposition:  Home w/Family           ___________________________________________________    Attending Physician: Jermain Desouza MD

## 2018-08-23 NOTE — PROGRESS NOTES
Reason for Readmission:   Abdominal pain, upper quadrant           RRAT Score and Risk Level:  8        Level of Readmission:  LOW         Care Conference scheduled:  Not at this time         Resources/supports as identified by patient/family:   Pt listed his spouse as support        Top Challenges facing patient (as identified by patient/family and CM): Finances/Medication cost? Pt has health insurance to cover the cost of medication and medical needs       Transportation    Pt is able to transport himself to and from medical appointment     Support system or lack thereof? Pt listed his family as a support system    Living arrangements? Pt lives with spouse in their tri level home (8 steps into main entrance)          Self-care/ADLs/Cognition? Pt is independent with ADLs           Current Advanced Directive/Advance Care Plan:  FULL           Plan for utilizing home health:  Not at this time               Likelihood of additional readmission: LOW                Transition of Care Plan:    Based on readmission, the patient's previous Plan of Care   has been evaluated and/or modified. The current Transition of Care Plan is:     Home with follow up appointment     Care Management Interventions  PCP Verified by CM: Yes  Mode of Transport at Discharge:  Other (see comment)  Transition of Care Consult (CM Consult): Discharge Planning  Discharge Durable Medical Equipment: No  Physical Therapy Consult: No  Occupational Therapy Consult: No  Speech Therapy Consult: No  Current Support Network: Lives with Spouse, Own Home  Confirm Follow Up Transport: Family  Plan discussed with Pt/Family/Caregiver: Yes  Discharge Location  Discharge Placement: [address], MSW   [phone]

## 2018-08-23 NOTE — PROGRESS NOTES
Bedside and Verbal shift change report given to [facility], RN (oncoming nurse) by  Nadege SÁNCHEZ RN (offgoing nurse). Report included the following information SBAR., KARDEX, and MAR. Patient resting comfortably, side rails up x2, call bell w/in reach, no further needs expressed at this time, aware of POC. 1930 Bedside and Verbal shift change report given to Deuce Cross RN (oncoming nurse) by [facility], RN (offgoing nurse). Report included the following information SBAR, Kardex, Intake/Output and MAR.

## 2018-08-23 NOTE — PROGRESS NOTES
Interdisciplinary Rounds were completed on this patient. Rounds included nursing, clinical care leader, pharmacy, and case management.      Plan for the day: CT scan  Plan for the stay: R/O C-DIFF    Activity: independent    Anticipated discharge date: 8/24

## 2018-08-23 NOTE — ED PROVIDER NOTES
EMERGENCY DEPARTMENT HISTORY AND PHYSICAL EXAM      Date: 8/22/2018  Patient Name: Wesley Rowland    History of Presenting Illness     Chief Complaint   Patient presents with    Abdominal Pain     ambulatory into triage with steady gait; pt reports pain \"around j pouch\"; pt reports \"oily stools, and darker urine\" hx Crohn's/UC; onset this weekend    Vomiting     once; + nausea       History Provided By: Patient    HPI: Wesley Rowland, 38 y.o. male with PMHx significant for PSC, Chron's colitis, pancreatitis, arthritis, pouchitis, cdiff, presents ambulatory to the ED with cc of constant and worsening abdominal pain around his J pouch. Pt reports associated nausea. Pt c/o 13-15 episodes of constant diarrhea especially immediately after eating. Pt reports a decrease in appetite due to his nausea and constant diarrhea. Per chart review, pt has a hx of cdiff. Per chart review, pt had a CT scan done on 07/24/2018. Pt discloses that he spoke to his GI doctor who referred him to the ED. Pt specifically denies any HA, CP, SOB, fever, chills, vomiting, or urinary sxs. There are no other complaints, changes, or physical findings at this time. PCP: Kiley Gu MD   GI: Dr. Chung Degroot    Current Outpatient Prescriptions   Medication Sig Dispense Refill    metroNIDAZOLE (FLAGYL) 500 mg tablet Take 500 mg by mouth three (3) times daily.          Past History     Past Medical History:  Past Medical History:   Diagnosis Date    Arthritis     Chest pain 2/18/2013    as of 10/14/14 pt denies any CP since 2/13    Crohn's colitis ([facility] [address]     Dr. Xavi Cunnnigham H/O Clostridium difficile infection 01/2017    as of 3/30/17 pt denies abd pain, diarrhea > 1 week    Iron deficiency anemia 2/18/2013    Pancreatitis     Polyarteritis nodosa ([address]     Pouchitis ([address] 12/8/2011    PSC (primary sclerosing cholangitis)     Dr Med Zacarias [id number]    Pyoderma gangrenosum        Past Surgical History:  Past Surgical History:   Procedure Laterality Date    ABDOMEN SURGERY PROC UNLISTED  2002    colostomy reversal & J Pouch    COLONOSCOPY N/A 4/4/2017    COL[facility]    COLONOSCOPY N/A 1/15/2018    COLONOSCOPY [facility] Fall [facility] at [facility]  1/15/2018         HX COLECTOMY  2002    colostomy    HX GI      ercp    HX ROTATOR CUFF REPAIR      right    [place] COLONOSCOPY W/BIOPSY SINGLE/MULTIPLE  12/8/2011         [place] EGD TRANSORAL BIOPSY SINGLE/MULTIPLE  2/18/2013            Family History:  Family History   Problem Relation Age of Onset    Diabetes Mother     Hypertension Mother     Arthritis-osteo Mother     Diabetes Father     Hypertension Father     Stroke Father     Arthritis-osteo Father     No Known Problems Sister     No Known Problems Brother     No Known Problems Maternal Aunt     No Known Problems Maternal Uncle     No Known Problems Paternal Aunt     No Known Problems Paternal Uncle     No Known Problems Maternal Grandmother     No Known Problems Maternal Grandfather     No Known Problems Paternal Grandmother     No Known Problems Paternal Grandfather        Social History:  Social History   Substance Use Topics    Smoking status: Never Smoker    Smokeless tobacco: Never Used    Alcohol use No       Allergies: Allergies   Allergen Reactions    Cafergot [Ergotamine-Caffeine] Hives         Review of Systems   Review of Systems   Constitutional: Positive for appetite change (decreased). Negative for chills and fever. HENT: Negative for congestion, ear pain, rhinorrhea, sore throat and trouble swallowing. Eyes: Negative for visual disturbance. Respiratory: Negative for cough, chest tightness and shortness of breath. Cardiovascular: Negative for chest pain and palpitations. Gastrointestinal: Positive for abdominal pain, diarrhea and nausea. Negative for blood in stool, constipation and vomiting.    Genitourinary: Negative for decreased urine volume, difficulty urinating, dysuria and frequency. Musculoskeletal: Negative for back pain and neck pain. Skin: Negative for color change and rash. Neurological: Negative for dizziness, weakness, light-headedness and headaches. Physical Exam   Physical Exam   Constitutional: He is oriented to person, place, and time. Vital signs are normal. He appears well-developed and well-nourished. He does not appear ill. No distress. HENT:   Mouth/Throat: Oropharynx is clear and moist.   Eyes: Conjunctivae are normal.   Neck: Neck supple. Cardiovascular: Normal rate and regular rhythm. Pulmonary/Chest: Effort normal and breath sounds normal. No accessory muscle usage. No respiratory distress. Abdominal: Soft. He exhibits no distension. There is tenderness in the right upper quadrant and right lower quadrant. Lymphadenopathy:     He has no cervical adenopathy. Neurological: He is alert and oriented to person, place, and time. He has normal strength. No cranial nerve deficit or sensory deficit. Skin: Skin is warm and dry. Nursing note and vitals reviewed.       Diagnostic Study Results     Labs -     Recent Results (from the past 12 hour(s))   URINALYSIS W/ REFLEX CULTURE    Collection Time: 08/22/18  6:23 PM   Result Value Ref Range    Color DARK YELLOW      Appearance CLEAR CLEAR      Specific gravity 1.026 1.003 - 1.030      pH (UA) 5.0 5.0 - 8.0      Protein NEGATIVE  NEG mg/dL    Glucose NEGATIVE  NEG mg/dL    Ketone TRACE (A) NEG mg/dL    Blood NEGATIVE  NEG      Urobilinogen 1.0 0.2 - 1.0 EU/dL    Nitrites NEGATIVE  NEG      Leukocyte Esterase SMALL (A) NEG      WBC 0-4 0 - 4 /hpf    RBC 0-5 0 - 5 /hpf    Epithelial cells FEW FEW /lpf    Bacteria NEGATIVE  NEG /hpf    UA:UC IF INDICATED CULTURE NOT INDICATED BY UA RESULT CNI      Mucus 4+ (A) NEG /lpf   BILIRUBIN, CONFIRM    Collection Time: 08/22/18  6:23 PM   Result Value Ref Range    Bilirubin UA, confirm NEGATIVE  NEG CBC WITH AUTOMATED DIFF    Collection Time: 08/22/18  7:15 PM   Result Value Ref Range    WBC 5.6 4.1 - 11.1 K/uL    RBC 3.96 (L) 4.10 - 5.70 M/uL    HGB 11.6 (L) 12.1 - 17.0 g/dL    HCT 35.3 (L) 36.6 - 50.3 %    MCV 89.1 80.0 - 99.0 FL    MCH 29.3 26.0 - 34.0 PG    MCHC 32.9 30.0 - 36.5 g/dL    RDW 15.1 (H) 11.5 - 14.5 %    PLATELET 014 (L) 684 - 400 K/uL    MPV 11.2 8.9 - 12.9 FL    NRBC 0.0 0  WBC    ABSOLUTE NRBC 0.00 0.00 - 0.01 K/uL    NEUTROPHILS 58 32 - 75 %    LYMPHOCYTES 23 12 - 49 %    MONOCYTES 9 5 - 13 %    EOSINOPHILS 9 (H) 0 - 7 %    BASOPHILS 1 0 - 1 %    IMMATURE GRANULOCYTES 0 0.0 - 0.5 %    ABS. NEUTROPHILS 3.3 1.8 - 8.0 K/UL    ABS. LYMPHOCYTES 1.3 0.8 - 3.5 K/UL    ABS. MONOCYTES 0.5 0.0 - 1.0 K/UL    ABS. EOSINOPHILS 0.5 (H) 0.0 - 0.4 K/UL    ABS. BASOPHILS 0.0 0.0 - 0.1 K/UL    ABS. IMM. GRANS. 0.0 0.00 - 0.04 K/UL    DF AUTOMATED     METABOLIC PANEL, COMPREHENSIVE    Collection Time: 08/22/18  7:15 PM   Result Value Ref Range    Sodium 139 136 - 145 mmol/L    Potassium 3.4 (L) 3.5 - 5.1 mmol/L    Chloride 105 97 - 108 mmol/L    CO2 26 21 - 32 mmol/L    Anion gap 8 5 - 15 mmol/L    Glucose 86 65 - 100 mg/dL    BUN 6 6 - 20 MG/DL    Creatinine 0.79 0.70 - 1.30 MG/DL    BUN/Creatinine ratio 8 (L) 12 - 20      GFR est AA >60 >60 ml/min/1.73m2    GFR est non-AA >60 >60 ml/min/1.73m2    Calcium 8.3 (L) 8.5 - 10.1 MG/DL    Bilirubin, total 2.1 (H) 0.2 - 1.0 MG/DL    ALT (SGPT) 95 (H) 12 - 78 U/L    AST (SGOT) 132 (H) 15 - 37 U/L    Alk. phosphatase 578 (H) 45 - 117 U/L    Protein, total 7.8 6.4 - 8.2 g/dL    Albumin 2.8 (L) 3.5 - 5.0 g/dL    Globulin 5.0 (H) 2.0 - 4.0 g/dL    A-G Ratio 0.6 (L) 1.1 - 2.2         Radiologic Studies -   US ABD LTD   Final Result        CT Results  (Last 48 hours)    None        CXR Results  (Last 48 hours)    None            Medical Decision Making   I am the first provider for this patient.     I reviewed the vital signs, available nursing notes, past medical history, past surgical history, family history and social history. Vital Signs-Reviewed the patient's vital signs. Patient Vitals for the past 12 hrs:   Temp Pulse Resp BP SpO2   08/22/18 2145 - - - 112/79 99 %   08/22/18 2015 - - - 113/68 100 %   08/22/18 1921 - - - - 97 %   08/22/18 1920 - - - 120/64 -   08/22/18 1812 98.1 °F (36.7 °C) 78 16 124/76 98 %       Pulse Oximetry Analysis - 100% on room air    Cardiac Monitor:   Rate: 75 bpm  Rhythm: Normal Sinus Rhythm     Records Reviewed: Nursing Notes and Old Medical Records    Provider Notes (Medical Decision Making):   Crohns with J-pouch and chronic pouchitis. Worsening pain and loose oily stools. No fever or leukocytosis. Recently started Flagyl. History of C diff. His liver enzymes have been steadily increasing since July. Had gallbladder distention on a CT 4 weeks ago. Will check US for cholecystitis. Symptoms may not be Crohns related or from his pouchitis. He is not septic. Do not suspect leakage of pouch, or would expect to see peritonitis on exam and leukocytosis. ED Course:   Initial assessment performed. The patients presenting problems have been discussed, and they are in agreement with the care plan formulated and outlined with them. I have encouraged them to ask questions as they arise throughout their visit. CONSULT NOTE:  9:25 Franca Segal MD spoke with Dr. Gloria Shaffer,  Specialty: GI  Consultant advises that if the pt's US comes back normal then the pt can be discharged and get outpatient work-up. Consultant further advises that if the US findings come back abnormal then the pt should be admitted. Written by Anju Romo ED Scribsherman, as dictated by Roya Irwin MD    CONSULT NOTE:   10:02 Franca Segal MD spoke with Dr. Kim Santoro,   Specialty: Hospitalist  Discussed pt's hx, disposition, and available diagnostic and imaging results. Reviewed care plans. Consultant will evaluate pt for admission.   Written by Tabatha Correa Shantell Sun, ED Scribe, as dictated by Leander Guerin MD.    Critical Care Time: None    Disposition:  Admit Note:  10:05 PM  Pt is being admitted by Dr. Igor Brizuela. The results of their tests and reason(s) for their admission have been discussed with pt and/or available family. They convey agreement and understanding for the need to be admitted and for admission diagnosis. Diagnosis     Clinical Impression:   1. Abdominal pain, right upper quadrant    2. Pouchitis ([address]    3. Elevated liver enzymes        Attestations: This note is prepared by Desean Ortega, acting as Scribe for Leander Guerin MD.    Leander Guerin MD: The scribe's documentation has been prepared under my direction and personally reviewed by me in its entirety. I confirm that the note above accurately reflects all work, treatment, procedures, and medical decision making performed by me. This note will not be viewable in [address]

## 2018-08-23 NOTE — H&P
Hospitalist Admission Note    NAME: Fritz Carson   :  1975   MRN:  [id number]     Date/Time:  2018 10:14 PM    Patient PCP: Suhas Ramirez MD  ______________________________________________________________________  Given the patient's current clinical presentation, I have a high level of concern for decompensation if discharged from the emergency department. Complex decision making was performed, which includes reviewing the patient's available past medical records, laboratory results, and x-ray films. My assessment of this patient's clinical condition and my plan of care is as follows. Assessment / Plan:    Principal Problem:    RLQ abdominal pain (2018) most likely secondary to pouchitis   -continue flagyl and start cipro   -keep patient NPO   - GI consult at AM   -IV Fluid  ml/HR   -check stool for cdiff   -follow up abdomen ct scan   -follow up lipase level     Active Problems:    Primary sclerosing cholangitis   -LFT slightly worse than 1 month ago , RUQ sonogram show gallbladder sludge, gallbladder distention, and gallbladder wall thickening  -Follow up repeat AT AM   -consider MRCP at AM   -follow up abdomen CT scan       Pouchitis   -continue antibiotics         Crohn's disease   -check ESR and CRP   -GI consult       Diarrhea   -check stool for Cdiff     Elevated LFT with distended gallbladder   -keep patient NPO   -continue antibiotics   -GI consult   -follow up abdomen ct scan       Code Status: full code   Surrogate Decision Maker:wife     DVT Prophylaxis: heparin          Subjective:   CHIEF COMPLAINT: abdominal pain     HISTORY OF PRESENT ILLNESS:     Fritz Carson is a 38 y.o.    male who presents with PMHx significant for PSC, Chron's colitis, pancreatitis, arthritis, pouchitis, cdiff, presents to Ed because of worsening abdominal pain at his RLQ associated with nausea and vomiting associated with worsening diarrhea , patient was referred to ED by his GI doctor , Jalyn Araya ED work up show elevated LFT, Abdomen US was done and show Tenderness to probe palpation over the gallbladder fossa,  gallbladder sludge, gallbladder distention, and gallbladder wall thickening. ED physician discussed [address] finding with GI and recommend patient to be admitted for further evaluation. We were asked to admit for work up and evaluation of the above problems.      Past Medical History:   Diagnosis Date    Arthritis     Chest pain 2/18/2013    as of 10/14/14 pt denies any CP since 2/13    Crohn's colitis ([address]     Dr. Ravinder Galeas H/O Clostridium difficile infection 01/2017    as of 3/30/17 pt denies abd pain, diarrhea > 1 week    Iron deficiency anemia 2/18/2013    Pancreatitis     Polyarteritis nodosa ([address]     Pouchitis ([address] 12/8/2011    PSC (primary sclerosing cholangitis)     Dr Refugio Bennett [phone]    Pyoderma gangrenosum         Past Surgical History:   Procedure Laterality Date    ABDOMEN SURGERY PROC UNLISTED  2002    colostomy reversal & J Pouch    COLONOSCOPY N/A 4/4/2017    [facility]    COLONOSCOPY N/A 1/15/2018    COL[facility]OSCOPY performed [facility] at [facility] ENDOSCOPY    COLONOSCOPY,DIAGNOSTIC  1/15/2018         HX COLECTOMY  2002    colostomy    HX GI      ercp    HX ROTATOR CUFF REPAIR      right    [place] COLONOSCOPY W/BIOPSY SINGLE/MULTIPLE  12/8/2011         OK EGD TRANSORAL BIOPSY SINGLE/MULTIPLE  2/18/2013            Social History   Substance Use Topics    Smoking status: Never Smoker    Smokeless tobacco: Never Used    Alcohol use No        Family History   Problem Relation Age of Onset    Diabetes Mother     Hypertension Mother     Arthritis-osteo Mother     Diabetes Father     Hypertension Father     Stroke Father     Arthritis-osteo Father     No Known Problems Sister     No Known Problems Brother     No Known Problems Maternal Aunt     No Known Problems Maternal Uncle  No Known Problems Paternal Aunt     No Known Problems Paternal Uncle     No Known Problems Maternal Grandmother     No Known Problems Maternal Grandfather     No Known Problems Paternal Grandmother     No Known Problems Paternal Grandfather      Allergies   Allergen Reactions    Cafergot [Ergotamine-Caffeine] Hives        Prior to Admission medications    Medication Sig Start Date End Date Taking? Authorizing Provider   metroNIDAZOLE (FLAGYL) 500 mg tablet Take 500 mg by mouth three (3) times daily. Yes Phys Other, MD       REVIEW OF SYSTEMS:     I am not able to complete the review of systems because:    The patient is intubated and sedated    The patient has altered mental status due to his acute medical problems    The patient has baseline aphasia from prior stroke(s)    The patient has baseline dementia and is not reliable historian    The patient is in acute medical distress and unable to provide information           Total of 12 systems reviewed as follows:       POSITIVE= underlined text  Negative = text not underlined  General:  fever, chills, sweats, generalized weakness, weight loss/gain,      loss of appetite   Eyes:    blurred vision, eye pain, loss of vision, double vision  ENT:    rhinorrhea, pharyngitis   Respiratory:   cough, sputum production, SOB, JARRELL, wheezing, pleuritic pain   Cardiology:   chest pain, palpitations, orthopnea, PND, edema, syncope   Gastrointestinal:  abdominal pain , N/V, diarrhea, dysphagia, constipation, bleeding   Genitourinary:  frequency, urgency, dysuria, hematuria, incontinence   Muskuloskeletal :  arthralgia, myalgia, back pain  Hematology:  easy bruising, nose or gum bleeding, lymphadenopathy   Dermatological: rash, ulceration, pruritis, color change / jaundice  Endocrine:   hot flashes or polydipsia   Neurological:  headache, dizziness, confusion, focal weakness, paresthesia,     Speech difficulties, memory loss, gait difficulty  Psychological: Feelings of anxiety, depression, agitation    Objective:   VITALS:    Visit Vitals    /79    Pulse 78    Temp 98.1 °F (36.7 °C)    Resp 16    Ht 6' (1.829 m)    Wt 106.9 kg (235 lb 10.8 oz)    SpO2 99%    BMI 31.96 kg/m2       PHYSICAL EXAM:    General:    Alert, cooperative, no distress, appears stated age. HEENT: Atraumatic, anicteric sclerae, pink conjunctivae     No oral ulcers, mucosa moist, throat clear, dentition fair  Neck:  Supple, symmetrical,  thyroid: non tender  Lungs:   Clear to auscultation bilaterally. No Wheezing or Rhonchi. No rales. Chest wall:  No tenderness  No Accessory muscle use. Heart:   Regular  rhythm,  No  murmur   No edema  Abdomen:   Soft,tenderness RLQ   Bowel sounds normal  Extremities: No cyanosis. No clubbing,      Skin turgor normal, Capillary refill normal, Radial dial pulse 2+  Skin:     Not pale. Not Jaundiced  No rashes   Psych:  Good insight. Not depressed. Not anxious or agitated. Neurologic: EOMs intact. No facial asymmetry. No aphasia or slurred speech. Symmetrical strength, Sensation grossly intact. Alert and oriented X 4.     _______________________________________________________________________  Care Plan discussed with:    Comments   Patient y    Family  y    RN     Care Manager                    Consultant:      _______________________________________________________________________  Expected  Disposition:   Home with Family y   HH/PT/OT/RN    SNF/LTC    KATY    ________________________________________________________________________  TOTAL TIME:  36 Minutes    Critical Care Provided     Minutes non procedure based      Comments    y Reviewed previous records   >50% of visit spent in counseling and coordination of care y Discussion with patient and/or family and questions answered       ________________________________________________________________________  Signed:  Sander Grewal MD    Procedures: see electronic medical records for all procedures/Xrays and details which were not copied into this note but were reviewed prior to creation of Plan. LAB DATA REVIEWED:    Recent Results (from the past 24 hour(s))   URINALYSIS W/ REFLEX CULTURE    Collection Time: 08/22/18  6:23 PM   Result Value Ref Range    Color DARK YELLOW      Appearance CLEAR CLEAR      Specific gravity 1.026 1.003 - 1.030      pH (UA) 5.0 5.0 - 8.0      Protein NEGATIVE  NEG mg/dL    Glucose NEGATIVE  NEG mg/dL    Ketone TRACE (A) NEG mg/dL    Blood NEGATIVE  NEG      Urobilinogen 1.0 0.2 - 1.0 EU/dL    Nitrites NEGATIVE  NEG      Leukocyte Esterase SMALL (A) NEG      WBC 0-4 0 - 4 /hpf    RBC 0-5 0 - 5 /hpf    Epithelial cells FEW FEW /lpf    Bacteria NEGATIVE  NEG /hpf    UA:UC IF INDICATED CULTURE NOT INDICATED BY UA RESULT CNI      Mucus 4+ (A) NEG /lpf   BILIRUBIN, CONFIRM    Collection Time: 08/22/18  6:23 PM   Result Value Ref Range    Bilirubin UA, confirm NEGATIVE  NEG     CBC WITH AUTOMATED DIFF    Collection Time: 08/22/18  7:15 PM   Result Value Ref Range    WBC 5.6 4.1 - 11.1 K/uL    RBC 3.96 (L) 4.10 - 5.70 M/uL    HGB 11.6 (L) 12.1 - 17.0 g/dL    HCT 35.3 (L) 36.6 - 50.3 %    MCV 89.1 80.0 - 99.0 FL    MCH 29.3 26.0 - 34.0 PG    MCHC 32.9 30.0 - 36.5 g/dL    RDW 15.1 (H) 11.5 - 14.5 %    PLATELET 818 (L) 730 - 400 K/uL    MPV 11.2 8.9 - 12.9 FL    NRBC 0.0 0  WBC    ABSOLUTE NRBC 0.00 0.00 - 0.01 K/uL    NEUTROPHILS 58 32 - 75 %    LYMPHOCYTES 23 12 - 49 %    MONOCYTES 9 5 - 13 %    EOSINOPHILS 9 (H) 0 - 7 %    BASOPHILS 1 0 - 1 %    IMMATURE GRANULOCYTES 0 0.0 - 0.5 %    ABS. NEUTROPHILS 3.3 1.8 - 8.0 K/UL    ABS. LYMPHOCYTES 1.3 0.8 - 3.5 K/UL    ABS. MONOCYTES 0.5 0.0 - 1.0 K/UL    ABS. EOSINOPHILS 0.5 (H) 0.0 - 0.4 K/UL    ABS. BASOPHILS 0.0 0.0 - 0.1 K/UL    ABS. IMM.  GRANS. 0.0 0.00 - 0.04 K/UL    DF AUTOMATED     METABOLIC PANEL, COMPREHENSIVE    Collection Time: 08/22/18  7:15 PM   Result Value Ref Range    Sodium 139 136 - 145 mmol/L    Potassium 3.4 (L) 3.5 - 5.1 mmol/L    Chloride 105 97 - 108 mmol/L    CO2 26 21 - 32 mmol/L    Anion gap 8 5 - 15 mmol/L    Glucose 86 65 - 100 mg/dL    BUN 6 6 - 20 MG/DL    Creatinine 0.79 0.70 - 1.30 MG/DL    BUN/Creatinine ratio 8 (L) 12 - 20      GFR est AA >60 >60 ml/min/1.73m2    GFR est non-AA >60 >60 ml/min/1.73m2    Calcium 8.3 (L) 8.5 - 10.1 MG/DL    Bilirubin, total 2.1 (H) 0.2 - 1.0 MG/DL    ALT (SGPT) 95 (H) 12 - 78 U/L    AST (SGOT) 132 (H) 15 - 37 U/L    Alk.  phosphatase 578 (H) 45 - 117 U/L    Protein, total 7.8 6.4 - 8.2 g/dL    Albumin 2.8 (L) 3.5 - 5.0 g/dL    Globulin 5.0 (H) 2.0 - 4.0 g/dL    A-G Ratio 0.6 (L) 1.1 - 2.2

## 2018-08-24 ENCOUNTER — APPOINTMENT (OUTPATIENT)
Dept: NUCLEAR MEDICINE | Age: 43
DRG: 393 | End: 2018-08-24
Attending: SPECIALIST
Payer: OTHER GOVERNMENT

## 2018-08-24 LAB
ALBUMIN SERPL-MCNC: 2.3 G/DL (ref 3.5–5)
ALBUMIN/GLOB SERPL: 0.5 {RATIO} (ref 1.1–2.2)
ALP SERPL-CCNC: 494 U/L (ref 45–117)
ALT SERPL-CCNC: 71 U/L (ref 12–78)
ANION GAP SERPL CALC-SCNC: 7 MMOL/L (ref 5–15)
AST SERPL-CCNC: 96 U/L (ref 15–37)
BASOPHILS # BLD: 0.1 K/UL (ref 0–0.1)
BASOPHILS NFR BLD: 1 % (ref 0–1)
BILIRUB SERPL-MCNC: 2.1 MG/DL (ref 0.2–1)
BUN SERPL-MCNC: 6 MG/DL (ref 6–20)
BUN/CREAT SERPL: 8 (ref 12–20)
C DIFF GDH STL QL: POSITIVE
C DIFF TOX A+B STL QL IA: NEGATIVE
C DIFF TOX GENS STL QL NAA+PROBE: POSITIVE
CALCIUM SERPL-MCNC: 8 MG/DL (ref 8.5–10.1)
CHLORIDE SERPL-SCNC: 109 MMOL/L (ref 97–108)
CO2 SERPL-SCNC: 22 MMOL/L (ref 21–32)
CREAT SERPL-MCNC: 0.74 MG/DL (ref 0.7–1.3)
DIFFERENTIAL METHOD BLD: ABNORMAL
EOSINOPHIL # BLD: 0.4 K/UL (ref 0–0.4)
EOSINOPHIL NFR BLD: 8 % (ref 0–7)
ERYTHROCYTE [DISTWIDTH] IN BLOOD BY AUTOMATED COUNT: 15 % (ref 11.5–14.5)
GLOBULIN SER CALC-MCNC: 4.8 G/DL (ref 2–4)
GLUCOSE SERPL-MCNC: 83 MG/DL (ref 65–100)
HCT VFR BLD AUTO: 35.6 % (ref 36.6–50.3)
HGB BLD-MCNC: 11.6 G/DL (ref 12.1–17)
IMM GRANULOCYTES # BLD: 0 K/UL (ref 0–0.04)
IMM GRANULOCYTES NFR BLD AUTO: 0 % (ref 0–0.5)
INTERPRETATION: ABNORMAL
LYMPHOCYTES # BLD: 1.4 K/UL (ref 0.8–3.5)
LYMPHOCYTES NFR BLD: 26 % (ref 12–49)
MCH RBC QN AUTO: 29.3 PG (ref 26–34)
MCHC RBC AUTO-ENTMCNC: 32.6 G/DL (ref 30–36.5)
MCV RBC AUTO: 89.9 FL (ref 80–99)
MONOCYTES # BLD: 0.4 K/UL (ref 0–1)
MONOCYTES NFR BLD: 7 % (ref 5–13)
NEUTS SEG # BLD: 3 K/UL (ref 1.8–8)
NEUTS SEG NFR BLD: 58 % (ref 32–75)
NRBC # BLD: 0 K/UL (ref 0–0.01)
NRBC BLD-RTO: 0 PER 100 WBC
PCR REFLEX: ABNORMAL
PLATELET # BLD AUTO: 101 K/UL (ref 150–400)
PMV BLD AUTO: 11.3 FL (ref 8.9–12.9)
POTASSIUM SERPL-SCNC: 4 MMOL/L (ref 3.5–5.1)
PROT SERPL-MCNC: 7.1 G/DL (ref 6.4–8.2)
RBC # BLD AUTO: 3.96 M/UL (ref 4.1–5.7)
SODIUM SERPL-SCNC: 138 MMOL/L (ref 136–145)
WBC # BLD AUTO: 5.3 K/UL (ref 4.1–11.1)

## 2018-08-24 PROCEDURE — A9537 TC99M MEBROFENIN: HCPCS

## 2018-08-24 PROCEDURE — 80053 COMPREHEN METABOLIC PANEL: CPT

## 2018-08-24 PROCEDURE — 85025 COMPLETE CBC W/AUTO DIFF WBC: CPT

## 2018-08-24 PROCEDURE — 74011250636 HC RX REV CODE- 250/636: Performed by: INTERNAL MEDICINE

## 2018-08-24 PROCEDURE — 65270000029 HC RM PRIVATE

## 2018-08-24 PROCEDURE — 36415 COLL VENOUS BLD VENIPUNCTURE: CPT

## 2018-08-24 PROCEDURE — 74011250637 HC RX REV CODE- 250/637: Performed by: INTERNAL MEDICINE

## 2018-08-24 PROCEDURE — 74011250636 HC RX REV CODE- 250/636

## 2018-08-24 RX ADMIN — SINCALIDE 2.14 MCG: 5 INJECTION, POWDER, LYOPHILIZED, FOR SOLUTION INTRAVENOUS at 10:45

## 2018-08-24 RX ADMIN — SODIUM CHLORIDE AND POTASSIUM CHLORIDE: 9; 1.49 INJECTION, SOLUTION INTRAVENOUS at 02:40

## 2018-08-24 RX ADMIN — METRONIDAZOLE 500 MG: 500 INJECTION, SOLUTION INTRAVENOUS at 04:03

## 2018-08-24 RX ADMIN — METRONIDAZOLE 500 MG: 500 INJECTION, SOLUTION INTRAVENOUS at 16:24

## 2018-08-24 RX ADMIN — Medication 10 ML: at 12:04

## 2018-08-24 RX ADMIN — Medication 10 ML: at 06:00

## 2018-08-24 RX ADMIN — METRONIDAZOLE 500 MG: 500 INJECTION, SOLUTION INTRAVENOUS at 20:24

## 2018-08-24 RX ADMIN — HEPARIN SODIUM 5000 UNITS: 5000 INJECTION INTRAVENOUS; SUBCUTANEOUS at 02:40

## 2018-08-24 RX ADMIN — CIPROFLOXACIN 500 MG: 500 TABLET, FILM COATED ORAL at 16:24

## 2018-08-24 RX ADMIN — Medication 10 ML: at 20:31

## 2018-08-24 RX ADMIN — FAMOTIDINE 20 MG: 20 TABLET, FILM COATED ORAL at 16:23

## 2018-08-24 RX ADMIN — CIPROFLOXACIN 500 MG: 500 TABLET, FILM COATED ORAL at 20:25

## 2018-08-24 NOTE — PROGRESS NOTES
[facility]    Medical Progress Note      NAME: Susana Iniguez   :  1975  MRM:  [id number]    Date/Time: 2018  10:12 AM       Assessment and Plan:     Pouchitis / Pyoderma / RLQ abdominal pain - POA. Appreciate GI consult. GI will coordinate with surgery regarding plan. HIDA this AM.  Empiric cipro and flagyl. He had no SIRS criteria. If surgery needed, likely needs it at [address] [place] [address]. Primary sclerosing cholangitis / Biliary obstruction / LFT elevation - POA, followed by [facility] and appears relatively stable. Crohn's disease with complication - POA, long standing and post resection of large bowel. Iron deficiency anemia - POA due to poor absorption and loss. Monitor. Obese - Manage per GI        Subjective:     Chief Complaint:  Abd pain better, tolerated HIDA, wants to eat    ROS:  (bold if positive, if negative)    Abd Pain  Tolerating PT  NPO for HIDA        Objective:     Last 24hrs VS reviewed since prior progress note.  Most recent are:    Visit Vitals    /66 (BP 1 Location: Right arm, BP Patient Position: At rest)    Pulse 70    Temp 97.8 °F (36.6 °C)    Resp 17    Ht 6' (1.829 m)    Wt 106.9 kg (235 lb 10.8 oz)    SpO2 100%    BMI 31.96 kg/m2     SpO2 Readings from Last 6 Encounters:   18 100%   18 98%   18 95%   18 100%   18 100%   18 96%            Intake/Output Summary (Last 24 hours) at 18 1011  Last data filed at 18 0607   Gross per 24 hour   Intake          1975.83 ml   Output                0 ml   Net          1975.83 ml        Physical Exam:    Gen:  Well-developed, well-nourished, in no acute distress  HEENT:  Pink conjunctivae, PERRL, hearing intact to voice, moist mucous membranes  Neck:  Supple, without masses, thyroid non-tender  Resp:  No accessory muscle use, clear breath sounds without wheezes rales or rhonchi  Card:  No murmurs, normal S1, S2 without thrills, bruits or peripheral edema  Abd:  Soft, non-tender, non-distended, normoactive bowel sounds are present, no mass  Lymph:  No cervical or inguinal adenopathy  Musc:  No cyanosis or clubbing  Skin:  No rashes or ulcers, skin turgor is good  Neuro:  Cranial nerves are grossly intact, no focal motor weakness, follows commands appropriately  Psych:  Good insight, oriented to person, place and time, alert    Telemetry reviewed:   normal sinus rhythm  __________________________________________________________________  Medications Reviewed: (see below)  Medications:     Current Facility-Administered Medications   Medication Dose Route Frequency    [facility]) injection 2.14 mcg  0.02 mcg/kg IntraVENous RAD ONCE    sodium chloride (NS) flush 5-10 mL  5-10 mL IntraVENous Q8H    sodium chloride (NS) flush 5-10 mL  5-10 mL IntraVENous PRN    0.9% sodium chloride with KCl 20 mEq/L infusion   IntraVENous CONTINUOUS    diphenhydrAMINE (BENADRYL) capsule 25 mg  25 mg Oral Q4H PRN    magnesium hydroxide (MILK OF MAGNESIA) 400 mg/5 mL oral suspension 30 mL  30 mL Oral DAILY PRN    heparin (porcine) injection 5,000 Units  5,000 Units SubCUTAneous Q8H    famotidine (PEPCID) tablet 20 mg  20 mg Oral BID    metroNIDAZOLE (FLAGYL) IVPB premix 500 mg  500 mg IntraVENous Q8H    ciprofloxacin HCl (CIPRO) tablet 500 mg  500 mg Oral Q12H        Lab Data Reviewed: (see below)  Lab Review:     Recent Labs      08/24/18 0420 08/23/18 0315 08/22/18 1915   WBC  5.3  5.0  5.6   HGB  11.6*  11.3*  11.6*   HCT  35.6*  33.7*  35.3*   PLT  101*  109*  116*     Recent Labs      08/24/18 0420 08/23/18 0315 08/22/18 1915   NA  138  140  139   K  4.0  3.5  3.4*   CL  109*  108  105   CO2  22  24  26   GLU  83  84  86   BUN  6  7  6   CREA  0.74  0.68*  0.79   CA  8.0*  7.9*  8.3*   ALB  2.3*  2.4*  2.8*   TBILI  2.1*  2.2*  2.1*   SGOT  96*  107*  132*   ALT  71  80*  95*     Lab Results   Component Value Date/Time    Glucose (POC) 114 (H) 07/24/2018 02:30 AM No results for input(s): PH, PCO2, PO2, HCO3, FIO2 in the last 72 hours. No results for input(s): INR in the last 72 hours. No lab exists for component: INREXT, INREXT  All Micro Results     Procedure Component Value Units Date/Time    C. DIFFICILE AG & TOXIN A/B [[id number]] Collected:  08/23/18 9556    Order Status:  Completed Specimen:  Stool from Stool Updated:  08/23/18 0704          I have reviewed notes of prior 24hr.     Other pertinent lab: none    Total time spent with patient: [address] [place] [address] discussed with: Patient, Family, Nursing Staff, Consultant/Specialist and >50% of time spent in counseling and coordination of care    Discussed:  Care Plan    Prophylaxis:  H2B/PPI    Disposition:  Home w/Family           ___________________________________________________    Attending Physician: Jana Mclean MD

## 2018-08-24 NOTE — ROUTINE PROCESS
Bedside and Verbal shift change report given to Deepa Moreira RN (oncoming nurse) by Jacqueline Chopra RN (offgoing nurse). Report included the following information SBAR, Kardex, Intake/Output and MAR.

## 2018-08-24 NOTE — PROGRESS NOTES
General Surgery End of Shift Nursing Note    Bedside shift change report given to So Karimi (oncoming nurse) by Ronit Sifuentes (offgoing nurse). Report included the following information SBAR, Kardex, Intake/Output, MAR and Recent Results. Shift worked:   C   Summary of shift:    0   Issues for physician to address:   0     Number times ambulated in hallway past shift: 0    Number of times OOB to chair past shift: 0    Pain Management:  Current medication: 0  Patient states pain is manageable on current pain medication: YES    GI:    Current diet:  DIET CLEAR LIQUID    Tolerating current diet: YES  Passing flatus: YES  Last Bowel Movement: yesterday   Appearance: 0    Respiratory:    Incentive Spirometer at bedside: YES  Patient instructed on use: YES    Patient Safety:    Falls Score: 1  Bed Alarm On? No  Sitter?  No    Yumiko Heath RN

## 2018-08-24 NOTE — PROGRESS NOTES
F/U for abnormal gall bladder imaging on ct  pouchitis    S: Mr. Joseline Aviles was seen by me today during rounds. At this time, he is resting + comfortably. Ruq pain is about the same. New in the last week. The patient has no new complaints today. Please see admission consult for details of ROS; there are no other changes today. He is hungry and wants to eat. O: Blood pressure 109/66, pulse 70, temperature 97.8 °F (36.6 °C), resp. rate 17, height 6' (1.829 m), weight 106.9 kg (235 lb 10.8 oz), SpO2 100 %. Gen: Patient is in no acute distress. There is no jaundice. Lungs: Clear to auscultation bilaterally . Heart:+RRR. Abd: Soft, full ruq, slightly tender, non-distended, bowel sounds present. Extremities: Warm. Cross sectional imaging:  Ct shows no colitis and shows distended gall bladder with wall edema, the latter may be due to liver disease. Lab Results   Component Value Date/Time    WBC 5.3 08/24/2018 04:20 AM    HGB 11.6 (L) 08/24/2018 04:20 AM    HCT 35.6 (L) 08/24/2018 04:20 AM    PLATELET 639 (L) 33/78/6072 04:20 AM    MCV 89.9 08/24/2018 04:20 AM     Lab Results   Component Value Date/Time    Sodium 138 08/24/2018 04:20 AM    Potassium 4.0 08/24/2018 04:20 AM    Chloride 109 (H) 08/24/2018 04:20 AM    CO2 22 08/24/2018 04:20 AM    Anion gap 7 08/24/2018 04:20 AM    Glucose 83 08/24/2018 04:20 AM    BUN 6 08/24/2018 04:20 AM    Creatinine 0.74 08/24/2018 04:20 AM    BUN/Creatinine ratio 8 (L) 08/24/2018 04:20 AM    GFR est AA >60 08/24/2018 04:20 AM    GFR est non-AA >60 08/24/2018 04:20 AM    Calcium 8.0 (L) 08/24/2018 04:20 AM    Bilirubin, total 2.1 (H) 08/24/2018 04:20 AM    AST (SGOT) 96 (H) 08/24/2018 04:20 AM    Alk.  phosphatase 494 (H) 08/24/2018 04:20 AM    Protein, total 7.1 08/24/2018 04:20 AM    Albumin 2.3 (L) 08/24/2018 04:20 AM    Globulin 4.8 (H) 08/24/2018 04:20 AM    A-G Ratio 0.5 (L) 08/24/2018 04:20 AM    ALT (SGPT) 71 08/24/2018 04:20 AM         A: Principal Problem:    Pouchitis ([facility] [address] (12/8/2011)    Active Problems:    Primary sclerosing cholangitis (1/9/2011)      Iron deficiency anemia (2/18/2013)      Pyoderma (7/20/2018)      Crohn's disease with complication ([facility] [address] (5/30/7853)      Biliary obstruction (8/22/2018)      RLQ abdominal pain (8/23/2018)      LFT elevation (8/23/2018)        Comment:  Exam more consistent with a distended pouch than a gall bladder problem, but we need assessment. If he has abnormal gall bladder then consider [facility] surgery given severity of liver disease and their involvement in that aspect of his care.     P:  HIDA scan  gen surg consult    I will follow    Carlyle Castillo MD  9:56 AM  8/24/2018

## 2018-08-24 NOTE — PROGRESS NOTES
Bedside and Verbal shift change report given to Bia Dacosta RN (oncoming nurse) by Jamie Jaime (offgoing nurse). Report included the following information SBAR, Kardex, ED Summary, Intake/Output, MAR and Recent Results.

## 2018-08-25 VITALS
WEIGHT: 235.67 LBS | SYSTOLIC BLOOD PRESSURE: 125 MMHG | TEMPERATURE: 97.8 F | DIASTOLIC BLOOD PRESSURE: 84 MMHG | RESPIRATION RATE: 18 BRPM | BODY MASS INDEX: 31.92 KG/M2 | OXYGEN SATURATION: 98 % | HEIGHT: 72 IN | HEART RATE: 62 BPM

## 2018-08-25 LAB
ALBUMIN SERPL-MCNC: 2.3 G/DL (ref 3.5–5)
ALBUMIN/GLOB SERPL: 0.5 {RATIO} (ref 1.1–2.2)
ALP SERPL-CCNC: 483 U/L (ref 45–117)
ALT SERPL-CCNC: 63 U/L (ref 12–78)
ANION GAP SERPL CALC-SCNC: 8 MMOL/L (ref 5–15)
AST SERPL-CCNC: 104 U/L (ref 15–37)
BASOPHILS # BLD: 0 K/UL (ref 0–0.1)
BASOPHILS NFR BLD: 1 % (ref 0–1)
BILIRUB SERPL-MCNC: 1.7 MG/DL (ref 0.2–1)
BUN SERPL-MCNC: 6 MG/DL (ref 6–20)
BUN/CREAT SERPL: 9 (ref 12–20)
CALCIUM SERPL-MCNC: 8.1 MG/DL (ref 8.5–10.1)
CHLORIDE SERPL-SCNC: 109 MMOL/L (ref 97–108)
CO2 SERPL-SCNC: 23 MMOL/L (ref 21–32)
CREAT SERPL-MCNC: 0.7 MG/DL (ref 0.7–1.3)
DIFFERENTIAL METHOD BLD: ABNORMAL
EOSINOPHIL # BLD: 0.3 K/UL (ref 0–0.4)
EOSINOPHIL NFR BLD: 7 % (ref 0–7)
ERYTHROCYTE [DISTWIDTH] IN BLOOD BY AUTOMATED COUNT: 15.1 % (ref 11.5–14.5)
GLOBULIN SER CALC-MCNC: 4.6 G/DL (ref 2–4)
GLUCOSE SERPL-MCNC: 92 MG/DL (ref 65–100)
HCT VFR BLD AUTO: 33.8 % (ref 36.6–50.3)
HGB BLD-MCNC: 11.2 G/DL (ref 12.1–17)
IMM GRANULOCYTES # BLD: 0 K/UL (ref 0–0.04)
IMM GRANULOCYTES NFR BLD AUTO: 0 % (ref 0–0.5)
LYMPHOCYTES # BLD: 1.2 K/UL (ref 0.8–3.5)
LYMPHOCYTES NFR BLD: 26 % (ref 12–49)
MCH RBC QN AUTO: 29.3 PG (ref 26–34)
MCHC RBC AUTO-ENTMCNC: 33.1 G/DL (ref 30–36.5)
MCV RBC AUTO: 88.5 FL (ref 80–99)
MONOCYTES # BLD: 0.4 K/UL (ref 0–1)
MONOCYTES NFR BLD: 9 % (ref 5–13)
NEUTS SEG # BLD: 2.6 K/UL (ref 1.8–8)
NEUTS SEG NFR BLD: 57 % (ref 32–75)
NRBC # BLD: 0 K/UL (ref 0–0.01)
NRBC BLD-RTO: 0 PER 100 WBC
PLATELET # BLD AUTO: 112 K/UL (ref 150–400)
PMV BLD AUTO: 11.9 FL (ref 8.9–12.9)
POTASSIUM SERPL-SCNC: 3.6 MMOL/L (ref 3.5–5.1)
PROT SERPL-MCNC: 6.9 G/DL (ref 6.4–8.2)
RBC # BLD AUTO: 3.82 M/UL (ref 4.1–5.7)
SODIUM SERPL-SCNC: 140 MMOL/L (ref 136–145)
WBC # BLD AUTO: 4.6 K/UL (ref 4.1–11.1)

## 2018-08-25 PROCEDURE — 94760 N-INVAS EAR/PLS OXIMETRY 1: CPT

## 2018-08-25 PROCEDURE — 74011250637 HC RX REV CODE- 250/637: Performed by: INTERNAL MEDICINE

## 2018-08-25 PROCEDURE — 36415 COLL VENOUS BLD VENIPUNCTURE: CPT | Performed by: INTERNAL MEDICINE

## 2018-08-25 PROCEDURE — 85025 COMPLETE CBC W/AUTO DIFF WBC: CPT | Performed by: INTERNAL MEDICINE

## 2018-08-25 PROCEDURE — 74011250636 HC RX REV CODE- 250/636: Performed by: INTERNAL MEDICINE

## 2018-08-25 PROCEDURE — 80053 COMPREHEN METABOLIC PANEL: CPT | Performed by: INTERNAL MEDICINE

## 2018-08-25 RX ORDER — METRONIDAZOLE 500 MG/1
500 TABLET ORAL 3 TIMES DAILY
Qty: 42 TAB | Refills: 0 | Status: SHIPPED | OUTPATIENT
Start: 2018-08-25 | End: 2018-09-08

## 2018-08-25 RX ORDER — METRONIDAZOLE 250 MG/1
500 TABLET ORAL EVERY 6 HOURS
Status: DISCONTINUED | OUTPATIENT
Start: 2018-08-25 | End: 2018-08-25 | Stop reason: HOSPADM

## 2018-08-25 RX ADMIN — Medication 5 ML: at 06:00

## 2018-08-25 RX ADMIN — HEPARIN SODIUM 5000 UNITS: 5000 INJECTION INTRAVENOUS; SUBCUTANEOUS at 01:33

## 2018-08-25 RX ADMIN — METRONIDAZOLE 500 MG: 250 TABLET ORAL at 13:10

## 2018-08-25 RX ADMIN — METRONIDAZOLE 500 MG: 500 INJECTION, SOLUTION INTRAVENOUS at 04:26

## 2018-08-25 RX ADMIN — SODIUM CHLORIDE AND POTASSIUM CHLORIDE: 9; 1.49 INJECTION, SOLUTION INTRAVENOUS at 01:33

## 2018-08-25 NOTE — PROGRESS NOTES
Pt to discharge home today by private vehicle with family. Pt has no PT/OT needs at this time, no [facility] qualifying dx. CM specialist to assist with scheduling PCP f/u appointment. All information entered into pt AVS.     Pt has no additional CM needs at this time. Care Management Interventions  PCP Verified by CM: Yes  Mode of Transport at Discharge:  Other (see comment)  Transition of Care Consult (CM Consult): Discharge Planning  Discharge Durable Medical Equipment: No  Physical Therapy Consult: No  Occupational Therapy Consult: No  Speech Therapy Consult: No  Current Support Network: Lives with Spouse, Own Home  Confirm Follow Up Transport: Family  Plan discussed with Pt/Family/Caregiver: Yes  Discharge Location  Discharge Placement: Home with family assistance    Zoë Talavera MSW [employer], [address] [place] [address]  [phone]

## 2018-08-25 NOTE — DISCHARGE SUMMARY
Physician Discharge Summary     Patient ID:  Haylee Santoro  [id number]  88 y.o.  1975    Admit date: 8/22/2018    Discharge date and time: 8/25/2018    Admission Diagnoses: Biliary obstruction    Discharge Diagnoses:    Principal Diagnosis   Pouchitis ([facility] [address]                                             Other Diagnoses    Primary sclerosing cholangitis (1/9/2011)    Iron deficiency anemia (2/18/2013)    Pyoderma (7/20/2018)    Crohn's disease with complication ([address] (1/20/8620)    Biliary obstruction (8/22/2018)    RLQ abdominal pain (8/23/2018)    LFT elevation (8/23/2018)       Hospital Course:   Pouchitis / Pyoderma / RLQ abdominal pain - POA. Appreciate GI consult. HIDA 8/24 was normal.  Stool testing positive for C diff, though I am not sure this is an active infection. Nevertheless, continue his Rx for outpatient flagyl. He had no SIRS criteria.     Primary sclerosing cholangitis / Biliary obstruction / LFT elevation - POA, followed by [facility] and appears relatively stable.      Crohn's disease with complication - POA, long standing and post resection of large bowel.     Iron deficiency anemia / thrombocytopenia - POA due to poor absorption and loss. Monitor.     Obese - Manage per GI      PCP: Chilo Lux MD    Consults: GI and General Surgery    Significant Diagnostic Studies: See Hospital Course    Discharged home in improved condition.     Discharge Exam:   /81 (BP 1 Location: Right arm, BP Patient Position: At rest)    Pulse (!) 58    Temp 97.4 °F (36.3 °C)    Resp 18    Ht 6' (1.829 m)    Wt 106.9 kg (235 lb 10.8 oz)    SpO2 98%    BMI 31.96 kg/m2      Gen:  Well-developed, well-nourished, in no acute distress  HEENT:  Pink conjunctivae, PERRL, hearing intact to voice, moist mucous membranes  Neck:  Supple, without masses, thyroid non-tender  Resp:  No accessory muscle use, clear breath sounds without wheezes rales or rhonchi  Card:  No murmurs, normal S1, S2 without thrills, bruits or peripheral edema  Abd:  Soft, non-tender, non-distended, normoactive bowel sounds are present, no mass  Lymph:  No cervical or inguinal adenopathy  Musc:  No cyanosis or clubbing  Skin:  No rashes or ulcers, skin turgor is good  Neuro:  Cranial nerves are grossly intact, no focal motor weakness, follows commands appropriately  Psych:  Good insight, oriented to person, place and time, alert    Patient Instructions:   Current Discharge Medication List      CONTINUE these medications which have CHANGED    Details   metroNIDAZOLE (FLAGYL) 500 mg tablet Take 1 Tab by mouth three (3) times daily for 14 days. Indications: Clostridium difficile infection  Qty: 43 Tab, Refills: 0           Activity: Activity as tolerated  Diet: Resume previous diet  Wound Care: Reinforce dressing PRN and As directed    Follow-up with your PCP and GI in a few weeks.   Follow-up tests/labs - none    Signed:  Enid Jaramillo MD  8/25/2018  9:03 AM

## 2018-08-25 NOTE — PROGRESS NOTES
[facility]    Medical Progress Note      NAME: Carlotta Gaona   :  1975  MRM:  [id number]    Date/Time: 2018  8:59 AM       Assessment and Plan:     Pouchitis / Pyoderma / RLQ abdominal pain - POA. Appreciate GI consult. HIDA  was normal.  Stool testing positive for C diff, though I am not sure this is an active infection. Nevertheless, continue his Rx for outpatient flagyl. He had no SIRS criteria. Primary sclerosing cholangitis / Biliary obstruction / LFT elevation - POA, followed by [facility] and appears relatively stable. Crohn's disease with complication - POA, long standing and post resection of large bowel. Iron deficiency anemia / thrombocytopenia - POA due to poor absorption and loss. Monitor. Obese - Manage per GI        Subjective:     Chief Complaint:  Abd pain better, tolerated diet    ROS:  (bold if positive, if negative)    Abd Pain  Tolerating PT  Tolerating diet        Objective:     Last 24hrs VS reviewed since prior progress note.  Most recent are:    Visit Vitals    /81 (BP 1 Location: Right arm, BP Patient Position: At rest)    Pulse (!) 58    Temp 97.4 °F (36.3 °C)    Resp 18    Ht 6' (1.829 m)    Wt 106.9 kg (235 lb 10.8 oz)    SpO2 98%    BMI 31.96 kg/m2     SpO2 Readings from Last 6 Encounters:   18 98%   18 98%   18 95%   18 100%   18 100%   18 96%            Intake/Output Summary (Last 24 hours) at 18 0859  Last data filed at 18 0144   Gross per 24 hour   Intake          2401.66 ml   Output                0 ml   Net          2401.66 ml        Physical Exam:    Gen:  Well-developed, well-nourished, in no acute distress  HEENT:  Pink conjunctivae, PERRL, hearing intact to voice, moist mucous membranes  Neck:  Supple, without masses, thyroid non-tender  Resp:  No accessory muscle use, clear breath sounds without wheezes rales or rhonchi  Card:  No murmurs, normal S1, S2 without thrills, bruits or peripheral edema  Abd:  Soft, non-tender, non-distended, normoactive bowel sounds are present, no mass  Lymph:  No cervical or inguinal adenopathy  Musc:  No cyanosis or clubbing  Skin:  No rashes or ulcers, skin turgor is good  Neuro:  Cranial nerves are grossly intact, no focal motor weakness, follows commands appropriately  Psych:  Good insight, oriented to person, place and time, alert    Telemetry reviewed:   normal sinus rhythm  __________________________________________________________________  Medications Reviewed: (see below)  Medications:     Current Facility-Administered Medications   Medication Dose Route Frequency    metroNIDAZOLE (FLAGYL) tablet 500 mg  500 mg Oral Q6H    sodium chloride (NS) flush 5-10 mL  5-10 mL IntraVENous Q8H    sodium chloride (NS) flush 5-10 mL  5-10 mL IntraVENous PRN    0.9% sodium chloride with KCl 20 mEq/L infusion   IntraVENous CONTINUOUS    diphenhydrAMINE (BENADRYL) capsule 25 mg  25 mg Oral Q4H PRN    magnesium hydroxide (MILK OF MAGNESIA) 400 mg/5 mL oral suspension 30 mL  30 mL Oral DAILY PRN    heparin (porcine) injection 5,000 Units  5,000 Units SubCUTAneous Q8H    famotidine (PEPCID) tablet 20 mg  20 mg Oral BID        Lab Data Reviewed: (see below)  Lab Review:     Recent Labs      08/25/18 0427 08/24/18   0420 08/23/18   0315   WBC  4.6  5.3  5.0   HGB  11.2*  11.6*  11.3*   HCT  33.8*  35.6*  33.7*   PLT  112*  101*  109*     Recent Labs      08/25/18   0427 08/24/18   0420  08/23/18   0315   NA  140  138  140   K  3.6  4.0  3.5   CL  109*  109*  108   CO2  23  22  24   GLU  92  83  84   BUN  6  6  7   CREA  0.70  0.74  0.68*   CA  8.1*  8.0*  7.9*   ALB  2.3*  2.3*  2.4*   TBILI  1.7*  2.1*  2.2*   SGOT  104*  96*  107*   ALT  63  71  80*     Lab Results   Component Value Date/Time    Glucose (POC) 114 (H) 07/24/2018 02:30 AM     No results for input(s): PH, PCO2, PO2, HCO3, FIO2 in the last 72 hours.   No results for input(s): INR in the last 72 hours. No lab exists for component: INREXT, INREXT  All Micro Results     Procedure Component Value Units Date/Time    C. DIFFICILE AG & TOXIN A/B [[id number]]  (Abnormal) Collected:  08/23/18 0642    Order Status:  Completed Specimen:  Stool from Stool Updated:  08/24/18 1241     GDH ANTIGEN POSITIVE (A)        C. difficile toxin NEGATIVE         PCR Reflex         See Reflex order for C. difficile (DNA)     INTERPRETATION         Indeterminate for Toxigenic C. difficile, PCR confirmation to follow. (A)    C. DIFFICILE (DNA) [[id number]]  (Abnormal) Collected:  08/23/18 0642    Order Status:  Completed Specimen:  Stool Updated:  08/24/18 1241     C. difficile (DNA) POSITIVE (A)         This specimen is positive for toxigenic C difficile by DNA amplification. Repeat testing is not recommended, samples received within 7 days of this positive result will be rejected. Assay is not approved to test for cure, since nucleic acids may persist after effective treatment and may prompt false positive results. I have reviewed notes of prior 24hr.     Other pertinent lab: none    Total time spent with patient: [address] [place] [address] discussed with: Patient, Family, Nursing Staff, Consultant/Specialist and >50% of time spent in counseling and coordination of care    Discussed:  Care Plan and D/C Planning    Prophylaxis:  H2B/PPI    Disposition:  Home w/Family           ___________________________________________________    Attending Physician: Aneta Burt MD

## 2018-08-25 NOTE — PROGRESS NOTES
Late entry. Surgery Consult  Consulted by: Dr. Andrzej Nam. Thank you. PCP: Monica Daniel MD    History and data reviewed. Pt interviewed/examined. Impression:  HIDA WNL. No evidence fo cholecystitis. Mild GB wall thickening and mildly elevated bilirubin likely related to his PSC. Plan:  No surgical indications. Explained typical symptoms to him and told him to call our office if these develop. Thanks.   Sweetie Granger MD

## 2018-08-25 NOTE — PROGRESS NOTES
F/U for abdominal pain, distended gb on ct    S: Mr. Haylee Santoro was seen by me today during rounds. At this time, he is resting +comfortably. The patient has + new complaints today. ++ right lower abdominal pain, started after iv infustion today. Please see admission consult for details of ROS; there are no other changes today. Pain better when I re examined at 10:36 AM     O: Blood pressure 134/81, pulse (!) 58, temperature 97.4 °F (36.3 °C), resp. rate 18, height 6' (1.829 m), weight 106.9 kg (235 lb 10.8 oz), SpO2 98 %. Gen: Patient is in no acute distress. There is no jaundice. Lungs: Clear to auscultation bilaterally . Heart:+RRR. Abd: Soft, + rluq tender  Less so at 10:36 AM  No rebound, non-distended, bowel sounds present. Extremities: Warm. Cross sectional imaging:  Hida:  FINDINGS:  The initial images demonstrate prompt hepatic tracer uptake. The common bile  duct is visualized at 8 minutes. The gallbladder is visualized at 16 minutes. It  demonstrates progressive filling. The duodenum is visualized at 17 minutes.     Post CCK images demonstrate appropriate gallbladder contraction. Calculated  ejection fraction between 0 and 30 minutes is 100 %.    IMPRESSION  IMPRESSION:  Normal gallbladder hepatobiliary imaging study.     A: Principal Problem:    Pouchitis ([address] (12/8/2011)    Active Problems:    Primary sclerosing cholangitis (1/9/2011)      Iron deficiency anemia (2/18/2013)      Pyoderma (7/20/2018)      Crohn's disease with complication ([address] (3/47/4256)      Biliary obstruction (8/22/2018)      RLQ abdominal pain (8/23/2018)      LFT elevation (8/23/2018)        Comment:  No evidence of gall baldder disease  P:  I am ok with discharge plans    Overall plan:  1) resume his metronidazole   2) [facility] and ileostomy  3) If he declines that we will consider biologics for his pouchitis and related Ronni Maurice MD  9:59 AM  8/25/2018

## 2018-08-25 NOTE — PROGRESS NOTES
General Surgery End of Shift Nursing Note    Bedside shift change report given to Victor Manuel PRAKASH, Kevyn Le RN (oncoming nurse) by Liz Cline RN (offgoing nurse). Report included the following information SBAR, Kardex, Intake/Output, MAR and Recent Results. Shift worked:   7p-7a   Summary of shift:   observed resting with eyes closed on most rounds, offered no complaints   Issues for physician to address:   none     Number times ambulated in hallway past shift: 0    Number of times OOB to chair past shift: 0    Pain Management:  Current medication:   Patient states pain is manageable on current pain medication: YES    GI:    Current diet:  DIET GI LITE (POST SURGICAL)    Tolerating current diet: YES  Passing flatus: YES  Last Bowel Movement:    Appearance:    Respiratory:    Incentive Spirometer at bedside: NO  Patient instructed on use: NO    Patient Safety:    Falls Score: 2  Bed Alarm On? No  Sitter?  No    Kelly Bagley RN

## 2018-08-25 NOTE — DISCHARGE INSTRUCTIONS
Patient Discharge Instructions    Yudi Hanna / [id number] : 1975    Admitted 2018 Discharged: 2018     Primary Diagnoses  Problem List as of 2018  Date Reviewed: 2018          Codes Class Noted - Resolved   RLQ abdominal pain   LFT elevation   Biliary obstruction   Pyoderma   Crohn's disease with complication (HCC)   Iron deficiency anemia   * (Principal)Pouchitis (HCC) (Chronic)   Primary sclerosing cholangitis (Chronic)          Take Home Medications     · It is important that you take the medication exactly as they are prescribed. · Keep your medication in the bottles provided by the pharmacist and keep a list of the medication names, dosages, and times to be taken in your wallet. · Do not take other medications without consulting your doctor. What to do at [address]: Resume previous diet    Recommended activity: Activity as tolerated    If you experience worse symptoms, please follow up with your PCP or GI specialist.    Follow-up with your PCP in a few weeks        Information obtained by :  I understand that if any problems occur once I am at home I am to contact my physician. I understand and acknowledge receipt of the instructions indicated above.                                                                                                                                            Physician's or R.N.'s Signature                                                                  Date/Time                                                                                                                                              Patient or Representative Signature                                                          Date/Time

## 2018-08-25 NOTE — PROGRESS NOTES
Problem: Falls - Risk of  Goal: *Absence of Falls  Document Steve Fall Risk and appropriate interventions in the flowsheet.    Outcome: Progressing Towards Goal  Fall Risk Interventions:            Medication Interventions: Patient to call before getting OOB                  Comments: Oriented    GI lite    8/25 BM    Voids    LAC NS with 20k @ 100

## 2018-09-13 ENCOUNTER — ANESTHESIA (OUTPATIENT)
Dept: ENDOSCOPY | Age: 43
End: 2018-09-13
Payer: OTHER GOVERNMENT

## 2018-09-13 ENCOUNTER — HOSPITAL ENCOUNTER (OUTPATIENT)
Age: 43
Setting detail: OUTPATIENT SURGERY
Discharge: HOME | End: 2018-09-13
Attending: SPECIALIST | Admitting: SPECIALIST
Payer: OTHER GOVERNMENT

## 2018-09-13 ENCOUNTER — ANESTHESIA EVENT (OUTPATIENT)
Dept: ENDOSCOPY | Age: 43
End: 2018-09-13
Payer: OTHER GOVERNMENT

## 2018-09-13 VITALS
TEMPERATURE: 98.2 F | RESPIRATION RATE: 16 BRPM | HEIGHT: 72 IN | BODY MASS INDEX: 31.03 KG/M2 | DIASTOLIC BLOOD PRESSURE: 76 MMHG | SYSTOLIC BLOOD PRESSURE: 109 MMHG | WEIGHT: 229.06 LBS | HEART RATE: 76 BPM | OXYGEN SATURATION: 100 %

## 2018-09-13 PROCEDURE — 76060000031 HC ANESTHESIA FIRST 0.5 HR: Performed by: SPECIALIST

## 2018-09-13 PROCEDURE — 88305 TISSUE EXAM BY PATHOLOGIST: CPT | Performed by: SPECIALIST

## 2018-09-13 PROCEDURE — 74011250636 HC RX REV CODE- 250/636

## 2018-09-13 PROCEDURE — 76040000019: Performed by: SPECIALIST

## 2018-09-13 PROCEDURE — 77030009426 HC FCPS BIOP ENDOSC BSC -B: Performed by: SPECIALIST

## 2018-09-13 PROCEDURE — 74011250636 HC RX REV CODE- 250/636: Performed by: SPECIALIST

## 2018-09-13 RX ORDER — SAME BUTANEDISULFONATE/BETAINE 400-600 MG
250 POWDER IN PACKET (EA) ORAL 2 TIMES DAILY
Qty: 60 CAP | Refills: 1 | Status: SHIPPED | OUTPATIENT
Start: 2018-09-13 | End: 2018-11-12

## 2018-09-13 RX ORDER — SODIUM CHLORIDE 0.9 % (FLUSH) 0.9 %
5-10 SYRINGE (ML) INJECTION EVERY 8 HOURS
Status: DISCONTINUED | OUTPATIENT
Start: 2018-09-13 | End: 2018-09-13 | Stop reason: HOSPADM

## 2018-09-13 RX ORDER — LIDOCAINE HYDROCHLORIDE 20 MG/ML
INJECTION, SOLUTION EPIDURAL; INFILTRATION; INTRACAUDAL; PERINEURAL AS NEEDED
Status: DISCONTINUED | OUTPATIENT
Start: 2018-09-13 | End: 2018-09-13 | Stop reason: HOSPADM

## 2018-09-13 RX ORDER — EPINEPHRINE 0.1 MG/ML
1 INJECTION INTRACARDIAC; INTRAVENOUS
Status: DISCONTINUED | OUTPATIENT
Start: 2018-09-13 | End: 2018-09-13 | Stop reason: HOSPADM

## 2018-09-13 RX ORDER — FLUMAZENIL 0.1 MG/ML
0.2 INJECTION INTRAVENOUS
Status: DISCONTINUED | OUTPATIENT
Start: 2018-09-13 | End: 2018-09-13 | Stop reason: HOSPADM

## 2018-09-13 RX ORDER — NALOXONE HYDROCHLORIDE 0.4 MG/ML
0.4 INJECTION, SOLUTION INTRAMUSCULAR; INTRAVENOUS; SUBCUTANEOUS
Status: DISCONTINUED | OUTPATIENT
Start: 2018-09-13 | End: 2018-09-13 | Stop reason: HOSPADM

## 2018-09-13 RX ORDER — DEXTROMETHORPHAN/PSEUDOEPHED 2.5-7.5/.8
1.2 DROPS ORAL
Status: DISCONTINUED | OUTPATIENT
Start: 2018-09-13 | End: 2018-09-13 | Stop reason: HOSPADM

## 2018-09-13 RX ORDER — SODIUM CHLORIDE 9 MG/ML
50 INJECTION, SOLUTION INTRAVENOUS CONTINUOUS
Status: DISCONTINUED | OUTPATIENT
Start: 2018-09-13 | End: 2018-09-13 | Stop reason: HOSPADM

## 2018-09-13 RX ORDER — SODIUM CHLORIDE 0.9 % (FLUSH) 0.9 %
5-10 SYRINGE (ML) INJECTION AS NEEDED
Status: DISCONTINUED | OUTPATIENT
Start: 2018-09-13 | End: 2018-09-13 | Stop reason: HOSPADM

## 2018-09-13 RX ORDER — PROPOFOL 10 MG/ML
INJECTION, EMULSION INTRAVENOUS AS NEEDED
Status: DISCONTINUED | OUTPATIENT
Start: 2018-09-13 | End: 2018-09-13 | Stop reason: HOSPADM

## 2018-09-13 RX ORDER — ATROPINE SULFATE 0.1 MG/ML
0.5 INJECTION INTRAVENOUS
Status: DISCONTINUED | OUTPATIENT
Start: 2018-09-13 | End: 2018-09-13 | Stop reason: HOSPADM

## 2018-09-13 RX ADMIN — PROPOFOL 100 MG: 10 INJECTION, EMULSION INTRAVENOUS at 12:41

## 2018-09-13 RX ADMIN — PROPOFOL 50 MG: 10 INJECTION, EMULSION INTRAVENOUS at 12:45

## 2018-09-13 RX ADMIN — SODIUM CHLORIDE 50 ML/HR: 900 INJECTION, SOLUTION INTRAVENOUS at 11:12

## 2018-09-13 RX ADMIN — LIDOCAINE HYDROCHLORIDE 100 MG: 20 INJECTION, SOLUTION EPIDURAL; INFILTRATION; INTRACAUDAL; PERINEURAL at 12:41

## 2018-09-13 NOTE — ANESTHESIA PREPROCEDURE EVALUATION
Anesthetic History No history of anesthetic complications Review of Systems / Medical History Patient summary reviewed, nursing notes reviewed and pertinent labs reviewed Pulmonary Within defined limits Neuro/Psych Within defined limits Cardiovascular Within defined limits Exercise tolerance: >4 METS 
  
GI/Hepatic/Renal 
  
 
 
 
Liver disease (Primary Sclerosing Cholangitis; stable) Comments: Crohn's & Ulcerative colitis  Endo/Other Obesity, arthritis and anemia (iron def) Other Findings Comments: PSC (primary sclerosing cholangitis) Crohn's disease LFT elevation H/O Clostridium difficile infection Physical Exam 
 
Airway Mallampati: I 
TM Distance: > 6 cm Neck ROM: normal range of motion Mouth opening: Normal 
 
 Cardiovascular Rhythm: regular Rate: normal 
 
 
Pertinent negatives: No murmur Dental 
 
Dentition: Lower dentition intact and Upper dentition intact Pulmonary Breath sounds clear to auscultation Abdominal 
GI exam deferred Other Findings Anesthetic Plan ASA: 2 Anesthesia type: total IV anesthesia Induction: Intravenous Anesthetic plan and risks discussed with: Patient

## 2018-09-13 NOTE — IP AVS SNAPSHOT
[address] [facility] 
[phone] Patient: Carlotta Gaona MRN: [id number] :1975 About your hospitalization You were admitted on:  2018 You last received care in the:  [facility] ENDOSCOPY You were discharged on:  2018 Why you were hospitalized Your primary diagnosis was:  Not on File Your diagnoses also included:  Pouchitis (Hcc), Crohn's Disease With Complication (Hcc) Follow-up Information None Discharge Orders None A check karthik indicates which time of day the medication should be taken. My Medications START taking these medications Instructions Each Dose to Equal  
 Morning Noon Evening Bedtime Saccharomyces boulardii 250 mg capsule Commonly known as:  Entellium Your last dose was: Your next dose is: Take 1 Cap by mouth two (2) times a day for 60 days. 250 mg Where to Get Your Medications Information on where to get these meds will be given to you by the nurse or doctor. ! Ask your nurse or doctor about these medications Saccharomyces boulardii 250 mg capsule Discharge Instructions Carlotta Gaona [id number] 
1975 Procedure  Discharge Instructions:   
 
Discomfort: 
Redness at IV site- apply warm compress to area; if redness or soreness persist- contact your physician There may be a slight amount of blood passed from the rectum Gaseous discomfort- walking, belching will help relieve any discomfort You may not operate a vehicle for 12 hours You may not engage in an occupation involving machinery or appliances for rest of today You may not drink alcoholic beverages for at least 12 hours Avoid making any critical decisions for at least 24 hour DIET: 
 You may resume your normal diet today.   You should not overeat or \"feast\" today as your abdomen may become distended or uncomfortable. MEDICATIONS: 
 I reconciled this list from the list you gave us when you came today for the procedure. Please clarify with me, your primary care physician and the nurse who is discharging you if we have any discrepancies. Aspirin and or non-steroidal medication (Ibuprofen, Motrin, naproxen, etc.) is ok in limited quantities. ACTIVITY: 
You may resume your normal daily activities it is recommended that you spend the remainder of the day resting -  avoid any strenuous activity. CALL M.D. ANY SIGN OF: Increasing pain, nausea, vomiting Abdominal distension (swelling) New increased bleeding (oral or rectal) Fever (chills) Follow-up Instructions: 
 Call Dr. Santi Diop for the results of  biopsy in approximately one week Telephone #  [phone] Follow up visit as previously scheduled. I faxed a copy of the report to Dr He Nielson. I want you two to get to know each other but the pouch looks the best I have seen. Yajaira Fall MD 
12:59 PM 
9/13/2018 Introducing [facility]! Dear Sommer Sibley: Thank you for requesting a [employer] account. Our records indicate that you already have an active [employer] account. You can access your account anytime at [email or web address] Did you know that you can access your hospital and ER discharge instructions at any time in [employer]? You can also review all of your test results from your hospital stay or ER visit. Additional Information If you have questions, please visit the Frequently Asked Questions section of the [employer] website at [email or web address] Remember, [employer] is NOT to be used for urgent needs. For medical emergencies, dial 911. Now available from your iPhone and Android! Introducing Simone Marks As a [facility] patient, I wanted to make you aware of our electronic visit tool called Simone [email or web address]. [employer] 24/7 allows you to connect within minutes with a medical provider 24 hours a day, seven days a week via a mobile device or tablet or logging into a secure website from your computer. You can access [employer] from anywhere in the [place]. A virtual visit might be right for you when you have a simple condition and feel like you just dont want to get out of bed, or cant get away from work for an appointment, when your regular [employer] provider is not available (evenings, weekends or holidays), or when youre out of town and need minor care. Electronic visits cost only $49 and if the [employer] 24/7 provider determines a prescription is needed to treat your condition, one can be electronically transmitted to a nearby pharmacy*. Please take a moment to enroll today if you have not already done so. The enrollment process is free and takes just a few minutes. To enroll, please download the [employer] link to your tablet or phone, or visit [email or web address] org to enroll on your computer. And, as an [address] [place] [address] patient with a "[employer] account, the results of your visits will be scanned into your electronic medical record and your primary care provider will be able to view the scanned results. We urge you to continue to see your regular [employer] provider for your ongoing medical care. And while your primary care provider may not be the one available when you seek a [employer] virtual visit, the peace of mind you get from getting a real diagnosis real time can be priceless. For more information on TriPlay, view our Frequently Asked Questions (FAQs) at [email or web address] org. Sincerely, 
 
Cecil Cazares MD 
Chief Medical Officer [address] *:  certain medications cannot be prescribed via [employer] Providers Seen During Your Hospitalization Provider Specialty Primary office phone Lashawn Link MD Gastroenterology [phone] Your Primary Care Physician (PCP) Primary Care Physician Office Phone Office Fax Vito Prieto [phone] You are allergic to the following Allergen Reactions Cafergot (Ergotamine-Caffeine) Hives Recent Documentation Height Weight BMI Smoking Status 1.829 m 103.9 kg 31.07 kg/m2 Never Smoker Emergency Contacts Name Discharge Info Relation Home Work Mobile Patty Reece DISCHARGE CAREGIVER [3] Spouse [3] [phone] Patient Belongings The following personal items are in your possession at time of discharge: 
  Dental Appliances: None  Visual Aid: Contacts, With patient Please provide this summary of care documentation to your next provider. Signatures-by signing, you are acknowledging that this After Visit Summary has been reviewed with you and you have received a copy. Patient Signature:  ____________________________________________________________ Date:  ____________________________________________________________  
  
[facility] Signature:  ____________________________________________________________ Date:  ____________________________________________________________

## 2018-09-13 NOTE — H&P
Pre-endoscopy H and P    The patient was seen and examined in the endoscopy suite. The airway was assessed and docuemented. The problem list, past medical history, and medications were reviewed. Patient Active Problem List   Diagnosis Code    Primary sclerosing cholangitis K83.0    Pouchitis (HCC) K91.850    Iron deficiency anemia D50.9    Pyoderma L08.0    Crohn's disease with complication ([facility] [address] P93.190    Biliary obstruction K83.1    RLQ abdominal pain R10.31    LFT elevation R79.89     Social History     Social History    Marital status:      Spouse name: N/A    Number of children: 3    Years of education: N/A     Occupational History          Social History Main Topics    Smoking status: Never Smoker    Smokeless tobacco: Never Used    Alcohol use No    Drug use: No    Sexual activity: Yes     Partners: Female     Birth control/ protection: Condom     Other Topics Concern    Not on file     Social History Narrative     Past Medical History:   Diagnosis Date    Arthritis     all joints and spine    Chest pain 2/18/2013    as of 10/14/14 pt denies any CP since 2/13    Crohn's colitis ([facility] [address]     Dr. Morris Quevedo H/O Clostridium difficile infection 01/2017    as of 3/30/17 pt denies abd pain, diarrhea > 1 week    Iron deficiency anemia 2/18/2013    Pancreatitis     Polyarteritis nodosa ([facility] [address]     Pouchitis ([facility] [address] 12/8/2011    PSC (primary sclerosing cholangitis)     Dr Dante Evans [phone]    Pyoderma gangrenosum      The patient has a family history of na    None           The review of systems is:  negative for shortness of breath or chest pain   He finished flagykl for c diff. If pouchitis is present then we are considering aggressive Rx     The heart, lungs, and mental status were satisfactory for the administration of anesthesia sedation and for the procedure.       I discussed with the patient the objectives, risks, consequences and alternatives to the procedure.       Cierra Monroe MD  9/13/2018  12:39 PM

## 2018-09-13 NOTE — DISCHARGE INSTRUCTIONS
Fiona Gandhi  [id number]  1975              Procedure  Discharge Instructions:      Discomfort:  Redness at IV site- apply warm compress to area; if redness or soreness persist- contact your physician  There may be a slight amount of blood passed from the rectum  Gaseous discomfort- walking, belching will help relieve any discomfort  You may not operate a vehicle for 12 hours  You may not engage in an occupation involving machinery or appliances for rest of today  You may not drink alcoholic beverages for at least 12 hours  Avoid making any critical decisions for at least 24 hour  DIET:   You may resume your normal diet today. You should not overeat or \"feast\" today as your abdomen may become distended or uncomfortable. MEDICATIONS:   I reconciled this list from the list you gave us when you came today for the procedure. Please clarify with me, your primary care physician and the nurse who is discharging you if we have any discrepancies. Aspirin and or non-steroidal medication (Ibuprofen, Motrin, naproxen, etc.) is ok in limited quantities. ACTIVITY:  You may resume your normal daily activities it is recommended that you spend the remainder of the day resting -  avoid any strenuous activity. CALL M.D. ANY SIGN OF:  Increasing pain, nausea, vomiting  Abdominal distension (swelling)  New increased bleeding (oral or rectal)  Fever (chills)          Follow-up Instructions:   Call Dr. Ayde Griffin for the results of  biopsy in approximately one week  Telephone #  [phone]  Follow up visit as previously scheduled. I faxed a copy of the report to Dr Eliana Chavis. I want you two to get to know each other but the pouch looks the best I have seen.       Rasheed Schneider MD  12:59 PM  9/13/2018

## 2018-09-13 NOTE — ANESTHESIA POSTPROCEDURE EVALUATION
Post-Anesthesia Evaluation and Assessment Patient: Fiona Gandhi MRN: [id number]  SSN: [id number] YOB: 1975  Age: 38 y.o. Sex: male Cardiovascular Function/Vital Signs Visit Vitals  BP (!) 88/61  Pulse 60  Temp 36.8 °C (98.3 °F)  Resp 14  
 Ht 6' (1.829 m)  Wt 103.9 kg (229 lb 1 oz)  SpO2 97%  BMI 31.07 kg/m2 Patient is status post total IV anesthesia anesthesia for Procedure(s): 
COLONOSCOPY 
COLON BIOPSY. Nausea/Vomiting: None Postoperative hydration reviewed and adequate. Pain: 
Pain Scale 1: Numeric (0 - 10) (09/13/18 1255) Pain Intensity 1: 0 (09/13/18 1255) Managed Neurological Status: At baseline Mental Status and Level of Consciousness: Arousable Pulmonary Status:  
O2 Device: CO2 nasal cannula (09/13/18 1255) Adequate oxygenation and airway patent Complications related to anesthesia: None Post-anesthesia assessment completed. No concerns Signed By: Shreya Khan MD   
 September 13, 2018

## 2018-09-13 NOTE — PROCEDURES
Ileal pouchoscopy/ ileoscopy proximal to pouch    Indications: pouchitis for follow up  Recent c diff    Pre-operative Diagnosis: see above     Medications:  See anesthesia form    Post-operative Diagnosis:  Negative exam of pouch and ileum      Procedure Details   Prior to the procedure its objectives, risks, consequences and alternatives were discussed with the patient who then elected to proceed. All questions were answered. Digital Rectal Exam:  was normal     The Olympus videocolonoscope was inserted in the rectum and advanced to the small intestine. The small intestine mucosa was normal.  I took biopsies. The colonoscope was slowly and carefully withdrawn as the mucosa was inspected. No abnormalities were noted. Retroflexion in the rectum was negative. I took pouch biopsies. Photos were obtained. The preparation was adequate      Estimated Blood Loss:  none    Specimens:  Ileum  pouch    Findings:  Negative exam, no pouchitis and no c diff        Complications:  none    Repeat colonoscopy is recommended in:  Two years .                Kenji Harrison MD  12:55 PM  9/13/2018

## 2018-12-31 ENCOUNTER — APPOINTMENT (OUTPATIENT)
Dept: CT IMAGING | Age: 43
End: 2018-12-31
Attending: EMERGENCY MEDICINE
Payer: OTHER GOVERNMENT

## 2018-12-31 ENCOUNTER — HOSPITAL ENCOUNTER (EMERGENCY)
Age: 43
Discharge: HOME | End: 2018-12-31
Attending: EMERGENCY MEDICINE
Payer: OTHER GOVERNMENT

## 2018-12-31 VITALS
HEART RATE: 83 BPM | WEIGHT: 235.89 LBS | DIASTOLIC BLOOD PRESSURE: 69 MMHG | OXYGEN SATURATION: 98 % | BODY MASS INDEX: 31.26 KG/M2 | SYSTOLIC BLOOD PRESSURE: 117 MMHG | RESPIRATION RATE: 16 BRPM | HEIGHT: 73 IN | TEMPERATURE: 97.5 F

## 2018-12-31 DIAGNOSIS — R10.32 ABDOMINAL PAIN, LLQ (LEFT LOWER QUADRANT): Primary | ICD-10-CM

## 2018-12-31 DIAGNOSIS — N12 PYELONEPHRITIS: ICD-10-CM

## 2018-12-31 LAB
ALBUMIN SERPL-MCNC: 2.6 G/DL (ref 3.5–5)
ALBUMIN/GLOB SERPL: 0.5 {RATIO} (ref 1.1–2.2)
ALP SERPL-CCNC: 583 U/L (ref 45–117)
ALT SERPL-CCNC: 83 U/L (ref 12–78)
ANION GAP SERPL CALC-SCNC: 8 MMOL/L (ref 5–15)
APPEARANCE UR: ABNORMAL
AST SERPL-CCNC: 126 U/L (ref 15–37)
BACTERIA URNS QL MICRO: ABNORMAL /HPF
BASOPHILS # BLD: 0.1 K/UL (ref 0–0.1)
BASOPHILS NFR BLD: 2 % (ref 0–1)
BILIRUB SERPL-MCNC: 1.4 MG/DL (ref 0.2–1)
BILIRUB UR QL: NEGATIVE
BUN SERPL-MCNC: 7 MG/DL (ref 6–20)
BUN/CREAT SERPL: 8 (ref 12–20)
CALCIUM SERPL-MCNC: 8.5 MG/DL (ref 8.5–10.1)
CHLORIDE SERPL-SCNC: 106 MMOL/L (ref 97–108)
CO2 SERPL-SCNC: 25 MMOL/L (ref 21–32)
COLOR UR: ABNORMAL
CREAT SERPL-MCNC: 0.84 MG/DL (ref 0.7–1.3)
DIFFERENTIAL METHOD BLD: ABNORMAL
EOSINOPHIL # BLD: 1 K/UL (ref 0–0.4)
EOSINOPHIL NFR BLD: 16 % (ref 0–7)
EPITH CASTS URNS QL MICRO: ABNORMAL /LPF
ERYTHROCYTE [DISTWIDTH] IN BLOOD BY AUTOMATED COUNT: 15.1 % (ref 11.5–14.5)
GLOBULIN SER CALC-MCNC: 5.7 G/DL (ref 2–4)
GLUCOSE SERPL-MCNC: 94 MG/DL (ref 65–100)
GLUCOSE UR STRIP.AUTO-MCNC: NEGATIVE MG/DL
HCT VFR BLD AUTO: 36.7 % (ref 36.6–50.3)
HGB BLD-MCNC: 12.2 G/DL (ref 12.1–17)
HGB UR QL STRIP: NEGATIVE
HYALINE CASTS URNS QL MICRO: ABNORMAL /LPF (ref 0–5)
IMM GRANULOCYTES # BLD: 0 K/UL (ref 0–0.04)
IMM GRANULOCYTES NFR BLD AUTO: 0 % (ref 0–0.5)
KETONES UR QL STRIP.AUTO: NEGATIVE MG/DL
LACTATE SERPL-SCNC: 1.1 MMOL/L (ref 0.4–2)
LEUKOCYTE ESTERASE UR QL STRIP.AUTO: ABNORMAL
LIPASE SERPL-CCNC: 225 U/L (ref 73–393)
LYMPHOCYTES # BLD: 2.1 K/UL (ref 0.8–3.5)
LYMPHOCYTES NFR BLD: 33 % (ref 12–49)
MCH RBC QN AUTO: 29.9 PG (ref 26–34)
MCHC RBC AUTO-ENTMCNC: 33.2 G/DL (ref 30–36.5)
MCV RBC AUTO: 90 FL (ref 80–99)
MONOCYTES # BLD: 0.1 K/UL (ref 0–1)
MONOCYTES NFR BLD: 2 % (ref 5–13)
NEUTS SEG # BLD: 3.2 K/UL (ref 1.8–8)
NEUTS SEG NFR BLD: 47 % (ref 32–75)
NITRITE UR QL STRIP.AUTO: POSITIVE
NRBC # BLD: 0 K/UL (ref 0–0.01)
NRBC BLD-RTO: 0 PER 100 WBC
PH UR STRIP: 6 [PH] (ref 5–8)
PLATELET # BLD AUTO: 142 K/UL (ref 150–400)
PMV BLD AUTO: 11.8 FL (ref 8.9–12.9)
POTASSIUM SERPL-SCNC: 3.6 MMOL/L (ref 3.5–5.1)
PROT SERPL-MCNC: 8.3 G/DL (ref 6.4–8.2)
PROT UR STRIP-MCNC: NEGATIVE MG/DL
RBC # BLD AUTO: 4.08 M/UL (ref 4.1–5.7)
RBC #/AREA URNS HPF: ABNORMAL /HPF (ref 0–5)
RBC MORPH BLD: ABNORMAL
SODIUM SERPL-SCNC: 139 MMOL/L (ref 136–145)
SP GR UR REFRACTOMETRY: 1.01 (ref 1–1.03)
UROBILINOGEN UR QL STRIP.AUTO: 1 EU/DL (ref 0.2–1)
WBC # BLD AUTO: 6.5 K/UL (ref 4.1–11.1)
WBC URNS QL MICRO: ABNORMAL /HPF (ref 0–4)

## 2018-12-31 PROCEDURE — 80053 COMPREHEN METABOLIC PANEL: CPT

## 2018-12-31 PROCEDURE — 83690 ASSAY OF LIPASE: CPT

## 2018-12-31 PROCEDURE — 96374 THER/PROPH/DIAG INJ IV PUSH: CPT

## 2018-12-31 PROCEDURE — 81001 URINALYSIS AUTO W/SCOPE: CPT

## 2018-12-31 PROCEDURE — 83605 ASSAY OF LACTIC ACID: CPT

## 2018-12-31 PROCEDURE — 87493 C DIFF AMPLIFIED PROBE: CPT

## 2018-12-31 PROCEDURE — 36415 COLL VENOUS BLD VENIPUNCTURE: CPT

## 2018-12-31 PROCEDURE — 96376 TX/PRO/DX INJ SAME DRUG ADON: CPT

## 2018-12-31 PROCEDURE — 87449 NOS EACH ORGANISM AG IA: CPT

## 2018-12-31 PROCEDURE — 99283 EMERGENCY DEPT VISIT LOW MDM: CPT

## 2018-12-31 PROCEDURE — 96361 HYDRATE IV INFUSION ADD-ON: CPT

## 2018-12-31 PROCEDURE — 96375 TX/PRO/DX INJ NEW DRUG ADDON: CPT

## 2018-12-31 PROCEDURE — 74011000250 HC RX REV CODE- 250: Performed by: EMERGENCY MEDICINE

## 2018-12-31 PROCEDURE — 74011250636 HC RX REV CODE- 250/636: Performed by: EMERGENCY MEDICINE

## 2018-12-31 PROCEDURE — 74177 CT ABD & PELVIS W/CONTRAST: CPT

## 2018-12-31 PROCEDURE — 74011636320 HC RX REV CODE- 636/320: Performed by: EMERGENCY MEDICINE

## 2018-12-31 PROCEDURE — 85025 COMPLETE CBC W/AUTO DIFF WBC: CPT

## 2018-12-31 RX ORDER — FENTANYL CITRATE 50 UG/ML
50 INJECTION, SOLUTION INTRAMUSCULAR; INTRAVENOUS
Status: COMPLETED | OUTPATIENT
Start: 2018-12-31 | End: 2018-12-31

## 2018-12-31 RX ORDER — SODIUM CHLORIDE 0.9 % (FLUSH) 0.9 %
10 SYRINGE (ML) INJECTION
Status: COMPLETED | OUTPATIENT
Start: 2018-12-31 | End: 2018-12-31

## 2018-12-31 RX ORDER — HYDROCODONE BITARTRATE AND ACETAMINOPHEN 5; 325 MG/1; MG/1
1 TABLET ORAL
Qty: 20 TAB | Refills: 0 | Status: SHIPPED | OUTPATIENT
Start: 2018-12-31 | End: 2019-01-18

## 2018-12-31 RX ORDER — ONDANSETRON 2 MG/ML
4 INJECTION INTRAMUSCULAR; INTRAVENOUS
Status: COMPLETED | OUTPATIENT
Start: 2018-12-31 | End: 2018-12-31

## 2018-12-31 RX ORDER — CEPHALEXIN 500 MG/1
500 CAPSULE ORAL 3 TIMES DAILY
Qty: 21 CAP | Refills: 0 | Status: SHIPPED | OUTPATIENT
Start: 2018-12-31 | End: 2019-01-07

## 2018-12-31 RX ORDER — ONDANSETRON 4 MG/1
4 TABLET, ORALLY DISINTEGRATING ORAL
Qty: 10 TAB | Refills: 0 | Status: SHIPPED | OUTPATIENT
Start: 2018-12-31 | End: 2019-01-18

## 2018-12-31 RX ORDER — SODIUM CHLORIDE 9 MG/ML
50 INJECTION, SOLUTION INTRAVENOUS
Status: COMPLETED | OUTPATIENT
Start: 2018-12-31 | End: 2018-12-31

## 2018-12-31 RX ADMIN — IOPAMIDOL 100 ML: 755 INJECTION, SOLUTION INTRAVENOUS at 09:10

## 2018-12-31 RX ADMIN — FENTANYL CITRATE 50 MCG: 50 INJECTION, SOLUTION INTRAMUSCULAR; INTRAVENOUS at 06:39

## 2018-12-31 RX ADMIN — SODIUM CHLORIDE 50 ML/HR: 900 INJECTION, SOLUTION INTRAVENOUS at 09:10

## 2018-12-31 RX ADMIN — Medication 10 ML: at 09:10

## 2018-12-31 RX ADMIN — DIATRIZOATE MEGLUMINE AND DIATRIZOATE SODIUM 30 ML: 660; 100 LIQUID ORAL; RECTAL at 08:06

## 2018-12-31 RX ADMIN — FAMOTIDINE 20 MG: 10 INJECTION, SOLUTION INTRAVENOUS at 07:43

## 2018-12-31 RX ADMIN — ONDANSETRON 4 MG: 2 INJECTION INTRAMUSCULAR; INTRAVENOUS at 06:40

## 2018-12-31 RX ADMIN — SODIUM CHLORIDE 1000 ML: 900 INJECTION, SOLUTION INTRAVENOUS at 06:40

## 2018-12-31 RX ADMIN — FENTANYL CITRATE 50 MCG: 50 INJECTION, SOLUTION INTRAMUSCULAR; INTRAVENOUS at 07:43

## 2018-12-31 NOTE — ED NOTES
Bedside and Verbal shift change report given to Valentin Montoya (oncoming nurse) by [facility] (offgoing nurse). Report included the following information SBAR, ED Summary, MAR and Recent Results.

## 2018-12-31 NOTE — ED PROVIDER NOTES
EMERGENCY DEPARTMENT HISTORY AND PHYSICAL EXAM 
 
 
Date: 12/31/2018 Patient Name: Mina Salvador History of Presenting Illness Chief Complaint Patient presents with  Abdominal Pain History Provided By: Patient HPI: Mina Salvador, 37 y.o. male with PMHx significant for Crohn's disease, pancreatitis, c-diff, presents ambulatory to the ED with c/o exacerbation in chronic abdominal pain x 2 days. He reports experiencing abdominal pain \"sometimes. \" However, for the past 2 days his abdominal pain has been constant. He describes pain as cramping with waves of sharp pain radiating from L side over to the R side. Pt rates his current pain as 5-6/10. He further reports chronic L lower flank pain. Pt denies any change in BMs, but does report abdominal pain when having BMs. He denies any home treatment. Pt specifically denies any recent N/V, fevers, chills, urinary symptoms, CP, SOB, or any other complaints. Social hx: -tobacco, -EtOH, -illicit drugs PFHx: HTN There are no other complaints, changes, or physical findings at this time. PCP: Glory Tovar MD 
 
 
 
Past History Past Medical History: 
Past Medical History:  
Diagnosis Date  Arthritis   
 all joints and spine  Chest pain 2/18/2013  
 as of 10/14/14 pt denies any CP since 2/13  Crohn's colitis ([facility] [address] Dr. Fay Has  H/O Clostridium difficile infection 01/2017  
 as of 3/30/17 pt denies abd pain, diarrhea > 1 week  Iron deficiency anemia 2/18/2013  Pancreatitis  Polyarteritis nodosa ([facility] [address]  Pouchitis ([facility] [address] 12/8/2011  PSC (primary sclerosing cholangitis) Dr Miguel Abraham [phone]  Pyoderma gangrenosum Past Surgical History: 
Past Surgical History:  
Procedure Laterality Date  ABDOMEN SURGERY PROC UNLISTED  2002  
 colostomy reversal & J Pouch  COLONOSCOPY N/A 4/4/2017 COLONOSCOPY performed by Keiko Hogan MD at . [facility] 91 COLONOSCOPY N/A 1/15/2018 COLONOSCOPY performed by Massimo Garces MD at [facility] ENDOSCOPY  COLONOSCOPY N/A 9/13/2018 COLONOSCOPY performed by Massimo Garces MD at [facility] ENDOSCOPY  COLONOSCOPY,DIAGNOSTIC  1/15/2018  COLONOSCOPY,DIAGNOSTIC  9/13/2018  HX COLECTOMY  2002  
 colostomy  HX GI    
 ercp Kashif Rabago 1999  MD COLONOSCOPY W/BIOPSY SINGLE/MULTIPLE  12/8/2011  MD EGD TRANSORAL BIOPSY SINGLE/MULTIPLE  2/18/2013 Family History: 
Family History Problem Relation Age of Onset  Diabetes Mother  Hypertension Mother [place] Arthritis-osteo Mother  Diabetes Father  Hypertension Father  Stroke Father  Arthritis-osteo Father  No Known Problems Sister  No Known Problems Brother  No Known Problems Maternal Aunt  No Known Problems Maternal Uncle  No Known Problems Paternal Aunt  No Known Problems Paternal Uncle  No Known Problems Maternal Grandmother  No Known Problems Maternal Grandfather  No Known Problems Paternal Grandmother  No Known Problems Paternal Grandfather Social History: 
Social History Tobacco Use  Smoking status: Never Smoker  Smokeless tobacco: Never Used Substance Use Topics  Alcohol use: No  
  Alcohol/week: 0.0 oz  Drug use: No  
 
 
Allergies: Allergies Allergen Reactions  Cafergot [Ergotamine-Caffeine] Hives Review of Systems Review of Systems Constitutional: Negative for chills and fever. HENT: Negative. Eyes: Negative. Respiratory: Negative for shortness of breath. Cardiovascular: Negative for chest pain. Gastrointestinal: Positive for abdominal pain. Negative for constipation, diarrhea, nausea and vomiting. Endocrine: Negative for heat intolerance. Genitourinary: Negative. Negative for dysuria and hematuria. Musculoskeletal: Negative for back pain and neck pain. Skin: Negative for rash. Allergic/Immunologic: Negative for immunocompromised state. Neurological: Negative for dizziness. Hematological: Does not bruise/bleed easily. Psychiatric/Behavioral: Negative. All other systems reviewed and are negative. Physical Exam  
Physical Exam  
Constitutional: He is oriented to person, place, and time. He appears well-developed and well-nourished. Pt in moderate distress HENT:  
Head: Normocephalic and atraumatic. Eyes: EOM are normal. Pupils are equal, round, and reactive to light. Neck: Normal range of motion. Neck supple. Cardiovascular: Normal rate, regular rhythm and normal heart sounds. Pulmonary/Chest: Effort normal and breath sounds normal. He has no wheezes. Abdominal: Soft. Bowel sounds are normal.  
Diffuse abdominal tenderness. No rebound or guarding. Musculoskeletal: Normal range of motion. He exhibits no edema or tenderness. Back nontender Neurological: He is alert and oriented to person, place, and time. No cranial nerve deficit. Skin: Skin is warm and dry. Psychiatric: He has a normal mood and affect. His behavior is normal.  
Nursing note and vitals reviewed. Diagnostic Study Results Labs - Recent Results (from the past 12 hour(s)) CBC WITH AUTOMATED DIFF Collection Time: 12/31/18  6:30 AM  
Result Value Ref Range WBC 6.5 4.1 - 11.1 K/uL  
 RBC 4.08 (L) 4.10 - 5.70 M/uL  
 HGB 12.2 12.1 - 17.0 g/dL HCT 36.7 36.6 - 50.3 % MCV 90.0 80.0 - 99.0 FL  
 MCH 29.9 26.0 - 34.0 PG  
 MCHC 33.2 30.0 - 36.5 g/dL  
 RDW 15.1 (H) 11.5 - 14.5 % PLATELET 290 (L) 756 - 400 K/uL MPV 11.8 8.9 - 12.9 FL  
 NRBC 0.0 0  WBC ABSOLUTE NRBC 0.00 0.00 - 0.01 K/uL NEUTROPHILS 47 32 - 75 % LYMPHOCYTES 33 12 - 49 % MONOCYTES 2 (L) 5 - 13 % EOSINOPHILS 16 (H) 0 - 7 % BASOPHILS 2 (H) 0 - 1 % IMMATURE GRANULOCYTES 0 0.0 - 0.5 % ABS. NEUTROPHILS 3.2 1.8 - 8.0 K/UL  
 ABS. LYMPHOCYTES 2.1 0.8 - 3.5 K/UL ABS. MONOCYTES 0.1 0.0 - 1.0 K/UL  
 ABS. EOSINOPHILS 1.0 (H) 0.0 - 0.4 K/UL  
 ABS. BASOPHILS 0.1 0.0 - 0.1 K/UL  
 ABS. IMM. GRANS. 0.0 0.00 - 0.04 K/UL  
 DF MANUAL    
 RBC COMMENTS NORMOCYTIC, NORMOCHROMIC METABOLIC PANEL, COMPREHENSIVE Collection Time: 12/31/18  6:30 AM  
Result Value Ref Range Sodium 139 136 - 145 mmol/L Potassium 3.6 3.5 - 5.1 mmol/L Chloride 106 97 - 108 mmol/L  
 CO2 25 21 - 32 mmol/L Anion gap 8 5 - 15 mmol/L Glucose 94 65 - 100 mg/dL BUN 7 6 - 20 MG/DL Creatinine 0.84 0.70 - 1.30 MG/DL  
 BUN/Creatinine ratio 8 (L) 12 - 20 GFR est AA >60 >60 ml/min/1.73m2 GFR est non-AA >60 >60 ml/min/1.73m2 Calcium 8.5 8.5 - 10.1 MG/DL Bilirubin, total 1.4 (H) 0.2 - 1.0 MG/DL  
 ALT (SGPT) 83 (H) 12 - 78 U/L  
 AST (SGOT) 126 (H) 15 - 37 U/L Alk. phosphatase 583 (H) 45 - 117 U/L Protein, total 8.3 (H) 6.4 - 8.2 g/dL Albumin 2.6 (L) 3.5 - 5.0 g/dL Globulin 5.7 (H) 2.0 - 4.0 g/dL A-G Ratio 0.5 (L) 1.1 - 2.2 LIPASE Collection Time: 12/31/18  6:30 AM  
Result Value Ref Range Lipase 225 73 - 393 U/L  
LACTIC ACID Collection Time: 12/31/18  6:33 AM  
Result Value Ref Range Lactic acid 1.1 0.4 - 2.0 MMOL/L  
URINALYSIS W/ RFLX MICROSCOPIC Collection Time: 12/31/18  7:52 AM  
Result Value Ref Range Color YELLOW/STRAW Appearance CLOUDY (A) CLEAR Specific gravity 1.012 1.003 - 1.030    
 pH (UA) 6.0 5.0 - 8.0 Protein NEGATIVE  NEG mg/dL Glucose NEGATIVE  NEG mg/dL Ketone NEGATIVE  NEG mg/dL Bilirubin NEGATIVE  NEG Blood NEGATIVE  NEG Urobilinogen 1.0 0.2 - 1.0 EU/dL Nitrites POSITIVE (A) NEG Leukocyte Esterase MODERATE (A) NEG    
 WBC 5-10 0 - 4 /hpf  
 RBC 0-5 0 - 5 /hpf Epithelial cells FEW FEW /lpf Bacteria 4+ (A) NEG /hpf Hyaline cast 0-2 0 - 5 /lpf Radiologic Studies -  
CT ABD PELV W CONT Final Result IMPRESSION:  
 1.  Ill defined areas of bilateral renal cortical hypoechogenicity. The  
appearance is nonspecific but in the setting of the patient's urinalysis, may be  
caused by pyelonephritis. Recommend clinical correlation and if the patient is  
treated for urinary tract infection, follow-up CT after treatment to exclude an  
underlying mass lesion. 2.  Unchanged nodularity of the hepatic edge, left hemiliver greater than right,  
consistent with known primary sclerosing cholangitis and cirrhosis. 3.  Unchanged enlarged karlene hepatis nodes, likely reactive. CT Results  (Last 48 hours) 12/31/18 0908  CT ABD PELV W CONT Final result Impression:  IMPRESSION:  
1. Ill defined areas of bilateral renal cortical hypoechogenicity. The  
appearance is nonspecific but in the setting of the patient's urinalysis, may be  
caused by pyelonephritis. Recommend clinical correlation and if the patient is  
treated for urinary tract infection, follow-up CT after treatment to exclude an  
underlying mass lesion. 2.  Unchanged nodularity of the hepatic edge, left hemiliver greater than right,  
consistent with known primary sclerosing cholangitis and cirrhosis. 3.  Unchanged enlarged karlene hepatis nodes, likely reactive. Narrative:  EXAM: CT ABD PELV W CONT INDICATION: Abdominal pain 75-year-old with Crohn's disease and primary  
sclerosing cholangitis presenting with exacerbation of chronic abdominal pain  
for 2 days. COMPARISON: CT abdomen pelvis 8/23/2018 CONTRAST: 100 mL of Isovue-370. TECHNIQUE:   
Following the uneventful intravenous administration of contrast, thin axial  
images were obtained through the abdomen and pelvis. Coronal and sagittal  
reconstructions were generated. Oral contrast was administered. CT dose  
reduction was achieved through use of a standardized protocol tailored for this  
examination and automatic exposure control for dose modulation. FINDINGS:   
LUNG BASES: Clear. INCIDENTALLY IMAGED HEART AND MEDIASTINUM: Unremarkable. LIVER: Nodularity of the left hemiliver greater than the right. No focal lesion. GALLBLADDER: Distended but otherwise unremarkable. No visualized cholelithiasis. SPLEEN: Mild splenomegaly to 13.5 cm. PANCREAS: No mass or ductal dilatation. ADRENALS: Unremarkable. KIDNEYS: Poorly defined, subtle areas of cortical hypoechogenicity. No definite  
perinephric stranding. Unchanged subcentimeter left lower pole hypodensity, too  
small to definitively characterize but likely a simple cyst. No hydronephrosis  
or nephrolithiasis. STOMACH: Unremarkable. SMALL BOWEL: No dilatation or wall thickening. COLON: Status post colectomy. APPENDIX: Surgically absent. PERITONEUM: Unchanged multiple enlarged nodes at the karlene hepatis. The largest  
just anterior to the IVC, image 2-26 measures 1.4 cm in short axis. No ascites  
or pneumoperitoneum. RETROPERITONEUM: No lymphadenopathy or aortic aneurysm. REPRODUCTIVE ORGANS: Unremarkable. URINARY BLADDER: No mass or calculus. BONES: No destructive bone lesion. ADDITIONAL COMMENTS: N/A Medical Decision Making I am the first provider for this patient. I reviewed the vital signs, available nursing notes, past medical history, past surgical history, family history and social history. Vital Signs-Reviewed the patient's vital signs. Patient Vitals for the past 12 hrs: 
 Temp Pulse Resp BP SpO2  
12/31/18 0745    117/69 98 % 12/31/18 0554 97.5 °F (36.4 °C) 83 16 129/86 99 % Pulse Oximetry Analysis - 99% on RA 
 
EKG interpretation: (Preliminary) 1894 Rhythm: sinus rhythm with occasional PVC's; and regular . Rate (approx.): 66; Axis: normal; MA interval: 182; QRS interval: 112; ST/T wave: new T wave inversion in I; Other findings: LVH with repolarization abnormality. Written by KRYSTEN Brown, as dictated by Laura Osman MD. Records Reviewed: Nursing Notes, Old Medical Records, Previous Radiology Studies and Previous Laboratory Studies Provider Notes (Medical Decision Making): DDx: Crohn's flare, pancreatitis, diverticulitis, UTI, obstruction, electrolyte abnormality ED Course:  
Initial assessment performed. The patients presenting problems have been discussed, and they are in agreement with the care plan formulated and outlined with them. I have encouraged them to ask questions as they arise throughout their visit. 6:15 AM 
Pt would like to hold off on a CT at this time because he had a recent one in August.  
 
7:28 AM 
Reassessed pt. His pain is unchanged with Fentanyl. He agrees with CT. Discharge Note: 
10:11 AM 
The patient has been re-evaluated and is ready for discharge. Reviewed available results with patient. Counseled patient on diagnosis and care plan. Patient has expressed understanding, and all questions have been answered. Patient agrees with plan and agrees to follow up as recommended, or to return to the ED if their symptoms worsen. Discharge instructions have been provided and explained to the patient, along with reasons to return to the ED. PLAN: 
1. Discharge Medication List as of 12/31/2018 10:11 AM  
  
START taking these medications Details HYDROcodone-acetaminophen (NORCO) 5-325 mg per tablet Take 1 Tab by mouth every four (4) hours as needed for Pain. Max Daily Amount: 6 Tabs., Print, Disp-20 Tab, R-0  
  
ondansetron (ZOFRAN ODT) 4 mg disintegrating tablet Take 1 Tab by mouth every eight (8) hours as needed for Nausea., Normal, Disp-10 Tab, R-0  
  
cephALEXin (KEFLEX) 500 mg capsule Take 1 Cap by mouth three (3) times daily for 7 days. , Normal, Disp-21 Cap, R-0  
  
  
 
2. Follow-up Information Follow up With Specialties Details Why Contact Info  
 see your GI doctor  In 2 days Angely Ariza MD Family Practice In 1 week to reevaluate your kidneys [address] [facility] 
[phone] hospitals EMERGENCY DEPT Emergency Medicine  If symptoms worsen [address] [facility] 
[phone] Return to ED if worse Diagnosis Clinical Impression: 1. Abdominal pain, LLQ (left lower quadrant) 2. Pyelonephritis Attestations: This note is prepared by Gio Chang, acting as Scribe for Destiny Cruz MD. 
 
The scribe's documentation has been prepared under my direction and personally reviewed by me in its entirety. I confirm that the note above accurately reflects all work, treatment, procedures, and medical decision making performed by me.  
Destiny Cruz MD

## 2018-12-31 NOTE — DISCHARGE INSTRUCTIONS
Abdominal Pain: Care Instructions  Your Care Instructions    Abdominal pain has many possible causes. Some aren't serious and get better on their own in a few days. Others need more testing and treatment. If your pain continues or gets worse, you need to be rechecked and may need more tests to find out what is wrong. You may need surgery to correct the problem. Don't ignore new symptoms, such as fever, nausea and vomiting, urination problems, pain that gets worse, and dizziness. These may be signs of a more serious problem. Your doctor may have recommended a follow-up visit in the next 8 to 12 hours. If you are not getting better, you may need more tests or treatment. The doctor has checked you carefully, but problems can develop later. If you notice any problems or new symptoms, get medical treatment right away. Follow-up care is a key part of your treatment and safety. Be sure to make and go to all appointments, and call your doctor if you are having problems. It's also a good idea to know your test results and keep a list of the medicines you take. How can you care for yourself at home? · Rest until you feel better. · To prevent dehydration, drink plenty of fluids, enough so that your urine is light yellow or clear like water. Choose water and other caffeine-free clear liquids until you feel better. If you have kidney, heart, or liver disease and have to limit fluids, talk with your doctor before you increase the amount of fluids you drink. · If your stomach is upset, eat mild foods, such as rice, dry toast or crackers, bananas, and applesauce. Try eating several small meals instead of two or three large ones. · Wait until 48 hours after all symptoms have gone away before you have spicy foods, alcohol, and drinks that contain caffeine. · Do not eat foods that are high in fat. · Avoid anti-inflammatory medicines such as aspirin, ibuprofen (Advil, Motrin), and naproxen (Aleve).  These can cause stomach upset. Talk to your doctor if you take daily aspirin for another health problem. When should you call for help? Call 911 anytime you think you may need emergency care. For example, call if:    · You passed out (lost consciousness).     · You pass maroon or very bloody stools.     · You vomit blood or what looks like coffee grounds.     · You have new, severe belly pain.    Call your doctor now or seek immediate medical care if:    · Your pain gets worse, especially if it becomes focused in one area of your belly.     · You have a new or higher fever.     · Your stools are black and look like tar, or they have streaks of blood.     · You have unexpected vaginal bleeding.     · You have symptoms of a urinary tract infection. These may include:  ? Pain when you urinate. ? Urinating more often than usual.  ? Blood in your urine.     · You are dizzy or lightheaded, or you feel like you may faint.    Watch closely for changes in your health, and be sure to contact your doctor if:    · You are not getting better after 1 day (24 hours). Where can you learn more? Go to http://oscarPeerJwilfrido.info/. Enter I870 in the search box to learn more about \"Abdominal Pain: Care Instructions. \"  Current as of: November 20, 2017  Content Version: 11.8  © 3287-8668 [employer]. Care instructions adapted under license by [employer] (which disclaims liability or warranty for this information). If you have questions about a medical condition or this instruction, always ask your healthcare professional. [place] [address] any warranty or liability for your use of this information. Kidney Infection: Care Instructions  Your Care Instructions    A kidney infection (pyelonephritis) is a type of urinary tract infection, or UTI. Most UTIs are bladder infections. Kidney infections tend to make people much sicker than bladder infections do.  A kidney infection is also more serious because it can cause lasting damage if it is not treated quickly. Follow-up care is a key part of your treatment and safety. Be sure to make and go to all appointments, and call your doctor if you are having problems. It's also a good idea to know your test results and keep a list of the medicines you take. How can you care for yourself at home? · Take your antibiotics as directed. Do not stop taking them just because you feel better. You need to take the full course of antibiotics. · Drink plenty of water, enough so that your urine is light yellow or clear like water. This may help wash out bacteria that are causing the infection. If you have kidney, heart, or liver disease and have to limit fluids, talk with your doctor before you increase the amount of fluids you drink. · Urinate often. Try to empty your bladder each time. · To relieve pain, take a hot shower or lay a heating pad (set on low) over your lower belly. Never go to sleep with a heating pad in place. Put a thin cloth between the heating pad and your skin. To help prevent kidney infections  · Drink plenty of water each day. This helps you urinate often, which clears bacteria from your system. If you have kidney, heart, or liver disease and have to limit fluids, talk with your doctor before you increase the amount of fluids you drink. · Urinate when you have the urge. Do not hold your urine for a long time. Urinate before you go to sleep. · If you have symptoms of a bladder infection, such as burning when you urinate or having to urinate often, call your doctor so you can treat the problem before it gets worse. If you do not treat a bladder infection quickly, it can spread to the kidney. · Men should keep the tip of the penis clean. If you are a woman, keep these ideas in mind:  · Urinate right after you have sex. · Change sanitary pads often.  Avoid douches, feminine hygiene sprays, and other feminine hygiene products that have deodorants. · After going to the bathroom, wipe from front to back. When should you call for help? Call your doctor now or seek immediate medical care if:    · You have symptoms that a kidney infection is getting worse. These may include:  ? Pain or burning when you urinate. ? A frequent need to urinate without being able to pass much urine. ? Pain in the flank, which is just below the rib cage and above the waist on either side of the back. ? Blood in the urine. ? A fever.     · You are vomiting or nauseated.    Watch closely for changes in your health, and be sure to contact your doctor if:    · You do not get better as expected. Where can you learn more? Go to http://oscar-wilfrido.info/. Enter A221 in the search box to learn more about \"Kidney Infection: Care Instructions. \"  Current as of: March 15, 2018  Content Version: 11.8  © 3099-2676 [employer]. Care instructions adapted under license by [employer] (which disclaims liability or warranty for this information). If you have questions about a medical condition or this instruction, always ask your healthcare professional. [place] [address] any warranty or liability for your use of this information.

## 2018-12-31 NOTE — ED TRIAGE NOTES
Emergency Department Triage Note: 
 
Ambulatory to triage from with chief complaint of abdominal pain. Reports history of Crohns, feels this may be related. Chronic abdominal pain, worsening over the past two days. Associated symptoms include nausea/vomiting, loose bowels. Denies fever, dysuria, hematuria, urinary frequency, constipation, or blood in stool.

## 2018-12-31 NOTE — LETTER
1/3/2019 [address] [facility] [address] [address] Dear Mr. Josselyn Kaufman, You were recently seen in the Emergency Department of [facility] and had lab work performed. We would like to discuss these results with you. Please call the Emergency Department at your earliest convenience at [phone] between 10am-8pm to speak with one of our providers. Sincerely, [facility] EMERGENCY DEPT 
[address] [place] [address] 83. [phone]

## 2018-12-31 NOTE — ED NOTES
Assumed care of pt. Dr. Tamiko Rico at bedside. Pt states he has abdominal pain, hx of crohns and psc. Pt states pain was intermittent 2-3 days and yesterday pain became worse.

## 2019-01-01 LAB
C DIFF TOX GENS STL QL NAA+PROBE: POSITIVE
INTERPRETATION: ABNORMAL
PCR REFLEX: ABNORMAL

## 2019-01-02 RX ORDER — VANCOMYCIN HYDROCHLORIDE 125 MG/1
125 CAPSULE ORAL 4 TIMES DAILY
Qty: 40 CAP | Refills: 0 | Status: SHIPPED | OUTPATIENT
Start: 2019-01-02 | End: 2019-01-12

## 2019-01-21 ENCOUNTER — ANESTHESIA EVENT (OUTPATIENT)
Dept: ENDOSCOPY | Age: 44
DRG: 372 | End: 2019-01-21
Payer: OTHER GOVERNMENT

## 2019-01-21 ENCOUNTER — ANESTHESIA (OUTPATIENT)
Dept: ENDOSCOPY | Age: 44
DRG: 372 | End: 2019-01-21
Payer: OTHER GOVERNMENT

## 2019-01-21 ENCOUNTER — HOSPITAL ENCOUNTER (OUTPATIENT)
Age: 44
Setting detail: OUTPATIENT SURGERY
Discharge: HOME | DRG: 372 | End: 2019-01-21
Attending: SPECIALIST | Admitting: SPECIALIST
Payer: OTHER GOVERNMENT

## 2019-01-21 VITALS
OXYGEN SATURATION: 100 % | RESPIRATION RATE: 15 BRPM | TEMPERATURE: 98 F | BODY MASS INDEX: 27.19 KG/M2 | DIASTOLIC BLOOD PRESSURE: 55 MMHG | WEIGHT: 235 LBS | SYSTOLIC BLOOD PRESSURE: 87 MMHG | HEIGHT: 78 IN | HEART RATE: 69 BPM

## 2019-01-21 PROBLEM — A04.72 C. DIFFICILE COLITIS: Status: ACTIVE | Noted: 2019-01-21

## 2019-01-21 PROCEDURE — 0DJD8ZZ INSPECTION OF LOWER INTESTINAL TRACT, VIA NATURAL OR ARTIFICIAL OPENING ENDOSCOPIC: ICD-10-PCS | Performed by: SPECIALIST

## 2019-01-21 PROCEDURE — 3E0H8GC INTRODUCTION OF OTHER THERAPEUTIC SUBSTANCE INTO LOWER GI, VIA NATURAL OR ARTIFICIAL OPENING ENDOSCOPIC: ICD-10-PCS | Performed by: SPECIALIST

## 2019-01-21 PROCEDURE — 74011000250 HC RX REV CODE- 250: Performed by: SPECIALIST

## 2019-01-21 PROCEDURE — 74011250637 HC RX REV CODE- 250/637: Performed by: SPECIALIST

## 2019-01-21 PROCEDURE — 76060000031 HC ANESTHESIA FIRST 0.5 HR: Performed by: SPECIALIST

## 2019-01-21 PROCEDURE — 76040000019: Performed by: SPECIALIST

## 2019-01-21 PROCEDURE — 74011250636 HC RX REV CODE- 250/636

## 2019-01-21 PROCEDURE — 74011250636 HC RX REV CODE- 250/636: Performed by: SPECIALIST

## 2019-01-21 RX ORDER — SODIUM CHLORIDE 0.9 % (FLUSH) 0.9 %
5-40 SYRINGE (ML) INJECTION AS NEEDED
Status: DISCONTINUED | OUTPATIENT
Start: 2019-01-21 | End: 2019-01-21 | Stop reason: HOSPADM

## 2019-01-21 RX ORDER — LOPERAMIDE HCL 2 MG
4 TABLET ORAL
Qty: 16 TAB | Refills: 0 | Status: SHIPPED | OUTPATIENT
Start: 2019-01-21 | End: 2019-01-24

## 2019-01-21 RX ORDER — SODIUM CHLORIDE 9 MG/ML
75 INJECTION, SOLUTION INTRAVENOUS CONTINUOUS
Status: DISCONTINUED | OUTPATIENT
Start: 2019-01-21 | End: 2019-01-21 | Stop reason: HOSPADM

## 2019-01-21 RX ORDER — SODIUM CHLORIDE 0.9 % (FLUSH) 0.9 %
5-40 SYRINGE (ML) INJECTION EVERY 8 HOURS
Status: DISCONTINUED | OUTPATIENT
Start: 2019-01-21 | End: 2019-01-21 | Stop reason: HOSPADM

## 2019-01-21 RX ORDER — EPINEPHRINE 0.1 MG/ML
1 INJECTION INTRACARDIAC; INTRAVENOUS
Status: DISCONTINUED | OUTPATIENT
Start: 2019-01-21 | End: 2019-01-21 | Stop reason: HOSPADM

## 2019-01-21 RX ORDER — NALOXONE HYDROCHLORIDE 0.4 MG/ML
0.4 INJECTION, SOLUTION INTRAMUSCULAR; INTRAVENOUS; SUBCUTANEOUS
Status: DISCONTINUED | OUTPATIENT
Start: 2019-01-21 | End: 2019-01-21 | Stop reason: HOSPADM

## 2019-01-21 RX ORDER — DEXTROMETHORPHAN/PSEUDOEPHED 2.5-7.5/.8
1.2 DROPS ORAL
Status: DISCONTINUED | OUTPATIENT
Start: 2019-01-21 | End: 2019-01-21 | Stop reason: HOSPADM

## 2019-01-21 RX ORDER — FLUMAZENIL 0.1 MG/ML
0.2 INJECTION INTRAVENOUS
Status: DISCONTINUED | OUTPATIENT
Start: 2019-01-21 | End: 2019-01-21 | Stop reason: HOSPADM

## 2019-01-21 RX ORDER — MIDAZOLAM HYDROCHLORIDE 1 MG/ML
.25-5 INJECTION, SOLUTION INTRAMUSCULAR; INTRAVENOUS
Status: DISCONTINUED | OUTPATIENT
Start: 2019-01-21 | End: 2019-01-21 | Stop reason: HOSPADM

## 2019-01-21 RX ORDER — ATROPINE SULFATE 0.1 MG/ML
0.5 INJECTION INTRAVENOUS
Status: DISCONTINUED | OUTPATIENT
Start: 2019-01-21 | End: 2019-01-21 | Stop reason: HOSPADM

## 2019-01-21 RX ORDER — FENTANYL CITRATE 50 UG/ML
50 INJECTION, SOLUTION INTRAMUSCULAR; INTRAVENOUS
Status: DISCONTINUED | OUTPATIENT
Start: 2019-01-21 | End: 2019-01-21 | Stop reason: HOSPADM

## 2019-01-21 RX ORDER — LIDOCAINE HYDROCHLORIDE 20 MG/ML
INJECTION, SOLUTION EPIDURAL; INFILTRATION; INTRACAUDAL; PERINEURAL AS NEEDED
Status: DISCONTINUED | OUTPATIENT
Start: 2019-01-21 | End: 2019-01-21 | Stop reason: HOSPADM

## 2019-01-21 RX ORDER — PROPOFOL 10 MG/ML
INJECTION, EMULSION INTRAVENOUS AS NEEDED
Status: DISCONTINUED | OUTPATIENT
Start: 2019-01-21 | End: 2019-01-21 | Stop reason: HOSPADM

## 2019-01-21 RX ADMIN — LIDOCAINE HYDROCHLORIDE 40 MG: 20 INJECTION, SOLUTION EPIDURAL; INFILTRATION; INTRACAUDAL; PERINEURAL at 10:50

## 2019-01-21 RX ADMIN — PROPOFOL 30 MG: 10 INJECTION, EMULSION INTRAVENOUS at 10:56

## 2019-01-21 RX ADMIN — PROPOFOL 30 MG: 10 INJECTION, EMULSION INTRAVENOUS at 10:54

## 2019-01-21 RX ADMIN — LOPERAMIDE HYDROCHLORIDE 4 MG: 1 SOLUTION ORAL at 11:34

## 2019-01-21 RX ADMIN — Medication 250 ML: at 10:55

## 2019-01-21 RX ADMIN — SODIUM CHLORIDE 75 ML/HR: 900 INJECTION, SOLUTION INTRAVENOUS at 10:37

## 2019-01-21 RX ADMIN — PROPOFOL 80 MG: 10 INJECTION, EMULSION INTRAVENOUS at 10:50

## 2019-01-21 RX ADMIN — PROPOFOL 30 MG: 10 INJECTION, EMULSION INTRAVENOUS at 10:52

## 2019-01-21 NOTE — ANESTHESIA POSTPROCEDURE EVALUATION
Procedure(s): 
COLONOSCOPY W/ FECAL TRANSPLANT. Anesthesia Post Evaluation Patient location during evaluation: PACU Note status: Adequate. Level of consciousness: responsive to verbal stimuli and sleepy but conscious Pain management: satisfactory to patient Airway patency: patent Anesthetic complications: no 
Cardiovascular status: acceptable Respiratory status: acceptable Hydration status: acceptable Comments: +Post-Anesthesia Evaluation and Assessment Patient: Winifred Calderon MRN: [id number]  SSN: [id number] YOB: 1975  Age: 37 y.o. Sex: male Cardiovascular Function/Vital Signs BP (!) 87/55   Pulse 69   Temp 36.7 °C (98 °F)   Resp 15   Ht 6' 10\" (2.083 m)   Wt 106.6 kg (235 lb)   SpO2 100%   BMI 24.57 kg/m² Patient is status post Procedure(s): 
COLONOSCOPY W/ FECAL TRANSPLANT. Nausea/Vomiting: Controlled. Postoperative hydration reviewed and adequate. Pain: 
Pain Scale 1: Numeric (0 - 10) (01/21/19 1139) Pain Intensity 1: 0 (01/21/19 1139) Managed. Neurological Status: At baseline. Mental Status and Level of Consciousness: Arousable. Pulmonary Status:  
O2 Device: Room air (01/21/19 1154) Adequate oxygenation and airway patent. Complications related to anesthesia: None Post-anesthesia assessment completed. No concerns. Signed By: Orlene Boxer, MD  
 1/21/2019 Post anesthesia nausea and vomiting:  controlled Visit Vitals BP (!) 87/55 Pulse 69 Temp 36.7 °C (98 °F) Resp 15 Ht 6' 10\" (2.083 m) Wt 106.6 kg (235 lb) SpO2 100% BMI 24.57 kg/m²

## 2019-01-21 NOTE — ANESTHESIA PREPROCEDURE EVALUATION
Anesthetic History No history of anesthetic complications Review of Systems / Medical History Patient summary reviewed, nursing notes reviewed and pertinent labs reviewed Pulmonary Within defined limits Neuro/Psych Within defined limits Cardiovascular Within defined limits Exercise tolerance: >4 METS 
  
GI/Hepatic/Renal 
  
 
 
 
Liver disease (Primary Sclerosing Cholangitis; stable) Comments: Crohn's & Ulcerative colitis  Endo/Other Obesity, arthritis and anemia (iron def) Other Findings Comments: PSC (primary sclerosing cholangitis) Crohn's disease LFT elevation H/O Clostridium difficile infection Physical Exam 
 
Airway Mallampati: I 
TM Distance: > 6 cm Neck ROM: normal range of motion Mouth opening: Normal 
 
 Cardiovascular Rhythm: regular Rate: normal 
 
 
Pertinent negatives: No murmur Dental 
 
Dentition: Lower dentition intact and Upper dentition intact Pulmonary Breath sounds clear to auscultation Abdominal 
GI exam deferred Other Findings Anesthetic Plan ASA: 2 Anesthesia type: total IV anesthesia and general 
 
 
 
 
Induction: Intravenous Anesthetic plan and risks discussed with: Patient Propofol MAC

## 2019-01-21 NOTE — DISCHARGE INSTRUCTIONS
Willie Moore  [id number]  1975              Procedure  Discharge Instructions:      Discomfort:  Redness at IV site- apply warm compress to area; if redness or soreness persist- contact your physician  There may be a slight amount of blood passed from the rectum  Gaseous discomfort- walking, belching will help relieve any discomfort  You may not operate a vehicle for 12 hours  You may not engage in an occupation involving machinery or appliances for rest of today  You may not drink alcoholic beverages for at least 12 hours  Avoid making any critical decisions for at least 24 hour  DIET:   You may resume your normal diet today. You should not overeat or \"feast\" today as your abdomen may become distended or uncomfortable. MEDICATIONS:   I reconciled this list from the list you gave us when you came today for the procedure. Please clarify with me, your primary care physician and the nurse who is discharging you if we have any discrepancies. Aspirin and or non-steroidal medication (Ibuprofen, Motrin, naproxen, etc.) is ok in limited quantities. ACTIVITY:  You may resume your normal daily activities it is recommended that you spend the remainder of the day resting -  avoid any strenuous activity. CALL M.D. ANY SIGN OF:  Increasing pain, nausea, vomiting  Abdominal distension (swelling)  New increased bleeding (oral or rectal)  Fever (chills)          Follow-up Instructions:   No biopsies  Lay on your back or on the right side down most of today. Telephone #  [phone]  Follow up visit as previously scheduled.       Joaquín Aeyrs MD  11:10 AM  1/21/2019

## 2019-01-21 NOTE — PROGRESS NOTES
..Anesthesia reports 170mg Propofol, 40mg Lidocaine and 400mL NS given during procedure. Received report from anesthesia staff on vital signs and status of patient.

## 2019-01-21 NOTE — H&P
Pre-endoscopy H and P    The patient was seen and examined in the endoscopy suite. The airway was assessed and docuemented. The problem list, past medical history, and medications were reviewed.    We are doing this for c diff recurrent in a patien twkeyona pouchitis     Patient Active Problem List   Diagnosis Code    Primary sclerosing cholangitis K83.01    Pouchitis ([facility] K91.850    Iron deficiency anemia D50.9    Pyoderma L08.0    Crohn's disease with complication ([facility] U53.666    Biliary obstruction K83.1    RLQ abdominal pain R10.31    LFT elevation R94.5    C. difficile colitis A04.72     Social History     Socioeconomic History    Marital status:      Spouse name: Not on file    Number of children: 3    Years of education: Not on file    Highest education level: Not on file   Social Needs    Financial resource strain: Not on file    Food insecurity - worry: Not on file    Food insecurity - inability: Not on file   [employer] needs - medical: Not on file   [employer] needs - non-medical: Not on file   Occupational History    Occupation:    Tobacco Use    Smoking status: Never Smoker    Smokeless tobacco: Never Used   Substance and Sexual Activity    Alcohol use: No     Alcohol/week: 0.0 oz    Drug use: No    Sexual activity: Yes     Partners: Female     Birth control/protection: Condom   Other Topics Concern    Not on file   Social History Narrative    Not on file     Past Medical History:   Diagnosis Date    Arthritis     all joints and spine    C. difficile colitis 1/21/2019    Chest pain 02/18/2013    as of 10/14/14 pt denies any CP since 2/13    Crohn's colitis ([facility]     Dr. Corey Zambrano H/O Clostridium difficile infection 01/2017    as of 3/30/17 pt denies abd pain, diarrhea > 1 week    Iron deficiency anemia 2/18/2013    Kidney infection 2018    Liver disease     Pancreatitis     Polyarteritis nodosa ([facility]     Pouchitis ([facility] 12/08/2011    PSC (primary sclerosing cholangitis)     Dr Lesley Emerson [phone]    Pyoderma gangrenosum      The patient has a family history of na    Prior to Admission Medications   Prescriptions Last Dose Informant Patient Reported? Taking?   vancomycin HCl (VANCOMYCIN PO) 1/20/2019 at Unknown time  Yes Yes   Sig: Take 1 Tab by mouth daily. Facility-Administered Medications: None           The review of systems is:  negative for shortness of breath or chest pain      The heart, lungs, and mental status were satisfactory for the administration of anestheisi sedation and for the procedure. I discussed with the patient the objectives, risks, consequences and alternatives to the procedure.       Kary Enriquez MD  1/21/2019  10:44 AM

## 2019-01-21 NOTE — PROGRESS NOTES
.. Endoscope was pre-cleaned at bedside immediately following procedure by TEJA Ross and Julia Johns.

## 2019-01-21 NOTE — PROCEDURES
Colonoscopy (pouchoscopy and ileostomy)    Indications: recurrent c diff    Pre-operative Diagnosis: see above    Medications:  See anesthesia form    Post-operative Diagnosis:  erythema of the punch, normal illeum, fecal transplant      Procedure Details   Prior to the procedure its objectives, risks, consequences and alternatives were discussed with the patient who then elected to proceed. All questions were answered. Digital Rectal Exam:  was normal     The Olympus videocolonoscope was inserted in the rectum and advanced to the terminal ileum at about 80cm (sixty cm above the pouch). Visual findings: The small intestine mucosa was normal  There was pouch erythema but no pouchitis. A normal appearing ileal pouch is present. One end has a blind ending. A surgical anastomosis is present at the anus. .Retroflexion in the pouch was not performed. Therapeutic:  250 ml of open biome fecal material was placed in the ileum and pouch, over 80 percent in the ileum. The preparation was adequate      Estimated Blood Loss:  none    Specimens:  none    Findings:  Normal ileum   Mild erythema of the pouch   Successful implantation of donor stool    Complications:  none    Repeat colonoscopy is recommended in:  Na.                Kayode Adams MD  11:03 AM  1/21/2019

## 2019-01-24 ENCOUNTER — APPOINTMENT (OUTPATIENT)
Dept: CT IMAGING | Age: 44
DRG: 372 | End: 2019-01-24
Attending: EMERGENCY MEDICINE
Payer: OTHER GOVERNMENT

## 2019-01-24 ENCOUNTER — HOSPITAL ENCOUNTER (INPATIENT)
Age: 44
LOS: 4 days | Discharge: HOME | DRG: 372 | End: 2019-01-28
Attending: EMERGENCY MEDICINE | Admitting: HOSPITALIST
Payer: OTHER GOVERNMENT

## 2019-01-24 DIAGNOSIS — K83.01 PRIMARY SCLEROSING CHOLANGITIS: Chronic | ICD-10-CM

## 2019-01-24 DIAGNOSIS — K91.850 POUCHITIS (HCC): Chronic | ICD-10-CM

## 2019-01-24 DIAGNOSIS — K52.9 ENTERITIS: Primary | ICD-10-CM

## 2019-01-24 DIAGNOSIS — E86.0 DEHYDRATION: ICD-10-CM

## 2019-01-24 DIAGNOSIS — E87.1 HYPONATREMIA: ICD-10-CM

## 2019-01-24 PROBLEM — R10.9 ABDOMINAL PAIN: Status: ACTIVE | Noted: 2019-01-24

## 2019-01-24 LAB
ALBUMIN SERPL-MCNC: 3.2 G/DL (ref 3.5–5)
ALBUMIN/GLOB SERPL: 0.5 {RATIO} (ref 1.1–2.2)
ALP SERPL-CCNC: 360 U/L (ref 45–117)
ALT SERPL-CCNC: 42 U/L (ref 12–78)
ANION GAP SERPL CALC-SCNC: 7 MMOL/L (ref 5–15)
APPEARANCE UR: CLEAR
AST SERPL-CCNC: 62 U/L (ref 15–37)
ATRIAL RATE: 115 BPM
BACTERIA URNS QL MICRO: NEGATIVE /HPF
BILIRUB SERPL-MCNC: 2.2 MG/DL (ref 0.2–1)
BILIRUB UR QL CFM: POSITIVE
BUN SERPL-MCNC: 21 MG/DL (ref 6–20)
BUN/CREAT SERPL: 17 (ref 12–20)
CALCIUM SERPL-MCNC: 8.9 MG/DL (ref 8.5–10.1)
CALCULATED P AXIS, ECG09: 69 DEGREES
CALCULATED R AXIS, ECG10: 26 DEGREES
CALCULATED T AXIS, ECG11: 9 DEGREES
CHLORIDE SERPL-SCNC: 96 MMOL/L (ref 97–108)
CO2 SERPL-SCNC: 26 MMOL/L (ref 21–32)
COLOR UR: ABNORMAL
COMMENT, HOLDF: NORMAL
CREAT SERPL-MCNC: 1.24 MG/DL (ref 0.7–1.3)
DIAGNOSIS, 93000: NORMAL
EPITH CASTS URNS QL MICRO: ABNORMAL /LPF
ERYTHROCYTE [DISTWIDTH] IN BLOOD BY AUTOMATED COUNT: 15.2 % (ref 11.5–14.5)
GLOBULIN SER CALC-MCNC: 6.8 G/DL (ref 2–4)
GLUCOSE SERPL-MCNC: 129 MG/DL (ref 65–100)
GLUCOSE UR STRIP.AUTO-MCNC: NEGATIVE MG/DL
HCT VFR BLD AUTO: 44.8 % (ref 36.6–50.3)
HGB BLD-MCNC: 14.5 G/DL (ref 12.1–17)
HGB UR QL STRIP: NEGATIVE
HYALINE CASTS URNS QL MICRO: ABNORMAL /LPF (ref 0–5)
KETONES UR QL STRIP.AUTO: 15 MG/DL
LACTATE SERPL-SCNC: 1.8 MMOL/L (ref 0.4–2)
LEUKOCYTE ESTERASE UR QL STRIP.AUTO: ABNORMAL
MCH RBC QN AUTO: 29.7 PG (ref 26–34)
MCHC RBC AUTO-ENTMCNC: 32.4 G/DL (ref 30–36.5)
MCV RBC AUTO: 91.6 FL (ref 80–99)
MUCOUS THREADS URNS QL MICRO: ABNORMAL /LPF
NITRITE UR QL STRIP.AUTO: POSITIVE
NRBC # BLD: 0 K/UL (ref 0–0.01)
NRBC BLD-RTO: 0 PER 100 WBC
P-R INTERVAL, ECG05: 138 MS
PH UR STRIP: 5.5 [PH] (ref 5–8)
PLATELET # BLD AUTO: 182 K/UL (ref 150–400)
PMV BLD AUTO: 11.5 FL (ref 8.9–12.9)
POTASSIUM SERPL-SCNC: 3.5 MMOL/L (ref 3.5–5.1)
PROT SERPL-MCNC: 10 G/DL (ref 6.4–8.2)
PROT UR STRIP-MCNC: 30 MG/DL
Q-T INTERVAL, ECG07: 300 MS
QRS DURATION, ECG06: 86 MS
QTC CALCULATION (BEZET), ECG08: 415 MS
RBC # BLD AUTO: 4.89 M/UL (ref 4.1–5.7)
RBC #/AREA URNS HPF: ABNORMAL /HPF (ref 0–5)
SAMPLES BEING HELD,HOLD: NORMAL
SODIUM SERPL-SCNC: 129 MMOL/L (ref 136–145)
SP GR UR REFRACTOMETRY: 1.03 (ref 1–1.03)
TROPONIN I SERPL-MCNC: <0.05 NG/ML
UA: UC IF INDICATED,UAUC: ABNORMAL
UROBILINOGEN UR QL STRIP.AUTO: 1 EU/DL (ref 0.2–1)
VENTRICULAR RATE, ECG03: 115 BPM
WBC # BLD AUTO: 6.6 K/UL (ref 4.1–11.1)
WBC URNS QL MICRO: ABNORMAL /HPF (ref 0–4)

## 2019-01-24 PROCEDURE — 80053 COMPREHEN METABOLIC PANEL: CPT

## 2019-01-24 PROCEDURE — 93005 ELECTROCARDIOGRAM TRACING: CPT

## 2019-01-24 PROCEDURE — 74011250636 HC RX REV CODE- 250/636: Performed by: PHYSICIAN ASSISTANT

## 2019-01-24 PROCEDURE — 81001 URINALYSIS AUTO W/SCOPE: CPT

## 2019-01-24 PROCEDURE — 83605 ASSAY OF LACTIC ACID: CPT

## 2019-01-24 PROCEDURE — 36415 COLL VENOUS BLD VENIPUNCTURE: CPT

## 2019-01-24 PROCEDURE — 74011250636 HC RX REV CODE- 250/636: Performed by: HOSPITALIST

## 2019-01-24 PROCEDURE — 96374 THER/PROPH/DIAG INJ IV PUSH: CPT

## 2019-01-24 PROCEDURE — 87045 FECES CULTURE AEROBIC BACT: CPT

## 2019-01-24 PROCEDURE — 96375 TX/PRO/DX INJ NEW DRUG ADDON: CPT

## 2019-01-24 PROCEDURE — 87449 NOS EACH ORGANISM AG IA: CPT

## 2019-01-24 PROCEDURE — 99284 EMERGENCY DEPT VISIT MOD MDM: CPT

## 2019-01-24 PROCEDURE — 74177 CT ABD & PELVIS W/CONTRAST: CPT

## 2019-01-24 PROCEDURE — 84484 ASSAY OF TROPONIN QUANT: CPT

## 2019-01-24 PROCEDURE — 65270000029 HC RM PRIVATE

## 2019-01-24 PROCEDURE — 85027 COMPLETE CBC AUTOMATED: CPT

## 2019-01-24 PROCEDURE — 74011636320 HC RX REV CODE- 636/320: Performed by: EMERGENCY MEDICINE

## 2019-01-24 PROCEDURE — 96361 HYDRATE IV INFUSION ADD-ON: CPT

## 2019-01-24 RX ORDER — HYDROMORPHONE HYDROCHLORIDE 2 MG/ML
2 INJECTION, SOLUTION INTRAMUSCULAR; INTRAVENOUS; SUBCUTANEOUS
Status: DISCONTINUED | OUTPATIENT
Start: 2019-01-24 | End: 2019-01-28 | Stop reason: HOSPADM

## 2019-01-24 RX ORDER — ACETAMINOPHEN 325 MG/1
650 TABLET ORAL
Status: DISCONTINUED | OUTPATIENT
Start: 2019-01-24 | End: 2019-01-28 | Stop reason: HOSPADM

## 2019-01-24 RX ORDER — MORPHINE SULFATE 4 MG/ML
2-4 INJECTION INTRAVENOUS
Status: DISCONTINUED | OUTPATIENT
Start: 2019-01-24 | End: 2019-01-24

## 2019-01-24 RX ORDER — ONDANSETRON 2 MG/ML
4 INJECTION INTRAMUSCULAR; INTRAVENOUS
Status: COMPLETED | OUTPATIENT
Start: 2019-01-24 | End: 2019-01-24

## 2019-01-24 RX ORDER — SODIUM CHLORIDE AND POTASSIUM CHLORIDE .9; .15 G/100ML; G/100ML
SOLUTION INTRAVENOUS CONTINUOUS
Status: DISCONTINUED | OUTPATIENT
Start: 2019-01-24 | End: 2019-01-24 | Stop reason: SDUPTHER

## 2019-01-24 RX ORDER — MORPHINE SULFATE 4 MG/ML
4 INJECTION INTRAVENOUS
Status: COMPLETED | OUTPATIENT
Start: 2019-01-24 | End: 2019-01-24

## 2019-01-24 RX ORDER — ONDANSETRON 2 MG/ML
4 INJECTION INTRAMUSCULAR; INTRAVENOUS
Status: DISCONTINUED | OUTPATIENT
Start: 2019-01-24 | End: 2019-01-28 | Stop reason: HOSPADM

## 2019-01-24 RX ORDER — SODIUM CHLORIDE 0.9 % (FLUSH) 0.9 %
10 SYRINGE (ML) INJECTION
Status: COMPLETED | OUTPATIENT
Start: 2019-01-24 | End: 2019-01-24

## 2019-01-24 RX ORDER — ENOXAPARIN SODIUM 100 MG/ML
40 INJECTION SUBCUTANEOUS
Status: DISCONTINUED | OUTPATIENT
Start: 2019-01-25 | End: 2019-01-28 | Stop reason: HOSPADM

## 2019-01-24 RX ORDER — SODIUM CHLORIDE 0.9 % (FLUSH) 0.9 %
5-40 SYRINGE (ML) INJECTION AS NEEDED
Status: DISCONTINUED | OUTPATIENT
Start: 2019-01-24 | End: 2019-01-28 | Stop reason: HOSPADM

## 2019-01-24 RX ORDER — SODIUM CHLORIDE 0.9 % (FLUSH) 0.9 %
5-40 SYRINGE (ML) INJECTION EVERY 8 HOURS
Status: DISCONTINUED | OUTPATIENT
Start: 2019-01-24 | End: 2019-01-28 | Stop reason: HOSPADM

## 2019-01-24 RX ADMIN — Medication 10 ML: at 12:54

## 2019-01-24 RX ADMIN — SODIUM CHLORIDE 1000 ML: 900 INJECTION, SOLUTION INTRAVENOUS at 12:22

## 2019-01-24 RX ADMIN — IOPAMIDOL 100 ML: 755 INJECTION, SOLUTION INTRAVENOUS at 12:54

## 2019-01-24 RX ADMIN — ONDANSETRON 4 MG: 2 INJECTION INTRAMUSCULAR; INTRAVENOUS at 20:43

## 2019-01-24 RX ADMIN — MORPHINE SULFATE 4 MG: 4 INJECTION INTRAVENOUS at 12:22

## 2019-01-24 RX ADMIN — Medication 10 ML: at 22:27

## 2019-01-24 RX ADMIN — HYDROMORPHONE HYDROCHLORIDE 2 MG: 2 INJECTION, SOLUTION INTRAMUSCULAR; INTRAVENOUS; SUBCUTANEOUS at 20:43

## 2019-01-24 RX ADMIN — Medication 10 ML: at 16:18

## 2019-01-24 RX ADMIN — ONDANSETRON 4 MG: 2 INJECTION INTRAMUSCULAR; INTRAVENOUS at 12:22

## 2019-01-24 RX ADMIN — MORPHINE SULFATE 4 MG: 4 INJECTION INTRAVENOUS at 17:12

## 2019-01-24 NOTE — ED NOTES
Patient arrived with complaints of lower abdominal pain that radiates to right side of back. Patient states that he had a fecal transportation earlier this week for a history of C Diff warm blanket given Call bell within reach Mother at bedside.

## 2019-01-24 NOTE — ED NOTES
Attempted to call report four times in past 60 minutes. No answer when  transferred call to RN. Spoke with . Instructed , Kemar Morales, to notify nurse to call [phone] if he/she has any questions.

## 2019-01-24 NOTE — ED NOTES
Bedside shift change report given to RN Dora Vincent (oncoming nurse) by France Pérez (offgoing nurse). Report included the following information SBAR, ED Summary and MAR.

## 2019-01-24 NOTE — H&P
Hospitalist Admission Note NAME: Teagan Love :  1975 MRN:  [id number] Date/Time:  2019 5:26 PM 
 
Patient PCP: Charleen Sheehan MD  
GI:  Dr. Ofe Sanchez 
______________________________________________________________________ Assessment & Plan: 
Enteritis, POA Abdominal pain, POA Hyponatremia Na 129 acute, due to dehydration, poor PO intake Hx C. Diff, s/p fecal transplant 19 Hx Crohn's s/p colectomy with reversal 
Ieitis and pouchitis  with ulcerations [facility] with chronic LFTs elevation, will likely require liver transplant in distant future Hx left leg pyoderma gangrenosum, quiescent currently. --clear liquids, pain control with morphine prn, anti-emetic prn 
--IVF with NS 
--check stool culture, c. Diff 
--GI consult. Discussed with Dr. Ofe Sanchez. Hold on steroid or antibiotics for now Body mass index is 31.87 kg/m². Code: full DVT prophylaxis: lovenox Surrogate decision maker:  wife Subjective: CHIEF COMPLAINT:  Abdominal pain, vomiting x 1 HISTORY OF PRESENT ILLNESS:    
Teagan Love is a 37 y.o.  male with PMH Crohn's, hx colectomy, Primary biliary sclerosis, pyoderma gangrenosum left leg, hx C. Diff who underwent fecal transplant on 19. Patient developed progressively worsening bilateral mid and lower abdominal pain, 10/10, sometimes radiate to left flank, associated with vomiting x 1 and loss of appetite. No fever, chills, diarrhea. Stopped taking immodium and had 1 watery brown stool yesterday. Pain worse with movement or \"jiggling\" of abdomen. Not worse with fluid intake. Not been able to eat but keep down sips of fluid. Denies dysuria, or gi bleeding. CT abdomen shows: 1. Status post colectomy with ileoanal anastomosis. 2. No bowel obstruction, possible ileus. 2. Mural thickening and hyperenhancement of some of the mid small bowel loops. The differential diagnosis includes inflammatory or infectious colitis with 
ischemia being less likely. 4. Mildly nodular liver suggesting cirrhosis with periportal decreased 
attenuation or edema of uncertain etiology and significance. 5. Splenomegaly. We were asked to admit for work up and evaluation of the above problems. Past Medical History:  
Diagnosis Date  Arthritis   
 all joints and spine  C. difficile colitis 1/21/2019  Chest pain 02/18/2013  
 as of 10/14/14 pt denies any CP since 2/13  Crohn's colitis ([address] Dr. Odalys Mcleod  H/O Clostridium difficile infection 01/2017  
 as of 3/30/17 pt denies abd pain, diarrhea > 1 week  Iron deficiency anemia 2/18/2013  Kidney infection 2018  Liver disease  Pancreatitis  Polyarteritis nodosa ([address]  Pouchitis ([address] 12/08/2011  PSC (primary sclerosing cholangitis) Dr [facility] [phone]  Pyoderma gangrenosum Past Surgical History:  
Procedure Laterality Date  ABDOMEN SURGERY PROC UNLISTED  2002  
 colostomy reversal & J Pouch  COLONOSCOPY N/A 4/4/2017 COLONOSCOPY performed by Poonam Malagon MD at . [address] COLONOSCOPY N/A 1/15/2018 COLONOSCOPY performed by Poonam Malagon MD at [facility] ENDOSCOPY  COLONOSCOPY N/A 9/13/2018 COLONOSCOPY performed by Poonam Malagon MD at [facility] ENDOSCOPY  COLONOSCOPY N/A 1/21/2019 COLONOSCOPY W/ FECAL TRANSPLANT performed by Mckenna Arellano MD at [facility] ENDOSCOPY  COLONOSCOPY,DIAGNOSTIC  1/15/2018  COLONOSCOPY,DIAGNOSTIC  9/13/2018  COLONOSCOPY,DIAGNOSTIC  1/21/2019  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  HX COLOSTOMY  2002  HX GI    
 ercp [facility] 1999  OK COLONOSCOPY W/BIOPSY SINGLE/MULTIPLE  12/8/2011  OK EGD TRANSORAL BIOPSY SINGLE/MULTIPLE  2/18/2013 Social History Tobacco Use  Smoking status: Never Smoker  Smokeless tobacco: Never Used Substance Use Topics  Alcohol use: No  
  Alcohol/week: 0.0 oz  
 Drug use:  denies Family History Problem Relation Age of Onset  Diabetes Mother  Hypertension Mother [facility] Arthritis-osteo Mother  Diabetes Father  Hypertension Father  Stroke Father  Arthritis-osteo Father  No Known Problems Sister  No Known Problems Brother  No Known Problems Maternal Aunt  No Known Problems Maternal Uncle  No Known Problems Paternal Aunt  No Known Problems Paternal Uncle  No Known Problems Maternal Grandmother  No Known Problems Maternal Grandfather  No Known Problems Paternal Grandmother  No Known Problems Paternal Grandfather Allergies Allergen Reactions  Cafergot [Ergotamine-Caffeine] Hives Prior to Admission medications Not on File REVIEW OF SYSTEMS:  POSITIVE= Bold. Negative = normal text General:  fever, chills, sweats, generalized weakness, weight loss/gain, loss of appetite Eyes:  blurred vision, eye pain, loss of vision, diplopia Ear Nose and Throat:  rhinorrhea, pharyngitis Respiratory:   cough, sputum production, SOB, wheezing, JARRELL, pleuritic pain 
Cardiology:  chest pain, palpitations, orthopnea, PND, edema, syncope Gastrointestinal:  abdominal pain, N/V, dysphagia, diarrhea, constipation, bleeding Genitourinary:  frequency, urgency, dysuria, hematuria, incontinence Muskuloskeletal :  arthralgia, myalgia Hematology:  easy bruising, bleeding, lymphadenopathy Dermatological:  rash, ulceration, pruritis Endocrine:  hot flashes or polydipsia Neurological:  headache, dizziness, confusion, focal weakness, paresthesia, memory loss, gait disturbance Psychological: anxiety, depression, agitation Objective: VITALS:   
Visit Vitals /76 Pulse (!) 103 Temp 98.8 °F (37.1 °C) Resp 12 Ht 6' (1.829 m) Wt 106.6 kg (235 lb) SpO2 94% BMI 31.87 kg/m² Temp (24hrs), Av.2 °F (37.3 °C), Min:98.8 °F (37.1 °C), Max:99.6 °F (37.6 °C) Body mass index is 31.87 kg/m². PHYSICAL EXAM: 
 
General:    Alert, cooperative, well nourished, looks uncomfortable, appears stated age. HEENT: Atraumatic, anicteric sclerae, pink conjunctivae No oral ulcers, mucosa moist, throat clear. Hearing intact. Neck:  Supple, symmetrical,  thyroid: non tender Lungs:   Clear to auscultation bilaterally. No Wheezing or Rhonchi. No rales. Chest wall:  No tenderness  No Accessory muscle use. Heart:   Regular  rhythm,  No  murmur   No gallop. No edema. Abdomen:   Mild diffuse tenderness, +voluntary guarding. Not distended. Bowel sounds hypoactive. No masses Extremities: No cyanosis. No clubbing Skin:     Not pale Not Jaundiced  No rashes Scars in left medial ankle and left lateral tibia from prior PG lesions Psych:  Good insight. Not depressed. Not anxious or agitated. Neurologic: EOMs intact. No facial asymmetry. No aphasia or slurred speech. Symmetrical strength, Alert and oriented X 3. IMAGING RESULTS: 
 []       I have personally reviewed the actual   []     CXR  []     CT scan CXR: 
CT : 
EKG: 
 ________________________________________________________________________ Care Plan discussed with: 
  Comments Patient y Family  y Niece at bedside RN Care Manager Consultant:  stuart Ackerman  
________________________________________________________________________ Prophylaxis: 
GI none DVT lovenox  
________________________________________________________________________ Recommended Disposition:  
Home with Family y HH/PT/OT/RN   
SNF/LTC   
KATY   
________________________________________________________________________ Code Status: 
Full Code y  
DNR/DNI   
________________________________________________________________________ TOTAL TIME: 50 minutes Comments  
 y Reviewed previous records  
 
 
______________________________________________________________________ Veronica Necessary, MD 
 
 
 Procedures: see electronic medical records for all procedures/Xrays and details which were not copied into this note but were reviewed prior to creation of Plan. LAB DATA REVIEWED:   
Recent Results (from the past 24 hour(s)) EKG, 12 LEAD, INITIAL Collection Time: 01/24/19 10:52 AM  
Result Value Ref Range Ventricular Rate 115 BPM  
 Atrial Rate 115 BPM  
 P-R Interval 138 ms QRS Duration 86 ms  
 Q-T Interval 300 ms QTC Calculation (Bezet) 415 ms Calculated P Axis 69 degrees Calculated R Axis 26 degrees Calculated T Axis 9 degrees Diagnosis Sinus tachycardia Nonspecific T wave abnormality When compared with ECG of 24-JUL-2018 02:51, 
T wave inversion now evident in Inferior leads T wave inversion now evident in Lateral leads Confirmed by Brian Acevedo P.VMan ([id number]) on 1/24/2019 11:42:09 AM 
  
CBC W/O DIFF Collection Time: 01/24/19 11:37 AM  
Result Value Ref Range WBC 6.6 4.1 - 11.1 K/uL  
 RBC 4.89 4. 10 - 5.70 M/uL  
 HGB 14.5 12.1 - 17.0 g/dL HCT 44.8 36.6 - 50.3 % MCV 91.6 80.0 - 99.0 FL  
 MCH 29.7 26.0 - 34.0 PG  
 MCHC 32.4 30.0 - 36.5 g/dL  
 RDW 15.2 (H) 11.5 - 14.5 % PLATELET 849 824 - 729 K/uL MPV 11.5 8.9 - 12.9 FL  
 NRBC 0.0 0  WBC ABSOLUTE NRBC 0.00 0.00 - 0.01 K/uL METABOLIC PANEL, COMPREHENSIVE Collection Time: 01/24/19 11:37 AM  
Result Value Ref Range Sodium 129 (L) 136 - 145 mmol/L Potassium 3.5 3.5 - 5.1 mmol/L Chloride 96 (L) 97 - 108 mmol/L  
 CO2 26 21 - 32 mmol/L Anion gap 7 5 - 15 mmol/L Glucose 129 (H) 65 - 100 mg/dL BUN 21 (H) 6 - 20 MG/DL Creatinine 1.24 0.70 - 1.30 MG/DL  
 BUN/Creatinine ratio 17 12 - 20 GFR est AA >60 >60 ml/min/1.73m2 GFR est non-AA >60 >60 ml/min/1.73m2 Calcium 8.9 8.5 - 10.1 MG/DL Bilirubin, total 2.2 (H) 0.2 - 1.0 MG/DL  
 ALT (SGPT) 42 12 - 78 U/L  
 AST (SGOT) 62 (H) 15 - 37 U/L Alk.  phosphatase 360 (H) 45 - 117 U/L  
 Protein, total 10.0 (H) 6.4 - 8.2 g/dL Albumin 3.2 (L) 3.5 - 5.0 g/dL Globulin 6.8 (H) 2.0 - 4.0 g/dL A-G Ratio 0.5 (L) 1.1 - 2.2 LACTIC ACID Collection Time: 01/24/19 11:50 AM  
Result Value Ref Range Lactic acid 1.8 0.4 - 2.0 MMOL/L  
URINALYSIS W/ REFLEX CULTURE Collection Time: 01/24/19 11:50 AM  
Result Value Ref Range Color ORANGE Appearance CLEAR CLEAR Specific gravity 1.029 1.003 - 1.030    
 pH (UA) 5.5 5.0 - 8.0 Protein 30 (A) NEG mg/dL Glucose NEGATIVE  NEG mg/dL Ketone 15 (A) NEG mg/dL Blood NEGATIVE  NEG Urobilinogen 1.0 0.2 - 1.0 EU/dL Nitrites POSITIVE (A) NEG Leukocyte Esterase SMALL (A) NEG    
 WBC 0-4 0 - 4 /hpf  
 RBC 0-5 0 - 5 /hpf Epithelial cells FEW FEW /lpf Bacteria NEGATIVE  NEG /hpf  
 UA:UC IF INDICATED CULTURE NOT INDICATED BY UA RESULT CNI Mucus 2+ (A) NEG /lpf Hyaline cast 5-10 0 - 5 /lpf  
BILIRUBIN, CONFIRM Collection Time: 01/24/19 11:50 AM  
Result Value Ref Range Bilirubin UA, confirm POSITIVE (A) NEG    
SAMPLES BEING HELD Collection Time: 01/24/19 11:50 AM  
Result Value Ref Range SAMPLES BEING HELD LAV PST   
 COMMENT Add-on orders for these samples will be processed based on acceptable specimen integrity and analyte stability, which may vary by analyte.

## 2019-01-24 NOTE — ED PROVIDER NOTES
EXPRESS CARE NOTE: 
11:47 AM 
I have seen and evaluated this patient in the Express Care portion of triage for acute abdominal pain. Pt was referred to the ED by Dr. Nik Flynn for abdominal pain s/p fecal transplant. The patients care will begin now and orders have been placed. This patient will be seen and provided further care in the Emergency Room. PAM Pedro 
 
 
EMERGENCY DEPARTMENT HISTORY AND PHYSICAL EXAM 
 
 
Date: 2019 Patient Name: Luis Mcdaniel History of Presenting Illness Chief Complaint Patient presents with  Abdominal Pain Pt had fecal transplant on Monday and is now c/o lower abdominal pain. Referred to ER by GI doctor. Sent by Brenda Melo, needs labs, [address] History Provided By: Patient HPI: Luis Mcdaniel, 37 y.o. male with PMHx significant for crohn's, IBD with colectomy, c-diff, CKD, liver disease, presents by private vehicle referred by Dr. Nik Flynn to the ED with c/o constant lower abdominal pain x 2 days s/p fecal transplant on 19. Pt describes abdominal pain as sharp and cramping. He reports radiating pain into L lower back. Pt states pain is exacerbated by walking. No relieving factors. He notes additional nausea, occasional vomiting, and diarrhea. Pt specifically denies any recent urinary symptoms, fevers, SOB, or any other complaints. There are no other complaints, changes, or physical findings at this time. PCP: Tian Wilkerson MD  
GI: Dr. John Garcia No current facility-administered medications on file prior to encounter. Current Outpatient Medications on File Prior to Encounter Medication Sig Dispense Refill  [] loperamide (IMMODIUM) 2 mg tablet Take 2 Tabs by mouth four (4) times daily as needed for Diarrhea for up to 2 days. 16 Tab 0 Past History Past Medical History: 
Past Medical History:  
Diagnosis Date  Arthritis   
 all joints and spine  C. difficile colitis 2019  Chest pain 2013 as of 10/14/14 pt denies any CP since 2/13  Crohn's colitis ([facility] [address] Dr. Evi Aaron  H/O Clostridium difficile infection 01/2017  
 as of 3/30/17 pt denies abd pain, diarrhea > 1 week  Iron deficiency anemia 2/18/2013  Kidney infection 2018  Liver disease  Pancreatitis  Polyarteritis nodosa ([address]  Pouchitis ([address] 12/08/2011  PSC (primary sclerosing cholangitis) Dr Zacarias Fuentes [phone]  Pyoderma gangrenosum Past Surgical History: 
Past Surgical History:  
Procedure Laterality Date  ABDOMEN SURGERY PROC UNLISTED  2002  
 colostomy reversal & J Pouch  COLONOSCOPY N/A 4/4/2017 COLONOSCOPY performed by Joaquín Ayers MD at . [address] COLONOSCOPY N/A 1/15/2018 COLONOSCOPY performed by Joaquín Ayers MD at [facility] ENDOSCOPY  COLONOSCOPY N/A 9/13/2018 COLONOSCOPY performed by Joaquín Ayers MD at [facility] ENDOSCOPY  COLONOSCOPY N/A 1/21/2019 COLONOSCOPY W/ FECAL TRANSPLANT performed by Rui Silva MD at [facility] ENDOSCOPY  COLONOSCOPY,DIAGNOSTIC  1/15/2018  COLONOSCOPY,DIAGNOSTIC  9/13/2018  COLONOSCOPY,DIAGNOSTIC  1/21/2019  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  HX COLOSTOMY  2002  HX GI    
 ercp [facility] Right 1999  SC COLONOSCOPY W/BIOPSY SINGLE/MULTIPLE  12/8/2011  SC EGD TRANSORAL BIOPSY SINGLE/MULTIPLE  2/18/2013 Family History: 
Family History Problem Relation Age of Onset  Diabetes Mother  Hypertension Mother Tivis Abelson Arthritis-osteo Mother  Diabetes Father  Hypertension Father  Stroke Father  Arthritis-osteo Father  No Known Problems Sister  No Known Problems Brother  No Known Problems Maternal Aunt  No Known Problems Maternal Uncle  No Known Problems Paternal Aunt  No Known Problems Paternal Uncle  No Known Problems Maternal Grandmother  No Known Problems Maternal Grandfather  No Known Problems Paternal Grandmother  No Known Problems Paternal Grandfather Social History: 
Social History Tobacco Use  Smoking status: Never Smoker  Smokeless tobacco: Never Used Substance Use Topics  Alcohol use: No  
  Alcohol/week: 0.0 oz  Drug use: No  
 
 
Allergies: Allergies Allergen Reactions  Cafergot [Ergotamine-Caffeine] Hives Review of Systems Review of Systems Constitutional: Negative for chills and fever. HENT: Negative for congestion. Eyes: Negative for visual disturbance. Respiratory: Negative for chest tightness. Cardiovascular: Negative for chest pain and leg swelling. Gastrointestinal: Positive for abdominal pain, diarrhea, nausea and vomiting. Endocrine: Negative for polyuria. Genitourinary: Negative for dysuria and frequency. Musculoskeletal: Negative for myalgias. Skin: Negative for color change. Allergic/Immunologic: Negative for immunocompromised state. Neurological: Negative for numbness. Physical Exam  
Physical Exam  
Nursing note and vitals reviewed. General appearance: non-toxic, mildly uncomfortable Eyes: PERRL, EOMI, conjunctiva normal, anicteric sclera HEENT: membranes tacky, oropharynx is clear Pulmonary: clear to auscultation bilaterally Cardiac: normal rate and regular rhythm, no gallops or rubs, 2/6 systolic murmur, 2+DP pulses, 2+ radial pulses Abdomen: soft, lower abd TTP, nondistended, minimal voluntary guarding, no rebound, non-rigid, bowel sounds present MSK: no pre-tibial edema Neuro: Alert, answers questions appropriately Skin: capillary refill brisk, chronic appearing healed LE skin lesions (suspect chronic pyoderma lesions) Diagnostic Study Results Labs - Recent Results (from the past 12 hour(s)) EKG, 12 LEAD, INITIAL Collection Time: 01/24/19 10:52 AM  
Result Value Ref Range Ventricular Rate 115 BPM  
 Atrial Rate 115 BPM  
 P-R Interval 138 ms QRS Duration 86 ms  
 Q-T Interval 300 ms QTC Calculation (Bezet) 415 ms Calculated P Axis 69 degrees Calculated R Axis 26 degrees Calculated T Axis 9 degrees Diagnosis Sinus tachycardia Nonspecific T wave abnormality When compared with ECG of 24-JUL-2018 02:51, 
T wave inversion now evident in Inferior leads T wave inversion now evident in Lateral leads Confirmed by JAMILA Mccormick ([id number]) on 1/24/2019 11:42:09 AM 
  
CBC W/O DIFF Collection Time: 01/24/19 11:37 AM  
Result Value Ref Range WBC 6.6 4.1 - 11.1 K/uL  
 RBC 4.89 4. 10 - 5.70 M/uL  
 HGB 14.5 12.1 - 17.0 g/dL HCT 44.8 36.6 - 50.3 % MCV 91.6 80.0 - 99.0 FL  
 MCH 29.7 26.0 - 34.0 PG  
 MCHC 32.4 30.0 - 36.5 g/dL  
 RDW 15.2 (H) 11.5 - 14.5 % PLATELET 847 274 - 936 K/uL MPV 11.5 8.9 - 12.9 FL  
 NRBC 0.0 0  WBC ABSOLUTE NRBC 0.00 0.00 - 0.01 K/uL METABOLIC PANEL, COMPREHENSIVE Collection Time: 01/24/19 11:37 AM  
Result Value Ref Range Sodium 129 (L) 136 - 145 mmol/L Potassium 3.5 3.5 - 5.1 mmol/L Chloride 96 (L) 97 - 108 mmol/L  
 CO2 26 21 - 32 mmol/L Anion gap 7 5 - 15 mmol/L Glucose 129 (H) 65 - 100 mg/dL BUN 21 (H) 6 - 20 MG/DL Creatinine 1.24 0.70 - 1.30 MG/DL  
 BUN/Creatinine ratio 17 12 - 20 GFR est AA >60 >60 ml/min/1.73m2 GFR est non-AA >60 >60 ml/min/1.73m2 Calcium 8.9 8.5 - 10.1 MG/DL Bilirubin, total 2.2 (H) 0.2 - 1.0 MG/DL  
 ALT (SGPT) 42 12 - 78 U/L  
 AST (SGOT) 62 (H) 15 - 37 U/L Alk. phosphatase 360 (H) 45 - 117 U/L Protein, total 10.0 (H) 6.4 - 8.2 g/dL Albumin 3.2 (L) 3.5 - 5.0 g/dL Globulin 6.8 (H) 2.0 - 4.0 g/dL A-G Ratio 0.5 (L) 1.1 - 2.2 LACTIC ACID Collection Time: 01/24/19 11:50 AM  
Result Value Ref Range Lactic acid 1.8 0.4 - 2.0 MMOL/L  
URINALYSIS W/ REFLEX CULTURE Collection Time: 01/24/19 11:50 AM  
Result Value Ref Range Color ORANGE Appearance CLEAR CLEAR Specific gravity 1.029 1.003 - 1.030    
 pH (UA) 5.5 5.0 - 8.0 Protein 30 (A) NEG mg/dL Glucose NEGATIVE  NEG mg/dL Ketone 15 (A) NEG mg/dL Blood NEGATIVE  NEG Urobilinogen 1.0 0.2 - 1.0 EU/dL Nitrites POSITIVE (A) NEG Leukocyte Esterase SMALL (A) NEG    
 WBC 0-4 0 - 4 /hpf  
 RBC 0-5 0 - 5 /hpf Epithelial cells FEW FEW /lpf Bacteria NEGATIVE  NEG /hpf  
 UA:UC IF INDICATED CULTURE NOT INDICATED BY UA RESULT CNI Mucus 2+ (A) NEG /lpf Hyaline cast 5-10 0 - 5 /lpf  
BILIRUBIN, CONFIRM Collection Time: 01/24/19 11:50 AM  
Result Value Ref Range Bilirubin UA, confirm POSITIVE (A) NEG Radiologic Studies -  
CT ABD PELV W CONT Final Result IMPRESSION:   
1. Status post colectomy with ileoanal anastomosis. 2. No bowel obstruction, possible ileus. 2. Mural thickening and hyperenhancement of some of the mid small bowel loops. The differential diagnosis includes inflammatory or infectious colitis with  
ischemia being less likely. 4. Mildly nodular liver suggesting cirrhosis with periportal decreased  
attenuation or edema of uncertain etiology and significance. 5. Splenomegaly. CT Results  (Last 48 hours) 01/24/19 1259  CT ABD PELV W CONT Final result Impression:  IMPRESSION:   
1. Status post colectomy with ileoanal anastomosis. 2. No bowel obstruction, possible ileus. 2. Mural thickening and hyperenhancement of some of the mid small bowel loops. The differential diagnosis includes inflammatory or infectious colitis with  
ischemia being less likely. 4. Mildly nodular liver suggesting cirrhosis with periportal decreased  
attenuation or edema of uncertain etiology and significance. 5. Splenomegaly. Narrative:  EXAM: CT ABDOMEN PELVIS WITH CONTRAST INDICATION: Abdominal pain. COMPARISON: 12/31/2018. CONTRAST: 100 mL of Isovue-370. TECHNIQUE:   
Multislice helical CT was performed from the diaphragm to the symphysis pubis during uneventful rapid bolus intravenous contrast administration. Oral contrast  
was not administered. Contiguous 5 mm axial images were reconstructed and lung  
and soft tissue windows were generated. Coronal and sagittal reformations were  
generated. CT dose reduction was achieved through use of a standardized protocol  
tailored for this examination and automatic exposure control for dose  
modulation. FINDINGS:  
LOWER CHEST: The visualized portions of the lung bases are clear. ABDOMEN:  
Liver: The liver is nodular in contour, particularly the left lobe. There is  
diffuse periportal decreased attenuation. There is no discrete mass. The portal  
veins are patent and the main portal vein is borderline enlarged measuring 13  
mm. Gallbladder and bile ducts: There is no calcified gallstone or biliary  
dilatation. Spleen: The spleen is enlarged without focal abnormality. Pancreas: No abnormality. Adrenal glands: No abnormality. Kidneys: No abnormality. PELVIS:  
Reproductive organs: The prostate gland and seminal vesicles are symmetrical.   
Bladder: No abnormality. BOWEL AND MESENTERY: The small bowel is normal.  There is no mesenteric mass or  
adenopathy. The colon and appendix are absent and there is a ileocolic  
anastomosis with an ileoanal pouch. The proximal small bowel is normal. There is  
mild small bowel dilatation without a point of obstruction. There is mural  
thickening and hyperenhancement of the mid small bowel. PERITONEUM: There is no ascites or free intraperitoneal air. RETROPERITONEUM: The aorta  tapers without aneurysm. There is no retroperitoneal  
adenopathy or mass. There is no pelvic mass or adenopathy. BONES AND SOFT TISSUES: The bones and soft tissues of the abdominal wall are  
within normal limits. CXR Results  (Last 48 hours) None Medical Decision Making I am the first provider for this patient. I reviewed the vital signs, available nursing notes, past medical history, past surgical history, family history and social history. Vital Signs-Reviewed the patient's vital signs. Patient Vitals for the past 12 hrs: 
 Temp Pulse Resp BP SpO2  
01/24/19 1421  71 12 128/86 100 % 01/24/19 1225  88 14 119/78 100 % 01/24/19 1043 99.6 °F (37.6 °C) (!) 128 18 127/85 100 % Pulse Oximetry Analysis - 100% on RA 
 
EKG interpretation: 1052 Rhythm: sinus tachycardia and Regular. Rate (approx.): 115; Axis: normal; ST/T wave: nonspecific T wave abnormality, no ST elevation, no ST depression;  
NC interval 138 ms, QRS 86 ms, QTc 415 ms. Other findings: nonspecific intraventricular delay. Records Reviewed: Nursing Notes, Old Medical Records, Previous Radiology Studies and Previous Laboratory Studies Provider Notes (Medical Decision Making): DDx: enteritis, perforation, metabolic disorder, UTI 
 
ED Course:  
Initial assessment performed. The patients presenting problems have been discussed, and they are in agreement with the care plan formulated and outlined with them. I have encouraged them to ask questions as they arise throughout their visit. CONSULT NOTE:  
1:54 PM 
[employer]. Shereen Aguirre MD spoke with Dr. Bryanna Pham, Specialty: GI 
Discussed pt's hx, disposition, and available diagnostic and imaging results. Reviewed care plans. Consultant agrees with plans as outlined. He states he has spoken with Dr. Randy Anderson who will admit pt. PLAN: 
1. Admit to Hospitalist with GI consult Admission Note: 
1:40 PM 
Patient is being admitted to the hospital by Dr. Dee Fuentes. The results of their tests and reasons for their admission have been discussed with them and available family. They convey agreement and understanding for the need to be admitted and for their admission diagnosis. Diagnosis Clinical Impression: 1. Enteritis 2. Dehydration 3. Hyponatremia Attestations: This note is prepared by Cynthia Sanderson, acting as Scribe for Bettye Resendiz. Mirela Ruelas MD. 
 
The scribe's documentation has been prepared under my direction and personally reviewed by me in its entirety. I confirm that the note above accurately reflects all work, treatment, procedures, and medical decision making performed by me. Bettye Resendiz. Mirela Ruelas MD 
 
 
 
This note will not be viewable in [address]

## 2019-01-25 ENCOUNTER — APPOINTMENT (OUTPATIENT)
Dept: GENERAL RADIOLOGY | Age: 44
DRG: 372 | End: 2019-01-25
Attending: SPECIALIST
Payer: OTHER GOVERNMENT

## 2019-01-25 LAB
ALBUMIN SERPL-MCNC: 2.9 G/DL (ref 3.5–5)
ALBUMIN/GLOB SERPL: 0.5 {RATIO} (ref 1.1–2.2)
ALP SERPL-CCNC: 300 U/L (ref 45–117)
ALT SERPL-CCNC: 34 U/L (ref 12–78)
ANION GAP SERPL CALC-SCNC: 8 MMOL/L (ref 5–15)
AST SERPL-CCNC: 48 U/L (ref 15–37)
BILIRUB SERPL-MCNC: 1.7 MG/DL (ref 0.2–1)
BUN SERPL-MCNC: 23 MG/DL (ref 6–20)
BUN/CREAT SERPL: 18 (ref 12–20)
C DIFF GDH STL QL: NEGATIVE
C DIFF TOX A+B STL QL IA: NEGATIVE
CALCIUM SERPL-MCNC: 8.3 MG/DL (ref 8.5–10.1)
CHLORIDE SERPL-SCNC: 95 MMOL/L (ref 97–108)
CO2 SERPL-SCNC: 27 MMOL/L (ref 21–32)
CREAT SERPL-MCNC: 1.26 MG/DL (ref 0.7–1.3)
ERYTHROCYTE [DISTWIDTH] IN BLOOD BY AUTOMATED COUNT: 14.9 % (ref 11.5–14.5)
GLOBULIN SER CALC-MCNC: 6.1 G/DL (ref 2–4)
GLUCOSE SERPL-MCNC: 129 MG/DL (ref 65–100)
HCT VFR BLD AUTO: 39.6 % (ref 36.6–50.3)
HGB BLD-MCNC: 13.1 G/DL (ref 12.1–17)
INTERPRETATION: NORMAL
MAGNESIUM SERPL-MCNC: 2.2 MG/DL (ref 1.6–2.4)
MCH RBC QN AUTO: 29.7 PG (ref 26–34)
MCHC RBC AUTO-ENTMCNC: 33.1 G/DL (ref 30–36.5)
MCV RBC AUTO: 89.8 FL (ref 80–99)
NRBC # BLD: 0 K/UL (ref 0–0.01)
NRBC BLD-RTO: 0 PER 100 WBC
PHOSPHATE SERPL-MCNC: 3.3 MG/DL (ref 2.6–4.7)
PLATELET # BLD AUTO: 204 K/UL (ref 150–400)
PMV BLD AUTO: 11.6 FL (ref 8.9–12.9)
POTASSIUM SERPL-SCNC: 3.5 MMOL/L (ref 3.5–5.1)
PROT SERPL-MCNC: 9 G/DL (ref 6.4–8.2)
RBC # BLD AUTO: 4.41 M/UL (ref 4.1–5.7)
SODIUM SERPL-SCNC: 130 MMOL/L (ref 136–145)
WBC # BLD AUTO: 10.9 K/UL (ref 4.1–11.1)

## 2019-01-25 PROCEDURE — 83735 ASSAY OF MAGNESIUM: CPT

## 2019-01-25 PROCEDURE — 65270000029 HC RM PRIVATE

## 2019-01-25 PROCEDURE — 74011250636 HC RX REV CODE- 250/636: Performed by: HOSPITALIST

## 2019-01-25 PROCEDURE — 80053 COMPREHEN METABOLIC PANEL: CPT

## 2019-01-25 PROCEDURE — 84100 ASSAY OF PHOSPHORUS: CPT

## 2019-01-25 PROCEDURE — 85027 COMPLETE CBC AUTOMATED: CPT

## 2019-01-25 PROCEDURE — 74022 RADEX COMPL AQT ABD SERIES: CPT

## 2019-01-25 PROCEDURE — 74011250636 HC RX REV CODE- 250/636: Performed by: INTERNAL MEDICINE

## 2019-01-25 PROCEDURE — 74011000250 HC RX REV CODE- 250: Performed by: INTERNAL MEDICINE

## 2019-01-25 PROCEDURE — 84165 PROTEIN E-PHORESIS SERUM: CPT

## 2019-01-25 PROCEDURE — 36415 COLL VENOUS BLD VENIPUNCTURE: CPT

## 2019-01-25 RX ORDER — LEVOFLOXACIN 5 MG/ML
500 INJECTION, SOLUTION INTRAVENOUS EVERY 24 HOURS
Status: DISCONTINUED | OUTPATIENT
Start: 2019-01-25 | End: 2019-01-28 | Stop reason: HOSPADM

## 2019-01-25 RX ADMIN — Medication 10 ML: at 15:36

## 2019-01-25 RX ADMIN — Medication 10 ML: at 22:00

## 2019-01-25 RX ADMIN — ONDANSETRON 4 MG: 2 INJECTION INTRAMUSCULAR; INTRAVENOUS at 09:28

## 2019-01-25 RX ADMIN — POTASSIUM CHLORIDE: 2 INJECTION, SOLUTION, CONCENTRATE INTRAVENOUS at 11:03

## 2019-01-25 RX ADMIN — ONDANSETRON 4 MG: 2 INJECTION INTRAMUSCULAR; INTRAVENOUS at 01:26

## 2019-01-25 RX ADMIN — HYDROMORPHONE HYDROCHLORIDE 2 MG: 2 INJECTION, SOLUTION INTRAMUSCULAR; INTRAVENOUS; SUBCUTANEOUS at 09:28

## 2019-01-25 RX ADMIN — HYDROMORPHONE HYDROCHLORIDE 2 MG: 2 INJECTION, SOLUTION INTRAMUSCULAR; INTRAVENOUS; SUBCUTANEOUS at 15:40

## 2019-01-25 RX ADMIN — ENOXAPARIN SODIUM 40 MG: 40 INJECTION SUBCUTANEOUS at 07:02

## 2019-01-25 RX ADMIN — Medication 10 ML: at 07:02

## 2019-01-25 RX ADMIN — LEVOFLOXACIN 500 MG: 5 INJECTION, SOLUTION INTRAVENOUS at 17:26

## 2019-01-25 RX ADMIN — FAMOTIDINE 20 MG: 10 INJECTION, SOLUTION INTRAVENOUS at 09:28

## 2019-01-25 RX ADMIN — POTASSIUM CHLORIDE: 2 INJECTION, SOLUTION, CONCENTRATE INTRAVENOUS at 17:26

## 2019-01-25 NOTE — PROGRESS NOTES
Pharmacy Automatic Renal Dosing Protocol - Antimicrobials Indication for Antimicrobials: Campylobacter in Stool Current Regimen of Each Antimicrobial: 
Levofloxacin 500mg IV q24h (Start Date ; Day # 1) Previous Antimicrobial Therapy: 
 
 
Significant Cultures:  
: Stool = + Campylobacter Antigen (pending) : C.diff = Negative (FINAL) Radiology / Imaging results: (X-ray, CT scan or MRI):  
: CT Abd: 
1. Status post colectomy with ileoanal anastomosis. 2. No bowel obstruction, possible ileus. 2. Mural thickening and hyperenhancement of some of the mid small bowel loops. The differential diagnosis includes inflammatory or infectious colitis with 
ischemia being less likely. 4. Mildly nodular liver suggesting cirrhosis with periportal decreased 
attenuation or edema of uncertain etiology and significance. 5. Splenomegaly. : XRay Abd: Small bowel obstruction. Paralysis, amputations, malnutrition: n/a Labs: 
Recent Labs  
  19 
0523 19 
1137 CREA 1.26 1.24 BUN 23* 21* WBC 10.9 6.6 Temp (24hrs), Av.3 °F (36.8 °C), Min:98 °F (36.7 °C), Max:98.8 °F (37.1 °C) Creatinine Clearance (mL/min) or Dialysis: 81 ml/min Impression/Plan:  
· Levofloxacin 500mg IV q24hrs is dosed appropriately based on indication and renal function · Antimicrobial stop date TBD Pharmacy will follow daily and adjust medications as appropriate for renal function and/or serum levels. Thank you, 
Louie Marie, [facility] Recommended duration of therapy 
http://[facility]/[place]/[facility]/Pharmacy/Clinical%20Companion/Duration%20of%20ABX%20therapy. docx Renal Dosing 
http://[facility]/[place]/[facility]/Pharmacy/Clinical%20Companion/Renal%[id number]. pdf

## 2019-01-25 NOTE — PROGRESS NOTES
Pm rounds 
 
+ jade Yuen rx levaquin Symptoms the same Watery stool, nausea, no vomiting.  
kub reviewed:  Ileus versus sbo. (no colon Exam:  
Visit Vitals /75 Pulse (!) 107 Temp 98.1 °F (36.7 °C) Resp 16 Ht 6' (1.829 m) Wt 103.5 kg (228 lb 1.6 oz) SpO2 97% BMI 30.94 kg/m²  
 bs+ but decresaed Epigastric tender No rebound Rectal tone + staple line felt no impaction, stool present Rec:  
 
rx c jejuni Daily kub and labs Supportive care and fluids Dr Ellen Lopez to see Hold ng unless ng gets worse Claritza Lockhart MD 
5:17 PM 
1/25/2019

## 2019-01-25 NOTE — CDMP QUERY
Account Number: [de-identified] MRN: [id number] Patient: Vika Etienne Created: [id number] Clinician Name: Madison Wilson Part : 
Please clarify if this patient is (was) being treated/managed for:  
 
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 
=> Other explanation of clinical findings 
=> Clinically Undetermined (no explanation for clinical findings) The medical record reflects the following clinical findings, treatment, and risk factors. Risk Factors:  43m hx crohns w/ colectomy, Hx C. Diff, s/p fecal transplant 1/21 Clinical Indicators:  1/24 gi--suspect infection (possibly related to fecal transplant). Treatment: NPO, ivfs, KUB, stool workup, no abx per gi Please clarify and document your clinical opinion in the progress notes and discharge summary including the definitive and/or presumptive diagnosis, (suspected or probable), related to the above clinical findings. Please include clinical findings supporting your diagnosis. Thank Deborah Gong Rn/CDMP

## 2019-01-25 NOTE — PROGRESS NOTES
Hospitalist Progress Note NAME: Tiffany Chavis :  1975 MRN:  [id number] Assessment / Plan: 
Enteritis, POA: F/U stool work up, no ABX for now as per GI. Abdominal pain, POA: CT shows paretic loops of SB, c/w Ileus, will keep NPO, F/U KUB today and monitor, GI in the case. Hyponatremia  So far improving, c/w IVF and monitor Hx C. Diff, s/p fecal transplant 19, F/U C. Diff test. Hx Crohn's s/p colectomy with reversal 
Ieitis and pouchitis  with ulcerations [facility] with chronic LFTs elevation, will likely require liver transplant in distant future Hx left leg pyoderma gangrenosum, quiescent currently. Hyperproteinemia: mainly globulins, will check UPEP/SPEP. Obesity: BMI 31.87 Surrogate decision maker:  wife Code status: Full Prophylaxis: Lovenox Recommended Disposition: Home w/Family Subjective: Chief Complaint / Reason for Physician Visit \"My belly hurts\". Discussed with RN events overnight. Review of Systems: 
Symptom Y/N Comments  Symptom Y/N Comments Fever/Chills    Chest Pain Poor Appetite    Edema Cough    Abdominal Pain y Sputum    Joint Pain SOB/JARRELL    Pruritis/Rash Nausea/vomit y   Tolerating PT/OT Diarrhea    Tolerating Diet Constipation    Other Could NOT obtain due to:   
 
Objective: VITALS:  
Last 24hrs VS reviewed since prior progress note. Most recent are: 
Patient Vitals for the past 24 hrs: 
 Temp Pulse Resp BP SpO2  
19 0356 98.4 °F (36.9 °C) 91 16 124/81 97 % 19 2315 98 °F (36.7 °C) 98 16 128/84 97 % 19 1905 98.6 °F (37 °C) (!) 111 18 131/82 96 % 19 1701 98.8 °F (37.1 °C) (!) 103  116/76 94 % 19 1508     96 % 19 1421  71 12 128/86 100 % 19 1225  88 14 119/78 100 % 19 1043 99.6 °F (37.6 °C) (!) 128 18 127/85 100 % Intake/Output Summary (Last 24 hours) at 2019 0900 Last data filed at 2019 2229 Gross per 24 hour Intake 1000 ml Output 600 ml Net 400 ml PHYSICAL EXAM: 
General: WD, WN. Alert, cooperative, in pain  
EENT:  EOMI. Anicteric sclerae. MMM Resp:  Coarse BS 
CV:  Regular  rhythm,  No edema GI:  Soft, Non distended, +  tender.  decreased Bowel sounds Neurologic:  Alert and oriented X 3, normal speech, Psych:   Good insight. Not anxious nor agitated Skin:  No rashes. No jaundice Reviewed most current lab test results and cultures  YES Reviewed most current radiology test results   YES Review and summation of old records today    NO Reviewed patient's current orders and MAR    YES 
PMH/SH reviewed - no change compared to H&P 
________________________________________________________________________ Care Plan discussed with: 
  Comments Patient y Family RN y   
Care Manager Consultant Multidiciplinary team rounds were held today with , nursing, pharmacist and clinical coordinator. Patient's plan of care was discussed; medications were reviewed and discharge planning was addressed. ________________________________________________________________________ Total NON critical care TIME: 35  Minutes Total CRITICAL CARE TIME Spent:   Minutes non procedure based Comments >50% of visit spent in counseling and coordination of care y   
________________________________________________________________________ Julieta Lawson MD  
 
Procedures: see electronic medical records for all procedures/Xrays and details which were not copied into this note but were reviewed prior to creation of Plan. LABS: 
I reviewed today's most current labs and imaging studies. Pertinent labs include: 
Recent Labs  
  01/25/19 
0523 01/24/19 
1137 WBC 10.9 6.6 HGB 13.1 14.5 HCT 39.6 44.8  182 Recent Labs  
  01/25/19 
0523 01/24/19 
1137 * 129*  
K 3.5 3.5 CL 95* 96* CO2 27 26 * 129* BUN 23* 21* CREA 1.26 1.24  
 CA 8.3* 8.9 MG 2.2  --   
PHOS 3.3  --   
ALB 2.9* 3.2* TBILI 1.7* 2.2*  
SGOT 48* 62* ALT 34 42 Signed: Sharyle Kief, MD

## 2019-01-25 NOTE — PROGRESS NOTES
General Surgery End of Shift Nursing Note Bedside shift change report given to  (oncoming nurse) by Edin Mayorga (offgoing nurse). Report included the following information SBAR, Kardex and MAR. Shift worked:   7p-7a Summary of shift:    Vitals stable. Pt had several episodes of nausea and bm's overnight. Pt complained of feeling like he could not urinate. Bladder scanned and amount was 119ml. Pt reported having a bladder infection prior to admission. Issues for physician to address:   N/A Number times ambulated in hallway past shift: 0 Number of times OOB to chair past shift: 4 Pain Management: 
Current medication: Dilaudid Patient states pain is manageable on current pain medication: YES 
 
GI: 
 
Current diet:  DIET CLEAR LIQUID Tolerating current diet: YES Passing flatus: YES Last Bowel Movement: today Appearance: watery Respiratory: 
 
Incentive Spirometer at bedside: YES Patient instructed on use: YES Patient Safety: 
 
Falls Score: 1 Bed Alarm On? No 
Sitter? Donna Herrera

## 2019-01-25 NOTE — PROGRESS NOTES
F/U for enteritis Abnormal ct abdomen Recent c diff treated with a stool transplant S: Mr. Shelley Brito was seen by me today during rounds. At this time, he is resting + uncomfortably. Pain was better overnight then back this am.  The patient has no other new complaints today. Please see admission consult for details of ROS; there are no changes today. Liquid brown stools O: Blood pressure 131/82, pulse (!) 111, temperature 98.6 °F (37 °C), resp. rate 18, height 6' (1.829 m), weight 106.6 kg (235 lb), SpO2 96 %. Gen: Patient is in no acute distress. There is no jaundice. Lungs: Clear to auscultation bilaterally . Heart:+ rapid heart rateRRR. Abd: distended, full,  bs+  no rebound  Tender in right mid abdomen. Extremities: Warm. Lab Results Component Value Date/Time WBC 10.9 01/25/2019 05:23 AM  
 HGB 13.1 01/25/2019 05:23 AM  
 HCT 39.6 01/25/2019 05:23 AM  
 PLATELET 880 31/70/4389 05:23 AM  
 MCV 89.8 01/25/2019 05:23 AM  
 
 
Cross sectional imaging:  None new Annamary Ripa Lab Results Component Value Date/Time Sodium 130 (L) 01/25/2019 05:23 AM  
 Potassium 3.5 01/25/2019 05:23 AM  
 Chloride 95 (L) 01/25/2019 05:23 AM  
 CO2 27 01/25/2019 05:23 AM  
 Anion gap 8 01/25/2019 05:23 AM  
 Glucose 129 (H) 01/25/2019 05:23 AM  
 BUN 23 (H) 01/25/2019 05:23 AM  
 Creatinine 1.26 01/25/2019 05:23 AM  
 BUN/Creatinine ratio 18 01/25/2019 05:23 AM  
 GFR est AA >60 01/25/2019 05:23 AM  
 GFR est non-AA >60 01/25/2019 05:23 AM  
 Calcium 8.3 (L) 01/25/2019 05:23 AM  
 Bilirubin, total 1.7 (H) 01/25/2019 05:23 AM  
 AST (SGOT) 48 (H) 01/25/2019 05:23 AM  
 Alk. phosphatase 300 (H) 01/25/2019 05:23 AM  
 Protein, total 9.0 (H) 01/25/2019 05:23 AM  
 Albumin 2.9 (L) 01/25/2019 05:23 AM  
 Globulin 6.1 (H) 01/25/2019 05:23 AM  
 A-G Ratio 0.5 (L) 01/25/2019 05:23 AM  
 ALT (SGPT) 34 01/25/2019 05:23 AM  
 
  
 
A: Active Problems: 
  Enteritis (1/24/2019) Hyponatremia (1/24/2019) Abdominal pain (1/24/2019) Comment:  See below Impression/ Report/ Plan:   
 
 
 Problem Discussion and recommendations 1 enteritis Await stool c diff and culture. I held abx intially, but if symptoms do not improve by tomrrow consider iv flagy +- levaquin even if stool culture negative 2 History c diff S/p stool transplant. This illness followed the tranplant 3 Volume depletino  Await today's labs, continue supportive care  I think he needs more fluds, but did not order. I did order strict I and o.     
4 ileus Symptoms suggest ileus. Await today's images; consider advancing to clear liquds depending on images. 5 tachycardia This is the most concerning fiding. If this persists after volume repletion and / or his symptoms worsen, low threshold for repeat ct scn   
 
 call partner to see daily this weekendc 1.27 and 1.28 Etelvina Anderson MD 
10:07 AM 
1/25/2019

## 2019-01-26 ENCOUNTER — APPOINTMENT (OUTPATIENT)
Dept: GENERAL RADIOLOGY | Age: 44
DRG: 372 | End: 2019-01-26
Attending: SPECIALIST
Payer: OTHER GOVERNMENT

## 2019-01-26 LAB
ALBUMIN SERPL-MCNC: 2.7 G/DL (ref 3.5–5)
ALBUMIN/GLOB SERPL: 0.5 {RATIO} (ref 1.1–2.2)
ALP SERPL-CCNC: 275 U/L (ref 45–117)
ALT SERPL-CCNC: 30 U/L (ref 12–78)
ANION GAP SERPL CALC-SCNC: 9 MMOL/L (ref 5–15)
AST SERPL-CCNC: 39 U/L (ref 15–37)
BACTERIA SPEC CULT: NORMAL
BASOPHILS # BLD: 0.1 K/UL (ref 0–0.1)
BASOPHILS NFR BLD: 1 % (ref 0–1)
BILIRUB SERPL-MCNC: 1.4 MG/DL (ref 0.2–1)
BUN SERPL-MCNC: 20 MG/DL (ref 6–20)
BUN/CREAT SERPL: 23 (ref 12–20)
C JEJUNI+C COLI AG STL QL: NORMAL
C JEJUNI+C COLI AG STL QL: POSITIVE
CALCIUM SERPL-MCNC: 8.4 MG/DL (ref 8.5–10.1)
CHLORIDE SERPL-SCNC: 99 MMOL/L (ref 97–108)
CO2 SERPL-SCNC: 27 MMOL/L (ref 21–32)
CREAT SERPL-MCNC: 0.86 MG/DL (ref 0.7–1.3)
DIFFERENTIAL METHOD BLD: ABNORMAL
E COLI SXT1+2 STL IA: NEGATIVE
EOSINOPHIL # BLD: 0.1 K/UL (ref 0–0.4)
EOSINOPHIL NFR BLD: 1 % (ref 0–7)
ERYTHROCYTE [DISTWIDTH] IN BLOOD BY AUTOMATED COUNT: 14.7 % (ref 11.5–14.5)
GLOBULIN SER CALC-MCNC: 5.9 G/DL (ref 2–4)
GLUCOSE SERPL-MCNC: 98 MG/DL (ref 65–100)
HCT VFR BLD AUTO: 38.4 % (ref 36.6–50.3)
HGB BLD-MCNC: 12.5 G/DL (ref 12.1–17)
IMM GRANULOCYTES # BLD AUTO: 0 K/UL (ref 0–0.04)
IMM GRANULOCYTES NFR BLD AUTO: 0 % (ref 0–0.5)
LYMPHOCYTES # BLD: 2.7 K/UL (ref 0.8–3.5)
LYMPHOCYTES NFR BLD: 29 % (ref 12–49)
MAGNESIUM SERPL-MCNC: 2.3 MG/DL (ref 1.6–2.4)
MCH RBC QN AUTO: 29.6 PG (ref 26–34)
MCHC RBC AUTO-ENTMCNC: 32.6 G/DL (ref 30–36.5)
MCV RBC AUTO: 90.8 FL (ref 80–99)
MONOCYTES # BLD: 1.8 K/UL (ref 0–1)
MONOCYTES NFR BLD: 19 % (ref 5–13)
NEUTS BAND NFR BLD MANUAL: 1 %
NEUTS SEG # BLD: 4.7 K/UL (ref 1.8–8)
NEUTS SEG NFR BLD: 49 % (ref 32–75)
NRBC # BLD: 0 K/UL (ref 0–0.01)
NRBC BLD-RTO: 0 PER 100 WBC
PLATELET # BLD AUTO: 188 K/UL (ref 150–400)
PMV BLD AUTO: 11.2 FL (ref 8.9–12.9)
POTASSIUM SERPL-SCNC: 3.4 MMOL/L (ref 3.5–5.1)
PROT SERPL-MCNC: 8.6 G/DL (ref 6.4–8.2)
RBC # BLD AUTO: 4.23 M/UL (ref 4.1–5.7)
RBC MORPH BLD: ABNORMAL
SERVICE CMNT-IMP: NORMAL
SODIUM SERPL-SCNC: 135 MMOL/L (ref 136–145)
WBC # BLD AUTO: 9.4 K/UL (ref 4.1–11.1)

## 2019-01-26 PROCEDURE — 36415 COLL VENOUS BLD VENIPUNCTURE: CPT

## 2019-01-26 PROCEDURE — 94760 N-INVAS EAR/PLS OXIMETRY 1: CPT

## 2019-01-26 PROCEDURE — 74011000250 HC RX REV CODE- 250: Performed by: INTERNAL MEDICINE

## 2019-01-26 PROCEDURE — 80053 COMPREHEN METABOLIC PANEL: CPT

## 2019-01-26 PROCEDURE — 74022 RADEX COMPL AQT ABD SERIES: CPT

## 2019-01-26 PROCEDURE — 99252 IP/OBS CONSLTJ NEW/EST SF 35: CPT | Performed by: SURGERY

## 2019-01-26 PROCEDURE — 74011250636 HC RX REV CODE- 250/636: Performed by: INTERNAL MEDICINE

## 2019-01-26 PROCEDURE — 85025 COMPLETE CBC W/AUTO DIFF WBC: CPT

## 2019-01-26 PROCEDURE — 74011250636 HC RX REV CODE- 250/636: Performed by: HOSPITALIST

## 2019-01-26 PROCEDURE — 83735 ASSAY OF MAGNESIUM: CPT

## 2019-01-26 PROCEDURE — 65270000029 HC RM PRIVATE

## 2019-01-26 RX ADMIN — HYDROMORPHONE HYDROCHLORIDE 2 MG: 2 INJECTION, SOLUTION INTRAMUSCULAR; INTRAVENOUS; SUBCUTANEOUS at 17:16

## 2019-01-26 RX ADMIN — HYDROMORPHONE HYDROCHLORIDE 2 MG: 2 INJECTION, SOLUTION INTRAMUSCULAR; INTRAVENOUS; SUBCUTANEOUS at 01:43

## 2019-01-26 RX ADMIN — POTASSIUM CHLORIDE: 2 INJECTION, SOLUTION, CONCENTRATE INTRAVENOUS at 22:37

## 2019-01-26 RX ADMIN — Medication 10 ML: at 21:20

## 2019-01-26 RX ADMIN — HYDROMORPHONE HYDROCHLORIDE 2 MG: 2 INJECTION, SOLUTION INTRAMUSCULAR; INTRAVENOUS; SUBCUTANEOUS at 22:44

## 2019-01-26 RX ADMIN — HYDROMORPHONE HYDROCHLORIDE 2 MG: 2 INJECTION, SOLUTION INTRAMUSCULAR; INTRAVENOUS; SUBCUTANEOUS at 13:35

## 2019-01-26 RX ADMIN — POTASSIUM CHLORIDE: 2 INJECTION, SOLUTION, CONCENTRATE INTRAVENOUS at 01:47

## 2019-01-26 RX ADMIN — POTASSIUM CHLORIDE: 2 INJECTION, SOLUTION, CONCENTRATE INTRAVENOUS at 11:18

## 2019-01-26 RX ADMIN — ONDANSETRON 4 MG: 2 INJECTION INTRAMUSCULAR; INTRAVENOUS at 22:43

## 2019-01-26 RX ADMIN — FAMOTIDINE 20 MG: 10 INJECTION, SOLUTION INTRAVENOUS at 09:52

## 2019-01-26 RX ADMIN — Medication 10 ML: at 22:45

## 2019-01-26 RX ADMIN — ENOXAPARIN SODIUM 40 MG: 40 INJECTION SUBCUTANEOUS at 09:52

## 2019-01-26 RX ADMIN — FAMOTIDINE 20 MG: 10 INJECTION, SOLUTION INTRAVENOUS at 01:43

## 2019-01-26 RX ADMIN — LEVOFLOXACIN 500 MG: 5 INJECTION, SOLUTION INTRAVENOUS at 17:15

## 2019-01-26 RX ADMIN — FAMOTIDINE 20 MG: 10 INJECTION, SOLUTION INTRAVENOUS at 20:37

## 2019-01-26 NOTE — PROGRESS NOTES
Pharmacy Automatic Renal Dosing Protocol - Antimicrobials Indication for Antimicrobials: Campylobacter in Stool Current Regimen of Each Antimicrobial: 
Levofloxacin 500mg IV q24h (Start Date ; Day # 2) Previous Antimicrobial Therapy: 
 
 
Significant Cultures:  
: Stool = + Campylobacter Antigen, shiga toxin negative-final 
: C.diff = Negative (FINAL) Radiology / Imaging results: (X-ray, CT scan or MRI):  
: CT Abd: 
1. Status post colectomy with ileoanal anastomosis. 2. No bowel obstruction, possible ileus. 2. Mural thickening and hyperenhancement of some of the mid small bowel loops. The differential diagnosis includes inflammatory or infectious colitis with 
ischemia being less likely. 4. Mildly nodular liver suggesting cirrhosis with periportal decreased 
attenuation or edema of uncertain etiology and significance. 5. Splenomegaly. : XRay Abd: Small bowel obstruction. Paralysis, amputations, malnutrition: n/a Labs: 
Recent Labs  
  19 
0528 19 
0523 19 
1137 CREA 0.86 1.26 1.24 BUN 20 23* 21* WBC 9.4 10.9 6.6 Temp (24hrs), Av.2 °F (36.8 °C), Min:97.8 °F (36.6 °C), Max:98.7 °F (37.1 °C) Creatinine Clearance (mL/min) or Dialysis: 119 ml/min Impression/Plan:  
· Levofloxacin 500mg IV q24hrs is dosed appropriately based on indication and renal function · Antimicrobial stop date TBD Pharmacy will follow daily and adjust medications as appropriate for renal function and/or serum levels. Thank you, IVAN MadisonD 
 
Recommended duration of therapy 
http://[facility]/Pharmacy/Clinical%20Companion/Duration%20of%20ABX%20therapy. docx Renal Dosing 
http://[facility]/[place]/[facility]/Pharmacy/Clinical%20Companion/Renal%20Dosing%[id number]. pdf

## 2019-01-26 NOTE — PROGRESS NOTES
Discussed with Dr. Nidhi Lutz order for enteric isolation. Culture negative for Cdiff, per Dr. Nidhi Lutz, ok to discontinue isolation as \"he should have cleared it\" - but asked to follow up with infection control on Monday.

## 2019-01-26 NOTE — CONSULTS
Surgery Consult    Subjective:      Alicia Avelar is a 37 y.o. male admitted to the medical service with campylobacter enteritis following fecal transplant last week for c diff enteritis. He is s/p total colectomy and subsequent j-pouch ileoanal anastomosis at [facility] in [place]. He is having loose stools and is now tolerating clear liquids. Surgery is consulted for possible obstructive/ileus pattern on CT.     Past Medical History:   Diagnosis Date    Arthritis     all joints and spine    C. difficile colitis 1/21/2019    Chest pain 02/18/2013    as of 10/14/14 pt denies any CP since 2/13    Crohn's colitis ([address]     Dr. 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/2011    PSC (primary sclerosing cholangitis)     Dr Radha Major [phone]    Pyoderma gangrenosum      Past Surgical History:   Procedure Laterality Date    ABDOMEN SURGERY PROC UNLISTED  2002    colostomy reversal & J Pouch    COLONOSCOPY N/A 4/4/2017    COLONOSCOPY performed by Monika Mike MD at [place] [id number] COLONOSCOPY N/A 1/15/2018    COLONOSCOPY performed by Monika Mike MD at [facility] ENDOSCOPY    COLONOSCOPY N/A 9/13/2018    COLONOSCOPY performed by Monika Mike MD at [facility] ENDOSCOPY    COLONOSCOPY N/A 1/21/2019    COLONOSCOPY W/ FECAL TRANSPLANT performed by Alan Murrell MD at [facility] ENDOSCOPY    COLONOSCOPY,DIAGNOSTIC  1/15/2018         COLONOSCOPY,DIAGNOSTIC  9/13/2018         COLONOSCOPY,DIAGNOSTIC  1/21/2019         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         HX COLOSTOMY  2002    HX GI      ercp    HX ROTATOR CUFF REPAIR Right 1999    IL COLONOSCOPY W/BIOPSY SINGLE/MULTIPLE  12/8/2011         [place] EGD TRANSORAL BIOPSY SINGLE/MULTIPLE  2/18/2013           Family History   Problem Relation Age of Onset    Diabetes Mother     Hypertension Mother    Jantaz Erb Arthritis-osteo Mother     Diabetes Father     Hypertension Father     Stroke Father     Arthritis-osteo Father     No Known Problems Sister     No Known Problems Brother     No Known Problems Maternal Aunt     No Known Problems Maternal Uncle     No Known Problems Paternal Aunt     No Known Problems Paternal Uncle     No Known Problems Maternal Grandmother     No Known Problems Maternal Grandfather     No Known Problems Paternal Grandmother     No Known Problems Paternal Grandfather      Social History     Tobacco Use    Smoking status: Never Smoker    Smokeless tobacco: Never Used   Substance Use Topics    Alcohol use: No     Alcohol/week: 0.0 oz      Prior to Admission medications    Not on File      Allergies   Allergen Reactions    Cafergot [Ergotamine-Caffeine] Hives       Review of Systems   Constitutional: Negative for chills, diaphoresis and fever. Respiratory: Negative for shortness of breath and wheezing. Cardiovascular: Negative for chest pain and palpitations. Gastrointestinal: Positive for abdominal pain and diarrhea. Negative for nausea and vomiting. Musculoskeletal: Negative for myalgias. Hematological: Does not bruise/bleed easily. Objective:     Patient Vitals for the past 8 hrs:   BP Temp Pulse Resp SpO2   19 0746 97/60 97.8 °F (36.6 °C) 95 20 98 %   19 0344 131/76 98.2 °F (36.8 °C) 97 17 99 %       Temp (24hrs), Av.2 °F (36.8 °C), Min:97.8 °F (36.6 °C), Max:98.7 °F (37.1 °C)      Physical Exam   Constitutional: He appears well-developed and well-nourished. No distress. HENT:   Head: Normocephalic and atraumatic. Cardiovascular: Normal rate, regular rhythm, normal heart sounds and intact distal pulses. Pulmonary/Chest: Breath sounds normal. He has no wheezes. He has no rales. Abdominal: Soft. Bowel sounds are normal. He exhibits no distension and no mass. There is no hepatosplenomegaly.  There is generalized tenderness. There is no rigidity, no rebound and no guarding. Mild diffuse tenderness, no peritoneal signs   Musculoskeletal: Normal range of motion. Lymphadenopathy:     He has no cervical adenopathy. Assessment:     Possible resolving ileus in complex pt recently s/p c diff enteritis/pouchitis and now with campylobacter infection. No surgical indications. Lani clear liquids and stooling. Plan:     Management per primary team and GI colleagues. No role for surgical intervention at this time.     Signed By: Damian Cline MD, [facility]    January 26, 2019

## 2019-01-26 NOTE — PROGRESS NOTES
General Surgery End of Shift Nursing Note Bedside shift change report given to [facility] (oncoming nurse) by Mello Rivero RN (offgoing nurse). Report included the following information SBAR, Kardex and Recent Results. Shift worked:   7p-7a Summary of shift:    Peaceful night. Complaints of nausea, medicated x1. Still having loose stools. Issues for physician to address:   None Number times ambulated in hallway past shift: 2 Number of times OOB to chair past shift: 2 Pain Management: 
Current medication:  
Patient states pain is manageable on current pain medication: YES 
 
GI: 
 
Current diet:  DIET NPO Except Meds Tolerating current diet: YES Passing flatus: YES Last Bowel Movement: yesterday Appearance: loose Respiratory: 
 
Incentive Spirometer at bedside: NO 
Patient instructed on use: NO 
 
Patient Safety: 
 
Falls Score: 1 Bed Alarm On? No 
Sitter?  No 
 
Clemente Metcalf RN

## 2019-01-26 NOTE — PROGRESS NOTES
Progress Note Pt Name  Daniel Hayes Date of Birth 1975 Medical Record Number  [id number] Age  37 y.o. PCP Laila Grant MD  
Admit date:  1/24/2019 Room Number  2110/01  @ [facility] Date of Service  1/26/2019 Admission Diagnoses:  <principal problem not specified>  
 
Assessment and plan:  
 
 
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 Chronic back pain attributed to Crohns as well - followed at [facility] Hyperproteinemia: mainly globulins, will check UPEP/SPEP. Obesity: BMI 31.87  
  
 
 
 
  
CODE STATUS   Full Functional Status  Pt is  and lives with his wife    
Surrogate decision maker:  Pt's wife     
Prophylaxis   Lovenox Discharge Plan:  Home w/Family,     
Misc Benefit:  Payor:  / Plan: [facility] / Product Type:  /   
Isolation :  Enteric Contact ADT status:  INPATIENT Query   None noted today Prognosis   Fair Social issues  Date  Comment    
01/26/19  
12:07 PM  No family or visitors in the room   
   
     
  
 
Subjective Data \"I feel better \" Review of Systems - History obtained from the patient Respiratory ROS: negative Cardiovascular ROS: no chest pain or dyspnea on exertion Objective Data Comments  Very pleasant gentleman lying in bed in no distress Patient Vitals for the past 24 hrs: 
 BP  
01/26/19 0746 97/60 01/26/19 0344 131/76  
01/26/19 0000 123/80  
01/25/19 1942 139/83  
01/25/19 1620 127/75  
01/25/19 1154 (!) 148/100 Patient Vitals for the past 24 hrs: 
 Pulse 01/26/19 0746 95  
01/26/19 0344 97  
01/26/19 0000 93  
01/25/19 1942 (!) 116  
01/25/19 1620 (!) 107  
01/25/19 1154 (!) 114 Patient Vitals for the past 24 hrs: 
 Resp  
01/26/19 0746 20  
01/26/19 0344 17  
01/26/19 0000 16  
01/25/19 1942 16  
01/25/19 1620 16  
01/25/19 1154 16 Patient Vitals for the past 24 hrs: 
 Temp  
01/26/19 0746 97.8 °F (36.6 °C)  
01/26/19 0344 98.2 °F (36.8 °C)  
01/26/19 0000 98.7 °F (37.1 °C)  
01/25/19 1942 98.6 °F (37 °C)  
01/25/19 1620 98.1 °F (36.7 °C)  
01/25/19 1154 98 °F (36.7 °C) SpO2 Readings from Last 6 Encounters:  
01/26/19 98% 01/21/19 100% 12/31/18 98% 09/13/18 100% 08/25/18 98% 07/26/18 98% O2 Device: Room air Body mass index is 30.94 kg/m². - 
Wt Readings from Last 10 Encounters:  
01/25/19 103.5 kg (228 lb 1.6 oz) 01/21/19 106.6 kg (235 lb) 12/31/18 107 kg (235 lb 14.3 oz) 09/13/18 103.9 kg (229 lb 1 oz) 08/22/18 106.9 kg (235 lb 10.8 oz)  
07/24/18 105.3 kg (232 lb 2.3 oz) 07/06/18 106.7 kg (235 lb 3.7 oz) 03/05/18 103.4 kg (228 lb) 02/13/18 105.8 kg (233 lb 4 oz) 01/15/18 102.1 kg (225 lb) Physical Exam:            
General:  Alert, cooperative,  
well noursished,  
well developed,  
appears stated age Ears/Eyes:  Hearing intact Sclera anicteric. Pupils equal  
Mouth/Throat:    
Neck:    
Lungs:  Trachea midline Chest excursion symmetrical  
Auscultation B/L Symmetrical with Vesicular breath sounds No Crepitations noted CVS:  Regular rate and rhythm  
no  murmur, No click, rub or gallop S1 normal  
S2 normal  
Pedal pulses  b/l symmetrical  
Abdomen:   
Soft, non-tender Bowel sounds normal 
No distension Extremities: No cyanosis, jaundice No edema noted No sign of DVT/cord like lesion on palpation No sign of acute trauma Age appropriate OA changes noted. Skin:  Pyoderma noted - some of the lesions are covered with bandaide Skin color, texture, turgor normal. no acute rash or lesions Lymph nodes: Musculoskeletal Muscle bulk B/L symmetrical  
Neuro Cranial nerves are intact,  
motor movement b/l symmetrical, Sensory evaluation b/l symmetrical   
Psych:  Alert and oriented,  
normal mood & affect Medications reviewed Current Facility-Administered Medications Medication Dose Route Frequency  famotidine (PF) (PEPCID) 20 mg in sodium chloride 0.9% 10 mL injection  20 mg IntraVENous Q12H  levoFLOXacin (LEVAQUIN) 500 mg in D5W IVPB  500 mg IntraVENous Q24H  
 sodium chloride (NS) flush 5-40 mL  5-40 mL IntraVENous Q8H  
 sodium chloride (NS) flush 5-40 mL  5-40 mL IntraVENous PRN  
 acetaminophen (TYLENOL) tablet 650 mg  650 mg Oral Q6H PRN  
 ondansetron (ZOFRAN) injection 4 mg  4 mg IntraVENous Q6H PRN  potassium chloride 20 mEq in 0.9% sodium chloride 1,000 mL IVPB   IntraVENous CONTINUOUS  
 enoxaparin (LOVENOX) injection 40 mg  40 mg SubCUTAneous 7am  
 HYDROmorphone (PF) (DILAUDID) injection 2 mg  2 mg IntraVENous Q4H PRN Relevant other informations: Other medical conditions listed in [facility] problem list section; all of these and other pertinent data were taken into consideration when treatment plan is developed and customized to this patient's unique overall circumstances and needs. We have reviewed available old medical records within the constraints of this admission process. Data Review:  
Recent Days: 
All Micro Results Procedure Component Value Units Date/Time Ryann Zavaleta [[id number]] Collected:  01/24/19 4626 Order Status:  Completed Specimen:  Stool Updated:  01/26/19 1123 Special Requests: NO SPECIAL REQUESTS Campylobacter antigen POSITIVE   Campylobacter antigen --     
  CALLED TO AND READ BACK BY 
 PATSY GILLRN AT 16:12 ON 1/25/19 BY [facility] Shiga toxin-producing E. coli Ag NEGATIVE Culture result:    
  NO ROUTINE ENTERIC PATHOGENS ISOLATED INCLUDING SALMONELLA, SHIGELLA, YERSINIA, VIBRIO OR SHIGA TOXIN PRODUCING E. COLI  
     
 C. DIFFICILE AG & TOXIN A/B [[id number]] Collected:  01/24/19 1857 Order Status:  Completed Specimen:  Stool Updated:  01/25/19 1049 [facility] ANTIGEN NEGATIVE      
  C. difficile toxin NEGATIVE INTERPRETATION    
  NEGATIVE FOR TOXIGENIC C. DIFFICILE Recent Labs  
  01/26/19 0528 01/25/19 0523 01/24/19 
1137 WBC 9.4 10.9 6.6 HGB 12.5 13.1 14.5 HCT 38.4 39.6 44.8  204 182 Recent Labs  
  01/26/19 0528 01/25/19 0523 01/24/19 
1137 * 130* 129*  
K 3.4* 3.5 3.5 CL 99 95* 96* CO2 27 27 26 GLU 98 129* 129* BUN 20 23* 21* CREA 0.86 1.26 1.24  
CA 8.4* 8.3* 8.9 MG 2.3 2.2  --   
PHOS  --  3.3  --   
ALB 2.7* 2.9* 3.2* TBILI 1.4* 1.7* 2.2*  
SGOT 39* 48* 62* ALT 30 34 42 No results found for: TSH, TSHEXT 
 
 
  
Care Plan discussed with:Patient/Family and Nurse Other medical conditions are listed in the active hospital problem list section; these and other pertinent data were taken into consideration when the treatment plan was developed and customized to this patient's unique overall circumstances and needs.    
High complexity decision making was performed for this patient who is at high risk for decompensation with multiple organ involvement.   
Today total floor/unit time was 35 minutes while caring for this patient and greater than 50% of that time was spent with patient (and/or family) coordinating patients clinical issues; this includes time spent during multidisciplinary rounds. Sacha Arenas MD MPH FACP   
1/26/2019

## 2019-01-27 ENCOUNTER — APPOINTMENT (OUTPATIENT)
Dept: GENERAL RADIOLOGY | Age: 44
DRG: 372 | End: 2019-01-27
Attending: SPECIALIST
Payer: OTHER GOVERNMENT

## 2019-01-27 LAB
ANION GAP SERPL CALC-SCNC: 7 MMOL/L (ref 5–15)
BASOPHILS # BLD: 0.1 K/UL (ref 0–0.1)
BASOPHILS NFR BLD: 1 % (ref 0–1)
BUN SERPL-MCNC: 12 MG/DL (ref 6–20)
BUN/CREAT SERPL: 14 (ref 12–20)
CALCIUM SERPL-MCNC: 8.3 MG/DL (ref 8.5–10.1)
CHLORIDE SERPL-SCNC: 100 MMOL/L (ref 97–108)
CO2 SERPL-SCNC: 27 MMOL/L (ref 21–32)
CREAT SERPL-MCNC: 0.83 MG/DL (ref 0.7–1.3)
DIFFERENTIAL METHOD BLD: ABNORMAL
EOSINOPHIL # BLD: 0.1 K/UL (ref 0–0.4)
EOSINOPHIL NFR BLD: 2 % (ref 0–7)
ERYTHROCYTE [DISTWIDTH] IN BLOOD BY AUTOMATED COUNT: 14.5 % (ref 11.5–14.5)
GLUCOSE SERPL-MCNC: 85 MG/DL (ref 65–100)
HCT VFR BLD AUTO: 36.8 % (ref 36.6–50.3)
HGB BLD-MCNC: 11.9 G/DL (ref 12.1–17)
IMM GRANULOCYTES # BLD AUTO: 0.3 K/UL (ref 0–0.04)
IMM GRANULOCYTES NFR BLD AUTO: 4 % (ref 0–0.5)
LYMPHOCYTES # BLD: 2.2 K/UL (ref 0.8–3.5)
LYMPHOCYTES NFR BLD: 30 % (ref 12–49)
MCH RBC QN AUTO: 29.8 PG (ref 26–34)
MCHC RBC AUTO-ENTMCNC: 32.3 G/DL (ref 30–36.5)
MCV RBC AUTO: 92.2 FL (ref 80–99)
MONOCYTES # BLD: 1 K/UL (ref 0–1)
MONOCYTES NFR BLD: 14 % (ref 5–13)
NEUTS SEG # BLD: 3.5 K/UL (ref 1.8–8)
NEUTS SEG NFR BLD: 49 % (ref 32–75)
NRBC # BLD: 0 K/UL (ref 0–0.01)
NRBC BLD-RTO: 0 PER 100 WBC
PLATELET # BLD AUTO: 168 K/UL (ref 150–400)
PMV BLD AUTO: 11.7 FL (ref 8.9–12.9)
POTASSIUM SERPL-SCNC: 3.6 MMOL/L (ref 3.5–5.1)
RBC # BLD AUTO: 3.99 M/UL (ref 4.1–5.7)
RBC MORPH BLD: ABNORMAL
SODIUM SERPL-SCNC: 134 MMOL/L (ref 136–145)
WBC # BLD AUTO: 7.2 K/UL (ref 4.1–11.1)

## 2019-01-27 PROCEDURE — 74011250636 HC RX REV CODE- 250/636: Performed by: INTERNAL MEDICINE

## 2019-01-27 PROCEDURE — 74022 RADEX COMPL AQT ABD SERIES: CPT

## 2019-01-27 PROCEDURE — 74011000250 HC RX REV CODE- 250: Performed by: INTERNAL MEDICINE

## 2019-01-27 PROCEDURE — 94760 N-INVAS EAR/PLS OXIMETRY 1: CPT

## 2019-01-27 PROCEDURE — 74011250636 HC RX REV CODE- 250/636: Performed by: HOSPITALIST

## 2019-01-27 PROCEDURE — 85025 COMPLETE CBC W/AUTO DIFF WBC: CPT

## 2019-01-27 PROCEDURE — 36415 COLL VENOUS BLD VENIPUNCTURE: CPT

## 2019-01-27 PROCEDURE — 65270000029 HC RM PRIVATE

## 2019-01-27 PROCEDURE — 80048 BASIC METABOLIC PNL TOTAL CA: CPT

## 2019-01-27 RX ADMIN — Medication 10 ML: at 06:56

## 2019-01-27 RX ADMIN — FAMOTIDINE 20 MG: 10 INJECTION, SOLUTION INTRAVENOUS at 22:44

## 2019-01-27 RX ADMIN — POTASSIUM CHLORIDE: 2 INJECTION, SOLUTION, CONCENTRATE INTRAVENOUS at 06:54

## 2019-01-27 RX ADMIN — ONDANSETRON 4 MG: 2 INJECTION INTRAMUSCULAR; INTRAVENOUS at 23:44

## 2019-01-27 RX ADMIN — LEVOFLOXACIN 500 MG: 5 INJECTION, SOLUTION INTRAVENOUS at 17:40

## 2019-01-27 RX ADMIN — ONDANSETRON 4 MG: 2 INJECTION INTRAMUSCULAR; INTRAVENOUS at 19:07

## 2019-01-27 RX ADMIN — HYDROMORPHONE HYDROCHLORIDE 2 MG: 2 INJECTION, SOLUTION INTRAMUSCULAR; INTRAVENOUS; SUBCUTANEOUS at 23:44

## 2019-01-27 RX ADMIN — FAMOTIDINE 20 MG: 10 INJECTION, SOLUTION INTRAVENOUS at 08:28

## 2019-01-27 RX ADMIN — Medication 10 ML: at 22:45

## 2019-01-27 RX ADMIN — HYDROMORPHONE HYDROCHLORIDE 2 MG: 2 INJECTION, SOLUTION INTRAMUSCULAR; INTRAVENOUS; SUBCUTANEOUS at 11:17

## 2019-01-27 RX ADMIN — ENOXAPARIN SODIUM 40 MG: 40 INJECTION SUBCUTANEOUS at 06:53

## 2019-01-27 RX ADMIN — ONDANSETRON 4 MG: 2 INJECTION INTRAMUSCULAR; INTRAVENOUS at 11:17

## 2019-01-27 RX ADMIN — HYDROMORPHONE HYDROCHLORIDE 2 MG: 2 INJECTION, SOLUTION INTRAMUSCULAR; INTRAVENOUS; SUBCUTANEOUS at 19:07

## 2019-01-27 RX ADMIN — POTASSIUM CHLORIDE: 2 INJECTION, SOLUTION, CONCENTRATE INTRAVENOUS at 15:40

## 2019-01-27 NOTE — PROGRESS NOTES
Pharmacy Automatic Renal Dosing Protocol - Antimicrobials Indication for Antimicrobials: Campylobacter in Stool Current Regimen of Each Antimicrobial: 
Levofloxacin 500mg IV q24h (Start Date ; Day # 3) Previous Antimicrobial Therapy: 
 
 
Significant Cultures:  
: Stool = + Campylobacter Antigen, shiga toxin negative-final 
: C.diff = Negative (FINAL) Radiology / Imaging results: (X-ray, CT scan or MRI):  
: CT Abd: 
1. Status post colectomy with ileoanal anastomosis. 2. No bowel obstruction, possible ileus. 2. Mural thickening and hyperenhancement of some of the mid small bowel loops. The differential diagnosis includes inflammatory or infectious colitis with 
ischemia being less likely. 4. Mildly nodular liver suggesting cirrhosis with periportal decreased 
attenuation or edema of uncertain etiology and significance. 5. Splenomegaly. : XRay Abd: Small bowel obstruction. Paralysis, amputations, malnutrition: n/a Labs: 
Recent Labs  
  19 
0528 19 
0523 19 
1137 CREA 0.86 1.26 1.24 BUN 20 23* 21* WBC 9.4 10.9 6.6 Temp (24hrs), Av.2 °F (36.8 °C), Min:97.8 °F (36.6 °C), Max:98.7 °F (37.1 °C) Creatinine Clearance (mL/min) or Dialysis: 123 ml/min Impression/Plan:  
· Levofloxacin 500mg IV q24hrs is dosed appropriately based on indication and renal function · Antimicrobial stop date TBD Pharmacy will follow daily and adjust medications as appropriate for renal function and/or serum levels. Thank you, IVAN MadisonD 
 
Recommended duration of therapy 
http://[facility]/Pharmacy/Clinical%20Companion/Duration%20of%20ABX%20therapy. docx Renal Dosing 
http://[facility]/[place]/[facility]/Pharmacy/Clinical%20Companion/Renal%20Dosing%[id number]. pdf

## 2019-01-27 NOTE — PROGRESS NOTES
Problem: Falls - Risk of 
Goal: *Absence of Falls Document [facility] Fall Risk and appropriate interventions in the flowsheet. Outcome: Progressing Towards Goal 
Fall Risk Interventions: 
  
 
  
 
Medication Interventions: Teach patient to arise slowly

## 2019-01-27 NOTE — PROGRESS NOTES
General Surgery End of Shift Nursing Note Bedside shift change report given to T (oncoming nurse) by Naeem Bangura (offgoing nurse). Report included the following information SBAR, Kardex, Intake/Output, MAR and Recent Results. Shift worked:   7a-7p Summary of shift:    Pt states he \"feels better\" - still with frequent BM's, tolerating clear liquid diet, no c/o nausea Issues for physician to address:   none Number times ambulated in hallway past shift: 2 Chanda Marquez

## 2019-01-27 NOTE — PROGRESS NOTES
Bedside and Verbal shift change report given to Ronak (oncoming nurse) by Filipe Coronel (offgoing nurse). Report included the following information SBAR, Kardex, Intake/Output and MAR.

## 2019-01-27 NOTE — PROGRESS NOTES
GI Progress Note (Seamon for Dewayne) Nguyễn Gonzalez [id number] HMN:[id number] ATTG: Dr. Jerzy Ma PCP: Maddy Dietz MD 
Date/Time:  1/27/2019 9:37 AM  
 
Primary GI: Dr. Buchanan Reason for following: ileus/enteritis Assessment:  
· Diarrhea/Enteritis on CT with Campylobacter jejuni · UC s/p colectomy with hx of pouchitis · [facility] · Hx of c.diff, sp FMT this past week · Ileus Plan: · Advance to GI lite · levaquin seems to be working well · Repeat CT if worsens this weeekend · Dr. Buchanan to resume care tomorrow Subjective:  
Feels better. Nausea after pain meds, but well controlled with antiemetics. Still having BMs, soft, non bloody and controlled. Still with some lower abd discomfort. Review of Systems: 
Symptom Y/N Comments  Symptom Y/N Comments Fever/Chills n   Chest Pain n   
Cough n   Headaches n Sputum n   Joint Pain n   
SOB/JARRELL n   Pruritis/Rash n Tolerating Diet y   Other Could NOT obtain due to:   
 
Objective: VITALS:  
Last 24hrs VS reviewed since prior progress note. Most recent are: 
Visit Vitals /79 Pulse 87 Temp 98.2 °F (36.8 °C) Resp 16 Ht 6' (1.829 m) Wt 103.5 kg (228 lb 1.6 oz) SpO2 99% BMI 30.94 kg/m² Intake/Output Summary (Last 24 hours) at 1/27/2019 1148 Last data filed at 1/27/2019 1122 Gross per 24 hour Intake 1485 ml Output 1625 ml Net -140 ml PHYSICAL EXAM: 
General: WD, WN. Alert, cooperative, no acute distress   
HEENT: NC, Atraumatic. Anicteric sclerae. Lungs:  CTA Bilaterally. No Wheezing/Rhonchi/Rales. Heart:  Regular  rhythm,  No murmur (), No Rubs, No Gallops Abdomen: Soft, Non distended, mild ttp in lower quadrants and suprapubic area.  +Bowel sounds, no HSM Extremities: No c/c/e Neurologic:  Alert and oriented X 3. No acute neurological distress Psych:   Good insight. Not anxious nor agitated. Lab and Radiology Data Reviewed: (see below) Medications Reviewed: (see below) PMH/ reviewed - no change compared to H&P 
________________________________________________________________________ Total time spent with patient: 15 minutes ________________________________________________________________________ Care Plan discussed with: 
Patient x Family RN Consultant:    
 
Hilary Day MD  
 
Procedures: see electronic medical records for all procedures/Xrays and details which were not copied into this note but were reviewed prior to creation of Plan. LABS: 
Recent Labs  
  01/27/19 0317 01/26/19 
0541 WBC 7.2 9.4 HGB 11.9* 12.5 HCT 36.8 38.4  188 Recent Labs  
  01/27/19 0317 01/26/19 
0528 01/25/19 
5660 * 135* 130*  
K 3.6 3.4* 3.5  99 95* CO2 27 27 27 BUN 12 20 23* CREA 0.83 0.86 1.26  
GLU 85 98 129* CA 8.3* 8.4* 8.3*  
MG  --  2.3 2.2 PHOS  --   --  3.3 Recent Labs  
  01/26/19 0528 01/25/19 
4809 SGOT 39* 48* * 300* TP 8.6* 9.0* ALB 2.7* 2.9*  
GLOB 5.9* 6.1* No results for input(s): INR, PTP, APTT in the last 72 hours. No lab exists for component: INREXT, INREXT No results for input(s): FE, TIBC, PSAT, FERR in the last 72 hours. No results found for: FOL, RBCF No results for input(s): PH, PCO2, PO2 in the last 72 hours. No results for input(s): CPK, CKMB in the last 72 hours. No lab exists for component: TROPONINI Lab Results Component Value Date/Time  Color ORANGE 01/24/2019 11:50 AM  
 Appearance CLEAR 01/24/2019 11:50 AM  
 Specific gravity 1.029 01/24/2019 11:50 AM  
 Specific gravity 1.012 12/31/2018 07:52 AM  
 pH (UA) 5.5 01/24/2019 11:50 AM  
 Protein 30 (A) 01/24/2019 11:50 AM  
 Glucose NEGATIVE  01/24/2019 11:50 AM  
 Ketone 15 (A) 01/24/2019 11:50 AM  
 Bilirubin NEGATIVE  12/31/2018 07:52 AM  
 Urobilinogen 1.0 01/24/2019 11:50 AM  
 Nitrites POSITIVE (A) 01/24/2019 11:50 AM  
 Leukocyte Esterase SMALL (A) 01/24/2019 11:50 AM  
 Epithelial cells FEW 01/24/2019 11:50 AM  
 Bacteria NEGATIVE  01/24/2019 11:50 AM  
 WBC 0-4 01/24/2019 11:50 AM  
 RBC 0-5 01/24/2019 11:50 AM  
 
 
MEDICATIONS: 
Current Facility-Administered Medications Medication Dose Route Frequency  famotidine (PF) (PEPCID) 20 mg in sodium chloride 0.9% 10 mL injection  20 mg IntraVENous Q12H  levoFLOXacin (LEVAQUIN) 500 mg in D5W IVPB  500 mg IntraVENous Q24H  
 sodium chloride (NS) flush 5-40 mL  5-40 mL IntraVENous Q8H  
 sodium chloride (NS) flush 5-40 mL  5-40 mL IntraVENous PRN  
 acetaminophen (TYLENOL) tablet 650 mg  650 mg Oral Q6H PRN  
 ondansetron (ZOFRAN) injection 4 mg  4 mg IntraVENous Q6H PRN  potassium chloride 20 mEq in 0.9% sodium chloride 1,000 mL IVPB   IntraVENous CONTINUOUS  
 enoxaparin (LOVENOX) injection 40 mg  40 mg SubCUTAneous 7am  
 HYDROmorphone (PF) (DILAUDID) injection 2 mg  2 mg IntraVENous Q4H PRN

## 2019-01-27 NOTE — PROGRESS NOTES
Bedside and Verbal shift change report given to Favian Allison RN (oncoming nurse) by Agapito Duke RN (offgoing nurse). Report included the following information SBAR, Kardex, Intake/Output and MAR.

## 2019-01-27 NOTE — PROGRESS NOTES
General Surgery End of Shift Nursing Note Bedside shift change report given to Flo Alberto (oncoming nurse) by Verónica Story (offgoing nurse). Report included the following information SBAR, Kardex, Procedure Summary, Intake/Output and MAR. Shift worked:   7a-7p Summary of shift:    Doing well on GI lite diet, medicated for pain x2 today. Issues for physician to address:   none 2 [facility]

## 2019-01-27 NOTE — PROGRESS NOTES
Progress Note Pt Name  Brenda Eid Date of Birth 1975 Medical Record Number  [id number] Age  37 y.o. PCP Creig Cabot, MD  
Admit date:  1/24/2019 Room Number  2110/01  @ [facility] Date of Service  1/27/2019 Admission Diagnoses:  <principal problem not specified>  
 
Assessment and plan:  
 
 
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 Chronic back pain attributed to Crohns as well - followed at [address] [place] [address] Hyperproteinemia: mainly globulins, will check UPEP/SPEP. Obesity: BMI 31.87  
  
 
 
 
  
CODE STATUS   Full Functional Status  Pt is  and lives with his wife    
Surrogate decision maker:  Pt's wife     
Prophylaxis   Lovenox Discharge Plan:  Home w/Family,     
Misc Benefit:  Payor:  / Plan: [facility] / Product Type:  /   
Isolation :  There are currently no Active Isolations ADT status:  INPATIENT Query   None noted today Prognosis   Fair Social issues  Date  Comment    
01/26/19  
12:07 PM  No family or visitors in the room 01/27/19 
11:57 AM  No family or visitor here today   
     
  
 
Subjective Data \"I am hungry  \" Review of Systems - History obtained from the patient Respiratory ROS: negative Cardiovascular ROS: no chest pain or dyspnea on exertion Objective Data Comments  Very pleasant gentleman lying in bed in no distress Patient Vitals for the past 24 hrs: 
 BP  
01/27/19 0827 118/79  
01/27/19 0406 129/74  
01/27/19 0010 131/70  
01/26/19 2019 117/86  
01/26/19 1538 135/79  
01/26/19 1150 128/78 Patient Vitals for the past 24 hrs: 
 Pulse 01/27/19 0827 87  
01/27/19 0406 92  
01/27/19 0010 91  
01/26/19 2019 99  
01/26/19 1538 (!) 102  
01/26/19 1150 99 Patient Vitals for the past 24 hrs: 
 Resp  
01/27/19 0827 16  
01/27/19 0406 18  
01/27/19 0010 18  
01/26/19 2019 18  
01/26/19 1538 18  
01/26/19 1150 12 Patient Vitals for the past 24 hrs: 
 Temp  
01/27/19 0827 98.2 °F (36.8 °C)  
01/27/19 0406 98.6 °F (37 °C)  
01/27/19 0010 98.2 °F (36.8 °C)  
01/26/19 2019 98.1 °F (36.7 °C)  
01/26/19 1538 98.1 °F (36.7 °C)  
01/26/19 1150 98 °F (36.7 °C) SpO2 Readings from Last 6 Encounters:  
01/27/19 99% 01/21/19 100% 12/31/18 98% 09/13/18 100% 08/25/18 98% 07/26/18 98% O2 Device: Room air Body mass index is 30.94 kg/m². - 
Wt Readings from Last 10 Encounters:  
01/25/19 103.5 kg (228 lb 1.6 oz) 01/21/19 106.6 kg (235 lb) 12/31/18 107 kg (235 lb 14.3 oz) 09/13/18 103.9 kg (229 lb 1 oz) 08/22/18 106.9 kg (235 lb 10.8 oz)  
07/24/18 105.3 kg (232 lb 2.3 oz) 07/06/18 106.7 kg (235 lb 3.7 oz) 03/05/18 103.4 kg (228 lb) 02/13/18 105.8 kg (233 lb 4 oz) 01/15/18 102.1 kg (225 lb) Physical Exam:            
General:  Alert, cooperative,  
well noursished,  
well developed,  
appears stated age Ears/Eyes:  Hearing intact Sclera anicteric. Pupils equal  
Mouth/Throat:    
Neck:    
Lungs:  Trachea midline Chest excursion symmetrical  
Auscultation B/L Symmetrical with Vesicular breath sounds No Crepitations noted CVS:  Regular rate and rhythm  
no  murmur, No click, rub or gallop S1 normal  
S2 normal  
Pedal pulses  b/l symmetrical  
Abdomen:   
Soft, non-tender Bowel sounds normal 
No distension Extremities: No cyanosis, jaundice No edema noted No sign of DVT/cord like lesion on palpation No sign of acute trauma Age appropriate OA changes noted. Skin:  Pyoderma noted - some of the lesions are covered with bandaide Skin color, texture, turgor normal. no acute rash or lesions Lymph nodes: Musculoskeletal Muscle bulk B/L symmetrical  
Neuro Face appears symmetrical   
Psych:  Alert and oriented,  
normal mood & affect Medications reviewed Current Facility-Administered Medications Medication Dose Route Frequency  famotidine (PF) (PEPCID) 20 mg in sodium chloride 0.9% 10 mL injection  20 mg IntraVENous Q12H  levoFLOXacin (LEVAQUIN) 500 mg in D5W IVPB  500 mg IntraVENous Q24H  
 sodium chloride (NS) flush 5-40 mL  5-40 mL IntraVENous Q8H  
 sodium chloride (NS) flush 5-40 mL  5-40 mL IntraVENous PRN  
 acetaminophen (TYLENOL) tablet 650 mg  650 mg Oral Q6H PRN  
 ondansetron (ZOFRAN) injection 4 mg  4 mg IntraVENous Q6H PRN  potassium chloride 20 mEq in 0.9% sodium chloride 1,000 mL IVPB   IntraVENous CONTINUOUS  
 enoxaparin (LOVENOX) injection 40 mg  40 mg SubCUTAneous 7am  
 HYDROmorphone (PF) (DILAUDID) injection 2 mg  2 mg IntraVENous Q4H PRN Relevant other informations: Other medical conditions listed in [facility] problem list section; all of these and other pertinent data were taken into consideration when treatment plan is developed and customized to this patient's unique overall circumstances and needs. We have reviewed available old medical records within the constraints of this admission process. Data Review:  
Recent Days: 
All Micro Results Procedure Component Value Units Date/Time Robin Mohr [[id number]] Collected:  01/24/19 4630 Order Status:  Completed Specimen:  Stool Updated:  01/26/19 1123 Special Requests: NO SPECIAL REQUESTS Campylobacter antigen POSITIVE Campylobacter antigen --     
  CALLED TO AND READ BACK BY 
PATSY GILL RN AT 16:12 ON 1/25/19 BY [facility] Shiga toxin-producing E. coli Ag NEGATIVE Culture result:    
  NO ROUTINE ENTERIC PATHOGENS ISOLATED INCLUDING SALMONELLA, SHIGELLA, YERSINIA, VIBRIO OR SHIGA TOXIN PRODUCING E. COLI  
     
 C. DIFFICILE AG & TOXIN A/B [[id number]] Collected:  01/24/19 1857 Order Status:  Completed Specimen:  Stool Updated:  01/25/19 1049 GD ANTIGEN NEGATIVE      
  C. difficile toxin NEGATIVE INTERPRETATION    
  NEGATIVE FOR TOXIGENIC C. DIFFICILE Recent Labs  
  01/27/19 0317 01/26/19 0528 01/25/19 0523 01/24/19 
1137 WBC 7.2 9.4 10.9 6.6 HGB 11.9* 12.5 13.1 14.5 HCT 36.8 38.4 39.6 44.8  188 204 182 Recent Labs  
  01/27/19 0317 01/26/19 0528 01/25/19 0523 01/24/19 
1137 * 135* 130* 129*  
K 3.6 3.4* 3.5 3.5  99 95* 96* CO2 27 27 27 26 GLU 85 98 129* 129* BUN 12 20 23* 21* CREA 0.83 0.86 1.26 1.24  
CA 8.3* 8.4* 8.3* 8.9 MG  --  2.3 2.2  --   
PHOS  --   --  3.3  --   
ALB  --  2.7* 2.9* 3.2* TBILI  --  1.4* 1.7* 2.2*  
SGOT  --  39* 48* 62* ALT  --  30 34 42 No results found for: TSH, TSHEXT, TSHEXT 
 
 
  
Care Plan discussed with:Patient/Family and Nurse Other medical conditions are listed in the active hospital problem list section; these and other pertinent data were taken into consideration when the treatment plan was developed and customized to this patient's unique overall circumstances and needs.    
High complexity decision making was performed for this patient who is at high risk for decompensation with multiple organ involvement.   
Today total floor/unit time was 15minutes while caring for this patient and greater than 50% of that time was spent with patient (and/or family) coordinating patients clinical issues; this includes time spent during multidisciplinary rounds.    
  
Daisy Torres MD MPH FACP   
1/27/2019

## 2019-01-28 ENCOUNTER — APPOINTMENT (OUTPATIENT)
Dept: GENERAL RADIOLOGY | Age: 44
DRG: 372 | End: 2019-01-28
Attending: SPECIALIST
Payer: OTHER GOVERNMENT

## 2019-01-28 VITALS
SYSTOLIC BLOOD PRESSURE: 123 MMHG | RESPIRATION RATE: 18 BRPM | TEMPERATURE: 98.5 F | HEIGHT: 72 IN | HEART RATE: 78 BPM | OXYGEN SATURATION: 97 % | BODY MASS INDEX: 30.37 KG/M2 | WEIGHT: 224.21 LBS | DIASTOLIC BLOOD PRESSURE: 76 MMHG

## 2019-01-28 PROBLEM — K52.9 ENTERITIS: Status: RESOLVED | Noted: 2019-01-24 | Resolved: 2019-01-28

## 2019-01-28 PROBLEM — E87.1 HYPONATREMIA: Status: RESOLVED | Noted: 2019-01-24 | Resolved: 2019-01-28

## 2019-01-28 LAB
ALBUMIN SERPL ELPH-MCNC: 3.1 G/DL (ref 2.9–4.4)
ALBUMIN/GLOB SERPL: 0.6 {RATIO} (ref 0.7–1.7)
ALPHA1 GLOB SERPL ELPH-MCNC: 0.4 G/DL (ref 0–0.4)
ALPHA2 GLOB SERPL ELPH-MCNC: 0.7 G/DL (ref 0.4–1)
ANION GAP SERPL CALC-SCNC: 8 MMOL/L (ref 5–15)
B-GLOBULIN SERPL ELPH-MCNC: 1.3 G/DL (ref 0.7–1.3)
BASOPHILS # BLD: 0 K/UL (ref 0–0.1)
BASOPHILS NFR BLD: 0 % (ref 0–1)
BUN SERPL-MCNC: 9 MG/DL (ref 6–20)
BUN/CREAT SERPL: 13 (ref 12–20)
CALCIUM SERPL-MCNC: 8.4 MG/DL (ref 8.5–10.1)
CHLORIDE SERPL-SCNC: 102 MMOL/L (ref 97–108)
CO2 SERPL-SCNC: 24 MMOL/L (ref 21–32)
CREAT SERPL-MCNC: 0.67 MG/DL (ref 0.7–1.3)
DIFFERENTIAL METHOD BLD: ABNORMAL
EOSINOPHIL # BLD: 0 K/UL (ref 0–0.4)
EOSINOPHIL NFR BLD: 0 % (ref 0–7)
ERYTHROCYTE [DISTWIDTH] IN BLOOD BY AUTOMATED COUNT: 14.3 % (ref 11.5–14.5)
GAMMA GLOB SERPL ELPH-MCNC: 2.9 G/DL (ref 0.4–1.8)
GLOBULIN SER CALC-MCNC: 5.3 G/DL (ref 2.2–3.9)
GLUCOSE SERPL-MCNC: 91 MG/DL (ref 65–100)
HCT VFR BLD AUTO: 35.2 % (ref 36.6–50.3)
HGB BLD-MCNC: 11.6 G/DL (ref 12.1–17)
IMM GRANULOCYTES # BLD AUTO: 0 K/UL (ref 0–0.04)
IMM GRANULOCYTES NFR BLD AUTO: 0 % (ref 0–0.5)
LYMPHOCYTES # BLD: 1.9 K/UL (ref 0.8–3.5)
LYMPHOCYTES NFR BLD: 24 % (ref 12–49)
M PROTEIN SERPL ELPH-MCNC: ABNORMAL G/DL
MCH RBC QN AUTO: 30.1 PG (ref 26–34)
MCHC RBC AUTO-ENTMCNC: 33 G/DL (ref 30–36.5)
MCV RBC AUTO: 91.4 FL (ref 80–99)
METAMYELOCYTES NFR BLD MANUAL: 1 %
MONOCYTES # BLD: 0.7 K/UL (ref 0–1)
MONOCYTES NFR BLD: 9 % (ref 5–13)
NEUTS SEG # BLD: 5.1 K/UL (ref 1.8–8)
NEUTS SEG NFR BLD: 66 % (ref 32–75)
NRBC # BLD: 0 K/UL (ref 0–0.01)
NRBC BLD-RTO: 0 PER 100 WBC
PLATELET # BLD AUTO: 149 K/UL (ref 150–400)
PMV BLD AUTO: 11.4 FL (ref 8.9–12.9)
POTASSIUM SERPL-SCNC: 3.6 MMOL/L (ref 3.5–5.1)
PROT SERPL-MCNC: 8.4 G/DL (ref 6–8.5)
RBC # BLD AUTO: 3.85 M/UL (ref 4.1–5.7)
RBC MORPH BLD: ABNORMAL
SODIUM SERPL-SCNC: 134 MMOL/L (ref 136–145)
WBC # BLD AUTO: 7.8 K/UL (ref 4.1–11.1)

## 2019-01-28 PROCEDURE — 80048 BASIC METABOLIC PNL TOTAL CA: CPT

## 2019-01-28 PROCEDURE — 36415 COLL VENOUS BLD VENIPUNCTURE: CPT

## 2019-01-28 PROCEDURE — 85025 COMPLETE CBC W/AUTO DIFF WBC: CPT

## 2019-01-28 PROCEDURE — 74011250636 HC RX REV CODE- 250/636: Performed by: INTERNAL MEDICINE

## 2019-01-28 PROCEDURE — 94760 N-INVAS EAR/PLS OXIMETRY 1: CPT

## 2019-01-28 PROCEDURE — 74011250636 HC RX REV CODE- 250/636: Performed by: HOSPITALIST

## 2019-01-28 PROCEDURE — 74022 RADEX COMPL AQT ABD SERIES: CPT

## 2019-01-28 PROCEDURE — 74011000250 HC RX REV CODE- 250: Performed by: INTERNAL MEDICINE

## 2019-01-28 PROCEDURE — 77030018836 HC SOL IRR NACL ICUM -A

## 2019-01-28 RX ORDER — HALOBETASOL PROPIONATE 0.5 MG/G
CREAM TOPICAL 2 TIMES DAILY
Status: DISCONTINUED | OUTPATIENT
Start: 2019-01-28 | End: 2019-01-28 | Stop reason: HOSPADM

## 2019-01-28 RX ORDER — HYDROCODONE BITARTRATE AND ACETAMINOPHEN 5; 325 MG/1; MG/1
1 TABLET ORAL
Qty: 10 TAB | Refills: 0 | Status: SHIPPED | OUTPATIENT
Start: 2019-01-28 | End: 2019-06-12

## 2019-01-28 RX ORDER — LEVOFLOXACIN 500 MG/1
500 TABLET, FILM COATED ORAL DAILY
Qty: 3 TAB | Refills: 0 | Status: SHIPPED | OUTPATIENT
Start: 2019-01-28 | End: 2019-01-31

## 2019-01-28 RX ORDER — HALOBETASOL PROPIONATE 0.5 MG/G
CREAM TOPICAL 2 TIMES DAILY
Qty: 15 G | Refills: 0 | Status: SHIPPED | OUTPATIENT
Start: 2019-01-28

## 2019-01-28 RX ADMIN — FAMOTIDINE 20 MG: 10 INJECTION, SOLUTION INTRAVENOUS at 09:02

## 2019-01-28 RX ADMIN — ENOXAPARIN SODIUM 40 MG: 40 INJECTION SUBCUTANEOUS at 06:25

## 2019-01-28 RX ADMIN — POTASSIUM CHLORIDE: 2 INJECTION, SOLUTION, CONCENTRATE INTRAVENOUS at 09:55

## 2019-01-28 RX ADMIN — ONDANSETRON 4 MG: 2 INJECTION INTRAMUSCULAR; INTRAVENOUS at 06:50

## 2019-01-28 RX ADMIN — LEVOFLOXACIN 500 MG: 5 INJECTION, SOLUTION INTRAVENOUS at 16:10

## 2019-01-28 RX ADMIN — Medication 10 ML: at 06:26

## 2019-01-28 RX ADMIN — ONDANSETRON 4 MG: 2 INJECTION INTRAMUSCULAR; INTRAVENOUS at 14:03

## 2019-01-28 RX ADMIN — HYDROMORPHONE HYDROCHLORIDE 2 MG: 2 INJECTION, SOLUTION INTRAMUSCULAR; INTRAVENOUS; SUBCUTANEOUS at 14:03

## 2019-01-28 RX ADMIN — Medication 10 ML: at 13:41

## 2019-01-28 RX ADMIN — HYDROMORPHONE HYDROCHLORIDE 2 MG: 2 INJECTION, SOLUTION INTRAMUSCULAR; INTRAVENOUS; SUBCUTANEOUS at 06:50

## 2019-01-28 RX ADMIN — POTASSIUM CHLORIDE: 2 INJECTION, SOLUTION, CONCENTRATE INTRAVENOUS at 02:10

## 2019-01-28 NOTE — PROGRESS NOTES
F/U for enteritis due to c jejuni Recurrent c diff, rx fecal microbial transplant S: Mr. Ani Logan was seen by me today during rounds. At this time, he is resting + comfortably. He feels better but still has lower abdominal pain. Eating gi lite, some nausea. The patient has no other new complaints today. Please see admission consult for details of ROS; there are no changes today. O: Blood pressure 121/78, pulse 87, temperature 98.2 °F (36.8 °C), resp. rate 16, height 6' (1.829 m), weight 101.7 kg (224 lb 3.3 oz), SpO2 97 %. Gen: Patient is in no acute distress. There is no jaundice. Lungs: Clear to auscultation bilaterally . Heart:+RRR. Abd: Soft, + tender, rlq qand llq  non-distended, bowel sounds present. Extremities: Warm. Lab Results Component Value Date/Time WBC 7.8 01/28/2019 01:33 AM  
 HGB 11.6 (L) 01/28/2019 01:33 AM  
 HCT 35.2 (L) 01/28/2019 01:33 AM  
 PLATELET 275 (L) 50/40/1594 01:33 AM  
 MCV 91.4 01/28/2019 01:33 AM  
 
Lab Results Component Value Date/Time Sodium 134 (L) 01/28/2019 01:33 AM  
 Potassium 3.6 01/28/2019 01:33 AM  
 Chloride 102 01/28/2019 01:33 AM  
 CO2 24 01/28/2019 01:33 AM  
 Anion gap 8 01/28/2019 01:33 AM  
 Glucose 91 01/28/2019 01:33 AM  
 BUN 9 01/28/2019 01:33 AM  
 Creatinine 0.67 (L) 01/28/2019 01:33 AM  
 BUN/Creatinine ratio 13 01/28/2019 01:33 AM  
 GFR est AA >60 01/28/2019 01:33 AM  
 GFR est non-AA >60 01/28/2019 01:33 AM  
 Calcium 8.4 (L) 01/28/2019 01:33 AM  
 Bilirubin, total 1.4 (H) 01/26/2019 05:28 AM  
 AST (SGOT) 39 (H) 01/26/2019 05:28 AM  
 Alk. phosphatase 275 (H) 01/26/2019 05:28 AM  
 Protein, total 8.6 (H) 01/26/2019 05:28 AM  
 Albumin 2.7 (L) 01/26/2019 05:28 AM  
 Globulin 5.9 (H) 01/26/2019 05:28 AM  
 A-G Ratio 0.5 (L) 01/26/2019 05:28 AM  
 ALT (SGPT) 30 01/26/2019 05:28 AM  
  
Cross sectional imaging:  Last image of abdomen:  IMPRESSION IMPRESSION:  
Clear lungs. Mid abdominal small bowel wall thickening, but no evidence of bowel obstruction 
or ileus.   (today) A: Active Problems: 
  Enteritis (1/24/2019) Hyponatremia (1/24/2019) Abdominal pain (1/24/2019) Comment:  He still has enterritis by kub. He still has tenderness. Most cases of c jejuni resolve on own. He is eating P:  Continue abx one more day  (up to date says 3 days, but I would go a little longer) Dc plans per hospitalist 
Will follow Angela Sanderson MD 
1:00 PM 
1/28/2019

## 2019-01-28 NOTE — PROGRESS NOTES
Bedside shift change report given to Madonna Peters RN by Verna Morales RN. Report included the following information SBAR, ED Summary, Intake/Output, MAR and Recent Results. 8207 Patient reports he has a dermatologic disorder that creates \"ulcers\" over his body. Patient had right hand \"ulcer\" covered with a bandaid, upon removal by patient it opened up and now has a deep, open wound. Wound care consult placed for recommendations of proper care for wound.

## 2019-01-28 NOTE — PROGRESS NOTES
I spoke to pharmacy and recommended they file adverse event for the c sylvia after fmt from open [employer]. Poonam Malagon MD 
1:56 PM 
1/28/2019

## 2019-01-28 NOTE — DISCHARGE SUMMARY
Hospitalist Discharge Summary     Patient ID:  Shelley Brito  [id number]  39 y.o.  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 37 y.o.  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 Post- fecal transplant patient with progressively worsening bilateral mid and lower abdominal pain and associated vomiting. No diarrhea since patient was taking imodium after fecal transplant. Stool culture was positive for campylobacter. C diff test was negative. He received IV levaquin with improvement of his abdominal pain. CT A/p revealed Mural thickening and hyperenhancement of some of the mid small bowel loops    Patient developed a pyoderma grangrenosum lesion on his right hand. Wound care consulted. Topical steroid added. PLAN:   - Levaquin for total of 7 days given immunosupression state.   - f/u in the GI clinic in 1 week  - F/u with PCP in 1 week  - repeat BMP next week    _______________________________________________________________________  Patient seen and examined by me on discharge day.   Pertinent Findings:  Gen:    Not in acute pain or distress  Chest: Clear to auscultation bilaterally  CVS:   Regular rate. No LE edema  Abd:  Soft, not distended, mildly tenderness  Neuro:  Alert, Ox3. SKIN: right dorsal hand area with an ulcerative lesion with dark margins compatible with pyoderma gangrenosum  _______________________________________________________________________  DISCHARGE MEDICATIONS:   Current Discharge Medication List      START taking these medications    Details   levoFLOXacin (LEVAQUIN) 500 mg tablet Take 1 Tab by mouth daily for 3 days. Qty: 3 Tab, Refills: 0      HYDROcodone-acetaminophen (NORCO) 5-325 mg per tablet Take 1 Tab by mouth every eight (8) hours as needed for Pain (abdominal pain). Max Daily Amount: 3 Tabs. Qty: 10 Tab, Refills: 0    Associated Diagnoses: Enteritis; Primary sclerosing cholangitis; Pouchitis ([address]             My Recommended Diet, Activity, Wound Care, and follow-up labs are listed in the patient's Discharge Insturctions which I have personally completed and reviewed.     ______________________________________________________________________    Risk of deterioration: Moderate    Condition at Discharge:  Stable  ______________________________________________________________________    Disposition  Home with family, no needs  ______________________________________________________________________    Care Plan discussed with:   Patient, Family, RN    ______________________________________________________________________    Code Status: Full Code  ______________________________________________________________________      Follow up with:   PCP : Debo Gonzalez MD  Follow-up Information     Follow up With Specialties Details Why Contact Info    Debo Gonzalez MD Family Practice Schedule an appointment as soon as possible for a visit in 1 week  [facility]  [phone]                Total time in minutes spent coordinating this discharge (includes going over instructions, follow-up, prescriptions, and preparing report for sign off to her PCP) :  40 minutes    Signed:  Aaron Arriaga MD

## 2019-01-28 NOTE — DISCHARGE INSTRUCTIONS
HOSPITALIST DISCHARGE INSTRUCTIONS    NAME: Godwin Guardado   :  1975   MRN:  [id number]     Date/Time:  2019 3:40 PM    ADMIT DATE: 2019   DISCHARGE DATE: 2019     Attending Physician: Diamond Gerard MD    DISCHARGE DIAGNOSIS:  Campylobacter Enteritis (Campylobacter antigen +ve )  S/p Fecal transplant 19 for C Diff infection  Ileus  Hyponatremia   Hx Crohn's s/p colectomy    Ileitis and pouchitis   PSC with chronic LFT elevation   Hx recurrent episodes of pyoderma gangrenosum  Chronic back pain   Hyperproteinemia      Medications: Per above medication reconciliation. Pain Management: per above medications    Recommended diet: Regular Diet. Drink plenty of fluids. Recommended activity: Activity as tolerated    Required Lab work: have a blood test to ckeck your potassium un 1 week      Outside physician follow up: Follow-up Information     Follow up With Specialties Details Why Contact Info    Maggie Polo MD [facility] Schedule an appointment as soon as possible for a visit in 1 week  [facility]  [phone]              Information obtained by :  I understand that if any problems occur once I am at home I am to contact my physician. I understand and acknowledge receipt of the instructions indicated above.                                                                                                                                            Physician's or R.N.'s Signature                                                                  Date/Time                                                                                                                                              Patient or Repres

## 2019-01-29 NOTE — PROGRESS NOTES
I have reviewed discharge instructions and medications with the patient. The patient verbalized understanding. IV was removed. New dressing was applied to pts hand. Pt was escorted to front entrance by PCT.

## 2019-01-29 NOTE — PROGRESS NOTES
[address] January 28, 2019 RE: Bobbi Irwin To Whom It May Concern, This is to certify that Bobbi Irwin may return to work on February 4th, 2019. Please feel free to contact my office if you have any questions or concerns. Thank you for your assistance in this matter. Sincerely, Maria Isabel Johnson RN

## 2019-01-29 NOTE — ROUTINE PROCESS
Bedside shift change report given to [facility] (oncoming nurse) by Richard Dodson (offgoing nurse). Report included the following information SBAR, Intake/Output and MAR No pain meds given during shift. Dr. Christopher Toro cleared patient for discharge after Levaquin administered. Wound care consult was placed this morning for open wound on pt's R hand. I called at beginning of my shift and left a msg. Dr. Christopher Newmanors aware of pt's hand but states wound care wouldn't be able to come today. Pt sent home with foam dressing and normal saline to clean. Prescription for topical was given as well. Night shift nurse will complete discharge.

## 2019-04-16 ENCOUNTER — HOSPITAL ENCOUNTER (EMERGENCY)
Age: 44
Discharge: HOME | End: 2019-04-16
Attending: EMERGENCY MEDICINE
Payer: OTHER GOVERNMENT

## 2019-04-16 ENCOUNTER — APPOINTMENT (OUTPATIENT)
Dept: CT IMAGING | Age: 44
End: 2019-04-16
Attending: EMERGENCY MEDICINE
Payer: OTHER GOVERNMENT

## 2019-04-16 VITALS
RESPIRATION RATE: 16 BRPM | SYSTOLIC BLOOD PRESSURE: 118 MMHG | TEMPERATURE: 97.7 F | HEART RATE: 68 BPM | DIASTOLIC BLOOD PRESSURE: 75 MMHG | OXYGEN SATURATION: 97 %

## 2019-04-16 DIAGNOSIS — K50.019 CROHN'S DISEASE OF SMALL INTESTINE WITH COMPLICATION (HCC): ICD-10-CM

## 2019-04-16 DIAGNOSIS — K52.9 ENTERITIS: ICD-10-CM

## 2019-04-16 DIAGNOSIS — R10.31 ABDOMINAL PAIN, RIGHT LOWER QUADRANT: Primary | ICD-10-CM

## 2019-04-16 LAB
ALBUMIN SERPL-MCNC: 2.5 G/DL (ref 3.5–5)
ALBUMIN/GLOB SERPL: 0.5 {RATIO} (ref 1.1–2.2)
ALP SERPL-CCNC: 426 U/L (ref 45–117)
ALT SERPL-CCNC: 68 U/L (ref 12–78)
ANION GAP SERPL CALC-SCNC: 7 MMOL/L (ref 5–15)
AST SERPL-CCNC: 110 U/L (ref 15–37)
BASOPHILS # BLD: 0 K/UL (ref 0–0.1)
BASOPHILS NFR BLD: 1 % (ref 0–1)
BILIRUB SERPL-MCNC: 1.1 MG/DL (ref 0.2–1)
BUN SERPL-MCNC: 9 MG/DL (ref 6–20)
BUN/CREAT SERPL: 11 (ref 12–20)
CALCIUM SERPL-MCNC: 8.4 MG/DL (ref 8.5–10.1)
CHLORIDE SERPL-SCNC: 105 MMOL/L (ref 97–108)
CO2 SERPL-SCNC: 26 MMOL/L (ref 21–32)
COMMENT, HOLDF: NORMAL
CREAT SERPL-MCNC: 0.85 MG/DL (ref 0.7–1.3)
DIFFERENTIAL METHOD BLD: ABNORMAL
EOSINOPHIL # BLD: 0.1 K/UL (ref 0–0.4)
EOSINOPHIL NFR BLD: 3 % (ref 0–7)
ERYTHROCYTE [DISTWIDTH] IN BLOOD BY AUTOMATED COUNT: 15.5 % (ref 11.5–14.5)
GLOBULIN SER CALC-MCNC: 5.4 G/DL (ref 2–4)
GLUCOSE SERPL-MCNC: 124 MG/DL (ref 65–100)
HCT VFR BLD AUTO: 36.1 % (ref 36.6–50.3)
HGB BLD-MCNC: 11.8 G/DL (ref 12.1–17)
IMM GRANULOCYTES # BLD AUTO: 0 K/UL (ref 0–0.04)
IMM GRANULOCYTES NFR BLD AUTO: 0 % (ref 0–0.5)
LIPASE SERPL-CCNC: 177 U/L (ref 73–393)
LYMPHOCYTES # BLD: 1.3 K/UL (ref 0.8–3.5)
LYMPHOCYTES NFR BLD: 25 % (ref 12–49)
MAGNESIUM SERPL-MCNC: 1.7 MG/DL (ref 1.6–2.4)
MCH RBC QN AUTO: 30 PG (ref 26–34)
MCHC RBC AUTO-ENTMCNC: 32.7 G/DL (ref 30–36.5)
MCV RBC AUTO: 91.9 FL (ref 80–99)
MONOCYTES # BLD: 0.4 K/UL (ref 0–1)
MONOCYTES NFR BLD: 8 % (ref 5–13)
NEUTS SEG # BLD: 3.3 K/UL (ref 1.8–8)
NEUTS SEG NFR BLD: 63 % (ref 32–75)
NRBC # BLD: 0 K/UL (ref 0–0.01)
NRBC BLD-RTO: 0 PER 100 WBC
PLATELET # BLD AUTO: 142 K/UL (ref 150–400)
PMV BLD AUTO: 11.8 FL (ref 8.9–12.9)
POTASSIUM SERPL-SCNC: 3.2 MMOL/L (ref 3.5–5.1)
PROT SERPL-MCNC: 7.9 G/DL (ref 6.4–8.2)
RBC # BLD AUTO: 3.93 M/UL (ref 4.1–5.7)
SAMPLES BEING HELD,HOLD: NORMAL
SODIUM SERPL-SCNC: 138 MMOL/L (ref 136–145)
WBC # BLD AUTO: 5.1 K/UL (ref 4.1–11.1)

## 2019-04-16 PROCEDURE — 74011636320 HC RX REV CODE- 636/320: Performed by: RADIOLOGY

## 2019-04-16 PROCEDURE — 96374 THER/PROPH/DIAG INJ IV PUSH: CPT

## 2019-04-16 PROCEDURE — 99283 EMERGENCY DEPT VISIT LOW MDM: CPT

## 2019-04-16 PROCEDURE — 74011000258 HC RX REV CODE- 258: Performed by: RADIOLOGY

## 2019-04-16 PROCEDURE — 74011250636 HC RX REV CODE- 250/636: Performed by: EMERGENCY MEDICINE

## 2019-04-16 PROCEDURE — 83735 ASSAY OF MAGNESIUM: CPT

## 2019-04-16 PROCEDURE — 80053 COMPREHEN METABOLIC PANEL: CPT

## 2019-04-16 PROCEDURE — 96375 TX/PRO/DX INJ NEW DRUG ADDON: CPT

## 2019-04-16 PROCEDURE — 96376 TX/PRO/DX INJ SAME DRUG ADON: CPT

## 2019-04-16 PROCEDURE — 83690 ASSAY OF LIPASE: CPT

## 2019-04-16 PROCEDURE — 85025 COMPLETE CBC W/AUTO DIFF WBC: CPT

## 2019-04-16 PROCEDURE — 74177 CT ABD & PELVIS W/CONTRAST: CPT

## 2019-04-16 RX ORDER — BUDESONIDE 3 MG/1
9 CAPSULE, COATED PELLETS ORAL
Qty: 42 CAP | Refills: 0 | Status: SHIPPED | OUTPATIENT
Start: 2019-04-16 | End: 2019-04-30

## 2019-04-16 RX ORDER — HYDROMORPHONE HYDROCHLORIDE 1 MG/ML
1 INJECTION, SOLUTION INTRAMUSCULAR; INTRAVENOUS; SUBCUTANEOUS ONCE
Status: COMPLETED | OUTPATIENT
Start: 2019-04-16 | End: 2019-04-16

## 2019-04-16 RX ORDER — ONDANSETRON 2 MG/ML
4 INJECTION INTRAMUSCULAR; INTRAVENOUS
Status: COMPLETED | OUTPATIENT
Start: 2019-04-16 | End: 2019-04-16

## 2019-04-16 RX ORDER — SODIUM CHLORIDE 0.9 % (FLUSH) 0.9 %
10 SYRINGE (ML) INJECTION
Status: COMPLETED | OUTPATIENT
Start: 2019-04-16 | End: 2019-04-16

## 2019-04-16 RX ORDER — HYDROMORPHONE HYDROCHLORIDE 1 MG/ML
0.5 INJECTION, SOLUTION INTRAMUSCULAR; INTRAVENOUS; SUBCUTANEOUS ONCE
Status: COMPLETED | OUTPATIENT
Start: 2019-04-16 | End: 2019-04-16

## 2019-04-16 RX ORDER — BUDESONIDE 3 MG/1
3 CAPSULE, COATED PELLETS ORAL
Qty: 14 CAP | Refills: 0 | Status: SHIPPED | OUTPATIENT
Start: 2019-04-16 | End: 2019-04-16

## 2019-04-16 RX ADMIN — HYDROMORPHONE HYDROCHLORIDE 1 MG: 1 INJECTION, SOLUTION INTRAMUSCULAR; INTRAVENOUS; SUBCUTANEOUS at 05:12

## 2019-04-16 RX ADMIN — IOPAMIDOL 100 ML: 755 INJECTION, SOLUTION INTRAVENOUS at 05:29

## 2019-04-16 RX ADMIN — IOHEXOL 50 ML: 240 INJECTION, SOLUTION INTRATHECAL; INTRAVASCULAR; INTRAVENOUS; ORAL at 03:51

## 2019-04-16 RX ADMIN — HYDROMORPHONE HYDROCHLORIDE 0.5 MG: 1 INJECTION, SOLUTION INTRAMUSCULAR; INTRAVENOUS; SUBCUTANEOUS at 03:44

## 2019-04-16 RX ADMIN — Medication 10 ML: at 05:29

## 2019-04-16 RX ADMIN — SODIUM CHLORIDE 100 ML: 900 INJECTION, SOLUTION INTRAVENOUS at 05:28

## 2019-04-16 RX ADMIN — ONDANSETRON 4 MG: 2 INJECTION INTRAMUSCULAR; INTRAVENOUS at 03:43

## 2019-04-16 NOTE — DISCHARGE INSTRUCTIONS
Crohn's Disease: Care Instructions  Your Care Instructions    Crohn's disease is a lifelong inflammatory bowel disease (IBD). Parts of the digestive tract get swollen and irritated and may develop deep sores called ulcers. Crohn's disease usually occurs in the last part of the small intestine and the first part of the large intestine. But it can develop anywhere from the mouth to the anus. The main symptoms of Crohn's disease are belly pain, diarrhea, fever, and weight loss. Some people may have constipation. Crohn's disease also sometimes causes problems with the joints, eyes, or skin. Your symptoms may be mild at some times and severe at others. The disease can also go into remission, which means that it is not active and you have no symptoms. Bad attacks of Crohn's disease often have to be treated in the hospital so that you can get medicines and liquids through a tube in your vein, called an IV. This gives your digestive system time to rest and recover. Talk with your doctor about the best treatments for you. You may need medicines that help prevent or treat flare-ups of the disease. You may need surgery to remove part of your bowel if you have an abnormal opening in the bowel (fistula), an abscess, or a bowel obstruction. In some cases, surgery is needed if medicines do not work. But symptoms often return to other areas of the intestines after surgery. Learning good self-care can help you reduce your symptoms and manage Crohn's disease. Follow-up care is a key part of your treatment and safety. Be sure to make and go to all appointments, and call your doctor if you are having problems. It's also a good idea to know your test results and keep a list of the medicines you take. How can you care for yourself at home? · Take your medicines exactly as prescribed. Call your doctor if you think you are having a problem with your medicine.  You will get more details on the specific medicines your doctor prescribes. · Do not take anti-inflammatory medicines, such as aspirin, ibuprofen (Advil, Motrin), or naproxen (Aleve). They may make your symptoms worse. Do not take any other medicines or herbal products without talking to your doctor first.  · Avoid foods that make your symptoms worse. These might include milk, alcohol, high-fiber foods, or spicy foods. · Eat a healthy diet. Make sure to get enough iron. Rectal bleeding may make you lose iron. Good sources of iron include beef, lentils, spinach, raisins, and iron-enriched breads and cereals. · Drink liquid meal replacements if your doctor recommends them. These are high in calories and contain vitamins and minerals. Severe symptoms may make it hard for your body to absorb vitamins and minerals. · Do not smoke. Smoking makes Crohn's disease worse. If you need help quitting, talk to your doctor about stop-smoking programs and medicines. These can increase your chances of quitting for good. · Seek support from friends and family to help cope with Crohn's disease. The illness can affect all parts of your life. Get counseling if you need it. When should you call for help? Call 911 anytime you think you may need emergency care. For example, call if:    · Your stools are maroon or very bloody.     · You passed out (lost consciousness).    Call your doctor now or seek immediate medical care if:    · You are vomiting.     · You have new or worse belly pain.     · You have a fever.     · You cannot pass stools or gas.     · You have new or more blood in your stools.    Watch closely for changes in your health, and be sure to contact your doctor if:    · You have new or worse symptoms.     · You are losing weight.     · You do not get better as expected. Where can you learn more? Go to http://oscar-wilfrido.info/. Enter 21 [id number] in the search box to learn more about \"Crohn's Disease: Care Instructions. \"  Current as of: March 27, 2018  Content Version: 11.9  © 7083-0327 [employer]. Care instructions adapted under license by [employer] (which disclaims liability or warranty for this information). If you have questions about a medical condition or this instruction, always ask your healthcare professional. [address] any warranty or liability for your use of this information. Patient Education        Abdominal Pain: Care Instructions  Your Care Instructions    Abdominal pain has many possible causes. Some aren't serious and get better on their own in a few days. Others need more testing and treatment. If your pain continues or gets worse, you need to be rechecked and may need more tests to find out what is wrong. You may need surgery to correct the problem. Don't ignore new symptoms, such as fever, nausea and vomiting, urination problems, pain that gets worse, and dizziness. These may be signs of a more serious problem. Your doctor may have recommended a follow-up visit in the next 8 to 12 hours. If you are not getting better, you may need more tests or treatment. The doctor has checked you carefully, but problems can develop later. If you notice any problems or new symptoms, get medical treatment right away. Follow-up care is a key part of your treatment and safety. Be sure to make and go to all appointments, and call your doctor if you are having problems. It's also a good idea to know your test results and keep a list of the medicines you take. How can you care for yourself at home? · Rest until you feel better. · To prevent dehydration, drink plenty of fluids, enough so that your urine is light yellow or clear like water. Choose water and other caffeine-free clear liquids until you feel better. If you have kidney, heart, or liver disease and have to limit fluids, talk with your doctor before you increase the amount of fluids you drink.   · If your stomach is upset, eat mild foods, such as rice, dry toast or crackers, bananas, and applesauce. Try eating several small meals instead of two or three large ones. · Wait until 48 hours after all symptoms have gone away before you have spicy foods, alcohol, and drinks that contain caffeine. · Do not eat foods that are high in fat. · Avoid anti-inflammatory medicines such as aspirin, ibuprofen (Advil, Motrin), and naproxen (Aleve). These can cause stomach upset. Talk to your doctor if you take daily aspirin for another health problem. When should you call for help? Call 911 anytime you think you may need emergency care. For example, call if:    · You passed out (lost consciousness).     · You pass maroon or very bloody stools.     · You vomit blood or what looks like coffee grounds.     · You have new, severe belly pain.    Call your doctor now or seek immediate medical care if:    · Your pain gets worse, especially if it becomes focused in one area of your belly.     · You have a new or higher fever.     · Your stools are black and look like tar, or they have streaks of blood.     · You have unexpected vaginal bleeding.     · You have symptoms of a urinary tract infection. These may include:  ? Pain when you urinate. ? Urinating more often than usual.  ? Blood in your urine.     · You are dizzy or lightheaded, or you feel like you may faint.    Watch closely for changes in your health, and be sure to contact your doctor if:    · You are not getting better after 1 day (24 hours). Where can you learn more? Go to http://oscar-wilfrido.info/. Enter W605 in the search box to learn more about \"Abdominal Pain: Care Instructions. \"  Current as of: September 23, 2018  Content Version: 11.9  © 3186-1578 [employer]. Care instructions adapted under license by [employer] (which disclaims liability or warranty for this information).  If you have questions about a medical condition or this instruction, always ask your healthcare professional. [address] any warranty or liability for your use of this information. Patient Education        Gastroenteritis: Care Instructions  Your Care Instructions    Gastroenteritis is an illness that may cause nausea, vomiting, and diarrhea. It is sometimes called \"stomach flu. \" It can be caused by bacteria or a virus. You will probably begin to feel better in 1 to 2 days. In the meantime, get plenty of rest and make sure you do not become dehydrated. Dehydration occurs when your body loses too much fluid. Follow-up care is a key part of your treatment and safety. Be sure to make and go to all appointments, and call your doctor if you are having problems. It's also a good idea to know your test results and keep a list of the medicines you take. How can you care for yourself at home? · If your doctor prescribed antibiotics, take them as directed. Do not stop taking them just because you feel better. You need to take the full course of antibiotics. · Drink plenty of fluids to prevent dehydration, enough so that your urine is light yellow or clear like water. Choose water and other caffeine-free clear liquids until you feel better. If you have kidney, heart, or liver disease and have to limit fluids, talk with your doctor before you increase your fluid intake. · Drink fluids slowly, in frequent, small amounts, because drinking too much too fast can cause vomiting. · Begin eating mild foods, such as dry toast, yogurt, applesauce, bananas, and rice. Avoid spicy, hot, or high-fat foods, and do not drink alcohol or caffeine for a day or two. Do not drink milk or eat ice cream until you are feeling better. How to prevent gastroenteritis  · Keep hot foods hot and cold foods cold. · Do not eat meats, dressings, salads, or other foods that have been kept at room temperature for more than 2 hours. · Use a thermometer to check your refrigerator.  It should be between 34°F and 40°F.  · Defrost meats in the refrigerator or microwave, not on the kitchen counter. · Keep your hands and your kitchen clean. Wash your hands, cutting boards, and countertops with hot soapy water frequently. · Cook meat until it is well done. · Do not eat raw eggs or uncooked sauces made with raw eggs. · Do not take chances. If food looks or tastes spoiled, throw it out. When should you call for help? Call 911 anytime you think you may need emergency care. For example, call if:    · You vomit blood or what looks like coffee grounds.     · You passed out (lost consciousness).     · You pass maroon or very bloody stools.    Call your doctor now or seek immediate medical care if:    · You have severe belly pain.     · You have signs of needing more fluids. You have sunken eyes, a dry mouth, and pass only a little dark urine.     · You feel like you are going to faint.     · You have increased belly pain that does not go away in 1 to 2 days.     · You have new or increased nausea, or you are vomiting.     · You have a new or higher fever.     · Your stools are black and tarlike or have streaks of blood.    Watch closely for changes in your health, and be sure to contact your doctor if:    · You are dizzy or lightheaded.     · You urinate less than usual, or your urine is dark yellow or brown.     · You do not feel better with each day that goes by. Where can you learn more? Go to http://oscar-wilfrido.info/. Enter N142 in the search box to learn more about \"Gastroenteritis: Care Instructions. \"  Current as of: July 30, 2018  Content Version: 11.9  © 6659-5243 [employer]. Care instructions adapted under license by [employer] (which disclaims liability or warranty for this information).  If you have questions about a medical condition or this instruction, always ask your healthcare professional. [address] any warranty or liability for your use of this information.

## 2019-04-16 NOTE — ED TRIAGE NOTES
Patient presents to the emergency department reporting a Crohn's flare. Patient reports the pain is consistent with his Crohn's flares. Patient reports some cramping yesterday, but onset of intense pain was overnight.

## 2019-04-16 NOTE — ED PROVIDER NOTES
37 y.o.  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 Patient presented to ER with 1-2 days of generalized abdominal pain her greatest pain in the right lower quadrant. Patient reports gestation a course of steroids. Having increased stool output no blood or mucus. Patient reports nausea no vomiting. No fevers or chills. GI: Lakia Mcmillan MD 
 
 
 
 
  
 
Past Medical History:  
Diagnosis Date  Arthritis   
 all joints and spine  C. difficile colitis 1/21/2019  Chest pain 02/18/2013  
 as of 10/14/14 pt denies any CP since 2/13  Crohn's colitis ([facility] [address] Dr. Germania Poole  H/O Clostridium difficile infection 01/2017  
 as of 3/30/17 pt denies abd pain, diarrhea > 1 week  Iron deficiency anemia 2/18/2013  Kidney infection 2018  Liver disease  Pancreatitis  Polyarteritis nodosa ([address]  Pouchitis ([address] 12/08/2011  PSC (primary sclerosing cholangitis) Dr Terrence Goldberg [phone]  Pyoderma gangrenosum Past Surgical History:  
Procedure Laterality Date  ABDOMEN SURGERY PROC UNLISTED  2002  
 colostomy reversal & J Pouch  COLONOSCOPY N/A 4/4/2017 COLONOSCOPY performed by Lakia Mcmillan MD at . [place] [id number] COLONOSCOPY N/A 1/15/2018 COLONOSCOPY performed by Lakia Mcmillan MD at [facility] ENDOSCOPY  COLONOSCOPY N/A 9/13/2018 COLONOSCOPY performed by Lakia Mcmillan MD at [facility] ENDOSCOPY  COLONOSCOPY N/A 1/21/2019 COLONOSCOPY W/ FECAL TRANSPLANT performed by Adrian Jacob MD at [facility] ENDOSCOPY  COLONOSCOPY,DIAGNOSTIC  1/15/2018  COLONOSCOPY,DIAGNOSTIC  9/13/2018  COLONOSCOPY,DIAGNOSTIC  1/21/2019  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  HX COLOSTOMY  2002  HX GI    
 ercp [facility] 1999  PA COLONOSCOPY W/BIOPSY SINGLE/MULTIPLE  12/8/2011  PA EGD TRANSORAL BIOPSY SINGLE/MULTIPLE  2/18/2013 Family History:  
Problem Relation Age of Onset  Diabetes Mother  Hypertension Mother Sebastien Vogel Arthritis-osteo Mother  Diabetes Father  Hypertension Father  Stroke Father  Arthritis-osteo Father  No Known Problems Sister  No Known Problems Brother  No Known Problems Maternal Aunt  No Known Problems Maternal Uncle  No Known Problems Paternal Aunt  No Known Problems Paternal Uncle  No Known Problems Maternal Grandmother  No Known Problems Maternal Grandfather  No Known Problems Paternal Grandmother  No Known Problems Paternal Grandfather Social History Socioeconomic History  Marital status:  Spouse name: Not on file  Number of children: 3  
 Years of education: Not on file  Highest education level: Not on file Occupational History  Occupation:  Social Needs  Financial resource strain: Not on file  Food insecurity:  
  Worry: Not on file Inability: Not on file  Transportation needs:  
  Medical: Not on file Non-medical: Not on file Tobacco Use  Smoking status: Never Smoker  Smokeless tobacco: Never Used Substance and Sexual Activity  Alcohol use: No  
  Alcohol/week: 0.0 oz  Drug use: No  
 Sexual activity: Yes  
  Partners: Female Birth control/protection: Condom Lifestyle  Physical activity:  
  Days per week: Not on file Minutes per session: Not on file  Stress: Not on file Relationships  Social connections:  
  Talks on phone: Not on file Gets together: Not on file Attends Rastafarian service: Not on file Active member of club or organization: Not on file Attends meetings of clubs or organizations: Not on file Relationship status: Not on file  Intimate partner violence: Fear of current or ex partner: Not on file Emotionally abused: Not on file Physically abused: Not on file Forced sexual activity: Not on file Other Topics Concern  Not on file Social History Narrative  Not on file ALLERGIES: Cafergot [ergotamine-caffeine] Review of Systems Constitutional: Negative for chills and fever. HENT: Negative for congestion and sore throat. Eyes: Negative for pain. Respiratory: Negative for shortness of breath. Cardiovascular: Negative for chest pain. Gastrointestinal: Positive for abdominal pain, diarrhea and nausea. Negative for blood in stool, rectal pain and vomiting. Genitourinary: Negative for dysuria and flank pain. Musculoskeletal: Negative for back pain and neck pain. Skin: Negative for rash. Neurological: Negative for dizziness and headaches. Vitals:  
 04/16/19 0259 Pulse: 95 Resp: 20 Temp: 97.9 °F (36.6 °C) SpO2: 97% Physical Exam  
Constitutional: He is oriented to person, place, and time. He appears well-developed and well-nourished. He appears distressed. HENT:  
Head: Normocephalic. Mouth/Throat: Oropharynx is clear and moist.  
Eyes: Conjunctivae are normal.  
Neck: Normal range of motion. Neck supple. Cardiovascular: Normal rate and regular rhythm. Pulmonary/Chest: Effort normal and breath sounds normal. No respiratory distress. Abdominal: Soft. Normal appearance and bowel sounds are normal. There is tenderness in the right lower quadrant and periumbilical area. There is guarding. There is no rigidity, no CVA tenderness, no tenderness at McBurney's point and negative Park's sign. Musculoskeletal: Normal range of motion. Neurological: He is alert and oriented to person, place, and time. No gross motor or sensory deficits Skin: Skin is warm. Capillary refill takes less than 2 seconds. No rash noted. Recent Results (from the past 24 hour(s)) SAMPLES BEING HELD  
 Collection Time: 04/16/19  3:26 AM  
Result Value Ref Range SAMPLES BEING HELD 1LAV,1PST,1RED,1BLUE   
 COMMENT Add-on orders for these samples will be processed based on acceptable specimen integrity and analyte stability, which may vary by analyte. CBC WITH AUTOMATED DIFF Collection Time: 04/16/19  3:26 AM  
Result Value Ref Range WBC 5.1 4.1 - 11.1 K/uL  
 RBC 3.93 (L) 4.10 - 5.70 M/uL  
 HGB 11.8 (L) 12.1 - 17.0 g/dL HCT 36.1 (L) 36.6 - 50.3 % MCV 91.9 80.0 - 99.0 FL  
 MCH 30.0 26.0 - 34.0 PG  
 MCHC 32.7 30.0 - 36.5 g/dL  
 RDW 15.5 (H) 11.5 - 14.5 % PLATELET 837 (L) 818 - 400 K/uL MPV 11.8 8.9 - 12.9 FL  
 NRBC 0.0 0  WBC ABSOLUTE NRBC 0.00 0.00 - 0.01 K/uL NEUTROPHILS 63 32 - 75 % LYMPHOCYTES 25 12 - 49 % MONOCYTES 8 5 - 13 % EOSINOPHILS 3 0 - 7 % BASOPHILS 1 0 - 1 % IMMATURE GRANULOCYTES 0 0.0 - 0.5 % ABS. NEUTROPHILS 3.3 1.8 - 8.0 K/UL  
 ABS. LYMPHOCYTES 1.3 0.8 - 3.5 K/UL  
 ABS. MONOCYTES 0.4 0.0 - 1.0 K/UL  
 ABS. EOSINOPHILS 0.1 0.0 - 0.4 K/UL  
 ABS. BASOPHILS 0.0 0.0 - 0.1 K/UL  
 ABS. IMM. GRANS. 0.0 0.00 - 0.04 K/UL  
 DF AUTOMATED METABOLIC PANEL, COMPREHENSIVE Collection Time: 04/16/19  3:26 AM  
Result Value Ref Range Sodium 138 136 - 145 mmol/L Potassium 3.2 (L) 3.5 - 5.1 mmol/L Chloride 105 97 - 108 mmol/L  
 CO2 26 21 - 32 mmol/L Anion gap 7 5 - 15 mmol/L Glucose 124 (H) 65 - 100 mg/dL BUN 9 6 - 20 MG/DL Creatinine 0.85 0.70 - 1.30 MG/DL  
 BUN/Creatinine ratio 11 (L) 12 - 20 GFR est AA >60 >60 ml/min/1.73m2 GFR est non-AA >60 >60 ml/min/1.73m2 Calcium 8.4 (L) 8.5 - 10.1 MG/DL Bilirubin, total 1.1 (H) 0.2 - 1.0 MG/DL  
 ALT (SGPT) 68 12 - 78 U/L  
 AST (SGOT) 110 (H) 15 - 37 U/L Alk. phosphatase 426 (H) 45 - 117 U/L Protein, total 7.9 6.4 - 8.2 g/dL Albumin 2.5 (L) 3.5 - 5.0 g/dL Globulin 5.4 (H) 2.0 - 4.0 g/dL A-G Ratio 0.5 (L) 1.1 - 2.2 LIPASE Collection Time: 04/16/19  3:26 AM  
Result Value Ref Range Lipase 177 73 - 393 U/L MAGNESIUM Collection Time: 04/16/19  3:26 AM  
Result Value Ref Range Magnesium 1.7 1.6 - 2.4 mg/dL Ct Abd Pelv W Cont Result Date: 4/16/2019 EXAM: CT ABD PELV W CONT INDICATION: Cramping abdominal pain increased overnight. Crohn disease. COMPARISON: CT abdomen/pelvis on 1/24/2019. CONTRAST: 100 mL of Isovue-[id number]. TECHNIQUE: Following the uneventful intravenous administration of contrast, thin axial images were obtained through the abdomen and pelvis. Coronal and sagittal reconstructions were generated. Oral contrast was administered. CT dose reduction was achieved through use of a standardized protocol tailored for this examination and automatic exposure control for dose modulation. Adaptive statistical iterative reconstruction (ASIR) was utilized. FINDINGS: LUNG BASES: Patchy dependent atelectasis. INCIDENTALLY IMAGED HEART AND MEDIASTINUM: Unremarkable. LIVER: Nodular surface is greater in the left hepatic lobe. Ill-defined hypodensities are periportal in the left hepatic lobe more than the right hepatic lobe. No measurable mass. GALLBLADDER: Moderate distention is unchanged. CBD is not dilated. SPLEEN: Splenomegaly is unchanged. No infarct. PANCREAS: No mass or ductal dilatation. ADRENALS: Unremarkable. KIDNEYS: Renal scarring is unchanged. No solid renal mass or hydronephrosis. STOMACH: Unremarkable. SMALL BOWEL: There is mural thickening of the neorectum. No bowel obstruction. Mild mural thickening of small bowel loops in the right lower quadrant adjacent to anastomotic sutures. COLON: Complete colectomy. APPENDIX: Appendectomy. PERITONEUM: No ascites or pneumoperitoneum. RETROPERITONEUM: No lymphadenopathy or aortic aneurysm. REPRODUCTIVE ORGANS: Prostate gland is normal in size. URINARY BLADDER: No mass or calculus. BONES: Mild bilateral hip osteoarthritis. No sacroiliitis.  ADDITIONAL COMMENTS: N/A  
 
 IMPRESSION: 1. Subacute more likely than acute discontiguous enteritis in the right lower quadrant small bowel and small bowel of the neorectum. No abscess or obstruction. Overall, enteritis is decreased compared to January. 2. Cirrhosis. Heterogeneous left hepatic lobe. Consider chronic cholangitis. CBD is not dilated. 3. Splenomegaly. No ascites. MDM Number of Diagnoses or Management Options Abdominal pain, right lower quadrant:  
Crohn's disease of small intestine with complication ([facility] [address]):  
Enteritis:  
Diagnosis management comments: PT with crohn's and hx of Cdiff s/p rectal transplant. CT subacute enteritis. GI consulted and recommending D/C with close follow-up and started on budesonide Outpatient stool studies. LFT's close to baseline. Amount and/or Complexity of Data Reviewed Clinical lab tests: reviewed Tests in the radiology section of CPT®: reviewed Procedures

## 2019-04-16 NOTE — CONSULTS
[facility]  [address] 140 [facility], [address]  [phone]                     GI CONSULTATION NOTE  Abi Sood, AGACNP-BC  Work Cell: [phone]      NAME:  Fritz Carson   :   1975   MRN:   [id number]       Referring Provider: Dr. Dorian Hwang    Consult Date: 2019     Chief Complaint: Abdominal pain    History of Present Illness:  Patient is a 37 y.o. who is seen in consultation at the request of Dr. Dorian Hwang for Crohn's disease. Mr. Zandra Mejía has a PMH as detailed below including Crohn's disease s/p colectomy, PSC cirrhosis, C. Diff s/p fecal transplant whom presented to the ER with abdominal pain. Onset of this was yesterday. Per patient, he has been \"battling some type of viral illness\" as his kids have been sick at home. Yesterday, he had recurrent RLQ cramping with associated nausea. He denies any fever, chills or vomiting. Stools are loose at baseline; however frequency has slightly increased. No mucous or blood per rectum. Work-up in ER showing stable labs and CT results as detailed below. He takes Asacol and had been on course of steroids (Entocort) which he finished 2-3 weeks ago. He has never been on biologics. CT abd/pelvis w/ contrast:  IMPRESSION:   1. Subacute more likely than acute discontiguous enteritis in the right lower  quadrant small bowel and small bowel of the neorectum. No abscess or  obstruction. Overall, enteritis is decreased compared to January. 2. Cirrhosis. Heterogeneous left hepatic lobe. Consider chronic cholangitis. CBD  is not dilated. 3. Splenomegaly. No ascites.     PMH:  Past Medical History:   Diagnosis Date    Arthritis     all joints and spine    C. difficile colitis 2019    Chest pain 2013    as of 10/14/14 pt denies any CP since     Crohn's colitis ([facility]     Dr. Iván Bautista H/O Clostridium difficile infection 2017    as of 3/30/17 pt denies abd pain, diarrhea > 1 week    Iron deficiency anemia 2013  Kidney infection 2018    Liver disease     Pancreatitis     [facility] nodosa [facility]     Pouchitis ([facility] [address] 12/08/2011    PSC (primary sclerosing cholangitis)     Dr Cheko Serrano [phone]    Pyoderma gangrenosum        PSH:  Past Surgical History:   Procedure Laterality Date    ABDOMEN SURGERY PROC UNLISTED  2002    colostomy reversal & J Pouch    COLONOSCOPY N/A 4/4/2017    COLONOSCOPY performed by Megan Freire MD at [address] COLONOSCOPY N/A 1/15/2018    COLONOSCOPY performed by Megan Freire MD at [facility] ENDOSCOPY    COLONOSCOPY N/A 9/13/2018    COLONOSCOPY performed by Megan Freire MD at [facility] ENDOSCOPY    COLONOSCOPY N/A 1/21/2019    COLONOSCOPY W/ FECAL TRANSPLANT performed by Roel Reece MD at [facility] ENDOSCOPY    COLONOSCOPY,DIAGNOSTIC  1/15/2018         COLONOSCOPY,DIAGNOSTIC  9/13/2018         COLONOSCOPY,DIAGNOSTIC  1/21/2019         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         HX COLOSTOMY  2002    HX GI      ercp    HX ROTATOR CUFF REPAIR Right 1999    AL COLONOSCOPY W/BIOPSY SINGLE/MULTIPLE  12/8/2011         AL EGD TRANSORAL BIOPSY SINGLE/MULTIPLE  2/18/2013            Allergies: Allergies   Allergen Reactions    Cafergot [Ergotamine-Caffeine] Hives       Home Medications:  Prior to Admission Medications   Prescriptions Last Dose Informant Patient Reported? Taking? HYDROcodone-acetaminophen (NORCO) 5-325 mg per tablet   No No   Sig: Take 1 Tab by mouth every eight (8) hours as needed for Pain (abdominal pain). Max Daily Amount: 3 Tabs.   halobetasol (ULTRAVATE) 0.05 % topical cream   No No   Sig: Apply  to affected area two (2) times a day. use thin layer. Apply to ulcers. Facility-Administered Medications: None       Hospital Medications:  No current facility-administered medications for this encounter.       Current Outpatient Medications   Medication Sig    HYDROcodone-acetaminophen (NORCO) 5-325 mg per tablet Take 1 Tab by mouth every eight (8) hours as needed for Pain (abdominal pain). Max Daily Amount: 3 Tabs.  halobetasol (ULTRAVATE) 0.05 % topical cream Apply  to affected area two (2) times a day. use thin layer. Apply to ulcers. Social History:  Social History     Tobacco Use    Smoking status: Never Smoker    Smokeless tobacco: Never Used   Substance Use Topics    Alcohol use: No     Alcohol/week: 0.0 oz       Family History:  Family History   Problem Relation Age of Onset    Diabetes Mother     Hypertension Mother     Arthritis-osteo Mother     Diabetes Father     Hypertension Father     Stroke Father     Arthritis-osteo Father     No Known Problems Sister     No Known Problems Brother     No Known Problems Maternal Aunt     No Known Problems Maternal Uncle     No Known Problems Paternal Aunt     No Known Problems Paternal Uncle     No Known Problems Maternal Grandmother     No Known Problems Maternal Grandfather     No Known Problems Paternal Grandmother     No Known Problems Paternal Grandfather        Review of Systems:    Constitutional: negative fever, negative chills, negative weight loss  Eyes:   negative visual changes  ENT:   negative sore throat, tongue or lip swelling  Respiratory:  negative cough, negative dyspnea  Cards:  negative for chest pain, palpitations, lower extremity edema  GI:   See HPI  :  negative for frequency, dysuria  Integument:  negative for rash and pruritus  Heme:  negative for easy bruising and gum/nose bleeding  Musculoskel: negative for myalgias, back pain and muscle weakness  Neuro: negative for headaches, dizziness, vertigo  Psych:  negative for feelings of anxiety, depression      Objective:     Patient Vitals for the past 8 hrs:   Temp Pulse Resp SpO2   04/16/19 0259 97.9 °F (36.6 °C) 95 20 97 %     No intake/output data recorded. No intake/output data recorded. PHYSICAL EXAM:  General: WD, WN. Alert, cooperative, no acute distress.    HEENT: NC, Atraumatic. PERRLA, EOMI.  Anicteric sclerae. Lungs:  CTA Bilaterally. No Wheezing/Rhonchi/Rales. Heart:  Regular rate and rhythm, No murmur, No Rubs, No Gallops  Abdomen: Soft, non-distended, mild RLQ tenderness.  +Bowel sounds, no HSM  Extremities: No c/c/e  Neurologic:  Alert and oriented X 3. No acute neurological distress. Psych:   Good insight. Not anxious nor agitated. Data Review     Recent Labs     04/16/19 0326   WBC 5.1   HGB 11.8*   HCT 36.1*   *     Recent Labs     04/16/19 0326      K 3.2*      CO2 26   BUN 9   CREA 0.85   *   CA 8.4*     Recent Labs     04/16/19 0326   SGOT 110*   *   TP 7.9   ALB 2.5*   GLOB 5.4*   LPSE 177     No results for input(s): INR, PTP, APTT in the last 72 hours. No lab exists for component: INREXT     Imaging studies reviewed      Assessment:   1. RLQ pain and nausea - likely related to IBD exacerbation, CT w/ enteritis in RLQ small bowel and small bowel of neorectum. No abscess or obstruction. 2. Diarrhea - chronic issue with increase in frequency, r/o infectious etiology including recurrent C. Diff  3. PSC cirrhosis   4. History of C. Diff - s/p fecal transplant    Patient Active Problem List   Diagnosis Code    Primary sclerosing cholangitis K83.01    Pouchitis (HCC) K91.850    Iron deficiency anemia D50.9    Pyoderma L08.0    [facility] R06.058    Biliary obstruction K83.1    RLQ abdominal pain R10.31    LFT elevation R94.5    C. difficile colitis A04.72    Abdominal pain R10.9              Plan:   -Supportive management per ER team  -Check C.  Diff - will obtain sample as outpatient  -Restart Entocort 9 mg once daily  -Okay to discharge from GI standpoint to complete the above and follow-up in office later this week  -Discussed with Dr. Kandi Middleton  -Thank you kindly for allowing us to participate in the care of this patient

## 2019-05-18 ENCOUNTER — APPOINTMENT (OUTPATIENT)
Dept: CT IMAGING | Age: 44
End: 2019-05-18
Attending: EMERGENCY MEDICINE
Payer: OTHER GOVERNMENT

## 2019-05-18 ENCOUNTER — HOSPITAL ENCOUNTER (EMERGENCY)
Age: 44
Discharge: HOME | End: 2019-05-19
Attending: EMERGENCY MEDICINE
Payer: OTHER GOVERNMENT

## 2019-05-18 DIAGNOSIS — K52.9 ENTERITIS: Primary | ICD-10-CM

## 2019-05-18 DIAGNOSIS — R10.84 ABDOMINAL PAIN, GENERALIZED: ICD-10-CM

## 2019-05-18 LAB
ALBUMIN SERPL-MCNC: 2.8 G/DL (ref 3.5–5)
ALBUMIN/GLOB SERPL: 0.5 {RATIO} (ref 1.1–2.2)
ALP SERPL-CCNC: 535 U/L (ref 45–117)
ALT SERPL-CCNC: 68 U/L (ref 12–78)
ANION GAP SERPL CALC-SCNC: 5 MMOL/L (ref 5–15)
APPEARANCE UR: CLEAR
AST SERPL-CCNC: 121 U/L (ref 15–37)
BACTERIA URNS QL MICRO: NEGATIVE /HPF
BASOPHILS # BLD: 0 K/UL (ref 0–0.1)
BASOPHILS NFR BLD: 1 % (ref 0–1)
BILIRUB SERPL-MCNC: 1.3 MG/DL (ref 0.2–1)
BILIRUB UR QL CFM: POSITIVE
BUN SERPL-MCNC: 9 MG/DL (ref 6–20)
BUN/CREAT SERPL: 10 (ref 12–20)
CALCIUM SERPL-MCNC: 8.4 MG/DL (ref 8.5–10.1)
CHLORIDE SERPL-SCNC: 103 MMOL/L (ref 97–108)
CO2 SERPL-SCNC: 30 MMOL/L (ref 21–32)
COLOR UR: ABNORMAL
CREAT SERPL-MCNC: 0.86 MG/DL (ref 0.7–1.3)
DIFFERENTIAL METHOD BLD: ABNORMAL
EOSINOPHIL # BLD: 0.3 K/UL (ref 0–0.4)
EOSINOPHIL NFR BLD: 5 % (ref 0–7)
EPITH CASTS URNS QL MICRO: ABNORMAL /LPF
ERYTHROCYTE [DISTWIDTH] IN BLOOD BY AUTOMATED COUNT: 15.5 % (ref 11.5–14.5)
ERYTHROCYTE [SEDIMENTATION RATE] IN BLOOD: 70 MM/HR (ref 0–15)
GLOBULIN SER CALC-MCNC: 6 G/DL (ref 2–4)
GLUCOSE SERPL-MCNC: 102 MG/DL (ref 65–100)
GLUCOSE UR STRIP.AUTO-MCNC: NEGATIVE MG/DL
HCT VFR BLD AUTO: 37 % (ref 36.6–50.3)
HGB BLD-MCNC: 12.3 G/DL (ref 12.1–17)
HGB UR QL STRIP: NEGATIVE
IMM GRANULOCYTES # BLD AUTO: 0 K/UL (ref 0–0.04)
IMM GRANULOCYTES NFR BLD AUTO: 0 % (ref 0–0.5)
KETONES UR QL STRIP.AUTO: ABNORMAL MG/DL
LEUKOCYTE ESTERASE UR QL STRIP.AUTO: NEGATIVE
LYMPHOCYTES # BLD: 1.9 K/UL (ref 0.8–3.5)
LYMPHOCYTES NFR BLD: 30 % (ref 12–49)
MCH RBC QN AUTO: 29.8 PG (ref 26–34)
MCHC RBC AUTO-ENTMCNC: 33.2 G/DL (ref 30–36.5)
MCV RBC AUTO: 89.6 FL (ref 80–99)
MONOCYTES # BLD: 0.6 K/UL (ref 0–1)
MONOCYTES NFR BLD: 9 % (ref 5–13)
MUCOUS THREADS URNS QL MICRO: ABNORMAL /LPF
NEUTS SEG # BLD: 3.6 K/UL (ref 1.8–8)
NEUTS SEG NFR BLD: 55 % (ref 32–75)
NITRITE UR QL STRIP.AUTO: NEGATIVE
NRBC # BLD: 0 K/UL (ref 0–0.01)
NRBC BLD-RTO: 0 PER 100 WBC
PH UR STRIP: 6 [PH] (ref 5–8)
PLATELET # BLD AUTO: 144 K/UL (ref 150–400)
PMV BLD AUTO: 12.3 FL (ref 8.9–12.9)
POTASSIUM SERPL-SCNC: 3.3 MMOL/L (ref 3.5–5.1)
PROT SERPL-MCNC: 8.8 G/DL (ref 6.4–8.2)
PROT UR STRIP-MCNC: ABNORMAL MG/DL
RBC # BLD AUTO: 4.13 M/UL (ref 4.1–5.7)
RBC #/AREA URNS HPF: ABNORMAL /HPF (ref 0–5)
SODIUM SERPL-SCNC: 138 MMOL/L (ref 136–145)
SP GR UR REFRACTOMETRY: >1.03 (ref 1–1.03)
UA: UC IF INDICATED,UAUC: ABNORMAL
UROBILINOGEN UR QL STRIP.AUTO: 2 EU/DL (ref 0.2–1)
WBC # BLD AUTO: 6.6 K/UL (ref 4.1–11.1)
WBC URNS QL MICRO: ABNORMAL /HPF (ref 0–4)

## 2019-05-18 PROCEDURE — 96374 THER/PROPH/DIAG INJ IV PUSH: CPT

## 2019-05-18 PROCEDURE — 99284 EMERGENCY DEPT VISIT MOD MDM: CPT

## 2019-05-18 PROCEDURE — 74011250636 HC RX REV CODE- 250/636: Performed by: EMERGENCY MEDICINE

## 2019-05-18 PROCEDURE — 36415 COLL VENOUS BLD VENIPUNCTURE: CPT

## 2019-05-18 PROCEDURE — 74177 CT ABD & PELVIS W/CONTRAST: CPT

## 2019-05-18 PROCEDURE — 85025 COMPLETE CBC W/AUTO DIFF WBC: CPT

## 2019-05-18 PROCEDURE — 74011636320 HC RX REV CODE- 636/320: Performed by: EMERGENCY MEDICINE

## 2019-05-18 PROCEDURE — 81001 URINALYSIS AUTO W/SCOPE: CPT

## 2019-05-18 PROCEDURE — 80053 COMPREHEN METABOLIC PANEL: CPT

## 2019-05-18 PROCEDURE — 85652 RBC SED RATE AUTOMATED: CPT

## 2019-05-18 PROCEDURE — 96375 TX/PRO/DX INJ NEW DRUG ADDON: CPT

## 2019-05-18 PROCEDURE — 86141 C-REACTIVE PROTEIN HS: CPT

## 2019-05-18 RX ORDER — MORPHINE SULFATE 2 MG/ML
6 INJECTION, SOLUTION INTRAMUSCULAR; INTRAVENOUS ONCE
Status: COMPLETED | OUTPATIENT
Start: 2019-05-18 | End: 2019-05-18

## 2019-05-18 RX ORDER — FENTANYL CITRATE 50 UG/ML
75 INJECTION, SOLUTION INTRAMUSCULAR; INTRAVENOUS
Status: COMPLETED | OUTPATIENT
Start: 2019-05-18 | End: 2019-05-18

## 2019-05-18 RX ADMIN — FENTANYL CITRATE 75 MCG: 50 INJECTION, SOLUTION INTRAMUSCULAR; INTRAVENOUS at 23:00

## 2019-05-18 RX ADMIN — IOHEXOL 50 ML: 240 INJECTION, SOLUTION INTRATHECAL; INTRAVASCULAR; INTRAVENOUS; ORAL at 23:00

## 2019-05-18 RX ADMIN — MORPHINE SULFATE 6 MG: 2 INJECTION, SOLUTION INTRAMUSCULAR; INTRAVENOUS at 23:48

## 2019-05-19 VITALS
DIASTOLIC BLOOD PRESSURE: 86 MMHG | SYSTOLIC BLOOD PRESSURE: 129 MMHG | WEIGHT: 229.5 LBS | RESPIRATION RATE: 14 BRPM | HEART RATE: 78 BPM | HEIGHT: 73 IN | BODY MASS INDEX: 30.42 KG/M2 | OXYGEN SATURATION: 98 % | TEMPERATURE: 99 F

## 2019-05-19 LAB — CRP SERPL HS-MCNC: >9.5 MG/L

## 2019-05-19 PROCEDURE — 96375 TX/PRO/DX INJ NEW DRUG ADDON: CPT

## 2019-05-19 PROCEDURE — 74011250636 HC RX REV CODE- 250/636

## 2019-05-19 PROCEDURE — 74011250636 HC RX REV CODE- 250/636: Performed by: EMERGENCY MEDICINE

## 2019-05-19 RX ORDER — MORPHINE SULFATE 2 MG/ML
4 INJECTION, SOLUTION INTRAMUSCULAR; INTRAVENOUS
Status: DISCONTINUED | OUTPATIENT
Start: 2019-05-19 | End: 2019-05-19 | Stop reason: HOSPADM

## 2019-05-19 RX ORDER — HYDROMORPHONE HYDROCHLORIDE 1 MG/ML
1 INJECTION, SOLUTION INTRAMUSCULAR; INTRAVENOUS; SUBCUTANEOUS ONCE
Status: COMPLETED | OUTPATIENT
Start: 2019-05-19 | End: 2019-05-19

## 2019-05-19 RX ORDER — ONDANSETRON 2 MG/ML
4 INJECTION INTRAMUSCULAR; INTRAVENOUS
Status: COMPLETED | OUTPATIENT
Start: 2019-05-19 | End: 2019-05-19

## 2019-05-19 RX ORDER — SODIUM CHLORIDE 0.9 % (FLUSH) 0.9 %
10 SYRINGE (ML) INJECTION
Status: COMPLETED | OUTPATIENT
Start: 2019-05-19 | End: 2019-05-19

## 2019-05-19 RX ORDER — METRONIDAZOLE 500 MG/1
500 TABLET ORAL 2 TIMES DAILY
Qty: 14 TAB | Refills: 0 | Status: SHIPPED | OUTPATIENT
Start: 2019-05-19 | End: 2019-05-26

## 2019-05-19 RX ORDER — CIPROFLOXACIN 500 MG/1
500 TABLET ORAL 2 TIMES DAILY
Qty: 14 TAB | Refills: 0 | Status: SHIPPED | OUTPATIENT
Start: 2019-05-19 | End: 2019-05-26

## 2019-05-19 RX ORDER — ONDANSETRON 2 MG/ML
INJECTION INTRAMUSCULAR; INTRAVENOUS
Status: COMPLETED
Start: 2019-05-19 | End: 2019-05-19

## 2019-05-19 RX ORDER — ONDANSETRON 8 MG/1
8 TABLET, ORALLY DISINTEGRATING ORAL
Qty: 12 TAB | Refills: 0 | Status: SHIPPED | OUTPATIENT
Start: 2019-05-19 | End: 2019-06-12

## 2019-05-19 RX ORDER — OXYCODONE HYDROCHLORIDE 5 MG/1
5 TABLET ORAL
Qty: 8 TAB | Refills: 0 | Status: SHIPPED | OUTPATIENT
Start: 2019-05-19 | End: 2019-05-22

## 2019-05-19 RX ORDER — MORPHINE SULFATE 4 MG/ML
INJECTION INTRAVENOUS
Status: COMPLETED
Start: 2019-05-19 | End: 2019-05-19

## 2019-05-19 RX ADMIN — Medication 10 ML: at 01:56

## 2019-05-19 RX ADMIN — ONDANSETRON 4 MG: 2 INJECTION INTRAMUSCULAR; INTRAVENOUS at 01:56

## 2019-05-19 RX ADMIN — HYDROMORPHONE HYDROCHLORIDE 1 MG: 1 INJECTION, SOLUTION INTRAMUSCULAR; INTRAVENOUS; SUBCUTANEOUS at 00:40

## 2019-05-19 RX ADMIN — MORPHINE SULFATE 4 MG: 4 INJECTION INTRAVENOUS at 01:56

## 2019-05-19 NOTE — ED PROVIDER NOTES
EMERGENCY DEPARTMENT HISTORY AND PHYSICAL EXAM 
 
 
Date: 5/18/2019 Patient Name: Dmitriy Stewart Please note that this dictation was completed with [employer], the computer voice recognition software. Quite often unanticipated grammatical, syntax, homophones, and other interpretive errors are inadvertently transcribed by the computer software. Please disregard these errors. Please excuse any errors that have escaped final proofreading. History of Presenting Illness Chief Complaint Patient presents with  Abdominal Pain  
  reports crohns flare x1 week with concern for bowel obstruction History Provided By: Patient HPI: Dmitriy Stewart, 37 y.o. male, with past medical history significant for Crohn's disease, primary sclerosing cholangitis, chronic pouchitis, C. difficile presented the emergency department complaining of abdominal pain across the bilateral lower quadrants for the last week. He was seen by Dr. Yadira Gaviria on Friday and a CAT scan was ordered. He feels as though he cannot wait for the scan. Rates his pain is moderate to severe. He admits to loose watery stools. States his stools do not feel like prior C. difficile, do not smell like prior C. difficile. He denies any fever. Admits to nausea but no vomiting. He is not taking anything for the pain. PCP: Stephen Naidu MD 
 
No current facility-administered medications on file prior to encounter. Current Outpatient Medications on File Prior to Encounter Medication Sig Dispense Refill  
 HYDROcodone-acetaminophen (NORCO) 5-325 mg per tablet Take 1 Tab by mouth every eight (8) hours as needed for Pain (abdominal pain). Max Daily Amount: 3 Tabs. 10 Tab 0  
 halobetasol (ULTRAVATE) 0.05 % topical cream Apply  to affected area two (2) times a day. use thin layer. Apply to ulcers. 15 g 0 Past History Past Medical History: 
Past Medical History:  
Diagnosis Date  Arthritis   
 all joints and spine  C. difficile colitis 1/21/2019  Chest pain 02/18/2013  
 as of 10/14/14 pt denies any CP since 2/13  Crohn's colitis ([facility] [address] Dr. Nasreen Alex  H/O Clostridium difficile infection 01/2017  
 as of 3/30/17 pt denies abd pain, diarrhea > 1 week  Iron deficiency anemia 2/18/2013  Kidney infection 2018  Liver disease  Pancreatitis  Polyarteritis nodosa ([address]  Pouchitis ([address] 12/08/2011  PSC (primary sclerosing cholangitis) Dr Raines Gave [phone]  Pyoderma gangrenosum Past Surgical History: 
Past Surgical History:  
Procedure Laterality Date  ABDOMEN SURGERY PROC UNLISTED  2002  
 colostomy reversal & J Pouch  COLONOSCOPY N/A 4/4/2017 COLONOSCOPY performed by Min Evangelista MD at [place] [address] COLONOSCOPY N/A 1/15/2018 COLONOSCOPY performed by Min Evangelista MD at [facility] ENDOSCOPY  COLONOSCOPY N/A 9/13/2018 COLONOSCOPY performed by Min Evangelista MD at [facility] ENDOSCOPY  COLONOSCOPY N/A 1/21/2019 COLONOSCOPY W/ FECAL TRANSPLANT performed by Amol Farley MD at [facility] ENDOSCOPY  COLONOSCOPY,DIAGNOSTIC  1/15/2018  COLONOSCOPY,DIAGNOSTIC  9/13/2018  COLONOSCOPY,DIAGNOSTIC  1/21/2019  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  HX COLOSTOMY  2002  HX GI    
 ercp [facility] Right 1999  [place] COLONOSCOPY W/BIOPSY SINGLE/MULTIPLE  12/8/2011  [place] EGD TRANSORAL BIOPSY SINGLE/MULTIPLE  2/18/2013 Family History: 
Family History Problem Relation Age of Onset  Diabetes Mother  Hypertension Mother [address] [place] [address] Fredy Arthritis-osteo Mother  Diabetes Father  Hypertension Father  Stroke Father  Arthritis-osteo Father  No Known Problems Sister  No Known Problems Brother  No Known Problems Maternal Aunt  No Known Problems Maternal Uncle  No Known Problems Paternal Aunt  No Known Problems Paternal Uncle  No Known Problems Maternal Grandmother  No Known Problems Maternal Grandfather  No Known Problems Paternal Grandmother  No Known Problems Paternal Grandfather Social History: 
Social History Tobacco Use  Smoking status: Never Smoker  Smokeless tobacco: Never Used Substance Use Topics  Alcohol use: No  
  Alcohol/week: 0.0 oz  Drug use: No  
 
 
Allergies: Allergies Allergen Reactions  Cafergot [Ergotamine-Caffeine] Hives Review of Systems Review of Systems Constitutional: Negative for chills and fever. HENT: Negative for congestion and sore throat. Eyes: Negative for visual disturbance. Respiratory: Negative for cough and shortness of breath. Cardiovascular: Negative for chest pain and leg swelling. Gastrointestinal: Positive for abdominal pain, diarrhea and nausea. Negative for blood in stool. Endocrine: Negative for polyuria. Genitourinary: Negative for dysuria and testicular pain. Musculoskeletal: Negative for arthralgias, joint swelling and myalgias. Skin: Negative for rash. Allergic/Immunologic: Negative for immunocompromised state. Neurological: Negative for weakness and headaches. Hematological: Does not bruise/bleed easily. Psychiatric/Behavioral: Negative for confusion. Physical Exam  
Physical Exam  
Constitutional: oriented to person, place, and time. appears well-developed and well-nourished. HENT:  
Head: Normocephalic and atraumatic. Moist mucous membranes Eyes: Pupils are equal, round, and reactive to light. Conjunctivae are normal. Right eye exhibits no discharge. Left eye exhibits no discharge. Neck: Normal range of motion. Neck supple. No tracheal deviation present. Cardiovascular: Normal rate, regular rhythm and normal heart sounds. No murmur heard. Pulmonary/Chest: Effort normal and breath sounds normal. No respiratory distress. no wheezes. no rales. Abdominal: surgical scars on the abdomen, ttp lower abdomen. No guarding or rebound Musculoskeletal: Normal range of motion. exhibits no edema, tenderness or deformity. Neurological: alert and oriented to person, place, and time. Skin: Skin is warm and dry. No rash noted. No erythema. Psychiatric: behavior is normal.  
Nursing note and vitals reviewed. Diagnostic Study Results Labs - Recent Results (from the past 12 hour(s)) URINALYSIS W/ REFLEX CULTURE Collection Time: 05/18/19  9:46 PM  
Result Value Ref Range Color DARK YELLOW Appearance CLEAR CLEAR Specific gravity >1.030 (H) 1.003 - 1.030  
 pH (UA) 6.0 5.0 - 8.0 Protein TRACE (A) NEG mg/dL Glucose NEGATIVE  NEG mg/dL Ketone TRACE (A) NEG mg/dL Blood NEGATIVE  NEG Urobilinogen 2.0 (H) 0.2 - 1.0 EU/dL Nitrites NEGATIVE  NEG Leukocyte Esterase NEGATIVE  NEG    
 WBC 0-4 0 - 4 /hpf  
 RBC 0-5 0 - 5 /hpf Epithelial cells FEW FEW /lpf Bacteria NEGATIVE  NEG /hpf  
 UA:UC IF INDICATED CULTURE NOT INDICATED BY UA RESULT CNI Mucus TRACE (A) NEG /lpf  
BILIRUBIN, CONFIRM Collection Time: 05/18/19  9:46 PM  
Result Value Ref Range Bilirubin UA, confirm POSITIVE (A) NEG    
CBC WITH AUTOMATED DIFF Collection Time: 05/18/19  9:47 PM  
Result Value Ref Range WBC 6.6 4.1 - 11.1 K/uL  
 RBC 4.13 4.10 - 5.70 M/uL  
 HGB 12.3 12.1 - 17.0 g/dL HCT 37.0 36.6 - 50.3 % MCV 89.6 80.0 - 99.0 FL  
 MCH 29.8 26.0 - 34.0 PG  
 MCHC 33.2 30.0 - 36.5 g/dL  
 RDW 15.5 (H) 11.5 - 14.5 % PLATELET 430 (L) 960 - 400 K/uL MPV 12.3 8.9 - 12.9 FL  
 NRBC 0.0 0  WBC ABSOLUTE NRBC 0.00 0.00 - 0.01 K/uL NEUTROPHILS 55 32 - 75 % LYMPHOCYTES 30 12 - 49 % MONOCYTES 9 5 - 13 % EOSINOPHILS 5 0 - 7 % BASOPHILS 1 0 - 1 % IMMATURE GRANULOCYTES 0 0.0 - 0.5 % ABS. NEUTROPHILS 3.6 1.8 - 8.0 K/UL  
 ABS. LYMPHOCYTES 1.9 0.8 - 3.5 K/UL  
 ABS. MONOCYTES 0.6 0.0 - 1.0 K/UL  
 ABS. EOSINOPHILS 0.3 0.0 - 0.4 K/UL  
 ABS. BASOPHILS 0.0 0.0 - 0.1 K/UL ABS. IMM. GRANS. 0.0 0.00 - 0.04 K/UL  
 DF AUTOMATED METABOLIC PANEL, COMPREHENSIVE Collection Time: 05/18/19  9:47 PM  
Result Value Ref Range Sodium 138 136 - 145 mmol/L Potassium 3.3 (L) 3.5 - 5.1 mmol/L Chloride 103 97 - 108 mmol/L  
 CO2 30 21 - 32 mmol/L Anion gap 5 5 - 15 mmol/L Glucose 102 (H) 65 - 100 mg/dL BUN 9 6 - 20 MG/DL Creatinine 0.86 0.70 - 1.30 MG/DL  
 BUN/Creatinine ratio 10 (L) 12 - 20 GFR est AA >60 >60 ml/min/1.73m2 GFR est non-AA >60 >60 ml/min/1.73m2 Calcium 8.4 (L) 8.5 - 10.1 MG/DL Bilirubin, total 1.3 (H) 0.2 - 1.0 MG/DL  
 ALT (SGPT) 68 12 - 78 U/L  
 AST (SGOT) 121 (H) 15 - 37 U/L Alk. phosphatase 535 (H) 45 - 117 U/L Protein, total 8.8 (H) 6.4 - 8.2 g/dL Albumin 2.8 (L) 3.5 - 5.0 g/dL Globulin 6.0 (H) 2.0 - 4.0 g/dL A-G Ratio 0.5 (L) 1.1 - 2.2 SED RATE (ESR) Collection Time: 05/18/19 10:14 PM  
Result Value Ref Range Sed rate, automated 70 (H) 0 - 15 mm/hr CRP, HIGH SENSITIVITY Collection Time: 05/18/19 10:14 PM  
Result Value Ref Range CRP, High sensitivity >9.5 mg/L Radiologic Studies -  
CT ABD PELV W CONT Final Result IMPRESSION:   
Mild acute enteritis in the right lower quadrant adjacent to anastomotic suture  
line. Herb Shillings Splenomegaly and cirrhotic change. CT Results  (Last 48 hours) 05/19/19 0046  CT ABD PELV W CONT Final result Impression:  IMPRESSION:   
Mild acute enteritis in the right lower quadrant adjacent to anastomotic suture  
line. Herb Shillings Splenomegaly and cirrhotic change. Narrative:  EXAM: CT ABD PELV W CONT Clinical history: Crohn's disease, abdominal pain INDICATION: Abdominal pain COMPARISON: 4/16/2019 CONTRAST: 100 mL of Isovue-370. TECHNIQUE:   
Following the uneventful intravenous administration of contrast, thin axial  
images were obtained through the abdomen and pelvis.  Coronal and sagittal  
 reconstructions were generated. Oral contrast was not administered. CT dose  
reduction was achieved through use of a standardized protocol tailored for this  
examination and automatic exposure control for dose modulation. FINDINGS:   
LEXIE BASES: Minimal atelectasis. INCIDENTALLY IMAGED HEART AND MEDIASTINUM: Unremarkable. LIVER: Nodular contour. Ill-defined hypodensities are stable. No measurable  
mass. GALLBLADDER: Hydropic. CBD is not dilated. SPLEEN: Splenomegaly. PANCREAS: No mass or ductal dilatation. ADRENALS: Unremarkable. KIDNEYS: Bilateral renal scarring with scattered mild hypodensities in the right  
and left renal parenchyma. No solid renal mass or hydronephrosis. STOMACH: Unremarkable. SMALL BOWEL: Medial rectum. . No bowel obstruction. Mild mural thickening of small bowel loops in the right lower quadrant. Anastomotic suture line appears to be intact. No obstruction. [place] COLON: Complete colectomy. APPENDIX: Appendectomy. PERITONEUM: No ascites or pneumoperitoneum. RETROPERITONEUM: No lymphadenopathy or aortic aneurysm. REPRODUCTIVE ORGANS: Prostate gland is normal.   
URINARY BLADDER: No mass or calculus. BONES: Mild bilateral hip osteoarthritis. No sacroiliitis. ADDITIONAL COMMENTS: N/A   
   
  
  
 
CXR Results  (Last 48 hours) None Medical Decision Making I am the first provider for this patient. I reviewed the vital signs, available nursing notes, past medical history, past surgical history, family history and social history. Vital Signs-Reviewed the patient's vital signs. Patient Vitals for the past 12 hrs: 
 Temp Pulse Resp BP SpO2  
05/19/19 0130 99 °F (37.2 °C) 78 14 129/86 98 % 05/19/19 0115    120/80 98 % 05/19/19 0100    107/66 98 % 05/19/19 0055    118/89   
05/19/19 0030    140/80 97 % 05/19/19 0015    132/84 98 % 05/19/19 0001    120/86 95 % 05/18/19 2346    132/70  05/18/19 2142 98.5 °F (36.9 °C) 85 16 124/82 98 % Pulse Oximetry Analysis -98% on room air Cardiac Monitor:  
78 normal sinus rhythm Records Reviewed: Nursing Notes and Old Medical Records Provider Notes (Medical Decision Making):  
78-year-old male with Crohn's and pouchitis here with abdominal pain. He is nontoxic, but uncomfortable. We will treat his symptoms and obtain CT abdomen pelvis. Differential includes abscess, Crohn's flare, other acute intra-abdominal infection less likely based off the history and physical. 
 
ED Course:  
Initial assessment performed. The patients presenting problems have been discussed, and they are in agreement with the care plan formulated and outlined with them. I have encouraged them to ask questions as they arise throughout their visit. Patient feeling better. Discussed results of imaging. Will give short course of opiate, will give some biotics, he is not sure if he wants to fill them at this time. I do not feel that he has to, but but they may be of some benefit given the enteritis. We will follow-up with GI. Critical Care Time:  
None Disposition: 
DISCHARGE NOTE Patients results have been reviewed with them. Patient and/or family have verbally conveyed their understanding and agreement of the patient's signs, symptoms, diagnosis, treatment and prognosis and additionally agree to follow up as recommended or return to the Emergency Room should their condition change or have any new concerns prior to their follow-up appointment. Patient verbally agrees with the care-plan and verbally conveys that all of their questions have been answered. Discharge instructions have also been provided to the patient with some educational information regarding their diagnosis as well a list of reasons why they would want to return to the ER prior to their follow-up appointment should their condition change. PLAN: 
1. Discharge Medication List as of 5/19/2019  2:36 AM  
  
START taking these medications Details  
oxyCODONE IR (ROXICODONE) 5 mg immediate release tablet Take 1 Tab by mouth every four (4) hours as needed for Pain for up to 3 days. Max Daily Amount: 30 mg., Print, Disp-8 Tab, R-0  
  
ondansetron (ZOFRAN ODT) 8 mg disintegrating tablet Take 1 Tab by mouth every eight (8) hours as needed for Nausea. , Print, Disp-12 Tab, R-0  
  
ciprofloxacin HCl (CIPRO) 500 mg tablet Take 1 Tab by mouth two (2) times a day for 7 days. , Print, Disp-14 Tab, R-0  
  
metroNIDAZOLE (FLAGYL) 500 mg tablet Take 1 Tab by mouth two (2) times a day for 7 days. , Print, Disp-14 Tab, R-0  
  
  
CONTINUE these medications which have NOT CHANGED Details HYDROcodone-acetaminophen (NORCO) 5-325 mg per tablet Take 1 Tab by mouth every eight (8) hours as needed for Pain (abdominal pain). Max Daily Amount: 3 Tabs., Print, Disp-10 Tab, R-0  
  
halobetasol (ULTRAVATE) 0.05 % topical cream Apply  to affected area two (2) times a day. use thin layer. Apply to ulcers. , Print, Disp-15 g, R-0  
  
  
 
2. Follow-up Information Follow up With Specialties Details Why Contact Info Jose Gupta MD Gastroenterology Schedule an appointment as soon as possible for a visit  [facility] [address] [facility] 
[phone] [facility] EMERGENCY DEPT Emergency Medicine  If symptoms worsen [address] [place] [address] [address] [facility] 
[phone] Return to ED if worse Diagnosis Clinical Impression: 1. Enteritis 2. Abdominal pain, generalized Attestations:  
This note was completed by Hetal Bustillo DO

## 2019-05-19 NOTE — DISCHARGE INSTRUCTIONS
Patient Education     Colitis: Care Instructions  Your Care Instructions  Colitis is the medical term for swelling (inflammation) of the intestine. It can be caused by different things, such as an infection or loss of blood flow in the intestine. Other causes are problems like Crohn's disease or ulcerative colitis. Symptoms may include fever, diarrhea that may be bloody, or belly pain. Sometimes symptoms go away without treatment. But you may need treatment or more tests, such as blood tests or a stool test. Or you may need imaging tests like a CT scan or a colonoscopy. In some cases, the doctor may want to test a sample of tissue from the intestine. This test is called a biopsy. The doctor has checked you carefully, but problems can develop later. If you notice any problems or new symptoms, get medical treatment right away. Follow-up care is a key part of your treatment and safety. Be sure to make and go to all appointments, and call your doctor if you are having problems. It's also a good idea to know your test results and keep a list of the medicines you take. How can you care for yourself at home? · Rest until you feel better. · Your doctor may recommend that you eat bland foods. These include rice, dry toast or crackers, bananas, and applesauce. · To prevent dehydration, drink plenty of fluids. Choose water and other caffeine-free clear liquids until you feel better. If you have kidney, heart, or liver disease and have to limit fluids, talk with your doctor before you increase the amount of fluids you drink. · Be safe with medicines. Take your medicines exactly as prescribed. Call your doctor if you think you are having a problem with your medicine. You will get more details on the specific medicines your doctor prescribes. When should you call for help? Call 911 anytime you think you may need emergency care. For example, call if:  · You passed out (lost consciousness).   · You vomit blood or what looks like coffee grounds. · Your stools are maroon or very bloody. Call your doctor now or seek immediate medical care if:  · You have new or worse pain. · You have a new or higher fever. · You have new or worse symptoms. · You cannot keep fluids or medicines down. Watch closely for changes in your health, and be sure to contact your doctor if:  · You do not get better as expected. Where can you learn more? Go to [employer]  Enter [id number] in the search box to learn more about \"Colitis: Care Instructions. \"   © 5575-2137 [employer], Incorporated. Care instructions adapted under license by [facility] (which disclaims liability or warranty for this information). This care instruction is for use with your licensed healthcare professional. If you have questions about a medical condition or this instruction, always ask your healthcare professional. [address] any warranty or liability for your use of this information. Content Version: 08.3.877888; Current as of: November 20, 2015           Patient Education        Abdominal Pain: Care Instructions  Your Care Instructions    Abdominal pain has many possible causes. Some aren't serious and get better on their own in a few days. Others need more testing and treatment. If your pain continues or gets worse, you need to be rechecked and may need more tests to find out what is wrong. You may need surgery to correct the problem. Don't ignore new symptoms, such as fever, nausea and vomiting, urination problems, pain that gets worse, and dizziness. These may be signs of a more serious problem. Your doctor may have recommended a follow-up visit in the next 8 to 12 hours. If you are not getting better, you may need more tests or treatment. The doctor has checked you carefully, but problems can develop later. If you notice any problems or new symptoms, get medical treatment right away.   Follow-up care is a key part of your treatment and safety. Be sure to make and go to all appointments, and call your doctor if you are having problems. It's also a good idea to know your test results and keep a list of the medicines you take. How can you care for yourself at home? · Rest until you feel better. · To prevent dehydration, drink plenty of fluids, enough so that your urine is light yellow or clear like water. Choose water and other caffeine-free clear liquids until you feel better. If you have kidney, heart, or liver disease and have to limit fluids, talk with your doctor before you increase the amount of fluids you drink. · If your stomach is upset, eat mild foods, such as rice, dry toast or crackers, bananas, and applesauce. Try eating several small meals instead of two or three large ones. · Wait until 48 hours after all symptoms have gone away before you have spicy foods, alcohol, and drinks that contain caffeine. · Do not eat foods that are high in fat. · Avoid anti-inflammatory medicines such as aspirin, ibuprofen (Advil, Motrin), and naproxen (Aleve). These can cause stomach upset. Talk to your doctor if you take daily aspirin for another health problem. When should you call for help? Call 911 anytime you think you may need emergency care. For example, call if:    · You passed out (lost consciousness).     · You pass maroon or very bloody stools.     · You vomit blood or what looks like coffee grounds.     · You have new, severe belly pain.    Call your doctor now or seek immediate medical care if:    · Your pain gets worse, especially if it becomes focused in one area of your belly.     · You have a new or higher fever.     · Your stools are black and look like tar, or they have streaks of blood.     · You have unexpected vaginal bleeding.     · You have symptoms of a urinary tract infection. These may include:  ? Pain when you urinate. ? Urinating more often than usual.  ?  Blood in your urine.     · You are dizzy or lightheaded, or you feel like you may faint.    Watch closely for changes in your health, and be sure to contact your doctor if:    · You are not getting better after 1 day (24 hours). Where can you learn more? Go to http://oscar-wilfrdio.info/. Enter X578 in the search box to learn more about \"Abdominal Pain: Care Instructions. \"  Current as of: September 23, 2018  Content Version: 11.9  © 8377-2363 [employer]. Care instructions adapted under license by [employer] (which disclaims liability or warranty for this information). If you have questions about a medical condition or this instruction, always ask your healthcare professional. [address] any warranty or liability for your use of this information.

## 2019-06-13 ENCOUNTER — ANESTHESIA (OUTPATIENT)
Dept: ENDOSCOPY | Age: 44
End: 2019-06-13
Payer: OTHER GOVERNMENT

## 2019-06-13 ENCOUNTER — HOSPITAL ENCOUNTER (OUTPATIENT)
Age: 44
Setting detail: OUTPATIENT SURGERY
Discharge: HOME | End: 2019-06-13
Attending: SPECIALIST | Admitting: SPECIALIST
Payer: OTHER GOVERNMENT

## 2019-06-13 ENCOUNTER — ANESTHESIA EVENT (OUTPATIENT)
Dept: ENDOSCOPY | Age: 44
End: 2019-06-13
Payer: OTHER GOVERNMENT

## 2019-06-13 VITALS
DIASTOLIC BLOOD PRESSURE: 70 MMHG | HEART RATE: 60 BPM | HEIGHT: 73 IN | TEMPERATURE: 98.2 F | WEIGHT: 230.13 LBS | RESPIRATION RATE: 16 BRPM | OXYGEN SATURATION: 97 % | BODY MASS INDEX: 30.5 KG/M2 | SYSTOLIC BLOOD PRESSURE: 108 MMHG

## 2019-06-13 PROCEDURE — 76040000019: Performed by: SPECIALIST

## 2019-06-13 PROCEDURE — 88305 TISSUE EXAM BY PATHOLOGIST: CPT

## 2019-06-13 PROCEDURE — 74011250636 HC RX REV CODE- 250/636: Performed by: SPECIALIST

## 2019-06-13 PROCEDURE — 76060000031 HC ANESTHESIA FIRST 0.5 HR: Performed by: SPECIALIST

## 2019-06-13 PROCEDURE — 77030021593 HC FCPS BIOP ENDOSC BSC -A: Performed by: SPECIALIST

## 2019-06-13 PROCEDURE — 74011250636 HC RX REV CODE- 250/636

## 2019-06-13 RX ORDER — PROPOFOL 10 MG/ML
INJECTION, EMULSION INTRAVENOUS AS NEEDED
Status: DISCONTINUED | OUTPATIENT
Start: 2019-06-13 | End: 2019-06-13 | Stop reason: HOSPADM

## 2019-06-13 RX ORDER — MIDAZOLAM HYDROCHLORIDE 1 MG/ML
.25-5 INJECTION, SOLUTION INTRAMUSCULAR; INTRAVENOUS
Status: DISCONTINUED | OUTPATIENT
Start: 2019-06-13 | End: 2019-06-13

## 2019-06-13 RX ORDER — NALOXONE HYDROCHLORIDE 0.4 MG/ML
0.4 INJECTION, SOLUTION INTRAMUSCULAR; INTRAVENOUS; SUBCUTANEOUS
Status: DISCONTINUED | OUTPATIENT
Start: 2019-06-13 | End: 2019-06-13 | Stop reason: HOSPADM

## 2019-06-13 RX ORDER — DEXTROMETHORPHAN/PSEUDOEPHED 2.5-7.5/.8
1.2 DROPS ORAL
Status: DISCONTINUED | OUTPATIENT
Start: 2019-06-13 | End: 2019-06-13 | Stop reason: HOSPADM

## 2019-06-13 RX ORDER — FENTANYL CITRATE 50 UG/ML
50 INJECTION, SOLUTION INTRAMUSCULAR; INTRAVENOUS
Status: DISCONTINUED | OUTPATIENT
Start: 2019-06-13 | End: 2019-06-13 | Stop reason: HOSPADM

## 2019-06-13 RX ORDER — LIDOCAINE HYDROCHLORIDE 20 MG/ML
INJECTION, SOLUTION EPIDURAL; INFILTRATION; INTRACAUDAL; PERINEURAL AS NEEDED
Status: DISCONTINUED | OUTPATIENT
Start: 2019-06-13 | End: 2019-06-13 | Stop reason: HOSPADM

## 2019-06-13 RX ORDER — FLUMAZENIL 0.1 MG/ML
0.2 INJECTION INTRAVENOUS
Status: DISCONTINUED | OUTPATIENT
Start: 2019-06-13 | End: 2019-06-13

## 2019-06-13 RX ORDER — EPINEPHRINE 0.1 MG/ML
1 INJECTION INTRACARDIAC; INTRAVENOUS
Status: DISCONTINUED | OUTPATIENT
Start: 2019-06-13 | End: 2019-06-13 | Stop reason: HOSPADM

## 2019-06-13 RX ORDER — SODIUM CHLORIDE 0.9 % (FLUSH) 0.9 %
5-40 SYRINGE (ML) INJECTION EVERY 8 HOURS
Status: DISCONTINUED | OUTPATIENT
Start: 2019-06-13 | End: 2019-06-13 | Stop reason: HOSPADM

## 2019-06-13 RX ORDER — SODIUM CHLORIDE 9 MG/ML
75 INJECTION, SOLUTION INTRAVENOUS CONTINUOUS
Status: DISCONTINUED | OUTPATIENT
Start: 2019-06-13 | End: 2019-06-13 | Stop reason: HOSPADM

## 2019-06-13 RX ORDER — SODIUM CHLORIDE 0.9 % (FLUSH) 0.9 %
5-40 SYRINGE (ML) INJECTION EVERY 8 HOURS
Status: DISCONTINUED | OUTPATIENT
Start: 2019-06-13 | End: 2019-06-13

## 2019-06-13 RX ORDER — FLUMAZENIL 0.1 MG/ML
0.2 INJECTION INTRAVENOUS
Status: DISCONTINUED | OUTPATIENT
Start: 2019-06-13 | End: 2019-06-13 | Stop reason: HOSPADM

## 2019-06-13 RX ORDER — MIDAZOLAM HYDROCHLORIDE 1 MG/ML
.25-5 INJECTION, SOLUTION INTRAMUSCULAR; INTRAVENOUS
Status: DISCONTINUED | OUTPATIENT
Start: 2019-06-13 | End: 2019-06-13 | Stop reason: HOSPADM

## 2019-06-13 RX ORDER — MESALAMINE 1000 MG/1
1000 SUPPOSITORY RECTAL
Qty: 30 SUPPOSITORY | Refills: 5 | Status: SHIPPED | OUTPATIENT
Start: 2019-06-13 | End: 2021-09-02

## 2019-06-13 RX ORDER — SODIUM CHLORIDE 0.9 % (FLUSH) 0.9 %
5-40 SYRINGE (ML) INJECTION AS NEEDED
Status: DISCONTINUED | OUTPATIENT
Start: 2019-06-13 | End: 2019-06-13 | Stop reason: HOSPADM

## 2019-06-13 RX ORDER — SODIUM CHLORIDE 0.9 % (FLUSH) 0.9 %
5-40 SYRINGE (ML) INJECTION AS NEEDED
Status: DISCONTINUED | OUTPATIENT
Start: 2019-06-13 | End: 2019-06-13

## 2019-06-13 RX ORDER — ATROPINE SULFATE 0.1 MG/ML
0.5 INJECTION INTRAVENOUS
Status: DISCONTINUED | OUTPATIENT
Start: 2019-06-13 | End: 2019-06-13 | Stop reason: HOSPADM

## 2019-06-13 RX ADMIN — PROPOFOL 50 MG: 10 INJECTION, EMULSION INTRAVENOUS at 11:43

## 2019-06-13 RX ADMIN — LIDOCAINE HYDROCHLORIDE 60 MG: 20 INJECTION, SOLUTION EPIDURAL; INFILTRATION; INTRACAUDAL; PERINEURAL at 11:39

## 2019-06-13 RX ADMIN — PROPOFOL 70 MG: 10 INJECTION, EMULSION INTRAVENOUS at 11:39

## 2019-06-13 RX ADMIN — SODIUM CHLORIDE 75 ML/HR: 900 INJECTION, SOLUTION INTRAVENOUS at 10:57

## 2019-06-13 RX ADMIN — PROPOFOL 50 MG: 10 INJECTION, EMULSION INTRAVENOUS at 11:41

## 2019-06-13 NOTE — PROCEDURES
Colonoscopy    Indications: recent pouchitis for follow up    Pre-operative Diagnosis: see above    Assistant(s):  see chart   [address] RN      Medications:  See anesthesia form    Post-operative Diagnosis:  * No post-op diagnosis entered *            Procedure Details   Prior to the procedure its objectives, risks, consequences and alternatives were discussed with the patient who then elected to proceed. All questions were answered. Digital Rectal Exam:  was normal     The Olympus videocolonoscope was inserted in the rectum and advanced to the ileum above the pouch. I took photographs. The ileum and the ileal pouch look normal.  The colonoscope was slowly and carefully withdrawn as the mucosa was inspected. I took biopsies of the ileum and the pouch. Retroflexion in the pouch  was negtive. Upon removal of the scope there was a 2 cm segment of erythema and friabiltiy c/w rectal cuff proctitis. I took a single biopsy. Photos to document the ileocecal valve, appendiceal orifice and retroflexion exam were obtained. The preparation was adequaet      Estimated Blood Loss:  none    Specimens:  Ileum  Ileal pouch  Rectum, distal    Findings:  Inflammation of the rectum distal to anastomosis  Normal pouch and ileum    Complications:  none    Implants:  none    Repeat colonoscopy is recommended in:  Two years or as symptoms warrant.                  Laurel Garcia MD  11:47 AM  6/13/2019

## 2019-06-13 NOTE — DISCHARGE INSTRUCTIONS
Giancarlo Dennison  [id number]  1975              Procedure  Discharge Instructions:      Discomfort:  Redness at IV site- apply warm compress to area; if redness or soreness persist- contact your physician  There may be a slight amount of blood passed from the rectum  Gaseous discomfort- walking, belching will help relieve any discomfort  You may not operate a vehicle for 12 hours  You may not engage in an occupation involving machinery or appliances for rest of today  You may not drink alcoholic beverages for at least 12 hours  Avoid making any critical decisions for at least 24 hour  DIET:   You may resume your normal diet today. You should not overeat or \"feast\" today as your abdomen may become distended or uncomfortable. MEDICATIONS:   I reconciled this list from the list you gave us when you came today for the procedure. Please clarify with me, your primary care physician and the nurse who is discharging you if we have any discrepancies. Aspirin and or non-steroidal medication (Ibuprofen, Motrin, naproxen, etc.) is ok in limited quantities. ACTIVITY:  You may resume your normal daily activities it is recommended that you spend the remainder of the day resting -  avoid any strenuous activity. CALL M.D. ANY SIGN OF:  Increasing pain, nausea, vomiting  Abdominal distension (swelling)  New increased bleeding (oral or rectal)  Fever (chills)        Follow-up Instructions:   Call Dr. Tolu Frank for the results of  biopsy in approximately one week  Telephone #  [phone]  Follow up visit as previously scheduled.       Cait Hilliard MD  11:51 AM  6/13/2019

## 2019-06-13 NOTE — ANESTHESIA POSTPROCEDURE EVALUATION
Procedure(s):  COLONOSCOPY  COLON BIOPSY. total IV anesthesia, general    Anesthesia Post Evaluation        Patient location during evaluation: PACU  Note status: Adequate. Level of consciousness: responsive to verbal stimuli and sleepy but conscious  Pain management: satisfactory to patient  Airway patency: patent  Anesthetic complications: no  Cardiovascular status: acceptable  Respiratory status: acceptable  Hydration status: acceptable  Comments: +Post-Anesthesia Evaluation and Assessment    Patient: Juliet Slaughter MRN: [id number]  SSN: [id number]   YOB: 1975  Age: 37 y.o. Sex: male      Cardiovascular Function/Vital Signs    BP 91/71   Pulse 73   Temp 36.7 °C (98 °F)   Resp 12   Ht 6' 1\" (1.854 m)   Wt 104.4 kg (230 lb 2 oz)   SpO2 99%   BMI 30.36 kg/m²     Patient is status post Procedure(s):  COLONOSCOPY  COLON BIOPSY. Nausea/Vomiting: Controlled. Postoperative hydration reviewed and adequate. Pain:  Pain Scale 1: Numeric (0 - 10) (06/13/19 1054)  Pain Intensity 1: 0 (06/13/19 1054)   Managed. Neurological Status: At baseline. Mental Status and Level of Consciousness: Arousable. Pulmonary Status:   O2 Device: CO2 nasal cannula (06/13/19 1149)   Adequate oxygenation and airway patent. Complications related to anesthesia: None    Post-anesthesia assessment completed. No concerns. Signed By: Mehreen Barry MD    6/13/2019  Post anesthesia nausea and vomiting:  controlled      No vitals data found for the desired time range.

## 2019-06-13 NOTE — PERIOP NOTES
Anesthesia reports 170mg Propofol, 60mg Lidocaine and 500mL NS given during procedure. Received report from anesthesia staff on vital signs and status of patient.

## 2019-06-13 NOTE — H&P
Pre-endoscopy H and P    The patient was seen and examined in the endoscopy suite. The airway was assessed and docuemented. The problem list, past medical history, and medications were reviewed.      Patient Active Problem List   Diagnosis Code    Primary sclerosing cholangitis K83.01    Pouchitis (HCC) K91.850    Iron deficiency anemia D50.9    Pyoderma L08.0    Crohn's disease with complication ([facility] [address] F69.086    Biliary obstruction K83.1    RLQ abdominal pain R10.31    LFT elevation R94.5    C. difficile colitis A04.72    Abdominal pain R10.9     Social History     Socioeconomic History    Marital status:      Spouse name: Not on file    Number of children: 3    Years of education: Not on file    Highest education level: Not on file   Occupational History    Occupation:    Social Needs    Financial resource strain: Not on file    Food insecurity:     Worry: Not on file     Inability: Not on file    Transportation needs:     Medical: Not on file     Non-medical: Not on file   Tobacco Use    Smoking status: Never Smoker    Smokeless tobacco: Never Used   Substance and Sexual Activity    Alcohol use: No     Alcohol/week: 0.0 oz    Drug use: No    Sexual activity: Yes     Partners: Female     Birth control/protection: Condom   Lifestyle    Physical activity:     Days per week: Not on file     Minutes per session: Not on file    Stress: Not on file   Relationships    Social connections:     Talks on phone: Not on file     Gets together: Not on file     Attends Islam service: Not on file     Active member of club or organization: Not on file     Attends meetings of clubs or organizations: Not on file     Relationship status: Not on file    Intimate partner violence:     Fear of current or ex partner: Not on file     Emotionally abused: Not on file     Physically abused: Not on file     Forced sexual activity: Not on file   Other Topics Concern    Not on file   Social History Narrative    Not on file     Past Medical History:   Diagnosis Date    Arthritis     all joints and spine    C. difficile colitis 1/21/2019    Chest pain 02/18/2013    as of 10/14/14 pt denies any CP since 2/13    Crohn's colitis ([facility] [address]     Dr. Krystina Rushing H/O Clostridium difficile infection 01/2017    as of 3/30/17 pt denies abd pain, diarrhea > 1 week    Iron deficiency anemia 2/18/2013    Kidney infection 2018    Liver disease     Pancreatitis     Polyarteritis nodosa ([facility] [address]     Pouchitis ([facility] [address] 12/08/2011    PSC (primary sclerosing cholangitis)     Dr Nawaf Degroot [phone]    Pyoderma gangrenosum      The patient has a family history of na    Prior to Admission Medications   Prescriptions Last Dose Informant Patient Reported? Taking?   halobetasol (ULTRAVATE) 0.05 % topical cream   No Yes   Sig: Apply  to affected area two (2) times a day. use thin layer. Apply to ulcers. Facility-Administered Medications: None           The review of systems is:  negative for shortness of breath or chest pain      The heart, lungs, and mental status were satisfactory for the administration of anesthesia sedation and for the procedure. I discussed with the patient the objectives, risks, consequences and alternatives to the procedure.       Cait Hilliard MD  6/13/2019  11:37 AM

## 2019-07-01 ENCOUNTER — HOSPITAL ENCOUNTER (EMERGENCY)
Age: 44
Discharge: HOME | End: 2019-07-02
Attending: EMERGENCY MEDICINE
Payer: OTHER GOVERNMENT

## 2019-07-01 DIAGNOSIS — K50.90 CROHN'S DISEASE WITHOUT COMPLICATION, UNSPECIFIED GASTROINTESTINAL TRACT LOCATION (HCC): ICD-10-CM

## 2019-07-01 DIAGNOSIS — R10.84 ABDOMINAL PAIN, GENERALIZED: Primary | ICD-10-CM

## 2019-07-01 LAB
ALBUMIN SERPL-MCNC: 2.6 G/DL (ref 3.5–5)
ALBUMIN/GLOB SERPL: 0.4 {RATIO} (ref 1.1–2.2)
ALP SERPL-CCNC: 489 U/L (ref 45–117)
ALT SERPL-CCNC: 52 U/L (ref 12–78)
ANION GAP SERPL CALC-SCNC: 6 MMOL/L (ref 5–15)
APPEARANCE UR: CLEAR
AST SERPL-CCNC: 94 U/L (ref 15–37)
BACTERIA URNS QL MICRO: NEGATIVE /HPF
BASOPHILS # BLD: 0.1 K/UL (ref 0–0.1)
BASOPHILS NFR BLD: 2 % (ref 0–1)
BILIRUB SERPL-MCNC: 1.7 MG/DL (ref 0.2–1)
BILIRUB UR QL CFM: POSITIVE
BUN SERPL-MCNC: 9 MG/DL (ref 6–20)
BUN/CREAT SERPL: 10 (ref 12–20)
CALCIUM SERPL-MCNC: 8.3 MG/DL (ref 8.5–10.1)
CHLORIDE SERPL-SCNC: 102 MMOL/L (ref 97–108)
CO2 SERPL-SCNC: 29 MMOL/L (ref 21–32)
COLOR UR: ABNORMAL
CREAT SERPL-MCNC: 0.89 MG/DL (ref 0.7–1.3)
DIFFERENTIAL METHOD BLD: ABNORMAL
EOSINOPHIL # BLD: 0.2 K/UL (ref 0–0.4)
EOSINOPHIL NFR BLD: 3 % (ref 0–7)
EPITH CASTS URNS QL MICRO: ABNORMAL /LPF
ERYTHROCYTE [DISTWIDTH] IN BLOOD BY AUTOMATED COUNT: 15.1 % (ref 11.5–14.5)
GLOBULIN SER CALC-MCNC: 5.8 G/DL (ref 2–4)
GLUCOSE SERPL-MCNC: 91 MG/DL (ref 65–100)
GLUCOSE UR STRIP.AUTO-MCNC: NEGATIVE MG/DL
HCT VFR BLD AUTO: 36.7 % (ref 36.6–50.3)
HGB BLD-MCNC: 11.9 G/DL (ref 12.1–17)
HGB UR QL STRIP: NEGATIVE
IMM GRANULOCYTES # BLD AUTO: 0 K/UL (ref 0–0.04)
IMM GRANULOCYTES NFR BLD AUTO: 0 % (ref 0–0.5)
KETONES UR QL STRIP.AUTO: ABNORMAL MG/DL
LEUKOCYTE ESTERASE UR QL STRIP.AUTO: ABNORMAL
LIPASE SERPL-CCNC: 147 U/L (ref 73–393)
LYMPHOCYTES # BLD: 1.1 K/UL (ref 0.8–3.5)
LYMPHOCYTES NFR BLD: 16 % (ref 12–49)
MCH RBC QN AUTO: 29.8 PG (ref 26–34)
MCHC RBC AUTO-ENTMCNC: 32.4 G/DL (ref 30–36.5)
MCV RBC AUTO: 91.8 FL (ref 80–99)
MONOCYTES # BLD: 0.3 K/UL (ref 0–1)
MONOCYTES NFR BLD: 5 % (ref 5–13)
MUCOUS THREADS URNS QL MICRO: ABNORMAL /LPF
NEUTS SEG # BLD: 4.9 K/UL (ref 1.8–8)
NEUTS SEG NFR BLD: 74 % (ref 32–75)
NITRITE UR QL STRIP.AUTO: NEGATIVE
NRBC # BLD: 0 K/UL (ref 0–0.01)
NRBC BLD-RTO: 0 PER 100 WBC
PH UR STRIP: 6 [PH] (ref 5–8)
PLATELET # BLD AUTO: 120 K/UL (ref 150–400)
PMV BLD AUTO: 11.6 FL (ref 8.9–12.9)
POTASSIUM SERPL-SCNC: 3.3 MMOL/L (ref 3.5–5.1)
PROT SERPL-MCNC: 8.4 G/DL (ref 6.4–8.2)
PROT UR STRIP-MCNC: ABNORMAL MG/DL
RBC # BLD AUTO: 4 M/UL (ref 4.1–5.7)
RBC #/AREA URNS HPF: ABNORMAL /HPF (ref 0–5)
SODIUM SERPL-SCNC: 137 MMOL/L (ref 136–145)
SP GR UR REFRACTOMETRY: 1.02 (ref 1–1.03)
UROBILINOGEN UR QL STRIP.AUTO: 2 EU/DL (ref 0.2–1)
WBC # BLD AUTO: 6.6 K/UL (ref 4.1–11.1)
WBC URNS QL MICRO: ABNORMAL /HPF (ref 0–4)

## 2019-07-01 PROCEDURE — 80053 COMPREHEN METABOLIC PANEL: CPT

## 2019-07-01 PROCEDURE — 83690 ASSAY OF LIPASE: CPT

## 2019-07-01 PROCEDURE — 85025 COMPLETE CBC W/AUTO DIFF WBC: CPT

## 2019-07-01 PROCEDURE — 96374 THER/PROPH/DIAG INJ IV PUSH: CPT

## 2019-07-01 PROCEDURE — 99282 EMERGENCY DEPT VISIT SF MDM: CPT

## 2019-07-01 PROCEDURE — 96375 TX/PRO/DX INJ NEW DRUG ADDON: CPT

## 2019-07-01 PROCEDURE — 74011250636 HC RX REV CODE- 250/636: Performed by: EMERGENCY MEDICINE

## 2019-07-01 PROCEDURE — 81001 URINALYSIS AUTO W/SCOPE: CPT

## 2019-07-01 PROCEDURE — 96372 THER/PROPH/DIAG INJ SC/IM: CPT

## 2019-07-01 PROCEDURE — 96361 HYDRATE IV INFUSION ADD-ON: CPT

## 2019-07-01 PROCEDURE — 36415 COLL VENOUS BLD VENIPUNCTURE: CPT

## 2019-07-01 RX ORDER — DICYCLOMINE HYDROCHLORIDE 10 MG/ML
20 INJECTION INTRAMUSCULAR
Status: COMPLETED | OUTPATIENT
Start: 2019-07-01 | End: 2019-07-01

## 2019-07-01 RX ORDER — ONDANSETRON 2 MG/ML
4 INJECTION INTRAMUSCULAR; INTRAVENOUS
Status: COMPLETED | OUTPATIENT
Start: 2019-07-01 | End: 2019-07-01

## 2019-07-01 RX ORDER — MORPHINE SULFATE 10 MG/ML
6 INJECTION, SOLUTION INTRAMUSCULAR; INTRAVENOUS ONCE
Status: COMPLETED | OUTPATIENT
Start: 2019-07-01 | End: 2019-07-01

## 2019-07-01 RX ADMIN — ONDANSETRON 4 MG: 2 INJECTION INTRAMUSCULAR; INTRAVENOUS at 22:16

## 2019-07-01 RX ADMIN — MORPHINE SULFATE 6 MG: 10 INJECTION INTRAVENOUS at 23:24

## 2019-07-01 RX ADMIN — SODIUM CHLORIDE 1000 ML: 900 INJECTION, SOLUTION INTRAVENOUS at 22:16

## 2019-07-01 RX ADMIN — DICYCLOMINE HYDROCHLORIDE 20 MG: 20 INJECTION, SOLUTION INTRAMUSCULAR at 22:15

## 2019-07-02 VITALS
DIASTOLIC BLOOD PRESSURE: 67 MMHG | HEART RATE: 74 BPM | SYSTOLIC BLOOD PRESSURE: 123 MMHG | TEMPERATURE: 98.4 F | RESPIRATION RATE: 18 BRPM | WEIGHT: 232.59 LBS | HEIGHT: 72 IN | BODY MASS INDEX: 31.5 KG/M2 | OXYGEN SATURATION: 98 %

## 2019-07-02 PROCEDURE — 96375 TX/PRO/DX INJ NEW DRUG ADDON: CPT

## 2019-07-02 PROCEDURE — 74011250636 HC RX REV CODE- 250/636: Performed by: EMERGENCY MEDICINE

## 2019-07-02 RX ORDER — HALOPERIDOL 5 MG/ML
5 INJECTION INTRAMUSCULAR ONCE
Status: COMPLETED | OUTPATIENT
Start: 2019-07-02 | End: 2019-07-02

## 2019-07-02 RX ADMIN — HALOPERIDOL LACTATE 5 MG: 5 INJECTION INTRAMUSCULAR at 01:16

## 2019-07-02 NOTE — ED PROVIDER NOTES
EMERGENCY DEPARTMENT HISTORY AND PHYSICAL EXAM      Date: 7/1/2019  Patient Name: Dai Marcelino    History of Presenting Illness     Chief Complaint   Patient presents with    Abdominal Pain     onset of sxs for 3 days - worsened this afternoon leading to visit - mild nausea - hx of crohns with resections in the past - denies fevers        History Provided By: Patient    HPI: Dai Marcelino, 37 y.o. male  presents to the ED with cc of abdominal pain. Patient localizes his abdominal pain to the right upper quadrant. Present for 3 days. He has had some mild nausea. He states his bowels have been a little more loose than usual.  He has had a history of a partial colectomy. Also has had a history of C. difficile. He had a colonoscopy about 2 weeks ago with his GI doctor, Dr. Everardo Alcantar. Prior to that colonoscopy he was on antibiotics for C. difficile infection. Denies any fevers or chills. No difficulty urinating. He has not been eating or drinking much. There are no other complaints, changes, or physical findings at this time. PCP: Miguel Cuello MD    No current facility-administered medications on file prior to encounter. Current Outpatient Medications on File Prior to Encounter   Medication Sig Dispense Refill    mesalamine (CANASA) 1,000 mg suppository Insert 1 Suppository into rectum nightly. 30 Suppository 5    halobetasol (ULTRAVATE) 0.05 % topical cream Apply  to affected area two (2) times a day. use thin layer. Apply to ulcers.  15 g 0       Past History     Past Medical History:  Past Medical History:   Diagnosis Date    Arthritis     all joints and spine    C. difficile colitis 1/21/2019    Chest pain 02/18/2013    as of 10/14/14 pt denies any CP since 2/13    Crohn's colitis ([facility] [address]     Dr. Keith Salazar H/O Clostridium difficile infection 01/2017    as of 3/30/17 pt denies abd pain, diarrhea > 1 week    Iron deficiency anemia 2/18/2013    Kidney infection 2018    Liver disease     Pancreatitis     Polyarteritis nodosa ([facility] [address]     Pouchitis ([address] 12/08/2011    PSC (primary sclerosing cholangitis)     Dr Antonette Hart [phone]    Pyoderma gangrenosum        Past Surgical History:  Past Surgical History:   Procedure Laterality Date    ABDOMEN SURGERY PROC UNLISTED  08/2002    colostomy reversal & J Pouch    ABDOMEN SURGERY PROC UNLISTED  06/2002    colostomy    COLONOSCOPY N/A 4/4/2017    COLON[facility]COPY performed [facility] at [facility] AM[facility]ULATORY OR    COLONOSCOPY N/A 1/15/2018    COLONOSCOPY performed by Aubrey Reagan MD at [facility] ENDOSCOPY    COLONOSCOPY N/A 9/13/2018    COLONOSCOPY performed by Aubrey Reagan MD at [facility] ENDOSCOPY    COLONOSCOPY N/A 1/21/2019    COLONOSCOPY W/ FECAL TRANSPLANT performed by Segundo Rojas MD at [facility] ENDOSCOPY    COLONOSCOPY N/A 6/13/2019    COLONOSCOPY performed by Segundo Rojas MD at [facility] ENDOSCOPY    COLONOSCOPY,DIAGNOSTIC  1/15/2018         COLONOSCOPY,DIAGNOSTIC  9/13/2018         COLONOSCOPY,DIAGNOSTIC  1/21/2019         COLONOSCOPY,DIAGNOSTIC  6/13/2019         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         HX GI      ercp    HX ROTATOR CUFF REPAIR Right 1999    [place] COLONOSCOPY W/BIOPSY SINGLE/MULTIPLE  12/8/2011         [place] EGD TRANSORAL BIOPSY SINGLE/MULTIPLE  2/18/2013            Family History:  Family History   Problem Relation Age of Onset    Diabetes Mother     Hypertension Mother     Arthritis-osteo Mother     Diabetes Father     Hypertension Father     Stroke Father     Arthritis-osteo Father     COPD Father         smoker    No Known Problems Sister     No Known Problems Brother     No Known Problems Maternal Aunt     No Known Problems Maternal Uncle     No Known Problems Paternal Aunt     No Known Problems Paternal Uncle     No Known Problems Maternal Grandmother     No Known Problems Maternal Grandfather     No Known Problems Paternal Grandmother     No Known Problems Paternal Grandfather        Social History:  Social History Tobacco Use    Smoking status: Never Smoker    Smokeless tobacco: Never Used   Substance Use Topics    Alcohol use: No     Alcohol/week: 0.0 oz    Drug use: No       Allergies: Allergies   Allergen Reactions    Cafergot [Ergotamine-Caffeine] Hives         Review of Systems   Review of Systems   Constitutional: Positive for appetite change. Negative for chills and fever. HENT: Negative for congestion, ear pain, rhinorrhea, sore throat and trouble swallowing. Eyes: Negative for visual disturbance. Respiratory: Negative for cough, chest tightness and shortness of breath. Cardiovascular: Negative for chest pain and palpitations. Gastrointestinal: Positive for abdominal pain and nausea. Negative for blood in stool, constipation, diarrhea and vomiting. Genitourinary: Negative for decreased urine volume, difficulty urinating, dysuria and frequency. Musculoskeletal: Negative for back pain and neck pain. Skin: Negative for color change and rash. Neurological: Negative for dizziness, weakness, light-headedness and headaches. Physical Exam   Physical Exam   Constitutional: He is oriented to person, place, and time. He appears well-developed and well-nourished. He does not appear ill. No distress. HENT:   Mouth/Throat: Oropharynx is clear and moist.   Eyes: Conjunctivae are normal.   Neck: Neck supple. Cardiovascular: Normal rate and regular rhythm. Pulmonary/Chest: Effort normal and breath sounds normal. No accessory muscle usage. No respiratory distress. Abdominal: Soft. He exhibits no distension. There is tenderness in the right upper quadrant, left upper quadrant and left lower quadrant. There is no rebound, no guarding and no CVA tenderness. Lymphadenopathy:     He has no cervical adenopathy. Neurological: He is alert and oriented to person, place, and time. He has normal strength. No cranial nerve deficit or sensory deficit. Skin: Skin is warm and dry.    Nursing note and vitals reviewed. Diagnostic Study Results     Labs -     Recent Results (from the past 24 hour(s))   CBC WITH AUTOMATED DIFF    Collection Time: 07/01/19  9:58 PM   Result Value Ref Range    WBC 6.6 4.1 - 11.1 K/uL    RBC 4.00 (L) 4.10 - 5.70 M/uL    HGB 11.9 (L) 12.1 - 17.0 g/dL    HCT 36.7 36.6 - 50.3 %    MCV 91.8 80.0 - 99.0 FL    MCH 29.8 26.0 - 34.0 PG    MCHC 32.4 30.0 - 36.5 g/dL    RDW 15.1 (H) 11.5 - 14.5 %    PLATELET 432 (L) 507 - 400 K/uL    MPV 11.6 8.9 - 12.9 FL    NRBC 0.0 0  WBC    ABSOLUTE NRBC 0.00 0.00 - 0.01 K/uL    NEUTROPHILS 74 32 - 75 %    LYMPHOCYTES 16 12 - 49 %    MONOCYTES 5 5 - 13 %    EOSINOPHILS 3 0 - 7 %    BASOPHILS 2 (H) 0 - 1 %    IMMATURE GRANULOCYTES 0 0.0 - 0.5 %    ABS. NEUTROPHILS 4.9 1.8 - 8.0 K/UL    ABS. LYMPHOCYTES 1.1 0.8 - 3.5 K/UL    ABS. MONOCYTES 0.3 0.0 - 1.0 K/UL    ABS. EOSINOPHILS 0.2 0.0 - 0.4 K/UL    ABS. BASOPHILS 0.1 0.0 - 0.1 K/UL    ABS. IMM. GRANS. 0.0 0.00 - 0.04 K/UL    DF MANUAL     METABOLIC PANEL, COMPREHENSIVE    Collection Time: 07/01/19  9:58 PM   Result Value Ref Range    Sodium 137 136 - 145 mmol/L    Potassium 3.3 (L) 3.5 - 5.1 mmol/L    Chloride 102 97 - 108 mmol/L    CO2 29 21 - 32 mmol/L    Anion gap 6 5 - 15 mmol/L    Glucose 91 65 - 100 mg/dL    BUN 9 6 - 20 MG/DL    Creatinine 0.89 0.70 - 1.30 MG/DL    BUN/Creatinine ratio 10 (L) 12 - 20      GFR est AA >60 >60 ml/min/1.73m2    GFR est non-AA >60 >60 ml/min/1.73m2    Calcium 8.3 (L) 8.5 - 10.1 MG/DL    Bilirubin, total 1.7 (H) 0.2 - 1.0 MG/DL    ALT (SGPT) 52 12 - 78 U/L    AST (SGOT) 94 (H) 15 - 37 U/L    Alk.  phosphatase 489 (H) 45 - 117 U/L    Protein, total 8.4 (H) 6.4 - 8.2 g/dL    Albumin 2.6 (L) 3.5 - 5.0 g/dL    Globulin 5.8 (H) 2.0 - 4.0 g/dL    A-G Ratio 0.4 (L) 1.1 - 2.2     LIPASE    Collection Time: 07/01/19  9:58 PM   Result Value Ref Range    Lipase 147 73 - 393 U/L   URINALYSIS W/ RFLX MICROSCOPIC    Collection Time: 07/01/19  9:58 PM   Result Value Ref Range Color DARK YELLOW      Appearance CLEAR CLEAR      Specific gravity 1.025 1.003 - 1.030      pH (UA) 6.0 5.0 - 8.0      Protein TRACE (A) NEG mg/dL    Glucose NEGATIVE  NEG mg/dL    Ketone TRACE (A) NEG mg/dL    Blood NEGATIVE  NEG      Urobilinogen 2.0 (H) 0.2 - 1.0 EU/dL    Nitrites NEGATIVE  NEG      Leukocyte Esterase SMALL (A) NEG      WBC 0-4 0 - 4 /hpf    RBC 0-5 0 - 5 /hpf    Epithelial cells FEW FEW /lpf    Bacteria NEGATIVE  NEG /hpf    Mucus 2+ (A) NEG /lpf   BILIRUBIN, CONFIRM    Collection Time: 07/01/19  9:58 PM   Result Value Ref Range    Bilirubin UA, confirm POSITIVE (A) NEG         Radiologic Studies -   No orders to display     CT Results  (Last 48 hours)    None        CXR Results  (Last 48 hours)    None            Medical Decision Making   I am the first provider for this patient. I reviewed the vital signs, available nursing notes, past medical history, past surgical history, family history and social history. Vital Signs-Reviewed the patient's vital signs. Patient Vitals for the past 24 hrs:   Temp Pulse Resp BP SpO2   07/01/19 2127 98.4 °F (36.9 °C) 88 20 117/80 100 %       Records Reviewed: Nursing Notes and Old Medical Records    Provider Notes (Medical Decision Making):   Patient with a history of Crohn's disease presents with abdominal pain. Has been nauseous. Has abdominal labs are unremarkable. No significant changes from his baseline. He specifically does not have a leukocytosis today. He has no fever. Multiple CT scans in the past and given that there is no lab changes I would rather not scan him tonight. Hydrated with IV fluids and given pain medicine and antiemetics. This pain is more subacute and chronic than it is acute. He has an appointment with his GI doctor today in the morning. We were able to get him comfortable to go home. Recommend follow-up with his GI doctor who can determine if he wants to do any type of imaging.     ED Course:   Initial assessment performed. The patients presenting problems have been discussed, and they are in agreement with the care plan formulated and outlined with them. I have encouraged them to ask questions as they arise throughout their visit. Orders Placed This Encounter    CBC WITH AUTOMATED DIFF    COMPREHENSIVE METABOLIC PANEL    LIPASE    URINALYSIS W/ RFLX MICROSCOPIC    BILIRUBIN, CONFIRM    SALINE LOCK IV ONE TIME STAT    sodium chloride 0.9 % bolus infusion 1,000 mL    ondansetron (ZOFRAN) injection 4 mg    dicyclomine (BENTYL) 10 mg/mL injection 20 mg    morphine 10 mg/ml injection 6 mg    haloperidol lactate (HALDOL) injection 5 mg         Critical Care Time:   0    Disposition:  Discharge  3:00 AM    The patient's emergency department evaluation is now complete. I have reviewed all labs, imaging, and pertinent information. I have discussed all results with the patient and/or family. Based on our evaluation today I do believe that the patient is safe to be discharged home. The patient has been provided with at home instructions that are pertinent to their complaint today, although these may not be specific to the exact diagnosis. I have reviewed the patient's home medications and attempted to reconcile if not already done so by pharmacy or nursing staff. I have discussed all new prescriptions with the patient. The patient has been encouraged to follow-up with primary care doctor and/or specialist, and these have been discussed with the patient. The patient has been advised that they may return to the emergency department if they have any worsening symptoms and or new symptoms that are of concern to them. Verbal discharge instructions may have also been provided to the patient that may not be specifically contained in the written discharge instructions. The patient has been given opportunity to ask questions prior to discharge. PLAN:  1. Current Discharge Medication List        2. Follow-up Information     Follow up With Specialties Details Why Chris Win MD Gastroenterology Go today  [address] [place] [address]  [phone]          Return to ED if worse     Diagnosis     Clinical Impression:   1. Abdominal pain, generalized    2. Crohn's disease without complication, unspecified gastrointestinal tract location [facility])            This note will not be viewable in [address]

## 2019-09-08 ENCOUNTER — HOSPITAL ENCOUNTER (EMERGENCY)
Age: 44
Discharge: HOME | End: 2019-09-09
Attending: EMERGENCY MEDICINE
Payer: OTHER GOVERNMENT

## 2019-09-08 DIAGNOSIS — K50.10 CROHN'S DISEASE OF LARGE INTESTINE WITHOUT COMPLICATION (HCC): Primary | ICD-10-CM

## 2019-09-08 PROCEDURE — 96375 TX/PRO/DX INJ NEW DRUG ADDON: CPT

## 2019-09-08 PROCEDURE — 99284 EMERGENCY DEPT VISIT MOD MDM: CPT

## 2019-09-08 PROCEDURE — 96374 THER/PROPH/DIAG INJ IV PUSH: CPT

## 2019-09-08 RX ORDER — MORPHINE SULFATE 2 MG/ML
6 INJECTION, SOLUTION INTRAMUSCULAR; INTRAVENOUS ONCE
Status: COMPLETED | OUTPATIENT
Start: 2019-09-08 | End: 2019-09-09

## 2019-09-09 VITALS
SYSTOLIC BLOOD PRESSURE: 133 MMHG | TEMPERATURE: 98 F | OXYGEN SATURATION: 98 % | RESPIRATION RATE: 18 BRPM | HEART RATE: 79 BPM | BODY MASS INDEX: 31.92 KG/M2 | HEIGHT: 72 IN | DIASTOLIC BLOOD PRESSURE: 76 MMHG | WEIGHT: 235.67 LBS

## 2019-09-09 LAB
ALBUMIN SERPL-MCNC: 2.9 G/DL (ref 3.5–5)
ALBUMIN/GLOB SERPL: 0.7 {RATIO} (ref 1.1–2.2)
ALP SERPL-CCNC: 266 U/L (ref 45–117)
ALT SERPL-CCNC: 62 U/L (ref 12–78)
ANION GAP SERPL CALC-SCNC: 5 MMOL/L (ref 5–15)
AST SERPL-CCNC: 66 U/L (ref 15–37)
BASOPHILS # BLD: 0 K/UL (ref 0–0.1)
BASOPHILS NFR BLD: 0 % (ref 0–1)
BILIRUB SERPL-MCNC: 0.8 MG/DL (ref 0.2–1)
BUN SERPL-MCNC: 7 MG/DL (ref 6–20)
BUN/CREAT SERPL: 10 (ref 12–20)
CALCIUM SERPL-MCNC: 8.5 MG/DL (ref 8.5–10.1)
CHLORIDE SERPL-SCNC: 110 MMOL/L (ref 97–108)
CO2 SERPL-SCNC: 25 MMOL/L (ref 21–32)
CREAT SERPL-MCNC: 0.72 MG/DL (ref 0.7–1.3)
DIFFERENTIAL METHOD BLD: ABNORMAL
EOSINOPHIL # BLD: 0.2 K/UL (ref 0–0.4)
EOSINOPHIL NFR BLD: 2 % (ref 0–7)
ERYTHROCYTE [DISTWIDTH] IN BLOOD BY AUTOMATED COUNT: 15.3 % (ref 11.5–14.5)
GLOBULIN SER CALC-MCNC: 4.4 G/DL (ref 2–4)
GLUCOSE SERPL-MCNC: 87 MG/DL (ref 65–100)
HCT VFR BLD AUTO: 35 % (ref 36.6–50.3)
HGB BLD-MCNC: 11.3 G/DL (ref 12.1–17)
IMM GRANULOCYTES # BLD AUTO: 0 K/UL (ref 0–0.04)
IMM GRANULOCYTES NFR BLD AUTO: 0 % (ref 0–0.5)
LACTATE SERPL-SCNC: 0.8 MMOL/L (ref 0.4–2)
LYMPHOCYTES # BLD: 2.5 K/UL (ref 0.8–3.5)
LYMPHOCYTES NFR BLD: 38 % (ref 12–49)
MCH RBC QN AUTO: 28.4 PG (ref 26–34)
MCHC RBC AUTO-ENTMCNC: 32.3 G/DL (ref 30–36.5)
MCV RBC AUTO: 87.9 FL (ref 80–99)
MONOCYTES # BLD: 0.6 K/UL (ref 0–1)
MONOCYTES NFR BLD: 9 % (ref 5–13)
NEUTS SEG # BLD: 3.2 K/UL (ref 1.8–8)
NEUTS SEG NFR BLD: 50 % (ref 32–75)
NRBC # BLD: 0 K/UL (ref 0–0.01)
NRBC BLD-RTO: 0 PER 100 WBC
PLATELET # BLD AUTO: 101 K/UL (ref 150–400)
PMV BLD AUTO: 11 FL (ref 8.9–12.9)
POTASSIUM SERPL-SCNC: 3.5 MMOL/L (ref 3.5–5.1)
PROT SERPL-MCNC: 7.3 G/DL (ref 6.4–8.2)
RBC # BLD AUTO: 3.98 M/UL (ref 4.1–5.7)
SODIUM SERPL-SCNC: 140 MMOL/L (ref 136–145)
WBC # BLD AUTO: 6.5 K/UL (ref 4.1–11.1)

## 2019-09-09 PROCEDURE — 74011250636 HC RX REV CODE- 250/636

## 2019-09-09 PROCEDURE — 85025 COMPLETE CBC W/AUTO DIFF WBC: CPT

## 2019-09-09 PROCEDURE — 74011250636 HC RX REV CODE- 250/636: Performed by: EMERGENCY MEDICINE

## 2019-09-09 PROCEDURE — 96374 THER/PROPH/DIAG INJ IV PUSH: CPT

## 2019-09-09 PROCEDURE — 36415 COLL VENOUS BLD VENIPUNCTURE: CPT

## 2019-09-09 PROCEDURE — 74011636637 HC RX REV CODE- 636/637

## 2019-09-09 PROCEDURE — 83605 ASSAY OF LACTIC ACID: CPT

## 2019-09-09 PROCEDURE — 80053 COMPREHEN METABOLIC PANEL: CPT

## 2019-09-09 PROCEDURE — 96375 TX/PRO/DX INJ NEW DRUG ADDON: CPT

## 2019-09-09 RX ORDER — PREDNISONE 20 MG/1
TABLET ORAL
Status: COMPLETED
Start: 2019-09-09 | End: 2019-09-09

## 2019-09-09 RX ORDER — ONDANSETRON 2 MG/ML
4 INJECTION INTRAMUSCULAR; INTRAVENOUS
Status: COMPLETED | OUTPATIENT
Start: 2019-09-09 | End: 2019-09-09

## 2019-09-09 RX ORDER — HYDROMORPHONE HYDROCHLORIDE 1 MG/ML
INJECTION, SOLUTION INTRAMUSCULAR; INTRAVENOUS; SUBCUTANEOUS
Status: COMPLETED
Start: 2019-09-09 | End: 2019-09-09

## 2019-09-09 RX ORDER — PREDNISONE 20 MG/1
40 TABLET ORAL
Status: COMPLETED | OUTPATIENT
Start: 2019-09-09 | End: 2019-09-09

## 2019-09-09 RX ORDER — HYDROMORPHONE HYDROCHLORIDE 1 MG/ML
2 INJECTION, SOLUTION INTRAMUSCULAR; INTRAVENOUS; SUBCUTANEOUS ONCE
Status: COMPLETED | OUTPATIENT
Start: 2019-09-09 | End: 2019-09-09

## 2019-09-09 RX ORDER — ONDANSETRON 4 MG/1
4 TABLET, ORALLY DISINTEGRATING ORAL
Qty: 20 TAB | Refills: 0 | Status: SHIPPED | OUTPATIENT
Start: 2019-09-09 | End: 2021-09-02

## 2019-09-09 RX ADMIN — HYDROMORPHONE HYDROCHLORIDE 2 MG: 1 INJECTION, SOLUTION INTRAMUSCULAR; INTRAVENOUS; SUBCUTANEOUS at 01:31

## 2019-09-09 RX ADMIN — MORPHINE SULFATE 6 MG: 2 INJECTION, SOLUTION INTRAMUSCULAR; INTRAVENOUS at 00:10

## 2019-09-09 RX ADMIN — ONDANSETRON 4 MG: 2 INJECTION INTRAMUSCULAR; INTRAVENOUS at 02:27

## 2019-09-09 RX ADMIN — SODIUM CHLORIDE 1000 ML: 900 INJECTION, SOLUTION INTRAVENOUS at 00:11

## 2019-09-09 RX ADMIN — PREDNISONE 40 MG: 20 TABLET ORAL at 01:31

## 2019-09-09 NOTE — ED PROVIDER NOTES
EMERGENCY DEPARTMENT HISTORY AND PHYSICAL EXAM      Date: 9/8/2019  Patient Name: Regine Holt  Patient Age and Sex: 37 y.o. male    History of Presenting Illness     Chief Complaint   Patient presents with    Abdominal Pain     Pt has hx of Crohn's Disease, has had nausea/abd pain x 3-4 days, pain increased today. Hx of partial colectomy in 2012. History Provided By: Patient    Ability to gather history was limited by: none    HPI: Regine Holt, 37 y.o. male with history of Crohn's disease, c/o acute on chronic lower abd pain. Nausea, no significant vomiting or fevers. Nature and location of pain are chronically recurrent. History of colectomy. On immune modulator for his Crohn's. Currently at the end of a prolonged prednisone taper, just moved down to 5 mg daily. Location:  Lower abd pain  Quality:    Aching, throbbing  Severity:  Mod/severe  Duration: 1 week  Timing:    Waxing/waning  Context:  Crohn's. Chronic similar recurrent pain  Modifying factors: Remicade  Associated symptoms: nausea, no fever    Pt denies any other alleviating or exacerbating factors. There are no other complaints, changes or physical findings at this time.      Past Medical History:   Diagnosis Date    Arthritis     all joints and spine    C. difficile colitis 1/21/2019    Chest pain 02/18/2013    as of 10/14/14 pt denies any CP since 2/13    Crohn's colitis ([facility] [address]     Dr. Olvin Reynolds H/O Clostridium difficile infection 01/2017    as of 3/30/17 pt denies abd pain, diarrhea > 1 week    Iron deficiency anemia 2/18/2013    Kidney infection 2018    Liver disease     Pancreatitis     Polyarteritis nodosa ([address]     Pouchitis ([facility] [address] 12/08/2011    PSC (primary sclerosing cholangitis)     Dr Omega Bear [phone]    Pyoderma gangrenosum      Past Surgical History:   Procedure Laterality Date    ABDOMEN SURGERY PROC UNLISTED  08/2002    colostomy reversal & J Pouch    ABDOMEN SURGERY PROC UNLISTED  06/2002    colostomy  COLONOSCOPY N/A 4/4/2017    COLONOSCOPY performed by Jaja Bhatti MD at [place] [id number] COLONOSCOPY N/A 1/15/2018    COLONOSCOPY [facility]ti [facility] at [facility] ENDOSCOPY    COLONOSCOPY N/A 9/13/2018    COLONOSCOPY performed by [facility]aja Bhatti MD at [facility] ENDOSCOPY    COLONOSCOPY N/A 1/21/2019    COLONOSCOPY W/ FECAL TRANSPLANT performed by Ernestine Murray MD at [facility] ENDOSCOPY    COLONOSCOPY N/A 6/13/2019    COLONOSCOPY performed by Ernestine Murray MD at [facility] ENDOSCOPY    COLONOSCOPY,DIAGNOSTIC  1/15/2018         COLONOSCOPY,DIAGNOSTIC  9/13/2018         COLONOSCOPY,DIAGNOSTIC  1/21/2019         COLONOSCOPY,DIAGNOSTIC  6/13/2019         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         HX GI      ercp    HX ROTATOR CUFF REPAIR Right 1999    [place] COLONOSCOPY W/BIOPSY SINGLE/MULTIPLE  12/8/2011         [place] EGD TRANSORAL BIOPSY SINGLE/MULTIPLE  2/18/2013            PCP: Brittany Rios MD    Past History   Past Medical History:  Past Medical History:   Diagnosis Date    Arthritis     all joints and spine    C. difficile colitis 1/21/2019    Chest pain 02/18/2013    as of 10/14/14 pt denies any CP since 2/13    Crohn's colitis ([address]     Dr. Ileana Chavez H/O Clostridium difficile infection 01/2017    as of 3/30/17 pt denies abd pain, diarrhea > 1 week    Iron deficiency anemia 2/18/2013    Kidney infection 2018    Liver disease     Pancreatitis     Polyarteritis nodosa ([address]     Pouchitis ([address] 12/08/2011    PSC (primary sclerosing cholangitis)     Dr Karyn Kincaid [phone]    Pyoderma gangrenosum        Past Surgical History:  Past Surgical History:   Procedure Laterality Date    ABDOMEN SURGERY PROC UNLISTED  08/2002    colostomy reversal & J Pouch    ABDOMEN SURGERY PROC UNLISTED  06/2002    colostomy    COLONOSCOPY N/A 4/4/2017    COLONOSCOPY performed by Jaja Bhatti MD at [facility] COLONOSCOPY N/A 1/15/2018    COLONOSCOPY performed by Jaja Bhatti MD at [facility] ENDOSCOPY    COLONOSCOPY N/A 9/13/2018    COLONOSCOPY performed by Yasmany Shepherd MD at [facility] ENDOSCOPY    COLONOSCOPY N/A 1/21/2019    COLONOSCOPY W/ FECAL TRANSPLANT performed by Loreto Gaspar MD at [facility] ENDOSCOPY    COLONOSCOPY N/A 6/13/2019    COLONOSCOPY performed by Loreto Gaspar MD at [facility] ENDOSCOPY    COLONOSCOPY,DIAGNOSTIC  1/15/2018         COLONOSCOPY,DIAGNOSTIC  9/13/2018         COLONOSCOPY,DIAGNOSTIC  1/21/2019         COLONOSCOPY,DIAGNOSTIC  6/13/2019         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         HX GI      ercp    HX ROTATOR CUFF REPAIR Right 1999    [place] COLONOSCOPY W/BIOPSY SINGLE/MULTIPLE  12/8/2011         [place] EGD TRANSORAL BIOPSY SINGLE/MULTIPLE  2/18/2013            Family History:  Family History   Problem Relation Age of Onset    Diabetes Mother     Hypertension Mother     Arthritis-osteo Mother     Diabetes Father     Hypertension Father     Stroke Father     Arthritis-osteo Father     COPD Father         smoker    No Known Problems Sister     No Known Problems Brother     No Known Problems Maternal Aunt     No Known Problems Maternal Uncle     No Known Problems Paternal Aunt     No Known Problems Paternal Uncle     No Known Problems Maternal Grandmother     No Known Problems Maternal Grandfather     No Known Problems Paternal Grandmother     No Known Problems Paternal Grandfather        Social History:  Social History     Tobacco Use    Smoking status: Never Smoker    Smokeless tobacco: Never Used   Substance Use Topics    Alcohol use: No     Alcohol/week: 0.0 standard drinks    Drug use: No       Allergies: Allergies   Allergen Reactions    Cafergot [Ergotamine-Caffeine] Hives       Current Medications:  No current facility-administered medications on file prior to encounter. Current Outpatient Medications on File Prior to Encounter   Medication Sig Dispense Refill    mesalamine (CANASA) 1,000 mg suppository Insert 1 Suppository into rectum nightly.  30 Suppository 5    halobetasol (ULTRAVATE) 0.05 % topical cream Apply  to affected area two (2) times a day. use thin layer. Apply to ulcers. 15 g 0       Review of Systems   Review of Systems   Constitutional: Negative for fever. Gastrointestinal: Positive for abdominal pain and nausea. All other systems reviewed and are negative. Physical Exam   Vital Signs  Patient Vitals for the past 24 hrs:   Temp Pulse Resp BP   09/08/19 2346 98 °F (36.7 °C) 79 18 134/84       Physical Exam   Constitutional: He is oriented to person, place, and time. He appears well-developed and well-nourished. No distress. HENT:   Head: Normocephalic and atraumatic. Eyes: Conjunctivae are normal. Right eye exhibits no discharge. Left eye exhibits no discharge. Neck: Normal range of motion. Neck supple. Cardiovascular: Normal rate, regular rhythm and normal heart sounds. No murmur heard. Pulmonary/Chest: Effort normal and breath sounds normal. No respiratory distress. He has no wheezes. Abdominal: Soft. He exhibits no distension. There is tenderness in the right lower quadrant and suprapubic area. Mild lower abd tenderness, no guarding   Musculoskeletal: Normal range of motion. He exhibits no deformity. Neurological: He is alert and oriented to person, place, and time. Skin: Skin is warm and dry. No rash noted. Psychiatric: He has a normal mood and affect. His behavior is normal. Thought content normal.   Nursing note and vitals reviewed.       Diagnostic Study Results   Labs  Recent Results (from the past 24 hour(s))   CBC WITH AUTOMATED DIFF    Collection Time: 09/09/19 12:06 AM   Result Value Ref Range    WBC 6.5 4.1 - 11.1 K/uL    RBC 3.98 (L) 4.10 - 5.70 M/uL    HGB 11.3 (L) 12.1 - 17.0 g/dL    HCT 35.0 (L) 36.6 - 50.3 %    MCV 87.9 80.0 - 99.0 FL    MCH 28.4 26.0 - 34.0 PG    MCHC 32.3 30.0 - 36.5 g/dL    RDW 15.3 (H) 11.5 - 14.5 %    PLATELET 129 (L) 689 - 400 K/uL    MPV 11.0 8.9 - 12.9 FL    NRBC 0.0 0  WBC    ABSOLUTE NRBC 0. 00 0.00 - 0.01 K/uL    NEUTROPHILS 50 32 - 75 %    LYMPHOCYTES 38 12 - 49 %    MONOCYTES 9 5 - 13 %    EOSINOPHILS 2 0 - 7 %    BASOPHILS 0 0 - 1 %    IMMATURE GRANULOCYTES 0 0.0 - 0.5 %    ABS. NEUTROPHILS 3.2 1.8 - 8.0 K/UL    ABS. LYMPHOCYTES 2.5 0.8 - 3.5 K/UL    ABS. MONOCYTES 0.6 0.0 - 1.0 K/UL    ABS. EOSINOPHILS 0.2 0.0 - 0.4 K/UL    ABS. BASOPHILS 0.0 0.0 - 0.1 K/UL    ABS. IMM. GRANS. 0.0 0.00 - 0.04 K/UL    DF AUTOMATED     METABOLIC PANEL, COMPREHENSIVE    Collection Time: 09/09/19 12:06 AM   Result Value Ref Range    Sodium 140 136 - 145 mmol/L    Potassium 3.5 3.5 - 5.1 mmol/L    Chloride 110 (H) 97 - 108 mmol/L    CO2 25 21 - 32 mmol/L    Anion gap 5 5 - 15 mmol/L    Glucose 87 65 - 100 mg/dL    BUN 7 6 - 20 MG/DL    Creatinine 0.72 0.70 - 1.30 MG/DL    BUN/Creatinine ratio 10 (L) 12 - 20      GFR est AA >60 >60 ml/min/1.73m2    GFR est non-AA >60 >60 ml/min/1.73m2    Calcium 8.5 8.5 - 10.1 MG/DL    Bilirubin, total 0.8 0.2 - 1.0 MG/DL    ALT (SGPT) 62 12 - 78 U/L    AST (SGOT) 66 (H) 15 - 37 U/L    Alk. phosphatase 266 (H) 45 - 117 U/L    Protein, total 7.3 6.4 - 8.2 g/dL    Albumin 2.9 (L) 3.5 - 5.0 g/dL    Globulin 4.4 (H) 2.0 - 4.0 g/dL    A-G Ratio 0.7 (L) 1.1 - 2.2     LACTIC ACID    Collection Time: 09/09/19 12:06 AM   Result Value Ref Range    Lactic acid 0.8 0.4 - 2.0 MMOL/L       Radiologic Studies  No orders to display     CT Results  (Last 48 hours)    None        CXR Results  (Last 48 hours)    None          Procedures     Procedures    Medical Decision Making     Provider Notes (Medical Decision Making):   40-year-old male with Crohn's disease, presenting with acute on chronic lower abdominal pain consistent with prior flareups of Crohn's disease and associated pain. No fevers. No leukocytosis. His labs are all essentially identical to laboratories 2 months ago, and also laboratories before that. He has had multiple CT scans this year.   At this time can safely defer repeat CT imaging. No significant concern based on the time course and H&P for intra-abdominal abscess or acute infection. Will treat symptomatically, increase his prednisone back to 20 mg daily, and close follow-up with GI. Cathy Sanderson MD  1:00 AM        Consult required? No      Medications Administered During ED Course:  Medications   sodium chloride 0.9 % bolus infusion 1,000 mL (1,000 mL IntraVENous New Bag 9/9/19 0011)   morphine injection 6 mg (6 mg IntraVENous Given 9/9/19 0010)          Diagnosis     Disposition:      Clinical Impression: No diagnosis found. Attestation:  I personally performed the services described in this documentation on this date 9/8/2019 for patient Chad Hicks. Cathy Sanderson MD        I was the first provider for this patient on this visit. To the best of my ability I reviewed relevant prior medical records, electrocardiograms, laboratories, and radiologic studies. The patient's presenting problems were discussed, and the patient was in agreement with the care plan formulated and outlined with them. Cathy Sanderson MD    Please note that this dictation was completed with Dragon voice recognition software. Quite often unanticipated grammatical, syntax, homophones, and other interpretive errors are inadvertently transcribed by the computer software. Please disregard these errors and excuse any errors that have escaped final proofreading.

## 2019-09-09 NOTE — DISCHARGE INSTRUCTIONS
Increase your prednisone back to 20 mg daily and communicate with your GI physicians about the prednisone taper.

## 2019-09-09 NOTE — ED NOTES
Pt provided with discharge paperwork by provider. Pt verbalized understanding of discharge paperwork and follow up information. Iv removed as noted. Pt's brother at bedside for safe ride home. Pt ambulated out of ed without assistance.

## 2019-11-19 ENCOUNTER — APPOINTMENT (OUTPATIENT)
Dept: CT IMAGING | Age: 44
End: 2019-11-19
Attending: EMERGENCY MEDICINE
Payer: OTHER GOVERNMENT

## 2019-11-19 ENCOUNTER — HOSPITAL ENCOUNTER (EMERGENCY)
Age: 44
Discharge: HOME | End: 2019-11-20
Attending: EMERGENCY MEDICINE
Payer: OTHER GOVERNMENT

## 2019-11-19 DIAGNOSIS — R10.84 ABDOMINAL PAIN, GENERALIZED: Primary | ICD-10-CM

## 2019-11-19 LAB
ALBUMIN SERPL-MCNC: 2.8 G/DL (ref 3.5–5)
ALBUMIN/GLOB SERPL: 0.6 {RATIO} (ref 1.1–2.2)
ALP SERPL-CCNC: 317 U/L (ref 45–117)
ALT SERPL-CCNC: 67 U/L (ref 12–78)
ANION GAP SERPL CALC-SCNC: 4 MMOL/L (ref 5–15)
APPEARANCE UR: CLEAR
AST SERPL-CCNC: 101 U/L (ref 15–37)
BACTERIA URNS QL MICRO: NEGATIVE /HPF
BASOPHILS # BLD: 0 K/UL (ref 0–0.1)
BASOPHILS NFR BLD: 1 % (ref 0–1)
BILIRUB SERPL-MCNC: 1.8 MG/DL (ref 0.2–1)
BILIRUB UR QL CFM: POSITIVE
BUN SERPL-MCNC: 9 MG/DL (ref 6–20)
BUN/CREAT SERPL: 11 (ref 12–20)
CALCIUM SERPL-MCNC: 8 MG/DL (ref 8.5–10.1)
CHLORIDE SERPL-SCNC: 108 MMOL/L (ref 97–108)
CO2 SERPL-SCNC: 28 MMOL/L (ref 21–32)
COLOR UR: ABNORMAL
CREAT SERPL-MCNC: 0.85 MG/DL (ref 0.7–1.3)
DIFFERENTIAL METHOD BLD: ABNORMAL
EOSINOPHIL # BLD: 0.2 K/UL (ref 0–0.4)
EOSINOPHIL NFR BLD: 4 % (ref 0–7)
EPITH CASTS URNS QL MICRO: ABNORMAL /LPF
ERYTHROCYTE [DISTWIDTH] IN BLOOD BY AUTOMATED COUNT: 18.1 % (ref 11.5–14.5)
GLOBULIN SER CALC-MCNC: 4.8 G/DL (ref 2–4)
GLUCOSE SERPL-MCNC: 86 MG/DL (ref 65–100)
GLUCOSE UR STRIP.AUTO-MCNC: NEGATIVE MG/DL
HCT VFR BLD AUTO: 35.9 % (ref 36.6–50.3)
HGB BLD-MCNC: 11.4 G/DL (ref 12.1–17)
HGB UR QL STRIP: NEGATIVE
HYALINE CASTS URNS QL MICRO: ABNORMAL /LPF (ref 0–5)
IMM GRANULOCYTES # BLD AUTO: 0 K/UL (ref 0–0.04)
IMM GRANULOCYTES NFR BLD AUTO: 0 % (ref 0–0.5)
KETONES UR QL STRIP.AUTO: NEGATIVE MG/DL
LACTATE SERPL-SCNC: 1.4 MMOL/L (ref 0.4–2)
LEUKOCYTE ESTERASE UR QL STRIP.AUTO: NEGATIVE
LIPASE SERPL-CCNC: 162 U/L (ref 73–393)
LYMPHOCYTES # BLD: 2 K/UL (ref 0.8–3.5)
LYMPHOCYTES NFR BLD: 38 % (ref 12–49)
MCH RBC QN AUTO: 28.7 PG (ref 26–34)
MCHC RBC AUTO-ENTMCNC: 31.8 G/DL (ref 30–36.5)
MCV RBC AUTO: 90.4 FL (ref 80–99)
MONOCYTES # BLD: 0.5 K/UL (ref 0–1)
MONOCYTES NFR BLD: 9 % (ref 5–13)
NEUTS SEG # BLD: 2.5 K/UL (ref 1.8–8)
NEUTS SEG NFR BLD: 48 % (ref 32–75)
NITRITE UR QL STRIP.AUTO: NEGATIVE
NRBC # BLD: 0 K/UL (ref 0–0.01)
NRBC BLD-RTO: 0 PER 100 WBC
PH UR STRIP: 6 [PH] (ref 5–8)
PLATELET # BLD AUTO: 97 K/UL (ref 150–400)
PMV BLD AUTO: 11.3 FL (ref 8.9–12.9)
POTASSIUM SERPL-SCNC: 3.5 MMOL/L (ref 3.5–5.1)
PROT SERPL-MCNC: 7.6 G/DL (ref 6.4–8.2)
PROT UR STRIP-MCNC: NEGATIVE MG/DL
RBC # BLD AUTO: 3.97 M/UL (ref 4.1–5.7)
RBC #/AREA URNS HPF: ABNORMAL /HPF (ref 0–5)
SODIUM SERPL-SCNC: 140 MMOL/L (ref 136–145)
SP GR UR REFRACTOMETRY: 1.03 (ref 1–1.03)
UA: UC IF INDICATED,UAUC: NEGATIVE
UROBILINOGEN UR QL STRIP.AUTO: 1 EU/DL (ref 0.2–1)
WBC # BLD AUTO: 5.2 K/UL (ref 4.1–11.1)
WBC URNS QL MICRO: ABNORMAL /HPF (ref 0–4)

## 2019-11-19 PROCEDURE — 99284 EMERGENCY DEPT VISIT MOD MDM: CPT

## 2019-11-19 PROCEDURE — 81001 URINALYSIS AUTO W/SCOPE: CPT

## 2019-11-19 PROCEDURE — 96375 TX/PRO/DX INJ NEW DRUG ADDON: CPT

## 2019-11-19 PROCEDURE — 36415 COLL VENOUS BLD VENIPUNCTURE: CPT

## 2019-11-19 PROCEDURE — 74011636320 HC RX REV CODE- 636/320: Performed by: EMERGENCY MEDICINE

## 2019-11-19 PROCEDURE — 74011250636 HC RX REV CODE- 250/636: Performed by: EMERGENCY MEDICINE

## 2019-11-19 PROCEDURE — 74177 CT ABD & PELVIS W/CONTRAST: CPT

## 2019-11-19 PROCEDURE — 80053 COMPREHEN METABOLIC PANEL: CPT

## 2019-11-19 PROCEDURE — 74011636320 HC RX REV CODE- 636/320

## 2019-11-19 PROCEDURE — 83690 ASSAY OF LIPASE: CPT

## 2019-11-19 PROCEDURE — 96374 THER/PROPH/DIAG INJ IV PUSH: CPT

## 2019-11-19 PROCEDURE — 85025 COMPLETE CBC W/AUTO DIFF WBC: CPT

## 2019-11-19 PROCEDURE — 83605 ASSAY OF LACTIC ACID: CPT

## 2019-11-19 RX ORDER — SODIUM CHLORIDE 0.9 % (FLUSH) 0.9 %
10 SYRINGE (ML) INJECTION
Status: COMPLETED | OUTPATIENT
Start: 2019-11-19 | End: 2019-11-19

## 2019-11-19 RX ORDER — MORPHINE SULFATE 2 MG/ML
4 INJECTION, SOLUTION INTRAMUSCULAR; INTRAVENOUS
Status: DISCONTINUED | OUTPATIENT
Start: 2019-11-19 | End: 2019-11-20 | Stop reason: HOSPADM

## 2019-11-19 RX ORDER — ONDANSETRON 2 MG/ML
4 INJECTION INTRAMUSCULAR; INTRAVENOUS
Status: DISCONTINUED | OUTPATIENT
Start: 2019-11-19 | End: 2019-11-20 | Stop reason: HOSPADM

## 2019-11-19 RX ORDER — HYDROMORPHONE HYDROCHLORIDE 1 MG/ML
1 INJECTION, SOLUTION INTRAMUSCULAR; INTRAVENOUS; SUBCUTANEOUS ONCE
Status: COMPLETED | OUTPATIENT
Start: 2019-11-19 | End: 2019-11-19

## 2019-11-19 RX ADMIN — ONDANSETRON 4 MG: 2 INJECTION INTRAMUSCULAR; INTRAVENOUS at 21:56

## 2019-11-19 RX ADMIN — IOHEXOL 50 ML: 240 INJECTION, SOLUTION INTRATHECAL; INTRAVASCULAR; INTRAVENOUS; ORAL at 22:30

## 2019-11-19 RX ADMIN — HYDROMORPHONE HYDROCHLORIDE 1 MG: 1 INJECTION, SOLUTION INTRAMUSCULAR; INTRAVENOUS; SUBCUTANEOUS at 23:38

## 2019-11-19 RX ADMIN — IOPAMIDOL 100 ML: 755 INJECTION, SOLUTION INTRAVENOUS at 23:32

## 2019-11-19 RX ADMIN — MORPHINE SULFATE 4 MG: 2 INJECTION, SOLUTION INTRAMUSCULAR; INTRAVENOUS at 21:56

## 2019-11-19 RX ADMIN — SODIUM CHLORIDE 1000 ML: 900 INJECTION, SOLUTION INTRAVENOUS at 21:56

## 2019-11-19 RX ADMIN — Medication 10 ML: at 23:57

## 2019-11-20 VITALS
RESPIRATION RATE: 16 BRPM | HEIGHT: 72 IN | SYSTOLIC BLOOD PRESSURE: 128 MMHG | BODY MASS INDEX: 34.64 KG/M2 | OXYGEN SATURATION: 100 % | HEART RATE: 81 BPM | TEMPERATURE: 97.9 F | WEIGHT: 255.73 LBS | DIASTOLIC BLOOD PRESSURE: 77 MMHG

## 2019-11-20 RX ORDER — PREDNISONE 20 MG/1
20 TABLET ORAL DAILY
Qty: 10 TAB | Refills: 0 | Status: SHIPPED | OUTPATIENT
Start: 2019-11-20 | End: 2019-11-30

## 2019-11-20 RX ORDER — OXYCODONE HYDROCHLORIDE 5 MG/1
5 TABLET ORAL
Qty: 12 TAB | Refills: 0 | Status: SHIPPED | OUTPATIENT
Start: 2019-11-20 | End: 2019-11-23

## 2019-11-20 RX ORDER — ONDANSETRON 4 MG/1
4 TABLET, ORALLY DISINTEGRATING ORAL
Qty: 21 TAB | Refills: 0 | Status: SHIPPED | OUTPATIENT
Start: 2019-11-20 | End: 2019-11-27

## 2019-11-20 NOTE — ED NOTES
Patient discharged by Shelli Padilla MD. Patient provided with discharge instructions Rx and instructions on follow up care. Patient out of ED ambulatory accompanied by family.

## 2019-11-20 NOTE — ED TRIAGE NOTES
Pt arrived with abd pain x2 days. Pain is RLQ. Pt states he has had all of his colon removed. Pt denies vomiting. Pt states he feel like he needs to have a BM but can't. BLM 3 days ago. Hx Crohn's.

## 2019-11-20 NOTE — ED PROVIDER NOTES
EMERGENCY DEPARTMENT HISTORY AND PHYSICAL EXAM      Date: 11/19/2019  Patient Name: Ridge Gaitan  Patient Age and Sex: 37 y.o. male     History of Presenting Illness     Chief Complaint   Patient presents with    Abdominal Pain     diffuse Abd pain, reports hx of Crohn's with a J pouch    Constipation     last BM x 2 days ago       History Provided By: Patient    HPI: Ridge Gaitan Is a 49-year-old male with past medical history of Crohn's disease presenting today with abdominal pain, nausea. Patient reports that he has been having symptoms for 2 days. He reports his pain is in the right lower quadrant and suprapubic area. He is currently on infusions for his Crohn's and has had surgery before. Denies any dysuria, fevers, vomiting. There are no other complaints, changes, or physical findings at this time. PCP: Virginia Hernandez MD    No current facility-administered medications on file prior to encounter. Current Outpatient Medications on File Prior to Encounter   Medication Sig Dispense Refill    ondansetron (ZOFRAN ODT) 4 mg disintegrating tablet Take 1 Tab by mouth every six (6) hours as needed for Nausea. 20 Tab 0    mesalamine (CANASA) 1,000 mg suppository Insert 1 Suppository into rectum nightly. 30 Suppository 5    halobetasol (ULTRAVATE) 0.05 % topical cream Apply  to affected area two (2) times a day. use thin layer. Apply to ulcers.  15 g 0       Past History     Past Medical History:  Past Medical History:   Diagnosis Date    Arthritis     all joints and spine    C. difficile colitis 1/21/2019    Chest pain 02/18/2013    as of 10/14/14 pt denies any CP since 2/13    Crohn's colitis ([facility] [address]     Dr. Rosalino Carvalho H/O Clostridium difficile infection 01/2017    as of 3/30/17 pt denies abd pain, diarrhea > 1 week    Iron deficiency anemia 2/18/2013    Kidney infection 2018    Liver disease     Pancreatitis     Polyarteritis nodosa ([address]     Pouchitis ([facility] [address] 12/08/2011    PSC (primary sclerosing cholangitis)     Dr Campa [facility] [phone]    Pyoderma gangrenosum        Past Surgical History:  Past Surgical History:   Procedure Laterality Date    ABDOMEN SURGERY PROC UNLISTED  08/2002    colostomy reversal & J Pouch    ABDOMEN SURGERY PROC UNLISTED  06/2002    colostomy    COLONOSCOPY N/A 4/4/2017    COLONOSCOPY [facility]    COLONOSCOPY N/A 1/15/2018    COLONOSCOPY performed by Beena Camp [facility] at [facility] ENDOSCOPY    COLONOSCOPY N/A 9/13/2018    COLONOSCOPY performed by Beena Camp [facility] at [facility] ENDOSCOPY    COLONOSCOPY N/A 1/21/2019    COLONOSCOPY W/ FECAL TRANSPLANT performed by Melodie Lopez MD at [facility] ENDOSCOPY    COLONOSCOPY N/A 6/13/2019    COLONOSCOPY performed by Melodie Lopez MD at [facility] ENDOSCOPY    COLONOSCOPY,DIAGNOSTIC  1/15/2018         COLONOSCOPY,DIAGNOSTIC  9/13/2018         COLONOSCOPY,DIAGNOSTIC  1/21/2019         COLONOSCOPY,DIAGNOSTIC  6/13/2019         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         HX GI      ercp    HX ROTATOR CUFF REPAIR Right 1999    MI COLONOSCOPY W/BIOPSY SINGLE/MULTIPLE  12/8/2011         MI EGD TRANSORAL BIOPSY SINGLE/MULTIPLE  2/18/2013            Family History:  Family History   Problem Relation Age of Onset    Diabetes Mother     Hypertension Mother     Arthritis-osteo Mother     Diabetes Father     Hypertension Father     Stroke Father     Arthritis-osteo Father     COPD Father         smoker    No Known Problems Sister     No Known Problems Brother     No Known Problems Maternal Aunt     No Known Problems Maternal Uncle     No Known Problems Paternal Aunt     No Known Problems Paternal Uncle     No Known Problems Maternal Grandmother     No Known Problems Maternal Grandfather     No Known Problems Paternal Grandmother     No Known Problems Paternal Grandfather        Social History:  Social History     Tobacco Use    Smoking status: Never Smoker    Smokeless tobacco: Never Used   Substance Use Topics    Alcohol use: No     Alcohol/week: 0.0 standard drinks    Drug use: No       Allergies: Allergies   Allergen Reactions    Cafergot [Ergotamine-Caffeine] Hives         Review of Systems   Constitutional: No  fever,  No  headache  Skin: No  rash, No  jaundice  HEENT: No  nasal congestion, No  eye drainage. Resp: No cough,  No  wheezing  CV: No chest pain, No  palpitations  GI: No vomiting,  No  diarrhea.,  No  Constipation, + nausea  : No dysuria,  No  hematuria  MSK: No joint pain,  No  trauma  Neuro: No numbness, No  tingling  Psych: No suicidal, No  paranoid      Physical Exam     Patient Vitals for the past 12 hrs:   Temp Pulse Resp BP SpO2   11/19/19 2330    125/82 100 %   11/19/19 2315    130/84 100 %   11/19/19 2300    129/76 100 %   11/19/19 2245    123/85 100 %   11/19/19 2230    126/89 100 %   11/19/19 1932 97.9 °F (36.6 °C) 81 16 127/80 100 %     General: alert, No acute distress  Eyes: EOMI, normal conjunctiva  ENT: moist mucous membranes. Neck: Active, full ROM of neck. Skin: No rashes. no jaundice              Lungs: Equal chest expansion. no respiratory distress. clear to auscultation bilaterally No accessory muscle usage  Heart: regular rate     no peripheral edema   2+ radial pulses and DPs bilaterally  Abd:  non distended soft, Tender in the LLQ and Suprapubic tenderness. No rebound tenderness. + voluntary guarding  Back: Full ROM  MSK: Full, active ROM in all 4 extremities.    Neuro: Person, Place, Time and Situation; normal speech;   Psych: Cooperative with exam; Appropriate mood and affect             Diagnostic Study Results     Labs -     Recent Results (from the past 12 hour(s))   CBC WITH AUTOMATED DIFF    Collection Time: 11/19/19  7:40 PM   Result Value Ref Range    WBC 5.2 4.1 - 11.1 K/uL    RBC 3.97 (L) 4.10 - 5.70 M/uL    HGB 11.4 (L) 12.1 - 17.0 g/dL    HCT 35.9 (L) 36.6 - 50.3 %    MCV 90.4 80.0 - 99.0 FL    MCH 28.7 26.0 - 34.0 PG    MCHC 31.8 30.0 - 36.5 g/dL    RDW 18.1 (H) 11.5 - 14.5 %    PLATELET 97 (L) 577 - 400 K/uL    MPV 11.3 8.9 - 12.9 FL    NRBC 0.0 0  WBC    ABSOLUTE NRBC 0.00 0.00 - 0.01 K/uL    NEUTROPHILS 48 32 - 75 %    LYMPHOCYTES 38 12 - 49 %    MONOCYTES 9 5 - 13 %    EOSINOPHILS 4 0 - 7 %    BASOPHILS 1 0 - 1 %    IMMATURE GRANULOCYTES 0 0.0 - 0.5 %    ABS. NEUTROPHILS 2.5 1.8 - 8.0 K/UL    ABS. LYMPHOCYTES 2.0 0.8 - 3.5 K/UL    ABS. MONOCYTES 0.5 0.0 - 1.0 K/UL    ABS. EOSINOPHILS 0.2 0.0 - 0.4 K/UL    ABS. BASOPHILS 0.0 0.0 - 0.1 K/UL    ABS. IMM. GRANS. 0.0 0.00 - 0.04 K/UL    DF AUTOMATED     METABOLIC PANEL, COMPREHENSIVE    Collection Time: 11/19/19  7:40 PM   Result Value Ref Range    Sodium 140 136 - 145 mmol/L    Potassium 3.5 3.5 - 5.1 mmol/L    Chloride 108 97 - 108 mmol/L    CO2 28 21 - 32 mmol/L    Anion gap 4 (L) 5 - 15 mmol/L    Glucose 86 65 - 100 mg/dL    BUN 9 6 - 20 MG/DL    Creatinine 0.85 0.70 - 1.30 MG/DL    BUN/Creatinine ratio 11 (L) 12 - 20      GFR est AA >60 >60 ml/min/1.73m2    GFR est non-AA >60 >60 ml/min/1.73m2    Calcium 8.0 (L) 8.5 - 10.1 MG/DL    Bilirubin, total 1.8 (H) 0.2 - 1.0 MG/DL    ALT (SGPT) 67 12 - 78 U/L    AST (SGOT) 101 (H) 15 - 37 U/L    Alk.  phosphatase 317 (H) 45 - 117 U/L    Protein, total 7.6 6.4 - 8.2 g/dL    Albumin 2.8 (L) 3.5 - 5.0 g/dL    Globulin 4.8 (H) 2.0 - 4.0 g/dL    A-G Ratio 0.6 (L) 1.1 - 2.2     LIPASE    Collection Time: 11/19/19  7:40 PM   Result Value Ref Range    Lipase 162 73 - 393 U/L   LACTIC ACID    Collection Time: 11/19/19  7:40 PM   Result Value Ref Range    Lactic acid 1.4 0.4 - 2.0 MMOL/L   URINALYSIS W/ REFLEX CULTURE    Collection Time: 11/19/19 11:41 PM   Result Value Ref Range    Color DARK YELLOW      Appearance CLEAR CLEAR      Specific gravity 1.030 1.003 - 1.030      pH (UA) 6.0 5.0 - 8.0      Protein NEGATIVE  NEG mg/dL    Glucose NEGATIVE  NEG mg/dL    Ketone NEGATIVE  NEG mg/dL    Blood NEGATIVE  NEG      Urobilinogen 1.0 0.2 - 1.0 EU/dL Nitrites NEGATIVE  NEG      Leukocyte Esterase NEGATIVE  NEG      UA:UC IF INDICATED NEGATIVE  (A) CNI      WBC 0-4 0 - 4 /hpf    RBC 0-5 0 - 5 /hpf    Epithelial cells FEW FEW /lpf    Bacteria NEGATIVE  NEG /hpf    Hyaline cast 0-2 0 - 5 /lpf   BILIRUBIN, CONFIRM    Collection Time: 11/19/19 11:41 PM   Result Value Ref Range    Bilirubin UA, confirm POSITIVE (A) NEG         Radiologic Studies -   CT ABD PELV W CONT   Final Result   IMPRESSION:   No acute process on CT. Total colectomy. No bowel obstruction. Unchanged cirrhosis and portal hepatic lymphadenopathy. Possible left hepatic   lobe intrahepatic biliary dilatation. Recommend nonemergent liver MRI with MRCP. CT Results  (Last 48 hours)               11/19/19 2356  CT ABD PELV W CONT Final result    Impression:  IMPRESSION:   No acute process on CT. Total colectomy. No bowel obstruction. Unchanged cirrhosis and portal hepatic lymphadenopathy. Possible left hepatic   lobe intrahepatic biliary dilatation. Recommend nonemergent liver MRI with MRCP. Narrative:  EXAM: CT ABD PELV W CONT       INDICATION: Right lower quadrant abdominal pain for 2 days. Total colectomy. COMPARISON: CT abdomen/pelvis on 5/19/2019. CONTRAST: 100 mL of Isovue-370. TECHNIQUE:    Following the uneventful intravenous administration of contrast, thin axial   images were obtained through the abdomen and pelvis. Coronal and sagittal   reconstructions were generated. Oral contrast was administered. CT dose   reduction was achieved through use of a standardized protocol tailored for this   examination and automatic exposure control for dose modulation. FINDINGS:    LUNG BASES: Clear. INCIDENTALLY IMAGED HEART AND MEDIASTINUM: Unremarkable. LIVER: Cirrhosis. Heterogeneous. Probable left hepatic lobe biliary dilatation   is unchanged. Portal vein is patent. No suspicious mass. GALLBLADDER: Unremarkable. SPLEEN: Splenomegaly is unchanged. PANCREAS: No mass or ductal dilatation. ADRENALS: Unremarkable. KIDNEYS: Bilateral renal scarring. No solid renal mass or evidence of   pyelonephritis. STOMACH: Unremarkable. SMALL BOWEL: No obstruction. No evidence of inflammation. COLON: Total colectomy. APPENDIX: Appendectomy. PERITONEUM: No ascites or pneumoperitoneum. RETROPERITONEUM: Rock hepatic lymphadenopathy is unchanged. No other   retroperitoneal lymphadenopathy. Normal aorta and its branches. REPRODUCTIVE ORGANS: Prostate gland is not enlarged. URINARY BLADDER: Nondistended and limits evaluation. BONES: No destructive bone lesion. ADDITIONAL COMMENTS: N/A               CXR Results  (Last 48 hours)    None            Medical Decision Making     Differential Diagnosis: Crohn's flare, bowel obstruction, pancreatitis, viral gastroenteritis, C. difficile    I reviewed the vital signs, available nursing notes, past medical history, past surgical history, family history and social history and old medical records. On my interpretation, Laboratory workup is significant for no elevation in the white blood cell count, hemoglobin is 11.4, electrolytes are largely unremarkable, lipase is negative, negative urinalysis  On my interpretation of the radiology studies CT the abdomen pelvis is pending    Management/ED course: Patient presents today with abdominal pain in the setting of Crohn's disease. He is on infusions chronically for his Crohn's disease. Initial laboratory work-up is largely unremarkable. Patient does appear to be in moderate pain. Treated with IV analgesics. The patient is currently pending a CT the abdomen pelvis. ED Course:   Initial assessment performed. The patients presenting problems have been discussed, and they are in agreement with the care plan formulated and outlined with them. I have encouraged them to ask questions as they arise throughout their visit.       = CT without any acute ab normality, I discussed the results with patient and he is discharged with pain medication, antinausea and instructed to follow-up with primary care provider. Procedures:  0    Disposition: Discharged    Discharge Note:  The patient has been re-evaluated and is ready for discharge. Reviewed available results with patient. Counseled patient on diagnosis and care plan. Patient has expressed understanding, and all questions have been answered. Patient agrees with plan and agrees to follow up as recommended, or to return to the ED if their symptoms worsen. Discharge instructions have been provided and explained to the patient, along with reasons to return to the ED. Diagnosis     Clinical Impression:   1. Abdominal pain, generalized        Attestations:  Brynn Haider MD        Please note that this dictation was completed with [employer], the computer voice recognition software. Quite often unanticipated grammatical, syntax, homophones, and other interpretive errors are inadvertently transcribed by the computer software. Please disregard these errors. Please excuse any errors that have escaped final proofreading. Thank you.

## 2020-03-05 ENCOUNTER — HOSPITAL ENCOUNTER (EMERGENCY)
Age: 45
Discharge: HOME | End: 2020-03-05
Attending: EMERGENCY MEDICINE
Payer: OTHER GOVERNMENT

## 2020-03-05 VITALS
TEMPERATURE: 97.7 F | OXYGEN SATURATION: 100 % | WEIGHT: 261.47 LBS | BODY MASS INDEX: 34.65 KG/M2 | DIASTOLIC BLOOD PRESSURE: 75 MMHG | HEART RATE: 96 BPM | RESPIRATION RATE: 20 BRPM | HEIGHT: 73 IN | SYSTOLIC BLOOD PRESSURE: 126 MMHG

## 2020-03-05 DIAGNOSIS — M25.511 PAIN IN JOINT OF RIGHT SHOULDER: Primary | ICD-10-CM

## 2020-03-05 PROCEDURE — 74011250636 HC RX REV CODE- 250/636: Performed by: EMERGENCY MEDICINE

## 2020-03-05 PROCEDURE — 74011250637 HC RX REV CODE- 250/637: Performed by: EMERGENCY MEDICINE

## 2020-03-05 PROCEDURE — 99283 EMERGENCY DEPT VISIT LOW MDM: CPT

## 2020-03-05 RX ORDER — HYDROCODONE BITARTRATE AND ACETAMINOPHEN 5; 325 MG/1; MG/1
1 TABLET ORAL
Qty: 12 TAB | Refills: 0 | Status: SHIPPED | OUTPATIENT
Start: 2020-03-05 | End: 2020-03-08

## 2020-03-05 RX ORDER — OXYCODONE AND ACETAMINOPHEN 5; 325 MG/1; MG/1
1 TABLET ORAL
Status: COMPLETED | OUTPATIENT
Start: 2020-03-05 | End: 2020-03-05

## 2020-03-05 RX ORDER — KETOROLAC TROMETHAMINE 30 MG/ML
30 INJECTION, SOLUTION INTRAMUSCULAR; INTRAVENOUS
Status: DISCONTINUED | OUTPATIENT
Start: 2020-03-05 | End: 2020-03-05 | Stop reason: HOSPADM

## 2020-03-05 RX ADMIN — OXYCODONE HYDROCHLORIDE AND ACETAMINOPHEN 1 TABLET: 5; 325 TABLET ORAL at 05:05

## 2020-03-05 NOTE — DISCHARGE INSTRUCTIONS
Patient Education        Shoulder Pain: Care Instructions  Your Care Instructions    You can hurt your shoulder by using it too much during an activity, such as fishing or baseball. It can also happen as part of the everyday wear and tear of getting older. Shoulder injuries can be slow to heal, but your shoulder should get better with time. Your doctor may recommend a sling to rest your shoulder. If you have injured your shoulder, you may need testing and treatment. Follow-up care is a key part of your treatment and safety. Be sure to make and go to all appointments, and call your doctor if you are having problems. It's also a good idea to know your test results and keep a list of the medicines you take. How can you care for yourself at home? · Take pain medicines exactly as directed. ? If the doctor gave you a prescription medicine for pain, take it as prescribed. ? If you are not taking a prescription pain medicine, ask your doctor if you can take an over-the-counter medicine. ? Do not take two or more pain medicines at the same time unless the doctor told you to. Many pain medicines contain acetaminophen, which is Tylenol. Too much acetaminophen (Tylenol) can be harmful. · If your doctor recommends that you wear a sling, use it as directed. Do not take it off before your doctor tells you to. · Put ice or a cold pack on the sore area for 10 to 20 minutes at a time. Put a thin cloth between the ice and your skin. · If there is no swelling, you can put moist heat, a heating pad, or a warm cloth on your shoulder. Some doctors suggest alternating between hot and cold. · Rest your shoulder for a few days. If your doctor recommends it, you can then begin gentle exercise of the shoulder, but do not lift anything heavy. When should you call for help? Call 911 anytime you think you may need emergency care. For example, call if:    · You have chest pain or pressure.  This may occur with:  ? Sweating. ? Shortness of breath. ? Nausea or vomiting. ? Pain that spreads from the chest to the neck, jaw, or one or both shoulders or arms. ? Dizziness or lightheadedness. ? A fast or uneven pulse. After calling 911, chew 1 adult-strength aspirin. Wait for an ambulance. Do not try to drive yourself.     · Your arm or hand is cool or pale or changes color.    Call your doctor now or seek immediate medical care if:    · You have signs of infection, such as:  ? Increased pain, swelling, warmth, or redness in your shoulder. ? Red streaks leading from a place on your shoulder. ? Pus draining from an area of your shoulder. ? Swollen lymph nodes in your neck, armpits, or groin. ? A fever.    Watch closely for changes in your health, and be sure to contact your doctor if:    · You cannot use your shoulder.     · Your shoulder does not get better as expected. Where can you learn more? Go to http://oscar-wilfrido.info/. Enter A697 in the search box to learn more about \"Shoulder Pain: Care Instructions. \"  Current as of: June 26, 2019  Content Version: 12.2  © 7532-7153 [employer]. Care instructions adapted under license by [employer] (which disclaims liability or warranty for this information). If you have questions about a medical condition or this instruction, always ask your healthcare professional. [place] [address] any warranty or liability for your use of this information.

## 2020-03-05 NOTE — ED NOTES
4556: Patient arrives to ED with complaints of right shoulder pain that has been ongoing \"for a good while\". Patient reports that he finally went to a doctor this past week due to limited range of motion. Patient reports that he was painting at home tonight, which worsened his pain. 0530: I have reviewed discharge instructions with the patient. The patient verbalized understanding. Patient ambulatory out of ED to await ride from family.

## 2020-03-09 NOTE — ED PROVIDER NOTES
EMERGENCY DEPARTMENT HISTORY AND PHYSICAL EXAM      Date: 3/5/2020  Patient Name: Nadeen Luis    History of Presenting Illness     Chief Complaint   Patient presents with    Shoulder Pain     after painting tonight & was seen per MD x 2 days ago for same       History Provided By: Patient    HPI: Nadeen Luis, 53 y.o. male with PMHx significant for arthritis and primary sclerosing cholangitis presents the emergency room with right shoulder pain. Patient has a history of arthritis and is followed by a orthopedic specialist at the [place]. He is scheduled to have an MRI within a few weeks. 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 He is now having severe pain and is unable to abduct his arm above about 60 degrees. He denies any numbness, tingling, weakness. He denies any chest pain or shortness of breath. PCP: Kolton Jiang MD    No current facility-administered medications on file prior to encounter. Current Outpatient Medications on File Prior to Encounter   Medication Sig Dispense Refill    adalimumab (HUMIRA) 40 mg/0.8 mL injection by SubCUTAneous route once.  ondansetron (ZOFRAN ODT) 4 mg disintegrating tablet Take 1 Tab by mouth every six (6) hours as needed for Nausea. 20 Tab 0    mesalamine (CANASA) 1,000 mg suppository Insert 1 Suppository into rectum nightly. 30 Suppository 5    halobetasol (ULTRAVATE) 0.05 % topical cream Apply  to affected area two (2) times a day. use thin layer. Apply to ulcers.  15 g 0       Past History     Past Medical History:  Past Medical History:   Diagnosis Date    Arthritis     all joints and spine    C. difficile colitis 1/21/2019    Chest pain 02/18/2013    as of 10/14/14 pt denies any CP since 2/13    Crohn's colitis ([facility] [address]     Dr. Dio Dias H/O Clostridium difficile infection 01/2017    as of 3/30/17 pt denies abd pain, diarrhea > 1 week  Iron deficiency anemia 2/18/2013    Kidney infection 2018    Liver disease     Pancreatitis     Polyarteritis nodosa ([facility] [address]     Pouchitis ([address] 12/08/2011    PSC (primary sclerosing cholangitis)     Dr Domonique Toro [phone]    Pyoderma gangrenosum        Past Surgical History:  Past Surgical History:   Procedure Laterality Date    ABDOMEN SURGERY PROC UNLISTED  08/2002    colostomy reversal & J Pouch    ABDOMEN SURGERY PROC UNLISTED  06/2002    colostomy    COLONOSCOPY N/A 4/4/2017    COLONOSCOPY performed by Aj Roche MD at [facility] AMBULATORY OR    COLONOSCOPY N/A 1/15/2018    COLONOSCOPY performed by Aj Roche MD at [facility] ENDOSCOPY    COLONOSCOPY N/A 9/13/2018    COLONOSCOPY performed by Aj Roche MD at [facility] ENDOSCOPY    COLONOSCOPY N/A 1/21/2019    COLONOSCOPY W/ FECAL TRANSPLANT performed by Darren Calvin MD at [facility] ENDOSCOPY    COLONOSCOPY N/A 6/13/2019    COLONOSCOPY performed by Darren Calvin MD at [facility] ENDOSCOPY    COLONOSCOPY,DIAGNOSTIC  1/15/2018         COLONOSCOPY,DIAGNOSTIC  9/13/2018         COLONOSCOPY,DIAGNOSTIC  1/21/2019         COLONOSCOPY,DIAGNOSTIC  6/13/2019         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         HX GI      ercp    HX ROTATOR CUFF REPAIR Right 1999    MN COLONOSCOPY W/BIOPSY SINGLE/MULTIPLE  12/8/2011         MN EGD TRANSORAL BIOPSY SINGLE/MULTIPLE  2/18/2013            Family History:  Family History   Problem Relation Age of Onset    Diabetes Mother     Hypertension Mother     Arthritis-osteo Mother     Diabetes Father     Hypertension Father     Stroke Father     Arthritis-osteo Father     COPD Father         smoker    No Known Problems Sister     No Known Problems Brother     No Known Problems Maternal Aunt     No Known Problems Maternal Uncle     No Known Problems Paternal Aunt     No Known Problems Paternal Uncle     No Known Problems Maternal Grandmother     No Known Problems Maternal Grandfather     No Known Problems Paternal Grandmother     No Known Problems Paternal Grandfather        Social History:  Social History     Tobacco Use    Smoking status: Never Smoker    Smokeless tobacco: Never Used   Substance Use Topics    Alcohol use: No     Alcohol/week: 0.0 standard drinks    Drug use: No       Allergies: Allergies   Allergen Reactions    Cafergot [Ergotamine-Caffeine] Hives         Review of Systems   Review of Systems   Constitutional: Negative for chills and fever. HENT: Negative for congestion, ear pain, rhinorrhea and sore throat. Eyes: Negative. Respiratory: Negative for cough, chest tightness, shortness of breath and wheezing. Cardiovascular: Negative for chest pain, palpitations and leg swelling. Gastrointestinal: Negative for abdominal pain, constipation, diarrhea, nausea and vomiting. Genitourinary: Negative for dysuria, flank pain and hematuria. Musculoskeletal: Positive for arthralgias. Negative for back pain and myalgias. Skin: Negative for rash and wound. Neurological: Negative for syncope, light-headedness and headaches. Psychiatric/Behavioral: Negative for confusion. The patient is nervous/anxious.           Physical Exam   General appearance - well nourished, well appearing, and in no distress  Eyes - pupils equal and reactive, extraocular eye movements intact  ENT - mucous membranes moist, pharynx normal without lesions  Neck - supple, no significant adenopathy; non-tender to palpation  Chest - clear to auscultation, no wheezes, rales or rhonchi; non-tender to palpation  Heart - normal rate and regular rhythm, S1 and S2 normal, no murmurs noted  Abdomen - soft, nontender, nondistended, no masses or organomegaly  Musculoskeletal -tender to palpation right anterior and posterior shoulder, increased pain with abduction of arm, no deformity or swelling; decreased range of motion due to pain  Extremities - peripheral pulses normal, no pedal edema  Skin - normal coloration and turgor, no cammie  Neurological - alert, oriented x3, normal speech, no focal findings or movement disorder noted    Diagnostic Study Results     Labs -   No results found for this or any previous visit (from the past 12 hour(s)). Radiologic Studies -   No orders to display     CT Results  (Last 48 hours)    None        CXR Results  (Last 48 hours)    None            Medical Decision Making   I am the first provider for this patient. I reviewed the vital signs, available nursing notes, past medical history, past surgical history, family history and social history. Vital Signs-Reviewed the patient's vital signs. Records Reviewed: Nursing Notes and Old Medical Records    Provider Notes (Medical Decision Making):   Differential diagnosis:  arthritis, rotator cuff injury, sprain    ED Course:   Initial assessment performed. The patients presenting problems have been discussed, and they are in agreement with the care plan formulated and outlined with them. I have encouraged them to ask questions as they arise throughout their visit. Disposition:  Discharge home, follow-up with [facility] orthopedics as scheduled. PLAN:  1. Discharge Medication List as of 3/5/2020  5:23 AM      START taking these medications    Details   HYDROcodone-acetaminophen (NORCO) 5-325 mg per tablet Take 1 Tab by mouth every six (6) hours as needed for Pain for up to 3 days. Max Daily Amount: 4 Tabs., Print, Disp-12 Tab, R-0         CONTINUE these medications which have NOT CHANGED    Details   adalimumab (HUMIRA) 40 mg/0.8 mL injection by SubCUTAneous route once., Historical Med      ondansetron (ZOFRAN ODT) 4 mg disintegrating tablet Take 1 Tab by mouth every six (6) hours as needed for Nausea., Normal, Disp-20 Tab, R-0      mesalamine (CANASA) 1,000 mg suppository Insert 1 Suppository into rectum nightly. , Print, Disp-30 Suppository, R-5      halobetasol (ULTRAVATE) 0.05 % topical cream Apply  to affected area two (2) times a day. use thin layer. Apply to ulcers. , Print, Disp-15 g, R-0           2. Follow-up Information     Follow up With Specialties Details Why Contact Info    \[facility]\"" EMERGENCY DEPT Emergency Medicine  If symptoms worsen [address] [place] [address]  [phone]    Cristhian Ness MD [facility] Call  [address]  [phone]          Return to ED if worse     Diagnosis     Clinical Impression:   1.  Pain in joint of right shoulder

## 2020-04-22 ENCOUNTER — APPOINTMENT (OUTPATIENT)
Dept: GENERAL RADIOLOGY | Age: 45
End: 2020-04-22
Attending: EMERGENCY MEDICINE
Payer: OTHER GOVERNMENT

## 2020-04-22 ENCOUNTER — APPOINTMENT (OUTPATIENT)
Dept: ULTRASOUND IMAGING | Age: 45
End: 2020-04-22
Attending: EMERGENCY MEDICINE
Payer: OTHER GOVERNMENT

## 2020-04-22 ENCOUNTER — APPOINTMENT (OUTPATIENT)
Dept: CT IMAGING | Age: 45
End: 2020-04-22
Attending: EMERGENCY MEDICINE
Payer: OTHER GOVERNMENT

## 2020-04-22 ENCOUNTER — HOSPITAL ENCOUNTER (EMERGENCY)
Age: 45
Discharge: HOME | End: 2020-04-22
Attending: EMERGENCY MEDICINE
Payer: OTHER GOVERNMENT

## 2020-04-22 VITALS
OXYGEN SATURATION: 97 % | WEIGHT: 263.01 LBS | BODY MASS INDEX: 34.86 KG/M2 | HEART RATE: 79 BPM | HEIGHT: 73 IN | TEMPERATURE: 98.4 F | DIASTOLIC BLOOD PRESSURE: 86 MMHG | RESPIRATION RATE: 20 BRPM | SYSTOLIC BLOOD PRESSURE: 122 MMHG

## 2020-04-22 DIAGNOSIS — E88.09 HYPOALBUMINEMIA: ICD-10-CM

## 2020-04-22 DIAGNOSIS — R06.02 SOB (SHORTNESS OF BREATH): Primary | ICD-10-CM

## 2020-04-22 DIAGNOSIS — K74.3 PRIMARY BILIARY CIRRHOSIS (HCC): ICD-10-CM

## 2020-04-22 LAB
ALBUMIN SERPL-MCNC: 2.5 G/DL (ref 3.5–5)
ALBUMIN/GLOB SERPL: 0.5 {RATIO} (ref 1.1–2.2)
ALP SERPL-CCNC: 317 U/L (ref 45–117)
ALT SERPL-CCNC: 68 U/L (ref 12–78)
ANION GAP SERPL CALC-SCNC: 4 MMOL/L (ref 5–15)
APPEARANCE UR: CLEAR
AST SERPL-CCNC: 102 U/L (ref 15–37)
BACTERIA URNS QL MICRO: NEGATIVE /HPF
BASOPHILS # BLD: 0 K/UL (ref 0–0.1)
BASOPHILS NFR BLD: 1 % (ref 0–1)
BILIRUB SERPL-MCNC: 1.3 MG/DL (ref 0.2–1)
BILIRUB UR QL CFM: NEGATIVE
BNP SERPL-MCNC: 13 PG/ML
BUN SERPL-MCNC: 8 MG/DL (ref 6–20)
BUN/CREAT SERPL: 10 (ref 12–20)
CALCIUM SERPL-MCNC: 8.3 MG/DL (ref 8.5–10.1)
CHLORIDE SERPL-SCNC: 107 MMOL/L (ref 97–108)
CO2 SERPL-SCNC: 27 MMOL/L (ref 21–32)
COLOR UR: ABNORMAL
COMMENT, HOLDF: NORMAL
CREAT SERPL-MCNC: 0.8 MG/DL (ref 0.7–1.3)
D DIMER PPP FEU-MCNC: 1.1 MG/L FEU (ref 0–0.65)
DIFFERENTIAL METHOD BLD: ABNORMAL
EOSINOPHIL # BLD: 0.2 K/UL (ref 0–0.4)
EOSINOPHIL NFR BLD: 5 % (ref 0–7)
EPITH CASTS URNS QL MICRO: ABNORMAL /LPF
ERYTHROCYTE [DISTWIDTH] IN BLOOD BY AUTOMATED COUNT: 15.1 % (ref 11.5–14.5)
GLOBULIN SER CALC-MCNC: 4.6 G/DL (ref 2–4)
GLUCOSE SERPL-MCNC: 101 MG/DL (ref 65–100)
GLUCOSE UR STRIP.AUTO-MCNC: NEGATIVE MG/DL
HCT VFR BLD AUTO: 34.6 % (ref 36.6–50.3)
HGB BLD-MCNC: 11.6 G/DL (ref 12.1–17)
HGB UR QL STRIP: NEGATIVE
HYALINE CASTS URNS QL MICRO: ABNORMAL /LPF (ref 0–5)
IMM GRANULOCYTES # BLD AUTO: 0 K/UL (ref 0–0.04)
IMM GRANULOCYTES NFR BLD AUTO: 0 % (ref 0–0.5)
KETONES UR QL STRIP.AUTO: NEGATIVE MG/DL
LEUKOCYTE ESTERASE UR QL STRIP.AUTO: NEGATIVE
LYMPHOCYTES # BLD: 1.7 K/UL (ref 0.8–3.5)
LYMPHOCYTES NFR BLD: 35 % (ref 12–49)
MCH RBC QN AUTO: 29.5 PG (ref 26–34)
MCHC RBC AUTO-ENTMCNC: 33.5 G/DL (ref 30–36.5)
MCV RBC AUTO: 88 FL (ref 80–99)
MONOCYTES # BLD: 0.4 K/UL (ref 0–1)
MONOCYTES NFR BLD: 8 % (ref 5–13)
NEUTS SEG # BLD: 2.5 K/UL (ref 1.8–8)
NEUTS SEG NFR BLD: 52 % (ref 32–75)
NITRITE UR QL STRIP.AUTO: NEGATIVE
NRBC # BLD: 0 K/UL (ref 0–0.01)
NRBC BLD-RTO: 0 PER 100 WBC
PH UR STRIP: 6.5 [PH] (ref 5–8)
PLATELET # BLD AUTO: 82 K/UL (ref 150–400)
PMV BLD AUTO: 11.7 FL (ref 8.9–12.9)
POTASSIUM SERPL-SCNC: 3.5 MMOL/L (ref 3.5–5.1)
PROCALCITONIN SERPL-MCNC: 0.08 NG/ML
PROT SERPL-MCNC: 7.1 G/DL (ref 6.4–8.2)
PROT UR STRIP-MCNC: NEGATIVE MG/DL
RBC # BLD AUTO: 3.93 M/UL (ref 4.1–5.7)
RBC #/AREA URNS HPF: ABNORMAL /HPF (ref 0–5)
SAMPLES BEING HELD,HOLD: NORMAL
SODIUM SERPL-SCNC: 138 MMOL/L (ref 136–145)
SP GR UR REFRACTOMETRY: 1.02 (ref 1–1.03)
TROPONIN I SERPL-MCNC: 0.05 NG/ML
TROPONIN I SERPL-MCNC: <0.05 NG/ML
UA: UC IF INDICATED,UAUC: ABNORMAL
UROBILINOGEN UR QL STRIP.AUTO: 4 EU/DL (ref 0.2–1)
WBC # BLD AUTO: 4.8 K/UL (ref 4.1–11.1)
WBC URNS QL MICRO: ABNORMAL /HPF (ref 0–4)

## 2020-04-22 PROCEDURE — 71275 CT ANGIOGRAPHY CHEST: CPT

## 2020-04-22 PROCEDURE — 93971 EXTREMITY STUDY: CPT

## 2020-04-22 PROCEDURE — 99284 EMERGENCY DEPT VISIT MOD MDM: CPT

## 2020-04-22 PROCEDURE — 81001 URINALYSIS AUTO W/SCOPE: CPT

## 2020-04-22 PROCEDURE — 85379 FIBRIN DEGRADATION QUANT: CPT

## 2020-04-22 PROCEDURE — 36415 COLL VENOUS BLD VENIPUNCTURE: CPT

## 2020-04-22 PROCEDURE — 93005 ELECTROCARDIOGRAM TRACING: CPT

## 2020-04-22 PROCEDURE — 84145 PROCALCITONIN (PCT): CPT

## 2020-04-22 PROCEDURE — 84484 ASSAY OF TROPONIN QUANT: CPT

## 2020-04-22 PROCEDURE — 83880 ASSAY OF NATRIURETIC PEPTIDE: CPT

## 2020-04-22 PROCEDURE — 74011636320 HC RX REV CODE- 636/320: Performed by: EMERGENCY MEDICINE

## 2020-04-22 PROCEDURE — 85025 COMPLETE CBC W/AUTO DIFF WBC: CPT

## 2020-04-22 PROCEDURE — 71045 X-RAY EXAM CHEST 1 VIEW: CPT

## 2020-04-22 PROCEDURE — 80053 COMPREHEN METABOLIC PANEL: CPT

## 2020-04-22 RX ORDER — SODIUM CHLORIDE 0.9 % (FLUSH) 0.9 %
10 SYRINGE (ML) INJECTION
Status: COMPLETED | OUTPATIENT
Start: 2020-04-22 | End: 2020-04-22

## 2020-04-22 RX ADMIN — Medication 10 ML: at 17:47

## 2020-04-22 RX ADMIN — IOPAMIDOL 63 ML: 755 INJECTION, SOLUTION INTRAVENOUS at 17:47

## 2020-04-22 NOTE — DISCHARGE INSTRUCTIONS
Shortness of Breath: Care Instructions  Your Care Instructions  Shortness of breath has many causes. Sometimes conditions such as anxiety can lead to shortness of breath. Some people get mild shortness of breath when they exercise. Trouble breathing also can be a symptom of a serious problem, such as asthma, lung disease, emphysema, heart problems, and pneumonia. If your shortness of breath continues, you may need tests and treatment. Watch for any changes in your breathing and other symptoms. Follow-up care is a key part of your treatment and safety. Be sure to make and go to all appointments, and call your doctor if you are having problems. It's also a good idea to know your test results and keep a list of the medicines you take. How can you care for yourself at home? · Do not smoke or allow others to smoke around you. If you need help quitting, talk to your doctor about stop-smoking programs and medicines. These can increase your chances of quitting for good. · Get plenty of rest and sleep. · Take your medicines exactly as prescribed. Call your doctor if you think you are having a problem with your medicine. · Find healthy ways to deal with stress. ? Exercise daily. ? Get plenty of sleep. ? Eat regularly and well. When should you call for help? Call 911 anytime you think you may need emergency care. For example, call if:    · You have severe shortness of breath.     · You have symptoms of a heart attack. These may include:  ? Chest pain or pressure, or a strange feeling in the chest.  ? Sweating. ? Shortness of breath. ? Nausea or vomiting. ? Pain, pressure, or a strange feeling in the back, neck, jaw, or upper belly or in one or both shoulders or arms. ? Lightheadedness or sudden weakness. ? A fast or irregular heartbeat. After you call  911, the  may tell you to chew 1 adult-strength or 2 to 4 low-dose aspirin. Wait for an ambulance.  Do not try to drive yourself.    Call your doctor now or seek immediate medical care if:    · Your shortness of breath gets worse or you start to wheeze. Wheezing is a high-pitched sound when you breathe.     · You wake up at night out of breath or have to prop your head up on several pillows to breathe.     · You are short of breath after only light activity or while at rest.    Watch closely for changes in your health, and be sure to contact your doctor if:    · You do not get better over the next 1 to 2 days. Where can you learn more? Go to http://oscar-wilfrido.info/  Enter S780 in the search box to learn more about \"Shortness of Breath: Care Instructions. \"  Current as of: June 9, 2019Content Version: 12.4  © 6324-1941 [employer]. Care instructions adapted under license by [employer] (which disclaims liability or warranty for this information). If you have questions about a medical condition or this instruction, always ask your healthcare professional. [place] [address] any warranty or liability for your use of this information. Patient Education        Cirrhosis: Care Instructions  Your Care Instructions    Cirrhosis occurs when healthy tissue in your liver gets scarred. This keeps the liver from working well. It usually happens after a liver has been inflamed for years. Cirrhosis is most often caused by alcohol use disorder or hepatitis infection. But there are other causes too. These include medicines and too much fat in the liver. Conditions passed down in families and other disorders can also cause it. In some cases, no cause can be found. Treatment can't completely fix liver damage. But you may be able to slow or prevent more damage if you don't drink alcohol or use drugs that harm your liver. Follow-up care is a key part of your treatment and safety. Be sure to make and go to all appointments, and call your doctor if you are having problems.  It's also a good idea to know your test results and keep a list of the medicines you take. How can you care for yourself at home? · Do not drink any alcohol. It can harm your liver. Talk to your doctor if you need help to stop drinking. · Be safe with medicines. Take your medicines exactly as prescribed. Call your doctor if you think you are having a problem with your medicine. · Talk to your doctor before you take any other medicines. These include over-the-counter medicines and herbal products. · Be careful taking acetaminophen (Tylenol), ibuprofen (Advil, Motrin), or naproxen (Aleve). These can sometimes cause more liver damage. Talk with your doctor if you're not sure which medicines are safe. · If your cirrhosis causes extra fluid to build up in your body, try not to eat a lot of salt. Use less salt when you cook and at the table. Don't eat fast foods or snack foods with a lot of salt. Extra fluid in your belly, legs, and chest can cause serious problems. · Work with your doctor or a dietitian to be sure you eat the right amount of carbohydrate, protein, fat, and sodium (salt). It's very important to choose the best foods for the health of your liver. · If your doctor recommends it, limit how much fluid you drink. · If your doctor recommends it, get more exercise. Walking is a good choice. Bit by bit, increase the amount you walk every day. Try for at least 30 minutes on most days of the week. You also may want to swim, bike, or do other activities. When should you call for help? Call 911 anytime you think you may need emergency care.  For example, call if:    · You have trouble breathing.     · You vomit blood or what looks like coffee grounds.    Call your doctor now or seek immediate medical care if:    · You feel very sleepy or confused.     · You have new belly pain, or your pain gets worse.     · You have a fever.     · There is a new or increasing yellow tint to your skin or the whites of your eyes.     · You have any abnormal bleeding, such as:  ? Nosebleeds. ? Vaginal bleeding that is different (heavier, more frequent, at a different time of the month) than what you are used to.  ? Bloody or black stools, or rectal bleeding. ? Bloody or pink urine.    Watch closely for changes in your health, and be sure to contact your doctor if:    · You have any problems.     · Your belly is getting bigger.     · You are gaining weight. Where can you learn more? Go to http://oscar-wilfrido.info/  Enter M412 in the search box to learn more about \"Cirrhosis: Care Instructions. \"  Current as of: August 11, 2019Content Version: 12.4  © 0352-1512 [employer]. Care instructions adapted under license by [employer] (which disclaims liability or warranty for this information). If you have questions about a medical condition or this instruction, always ask your healthcare professional. [address] any warranty or liability for your use of this information.

## 2020-04-22 NOTE — ED TRIAGE NOTES
Assumed care of pt from triage. Pt is A&O x 4. Pt reports CC of shortness of breath for one month now. Pt says last night it has gotten increasingly worse. Pt called his PCP this morning and was told to come here and get checked out and tested for COVID since he has an autoimmune disorder. Pt has Crohn's disease. Explained to pt our guidelines for testing. Pt denies any fevers but does say that he has a dry cough. Pt has a colostomy bag and says he doesn't have any change in stool but he has been going a lot more recently. Pt denies NV. Pt resting on stretcher in POC with call bell in reach. Pt placed on monitor x 3. VSS at this time. 1551: X-ray at bedside    1801: US at bedside    1920: Pt discharged by Dr. Suyapa Doan. Pt provided with discharge instructions Rx and instructions on follow up care. Pt out of ED ambulatory.

## 2020-04-22 NOTE — ED PROVIDER NOTES
EMERGENCY DEPARTMENT HISTORY AND PHYSICAL EXAM      Date: 4/22/2020  Patient Name: Savage Cid    Please note that this dictation was completed with [employer], the computer voice recognition software. Quite often unanticipated grammatical, syntax, homophones, and other interpretive errors are inadvertently transcribed by the computer software. Please disregard these errors. Please excuse any errors that have escaped final proofreading. History of Presenting Illness     Chief Complaint   Patient presents with    Shortness of Breath     Patient states he has been having SOB x 1 month, pt states his SOB has gotten worse over last week.  Chest Pain     Pt c/o intermittent midsternal chest pressure for last week. Pt c/o dry cough, sore throat, nausea no vomiting, and more frequent stools than normal x 1 week. Pt states his PCP told him to come to ER to get tested for COVID19       History Provided By: Patient    HPI: Savage Cid, 53 y.o. male, with a past medical history significant for Crohn's disease, primary biliary cirrhosis on Humira presenting the emergency department complaining of shortness of breath.'s been going on for 1 month, progressively worsening. Worse with laying flat, feels unable to take a deep breath. Reports a intermittent nonproductive cough. No fevers or chills. Admits to bilateral lower extremity edema, but he states this is chronic for him with his pyoderma. He denies any fever. Denies any sick contacts. No nausea vomiting or diarrhea. No change in smell or taste. Does report some intermittent episodes of retrosternal chest discomfort described as pressure. No other exacerbating relieving factors or associated symptoms at this time    PCP: Macy Yepez MD    No current facility-administered medications on file prior to encounter.       Current Outpatient Medications on File Prior to Encounter   Medication Sig Dispense Refill    adalimumab (HUMIRA) 40 mg/0.8 mL injection by SubCUTAneous route once.  ondansetron (ZOFRAN ODT) 4 mg disintegrating tablet Take 1 Tab by mouth every six (6) hours as needed for Nausea. 20 Tab 0    mesalamine (CANASA) 1,000 mg suppository Insert 1 Suppository into rectum nightly. 30 Suppository 5    halobetasol (ULTRAVATE) 0.05 % topical cream Apply  to affected area two (2) times a day. use thin layer. Apply to ulcers.  15 g 0       Past History     Past Medical History:  Past Medical History:   Diagnosis Date    Arthritis     all joints and spine    C. difficile colitis 1/21/2019    Chest pain 02/18/2013    as of 10/14/14 pt denies any CP since 2/13    Crohn's colitis ([address]     Dr. Nanette Barton H/O Clostridium difficile infection 01/2017    as of 3/30/17 pt denies abd pain, diarrhea > 1 week    Iron deficiency anemia 2/18/2013    Kidney infection 2018    Liver disease     Pancreatitis     Polyarteritis nodosa ([address]     Pouchitis ([address] 12/08/2011    PSC (primary sclerosing cholangitis)     Dr Alexus Wilder [phone]    Pyoderma gangrenosum        Past Surgical History:  Past Surgical History:   Procedure Laterality Date    ABDOMEN SURGERY PROC UNLISTED  08/2002    colostomy reversal & J Pouch    ABDOMEN SURGERY PROC UNLISTED  06/2002    colostomy    COLONOSCOPY N/A 4/4/2017    COLONOSCOPY performed by Marcus Heller MD at [place] [id number] COLONOSCOPY N/A 1/15/2018    COLONOSCOPY performed by Marcus Heller MD at [facility] ENDOSCOPY    COLONOSCOPY N/A 9/13/2018    COLONOSCOPY performed by Marcus Heller MD at [facility] ENDOSCOPY    COLONOSCOPY N/A 1/21/2019    COLONOSCOPY W/ FECAL TRANSPLANT performed by Brad Rivero MD at [facility] ENDOSCOPY    COLONOSCOPY N/A 6/13/2019    COLONOSCOPY performed by Brad Rivero MD at [facility] ENDOSCOPY    COLONOSCOPY,DIAGNOSTIC  1/15/2018         COLONOSCOPY,DIAGNOSTIC  9/13/2018         COLONOSCOPY,DIAGNOSTIC  1/21/2019         COLONOSCOPY,DIAGNOSTIC  6/13/2019         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         HX GI      ercp  HX ROTATOR CUFF REPAIR Right 1999    ND COLONOSCOPY W/BIOPSY SINGLE/MULTIPLE  12/8/2011         ND EGD TRANSORAL BIOPSY SINGLE/MULTIPLE  2/18/2013            Family History:  Family History   Problem Relation Age of Onset    Diabetes Mother     Hypertension Mother     Arthritis-osteo Mother     Diabetes Father     Hypertension Father     Stroke Father     Arthritis-osteo Father     COPD Father         smoker    No Known Problems Sister     No Known Problems Brother     No Known Problems Maternal Aunt     No Known Problems Maternal Uncle     No Known Problems Paternal Aunt     No Known Problems Paternal Uncle     No Known Problems Maternal Grandmother     No Known Problems Maternal Grandfather     No Known Problems Paternal Grandmother     No Known Problems Paternal Grandfather        Social History:  Social History     Tobacco Use    Smoking status: Never Smoker    Smokeless tobacco: Never Used   Substance Use Topics    Alcohol use: No     Alcohol/week: 0.0 standard drinks    Drug use: No       Allergies: Allergies   Allergen Reactions    Cafergot [Ergotamine-Caffeine] Hives         Review of Systems   Review of Systems   Constitutional: Negative for chills and fever. HENT: Negative for congestion and sore throat. Eyes: Negative for visual disturbance. Respiratory: Positive for cough, chest tightness and shortness of breath. Cardiovascular: Positive for chest pain and leg swelling. Gastrointestinal: Negative for abdominal pain, blood in stool, diarrhea and nausea. Endocrine: Negative for polyuria. Genitourinary: Negative for dysuria and testicular pain. Musculoskeletal: Negative for arthralgias, joint swelling and myalgias. Skin: Negative for rash. Allergic/Immunologic: Negative for immunocompromised state. Neurological: Negative for weakness and headaches. Hematological: Does not bruise/bleed easily. Psychiatric/Behavioral: Negative for confusion. Physical Exam   Physical Exam  Vitals signs and nursing note reviewed. Constitutional:       Appearance: He is well-developed. HENT:      Head: Normocephalic and atraumatic. Nose:      Comments: Boggy nasal mucosa     Mouth/Throat:      Mouth: Mucous membranes are moist.   Eyes:      General:         Right eye: No discharge. Left eye: No discharge. Conjunctiva/sclera: Conjunctivae normal.      Pupils: Pupils are equal, round, and reactive to light. Neck:      Musculoskeletal: Normal range of motion and neck supple. Trachea: No tracheal deviation. Cardiovascular:      Rate and Rhythm: Normal rate and regular rhythm. Heart sounds: Normal heart sounds. No murmur. Pulmonary:      Effort: Pulmonary effort is normal. No respiratory distress. Breath sounds: No wheezing or rales. Comments: Patient mildly tachypneic, no respiratory distress. Lungs clear  Abdominal:      General: Bowel sounds are normal.      Palpations: Abdomen is soft. Tenderness: There is no abdominal tenderness. There is no guarding or rebound. Musculoskeletal: Normal range of motion. General: No tenderness or deformity. Right lower leg: Edema present. Left lower leg: Edema present. Skin:     General: Skin is warm and dry. Findings: No erythema or rash. Comments: Chronic wound and rash on the bilateral lower extremities patient reports this is consistent with his pyoderma. Neurological:      Mental Status: He is alert and oriented to person, place, and time.    Psychiatric:         Behavior: Behavior normal.         Diagnostic Study Results     Labs -     Recent Results (from the past 12 hour(s))   EKG, 12 LEAD, INITIAL    Collection Time: 04/22/20  3:24 PM   Result Value Ref Range    Ventricular Rate 82 BPM    Atrial Rate 82 BPM    P-R Interval 158 ms    QRS Duration 94 ms    Q-T Interval 360 ms    QTC Calculation (Bezet) 420 ms    Calculated P Axis 60 degrees Calculated R Axis 3 degrees    Calculated T Axis 25 degrees    Diagnosis       Normal sinus rhythm  Normal ECG  When compared with ECG of 24-JAN-2019 10:52,  Non-specific change in ST segment in Lateral leads  T wave inversion no longer evident in Inferior leads  T wave inversion no longer evident in Lateral leads     CBC WITH AUTOMATED DIFF    Collection Time: 04/22/20  4:25 PM   Result Value Ref Range    WBC 4.8 4.1 - 11.1 K/uL    RBC 3.93 (L) 4.10 - 5.70 M/uL    HGB 11.6 (L) 12.1 - 17.0 g/dL    HCT 34.6 (L) 36.6 - 50.3 %    MCV 88.0 80.0 - 99.0 FL    MCH 29.5 26.0 - 34.0 PG    MCHC 33.5 30.0 - 36.5 g/dL    RDW 15.1 (H) 11.5 - 14.5 %    PLATELET 82 (L) 787 - 400 K/uL    MPV 11.7 8.9 - 12.9 FL    NRBC 0.0 0  WBC    ABSOLUTE NRBC 0.00 0.00 - 0.01 K/uL    NEUTROPHILS 52 32 - 75 %    LYMPHOCYTES 35 12 - 49 %    MONOCYTES 8 5 - 13 %    EOSINOPHILS 5 0 - 7 %    BASOPHILS 1 0 - 1 %    IMMATURE GRANULOCYTES 0 0.0 - 0.5 %    ABS. NEUTROPHILS 2.5 1.8 - 8.0 K/UL    ABS. LYMPHOCYTES 1.7 0.8 - 3.5 K/UL    ABS. MONOCYTES 0.4 0.0 - 1.0 K/UL    ABS. EOSINOPHILS 0.2 0.0 - 0.4 K/UL    ABS. BASOPHILS 0.0 0.0 - 0.1 K/UL    ABS. IMM. GRANS. 0.0 0.00 - 0.04 K/UL    DF AUTOMATED     METABOLIC PANEL, COMPREHENSIVE    Collection Time: 04/22/20  4:25 PM   Result Value Ref Range    Sodium 138 136 - 145 mmol/L    Potassium 3.5 3.5 - 5.1 mmol/L    Chloride 107 97 - 108 mmol/L    CO2 27 21 - 32 mmol/L    Anion gap 4 (L) 5 - 15 mmol/L    Glucose 101 (H) 65 - 100 mg/dL    BUN 8 6 - 20 MG/DL    Creatinine 0.80 0.70 - 1.30 MG/DL    BUN/Creatinine ratio 10 (L) 12 - 20      GFR est AA >60 >60 ml/min/1.73m2    GFR est non-AA >60 >60 ml/min/1.73m2    Calcium 8.3 (L) 8.5 - 10.1 MG/DL    Bilirubin, total 1.3 (H) 0.2 - 1.0 MG/DL    ALT (SGPT) 68 12 - 78 U/L    AST (SGOT) 102 (H) 15 - 37 U/L    Alk.  phosphatase 317 (H) 45 - 117 U/L    Protein, total 7.1 6.4 - 8.2 g/dL    Albumin 2.5 (L) 3.5 - 5.0 g/dL    Globulin 4.6 (H) 2.0 - 4.0 g/dL    A-G Ratio 0.5 (L) 1.1 - 2.2     TROPONIN I    Collection Time: 04/22/20  4:25 PM   Result Value Ref Range    Troponin-I, Qt. 0.05 (H) <0.05 ng/mL   URINALYSIS W/ REFLEX CULTURE    Collection Time: 04/22/20  4:25 PM   Result Value Ref Range    Color DARK YELLOW      Appearance CLEAR CLEAR      Specific gravity 1.020 1.003 - 1.030      pH (UA) 6.5 5.0 - 8.0      Protein Negative NEG mg/dL    Glucose Negative NEG mg/dL    Ketone Negative NEG mg/dL    Blood Negative NEG      Urobilinogen 4.0 (H) 0.2 - 1.0 EU/dL    Nitrites Negative NEG      Leukocyte Esterase Negative NEG      WBC 0-4 0 - 4 /hpf    RBC 0-5 0 - 5 /hpf    Epithelial cells FEW FEW /lpf    Bacteria Negative NEG /hpf    UA:UC IF INDICATED CULTURE NOT INDICATED BY UA RESULT CNI      Hyaline cast 0-2 0 - 5 /lpf   SAMPLES BEING HELD    Collection Time: 04/22/20  4:25 PM   Result Value Ref Range    SAMPLES BEING HELD  1 SST     COMMENT        Add-on orders for these samples will be processed based on acceptable specimen integrity and analyte stability, which may vary by analyte. PROCALCITONIN    Collection Time: 04/22/20  4:25 PM   Result Value Ref Range    Procalcitonin 0.08 ng/mL   D DIMER    Collection Time: 04/22/20  4:25 PM   Result Value Ref Range    D-dimer 1.10 (H) 0.00 - 0.65 mg/L FEU   NT-PRO BNP    Collection Time: 04/22/20  4:25 PM   Result Value Ref Range    NT pro-BNP 13 <125 PG/ML   BILIRUBIN, CONFIRM    Collection Time: 04/22/20  4:25 PM   Result Value Ref Range    Bilirubin UA, confirm Negative NEG     TROPONIN I    Collection Time: 04/22/20  6:12 PM   Result Value Ref Range    Troponin-I, Qt. <0.05 <0.05 ng/mL       Radiologic Studies -   CTA CHEST W OR W WO CONT   Final Result   IMPRESSION:   1. No evidence of pulmonary embolus. 2.  Cardiomegaly. 3. Clear lungs. 4. Cirrhotic liver with portal hypertension, splenomegaly, and karlene hepatis   lymphadenopathy, new since prior study.  Recommend comparison to more recent   prior studies, if any are available. Patient's chart reports a history of   primary sclerosing cholangitis and inflammatory bowel disease. XR CHEST PORT   Final Result   IMPRESSION: No evidence of acute cardiopulmonary process. CT Results  (Last 48 hours)               04/22/20 1747  CTA CHEST W OR W WO CONT Final result    Impression:  IMPRESSION:   1. No evidence of pulmonary embolus. 2.  Cardiomegaly. 3. Clear lungs. 4. Cirrhotic liver with portal hypertension, splenomegaly, and karlene hepatis   lymphadenopathy, new since prior study. Recommend comparison to more recent   prior studies, if any are available. Patient's chart reports a history of   primary sclerosing cholangitis and inflammatory bowel disease. Narrative:  INDICATION:   elevated D dimer, dyspnea, chest pain        COMPARISON:  CT abdomen 1/9/2011       TECHNIQUE:  Following the uneventful intravenous administration of 100 cc Isovue   921, thin helical axial images were obtained through the chest. Postprocessing   was performed. 3D image postprocessing was performed. CT dose reduction was achieved through the use of a standardized protocol   tailored for this examination and automatic exposure control for dose   modulation. FINDINGS:       There are no enlarged mediastinal or hilar lymph nodes. The heart is enlarged. There is no pericardial effusion. No filling defect is seen within the pulmonary arterial system to suggest   pulmonary embolus. The aorta is normal in caliber. There is no focal air space disease. No pulmonary nodule or mass is seen. There   are no pleural effusions. Limited evaluation of the upper abdomen demonstrates a cirrhotic liver, which   represents a change from the prior study. There is heterogeneous enhancement of   the liver as well as karlene hepatis lymphadenopathy and splenomegaly. . The   osseous structures are unremarkable.                CXR Results  (Last 48 hours) 04/22/20 1556  XR CHEST PORT Final result    Impression:  IMPRESSION: No evidence of acute cardiopulmonary process. Narrative:  INDICATION: Chest pain. Shortness of breath. Cough. COMPARISON: January 28, 2019       FINDINGS: AP portable imaging of the chest performed at 3:52 PM demonstrates a   stable cardiomediastinal silhouette. The lungs are clear bilaterally. No   significant osseous abnormalities are seen. Medical Decision Making   I am the first provider for this patient. I reviewed the vital signs, available nursing notes, past medical history, past surgical history, family history and social history. Vital Signs-Reviewed the patient's vital signs. Patient Vitals for the past 12 hrs:   Temp Pulse Resp BP SpO2   04/22/20 1830  79  122/86 97 %   04/22/20 1536     97 %   04/22/20 1503 98.4 °F (36.9 °C) 83 20 150/86 98 %     Oxygen interpretation: SPO2 97% on room air with good waveform. EKG interpretation: (Preliminary)  EKG shows sinus rhythm, rate 82. No evidence of acute ischemic ST or T wave changes. Interpreted by me    Records Reviewed: Nursing Notes and Old Medical Records    Provider Notes (Medical Decision Making):   Patient evaluated found to be in no acute cardiopulmonary distress. Lungs are clear. Shortness of breath may be multifactorial, doubt coronavirus based off the history and physical.  Differential includes heart failure, third spacing, liver failure, renal failure, pneumonia, pulmonary embolus. Will obtain labs, imaging. Unfortunately at this time the patient would not qualify for testing for coronavirus here in the emergency department. ED Course:   Initial assessment performed. The patients presenting problems have been discussed, and they are in agreement with the care plan formulated and outlined with them. I have encouraged them to ask questions as they arise throughout their visit.            Reassessment:   Patient's labs show hypoalbuminemia and elevated liver enzymes consistent with his prior primary sclerosing cholangitis. He has an elevated d-dimer will obtain a CTA of the chest.  His troponin  0.05. This is above normal, but not significantly elevated. His EKG is nonischemic. Will recheck troponin. At this time I do not have a clear etiology of the patient's shortness of breath. Critical [place] Time:   none    Disposition:  DISCHARGE NOTE  Patients results have been reviewed with them. Patient and/or family have verbally conveyed their understanding and agreement of the patient's signs, symptoms, diagnosis, treatment and prognosis and additionally agree to follow up as recommended or return to the Emergency Room should their condition change or have any new concerns prior to their follow-up appointment. Patient verbally agrees with the care-plan and verbally conveys that all of their questions have been answered. Discharge instructions have also been provided to the patient with some educational information regarding their diagnosis as well a list of reasons why they would want to return to the ER prior to their follow-up appointment should their condition change. PLAN:  1. Discharge Medication List as of 4/22/2020  7:02 PM        2. Follow-up Information     Follow up With Specialties Details Why Contact Info    Asif Sifuentes MD [facility] Schedule an appointment as soon as possible for a visit  [address] [facility] [phone]      [facility] EMERGENCY DEPT Emergency Medicine  If symptoms worsen [address] [facility] [address]   [phone]  [address]  Schedule an appointment as soon as possible for a visit  [address] [facility] [address] [place] [id number] [phone]          Return to ED if worse     Diagnosis     Clinical Impression:   1. SOB (shortness of breath)    2. Primary biliary cirrhosis ([facility] [address]    3. Hypoalbuminemia        Attestations:   This note was completed by Forest Rowe, DO

## 2020-04-23 ENCOUNTER — PATIENT OUTREACH (OUTPATIENT)
Dept: CASE MANAGEMENT | Age: 45
End: 2020-04-23

## 2020-04-23 LAB
ATRIAL RATE: 82 BPM
CALCULATED P AXIS, ECG09: 60 DEGREES
CALCULATED R AXIS, ECG10: 3 DEGREES
CALCULATED T AXIS, ECG11: 25 DEGREES
DIAGNOSIS, 93000: NORMAL
P-R INTERVAL, ECG05: 158 MS
Q-T INTERVAL, ECG07: 360 MS
QRS DURATION, ECG06: 94 MS
QTC CALCULATION (BEZET), ECG08: 420 MS
VENTRICULAR RATE, ECG03: 82 BPM

## 2020-04-23 NOTE — PROGRESS NOTES
Patient contacted regarding recent discharge and COVID-19 risk   Care Transition Nurse/ Ambulatory Care Manager contacted the patient by telephone to perform post discharge assessment. Verified name and  with patient as identifiers. Patient has following risk factors of: no known risk factors. CTN/ACM reviewed discharge instructions, medical action plan and red flags related to discharge diagnosis. Reviewed and educated them on any new and changed medications related to discharge diagnosis. Advised obtaining a 90-day supply of all daily and as-needed medications. Education provided regarding infection prevention, and signs and symptoms of COVID-19 and when to seek medical attention with patient who verbalized understanding. Discussed exposure protocols and quarantine from [address] you at higher risk for severe illness  and given an opportunity for questions and concerns. The patient agrees to contact  PCP office for questions related to their healthcare. CTN/ACM provided contact information for future reference. From CDC: Are you at higher risk for severe illness?  Wash your hands often.  Avoid close contact (6 feet, which is about two arm lengths) with people who are sick.  Put distance between yourself and other people if COVID-19 is spreading in your community.  Clean and disinfect frequently touched surfaces.  Avoid all cruise travel and non-essential air travel.  Call your healthcare professional if you have concerns about COVID-19 and your underlying condition or if you are sick. For more information on steps you can take to protect yourself, see CDC's How to Protect Yourself      Patient/family/caregiver given information for GetWell Loop and agrees to enroll no  Patient's preferred e-mail:  declined  Patient's preferred phone number: declined  Based on Loop alert triggers, patient will be contacted by nurse care manager for worsening symptoms.     Plan for follow-up call in 7-14 days based on severity of symptoms and risk factors. Patient reports he is doing ok. Reports his SOB is unchanged. He is not having fever or any other viral sx's at this time. He does feel he should be tested for COVID and is going to urgent care for testing today. He has an appt with his liver specialist tomorrow and plans to call his PCP today for follow up. Patient declined Get Well loop. Patient has no other questions or concerns.

## 2020-05-07 ENCOUNTER — PATIENT OUTREACH (OUTPATIENT)
Dept: CASE MANAGEMENT | Age: 45
End: 2020-05-07

## 2020-05-07 NOTE — PROGRESS NOTES
Patient resolved from Transition of Care episode on 5/7/20  Patient/family has been provided the following resources and education related to COVID-19:                         Signs, symptoms and red flags related to COVID-19            CDC exposure and quarantine guidelines            [employer] exposure contact - [phone]            Contact for their local Department of Health                 Patient currently reports that the following symptoms have improved:  no new symptoms and patient reports feeling about the same but doing ok. Patient short but states he saw his cardiologist today for follow up and was told his liver and heart are enlarged. He did go for COVID test and he was negative. He has no other concerns or questions today. No further outreach scheduled with this CTN/ACM. Episode of Care resolved. Patient has this CTN/ACM contact information if future needs arise.

## 2020-11-07 NOTE — ED NOTES
Patient w/ extensive GI history arrives for abd pain for a few weeks but worsened in the last 3-4 days. Patient states that the pain started on the RLQ pain that radiates across. Feels like a cramping sensation. Patient reports that he tried to contact his GI without success. Patient reports that he has periods of decreased bowel movements interchanging with diarrhea. Denies blood in stool. Patient reports hx of cdiff from abx. Patient takes cipro every day for his Jpouch. No distress noted. Will continue to monitor. Home

## 2021-05-22 ENCOUNTER — HOSPITAL ENCOUNTER (EMERGENCY)
Age: 46
Discharge: HOME | End: 2021-05-23
Attending: EMERGENCY MEDICINE
Payer: OTHER GOVERNMENT

## 2021-05-22 VITALS
OXYGEN SATURATION: 99 % | TEMPERATURE: 98.3 F | HEIGHT: 72 IN | SYSTOLIC BLOOD PRESSURE: 136 MMHG | HEART RATE: 85 BPM | DIASTOLIC BLOOD PRESSURE: 74 MMHG | WEIGHT: 279.98 LBS | BODY MASS INDEX: 37.92 KG/M2 | RESPIRATION RATE: 18 BRPM

## 2021-05-22 DIAGNOSIS — I89.0 LYMPHEDEMA: ICD-10-CM

## 2021-05-22 DIAGNOSIS — M79.604 BILATERAL LEG PAIN: ICD-10-CM

## 2021-05-22 DIAGNOSIS — E87.6 ACUTE HYPOKALEMIA: ICD-10-CM

## 2021-05-22 DIAGNOSIS — M79.605 BILATERAL LEG PAIN: ICD-10-CM

## 2021-05-22 DIAGNOSIS — R60.0 BILATERAL EDEMA OF LOWER EXTREMITY: Primary | ICD-10-CM

## 2021-05-22 PROCEDURE — 96375 TX/PRO/DX INJ NEW DRUG ADDON: CPT

## 2021-05-22 PROCEDURE — 96374 THER/PROPH/DIAG INJ IV PUSH: CPT

## 2021-05-22 PROCEDURE — 93005 ELECTROCARDIOGRAM TRACING: CPT

## 2021-05-22 PROCEDURE — 99283 EMERGENCY DEPT VISIT LOW MDM: CPT

## 2021-05-23 ENCOUNTER — APPOINTMENT (OUTPATIENT)
Dept: ULTRASOUND IMAGING | Age: 46
End: 2021-05-23
Attending: EMERGENCY MEDICINE
Payer: OTHER GOVERNMENT

## 2021-05-23 LAB
ALBUMIN SERPL-MCNC: 2.3 G/DL (ref 3.5–5)
ALBUMIN/GLOB SERPL: 0.5 {RATIO} (ref 1.1–2.2)
ALP SERPL-CCNC: 195 U/L (ref 45–117)
ALT SERPL-CCNC: 31 U/L (ref 12–78)
ANION GAP SERPL CALC-SCNC: 4 MMOL/L (ref 5–15)
AST SERPL-CCNC: 60 U/L (ref 15–37)
ATRIAL RATE: 87 BPM
BASOPHILS # BLD: 0.1 K/UL (ref 0–0.1)
BASOPHILS NFR BLD: 1 % (ref 0–1)
BILIRUB SERPL-MCNC: 1 MG/DL (ref 0.2–1)
BNP SERPL-MCNC: <5 PG/ML
BUN SERPL-MCNC: 10 MG/DL (ref 6–20)
BUN/CREAT SERPL: 12 (ref 12–20)
CALCIUM SERPL-MCNC: 7.8 MG/DL (ref 8.5–10.1)
CALCULATED P AXIS, ECG09: 61 DEGREES
CALCULATED R AXIS, ECG10: 20 DEGREES
CALCULATED T AXIS, ECG11: 11 DEGREES
CHLORIDE SERPL-SCNC: 107 MMOL/L (ref 97–108)
CO2 SERPL-SCNC: 28 MMOL/L (ref 21–32)
CREAT SERPL-MCNC: 0.81 MG/DL (ref 0.7–1.3)
DIAGNOSIS, 93000: NORMAL
DIFFERENTIAL METHOD BLD: ABNORMAL
EOSINOPHIL # BLD: 0.4 K/UL (ref 0–0.4)
EOSINOPHIL NFR BLD: 7 % (ref 0–7)
ERYTHROCYTE [DISTWIDTH] IN BLOOD BY AUTOMATED COUNT: 16 % (ref 11.5–14.5)
GLOBULIN SER CALC-MCNC: 4.7 G/DL (ref 2–4)
GLUCOSE SERPL-MCNC: 95 MG/DL (ref 65–100)
HCT VFR BLD AUTO: 31.1 % (ref 36.6–50.3)
HGB BLD-MCNC: 10.4 G/DL (ref 12.1–17)
IMM GRANULOCYTES # BLD AUTO: 0 K/UL (ref 0–0.04)
IMM GRANULOCYTES NFR BLD AUTO: 0 % (ref 0–0.5)
LYMPHOCYTES # BLD: 2 K/UL (ref 0.8–3.5)
LYMPHOCYTES NFR BLD: 38 % (ref 12–49)
MCH RBC QN AUTO: 29.1 PG (ref 26–34)
MCHC RBC AUTO-ENTMCNC: 33.4 G/DL (ref 30–36.5)
MCV RBC AUTO: 87.1 FL (ref 80–99)
MONOCYTES # BLD: 0.5 K/UL (ref 0–1)
MONOCYTES NFR BLD: 9 % (ref 5–13)
NEUTS SEG # BLD: 2.2 K/UL (ref 1.8–8)
NEUTS SEG NFR BLD: 45 % (ref 32–75)
NRBC # BLD: 0 K/UL (ref 0–0.01)
NRBC BLD-RTO: 0 PER 100 WBC
P-R INTERVAL, ECG05: 164 MS
PLATELET # BLD AUTO: 83 K/UL (ref 150–400)
PLATELET COMMENTS,PCOM: ABNORMAL
PMV BLD AUTO: 11.7 FL (ref 8.9–12.9)
POTASSIUM SERPL-SCNC: 3 MMOL/L (ref 3.5–5.1)
PROT SERPL-MCNC: 7 G/DL (ref 6.4–8.2)
Q-T INTERVAL, ECG07: 356 MS
QRS DURATION, ECG06: 90 MS
QTC CALCULATION (BEZET), ECG08: 428 MS
RBC # BLD AUTO: 3.57 M/UL (ref 4.1–5.7)
RBC MORPH BLD: ABNORMAL
SODIUM SERPL-SCNC: 139 MMOL/L (ref 136–145)
VENTRICULAR RATE, ECG03: 87 BPM
WBC # BLD AUTO: 5.2 K/UL (ref 4.1–11.1)

## 2021-05-23 PROCEDURE — 83880 ASSAY OF NATRIURETIC PEPTIDE: CPT

## 2021-05-23 PROCEDURE — 80053 COMPREHEN METABOLIC PANEL: CPT

## 2021-05-23 PROCEDURE — 36415 COLL VENOUS BLD VENIPUNCTURE: CPT

## 2021-05-23 PROCEDURE — 74011250637 HC RX REV CODE- 250/637: Performed by: EMERGENCY MEDICINE

## 2021-05-23 PROCEDURE — 74011250636 HC RX REV CODE- 250/636: Performed by: EMERGENCY MEDICINE

## 2021-05-23 PROCEDURE — 96375 TX/PRO/DX INJ NEW DRUG ADDON: CPT

## 2021-05-23 PROCEDURE — 85025 COMPLETE CBC W/AUTO DIFF WBC: CPT

## 2021-05-23 PROCEDURE — 93970 EXTREMITY STUDY: CPT

## 2021-05-23 PROCEDURE — 96374 THER/PROPH/DIAG INJ IV PUSH: CPT

## 2021-05-23 RX ORDER — FUROSEMIDE 40 MG/1
40 TABLET ORAL DAILY
Qty: 20 TABLET | Refills: 0 | Status: SHIPPED | OUTPATIENT
Start: 2021-05-23 | End: 2021-06-12

## 2021-05-23 RX ORDER — NAPROXEN 500 MG/1
500 TABLET ORAL 2 TIMES DAILY WITH MEALS
Qty: 20 TABLET | Refills: 0 | Status: SHIPPED | OUTPATIENT
Start: 2021-05-23 | End: 2021-09-02

## 2021-05-23 RX ORDER — FUROSEMIDE 10 MG/ML
20 INJECTION INTRAMUSCULAR; INTRAVENOUS ONCE
Status: COMPLETED | OUTPATIENT
Start: 2021-05-23 | End: 2021-05-23

## 2021-05-23 RX ORDER — KETOROLAC TROMETHAMINE 30 MG/ML
30 INJECTION, SOLUTION INTRAMUSCULAR; INTRAVENOUS
Status: COMPLETED | OUTPATIENT
Start: 2021-05-23 | End: 2021-05-23

## 2021-05-23 RX ORDER — POTASSIUM CHLORIDE 750 MG/1
40 TABLET, FILM COATED, EXTENDED RELEASE ORAL
Status: COMPLETED | OUTPATIENT
Start: 2021-05-23 | End: 2021-05-23

## 2021-05-23 RX ADMIN — POTASSIUM CHLORIDE 40 MEQ: 750 TABLET, EXTENDED RELEASE ORAL at 02:51

## 2021-05-23 RX ADMIN — KETOROLAC TROMETHAMINE 30 MG: 30 INJECTION, SOLUTION INTRAMUSCULAR at 02:51

## 2021-05-23 RX ADMIN — FUROSEMIDE 20 MG: 10 INJECTION, SOLUTION INTRAMUSCULAR; INTRAVENOUS at 02:51

## 2021-05-23 NOTE — DISCHARGE INSTRUCTIONS
Thank you for allowing us to take care of you today! In this packet, I have included a copy of all your lab results and/or radiologic studies so you can have them easily available at your follow-up visit. We hope we addressed all of your concerns and needs. We strive to provide excellent quality care in the Emergency Department. You will receive a survey after your visit to evaluate the care you were provided. It was a pleasure serving you. Should you receive a survey from us, we invite you to share your experience with us. The exam and treatment you received in the Emergency Department were for an urgent problem and are not intended as complete care. It is important that you follow up with a doctor, nurse practitioner, or physician assistant for ongoing care. If your symptoms become worse or you do not improve as expected and you are unable to reach your usual health care provider, you should return to the Emergency Department. We are available 24 hours a day. Please take your discharge instructions with you when you go to your follow-up appointment. If you have any problem arranging a follow-up appointment, contact the Emergency Department immediately. If a prescription has been provided, please have it filled as soon as possible to prevent a delay in treatment. Read the entire medication instruction sheet provided to you by the pharmacy. If you have any questions or reservations about taking the medication due to side effects or interactions with other medications, please call your primary care physician or contact the ER to speak with the charge nurse. Make an appointment with your family doctor or the physician you were referred to for follow-up of this visit as instructed on your discharge paperwork. Return to the ER if you are unable to be seen or if you are unable to be seen in a timely manner.     If you have any problem arranging the follow-up visit, contact the Emergency Department immediately. I hope you feel better and thank you again for allowing us to provide you with excellent care today at [place] [address].! Warmest regards,    Sofia Story MD  Emergency Medicine Physician  [place] [address].      ______________________________________________________________________________________________    Vitals:    05/22/21 2234   BP: 136/74   BP 1 Location: Left arm   BP Patient Position: At rest   Pulse: 85   Resp: 18   Temp: 98.3 °F (36.8 °C)   SpO2: 99%   Weight: 127 kg (279 lb 15.8 oz)   Height: 6' (1.829 m)       Recent Results (from the past 12 hour(s))   EKG, 12 LEAD, INITIAL    Collection Time: 05/22/21 10:39 PM   Result Value Ref Range    Ventricular Rate 87 BPM    Atrial Rate 87 BPM    P-R Interval 164 ms    QRS Duration 90 ms    Q-T Interval 356 ms    QTC Calculation (Bezet) 428 ms    Calculated P Axis 61 degrees    Calculated R Axis 20 degrees    Calculated T Axis 11 degrees    Diagnosis       Normal sinus rhythm  Normal ECG  When compared with ECG of 22-APR-2020 15:24,  No significant change was found     CBC WITH AUTOMATED DIFF    Collection Time: 05/23/21 12:48 AM   Result Value Ref Range    WBC 5.2 4.1 - 11.1 K/uL    RBC 3.57 (L) 4.10 - 5.70 M/uL    HGB 10.4 (L) 12.1 - 17.0 g/dL    HCT 31.1 (L) 36.6 - 50.3 %    MCV 87.1 80.0 - 99.0 FL    MCH 29.1 26.0 - 34.0 PG    MCHC 33.4 30.0 - 36.5 g/dL    RDW 16.0 (H) 11.5 - 14.5 %    PLATELET 83 (L) 159 - 400 K/uL    MPV 11.7 8.9 - 12.9 FL    NRBC 0.0 0  WBC    ABSOLUTE NRBC 0.00 0.00 - 0.01 K/uL    NEUTROPHILS 45 32 - 75 %    LYMPHOCYTES 38 12 - 49 %    MONOCYTES 9 5 - 13 %    EOSINOPHILS 7 0 - 7 %    BASOPHILS 1 0 - 1 %    IMMATURE GRANULOCYTES 0 0.0 - 0.5 %    ABS. NEUTROPHILS 2.2 1.8 - 8.0 K/UL    ABS. LYMPHOCYTES 2.0 0.8 - 3.5 K/UL    ABS. MONOCYTES 0.5 0.0 - 1.0 K/UL    ABS. EOSINOPHILS 0.4 0.0 - 0.4 K/UL    ABS. BASOPHILS 0.1 0.0 - 0.1 K/UL    ABS. IMM. GRANS. 0.0 0.00 - 0.04 K/UL    DF SMEAR SCANNED      PLATELET COMMENTS DECREASED PLATELETS      RBC COMMENTS ANISOCYTOSIS  1+       METABOLIC PANEL, COMPREHENSIVE    Collection Time: 05/23/21 12:48 AM   Result Value Ref Range    Sodium 139 136 - 145 mmol/L    Potassium 3.0 (L) 3.5 - 5.1 mmol/L    Chloride 107 97 - 108 mmol/L    CO2 28 21 - 32 mmol/L    Anion gap 4 (L) 5 - 15 mmol/L    Glucose 95 65 - 100 mg/dL    BUN 10 6 - 20 MG/DL    Creatinine 0.81 0.70 - 1.30 MG/DL    BUN/Creatinine ratio 12 12 - 20      GFR est AA >60 >60 ml/min/1.73m2    GFR est non-AA >60 >60 ml/min/1.73m2    Calcium 7.8 (L) 8.5 - 10.1 MG/DL    Bilirubin, total 1.0 0.2 - 1.0 MG/DL    ALT (SGPT) 31 12 - 78 U/L    AST (SGOT) 60 (H) 15 - 37 U/L    Alk.  phosphatase 195 (H) 45 - 117 U/L    Protein, total 7.0 6.4 - 8.2 g/dL    Albumin 2.3 (L) 3.5 - 5.0 g/dL    Globulin 4.7 (H) 2.0 - 4.0 g/dL    A-G Ratio 0.5 (L) 1.1 - 2.2     NT-PRO BNP    Collection Time: 05/23/21 12:48 AM   Result Value Ref Range    NT pro-BNP <5 <125 PG/ML       No orders to display     CT Results  (Last 48 hours)      None

## 2021-06-28 ENCOUNTER — TRANSCRIBE ORDER (OUTPATIENT)
Dept: SCHEDULING | Age: 46
End: 2021-06-28

## 2021-06-28 DIAGNOSIS — K74.60 CIRRHOSIS OF LIVER NOT DUE TO ALCOHOL (HCC): ICD-10-CM

## 2021-06-28 DIAGNOSIS — K83.01 PRIMARY SCLEROSING CHOLANGITIS: ICD-10-CM

## 2021-06-28 DIAGNOSIS — K52.9 INFLAMMATORY BOWEL DISEASE: ICD-10-CM

## 2021-06-28 DIAGNOSIS — R60.0 EDEMA LEG: Primary | ICD-10-CM

## 2021-07-21 ENCOUNTER — HOSPITAL ENCOUNTER (OUTPATIENT)
Dept: CT IMAGING | Age: 46
Discharge: HOME | End: 2021-07-21
Attending: PHYSICIAN ASSISTANT
Payer: OTHER GOVERNMENT

## 2021-07-21 DIAGNOSIS — K83.01 PRIMARY SCLEROSING CHOLANGITIS: ICD-10-CM

## 2021-07-21 DIAGNOSIS — K74.60 CIRRHOSIS OF LIVER NOT DUE TO ALCOHOL (HCC): ICD-10-CM

## 2021-07-21 DIAGNOSIS — K52.9 INFLAMMATORY BOWEL DISEASE: ICD-10-CM

## 2021-07-21 DIAGNOSIS — R60.0 EDEMA LEG: ICD-10-CM

## 2021-07-21 PROCEDURE — 74011000250 HC RX REV CODE- 250: Performed by: PHYSICIAN ASSISTANT

## 2021-07-21 PROCEDURE — 74011000636 HC RX REV CODE- 636: Performed by: PHYSICIAN ASSISTANT

## 2021-07-21 PROCEDURE — 74177 CT ABD & PELVIS W/CONTRAST: CPT

## 2021-07-21 RX ORDER — BARIUM SULFATE 20 MG/ML
900 SUSPENSION ORAL
Status: COMPLETED | OUTPATIENT
Start: 2021-07-21 | End: 2021-07-21

## 2021-07-21 RX ADMIN — BARIUM SULFATE 900 ML: 21 SUSPENSION ORAL at 13:57

## 2021-07-21 RX ADMIN — IOPAMIDOL 100 ML: 755 INJECTION, SOLUTION INTRAVENOUS at 13:56

## 2021-09-01 ENCOUNTER — APPOINTMENT (OUTPATIENT)
Dept: GENERAL RADIOLOGY | Age: 46
DRG: 386 | End: 2021-09-01
Attending: EMERGENCY MEDICINE
Payer: OTHER GOVERNMENT

## 2021-09-01 ENCOUNTER — HOSPITAL ENCOUNTER (INPATIENT)
Age: 46
LOS: 3 days | Discharge: HOME | DRG: 386 | End: 2021-09-04
Attending: EMERGENCY MEDICINE | Admitting: INTERNAL MEDICINE
Payer: OTHER GOVERNMENT

## 2021-09-01 DIAGNOSIS — K50.90 EXACERBATION OF CROHN'S DISEASE WITHOUT COMPLICATION (HCC): Primary | ICD-10-CM

## 2021-09-01 LAB
ALBUMIN SERPL-MCNC: 2.3 G/DL (ref 3.5–5)
ALBUMIN/GLOB SERPL: 0.4 {RATIO} (ref 1.1–2.2)
ALP SERPL-CCNC: 204 U/L (ref 45–117)
ALT SERPL-CCNC: 50 U/L (ref 12–78)
ANION GAP SERPL CALC-SCNC: 2 MMOL/L (ref 5–15)
APPEARANCE UR: CLEAR
AST SERPL-CCNC: 62 U/L (ref 15–37)
BACTERIA URNS QL MICRO: NEGATIVE /HPF
BILIRUB SERPL-MCNC: 1.9 MG/DL (ref 0.2–1)
BILIRUB UR QL: NEGATIVE
BUN SERPL-MCNC: 13 MG/DL (ref 6–20)
BUN/CREAT SERPL: 15 (ref 12–20)
CALCIUM SERPL-MCNC: 7.7 MG/DL (ref 8.5–10.1)
CHLORIDE SERPL-SCNC: 106 MMOL/L (ref 97–108)
CO2 SERPL-SCNC: 30 MMOL/L (ref 21–32)
COLOR UR: ABNORMAL
COVID-19 RAPID TEST, COVR: NOT DETECTED
CREAT SERPL-MCNC: 0.84 MG/DL (ref 0.7–1.3)
EPITH CASTS URNS QL MICRO: ABNORMAL /LPF
ERYTHROCYTE [DISTWIDTH] IN BLOOD BY AUTOMATED COUNT: 17.9 % (ref 11.5–14.5)
GLOBULIN SER CALC-MCNC: 5.3 G/DL (ref 2–4)
GLUCOSE SERPL-MCNC: 98 MG/DL (ref 65–100)
GLUCOSE UR STRIP.AUTO-MCNC: NEGATIVE MG/DL
HCT VFR BLD AUTO: 32.6 % (ref 36.6–50.3)
HGB BLD-MCNC: 10.5 G/DL (ref 12.1–17)
HGB UR QL STRIP: NEGATIVE
HYALINE CASTS URNS QL MICRO: ABNORMAL /LPF (ref 0–5)
KETONES UR QL STRIP.AUTO: NEGATIVE MG/DL
LEUKOCYTE ESTERASE UR QL STRIP.AUTO: NEGATIVE
LIPASE SERPL-CCNC: 177 U/L (ref 73–393)
MCH RBC QN AUTO: 27.4 PG (ref 26–34)
MCHC RBC AUTO-ENTMCNC: 32.2 G/DL (ref 30–36.5)
MCV RBC AUTO: 85.1 FL (ref 80–99)
NITRITE UR QL STRIP.AUTO: NEGATIVE
NRBC # BLD: 0 K/UL (ref 0–0.01)
NRBC BLD-RTO: 0 PER 100 WBC
PH UR STRIP: 8 [PH] (ref 5–8)
PLATELET # BLD AUTO: 112 K/UL (ref 150–400)
PMV BLD AUTO: 11.9 FL (ref 8.9–12.9)
POTASSIUM SERPL-SCNC: 3.6 MMOL/L (ref 3.5–5.1)
PROT SERPL-MCNC: 7.6 G/DL (ref 6.4–8.2)
PROT UR STRIP-MCNC: ABNORMAL MG/DL
RBC # BLD AUTO: 3.83 M/UL (ref 4.1–5.7)
RBC #/AREA URNS HPF: ABNORMAL /HPF (ref 0–5)
SARS-COV-2, COV2: NORMAL
SARS-COV-2, COV2: NORMAL
SODIUM SERPL-SCNC: 138 MMOL/L (ref 136–145)
SOURCE, COVRS: NORMAL
SP GR UR REFRACTOMETRY: 1.03 (ref 1–1.03)
TROPONIN I SERPL-MCNC: <0.05 NG/ML
UA: UC IF INDICATED,UAUC: ABNORMAL
UROBILINOGEN UR QL STRIP.AUTO: 1 EU/DL (ref 0.2–1)
WBC # BLD AUTO: 13.6 K/UL (ref 4.1–11.1)
WBC URNS QL MICRO: ABNORMAL /HPF (ref 0–4)

## 2021-09-01 PROCEDURE — 74011250636 HC RX REV CODE- 250/636: Performed by: INTERNAL MEDICINE

## 2021-09-01 PROCEDURE — 36415 COLL VENOUS BLD VENIPUNCTURE: CPT

## 2021-09-01 PROCEDURE — 65270000029 HC RM PRIVATE

## 2021-09-01 PROCEDURE — 81001 URINALYSIS AUTO W/SCOPE: CPT

## 2021-09-01 PROCEDURE — 74011000258 HC RX REV CODE- 258: Performed by: EMERGENCY MEDICINE

## 2021-09-01 PROCEDURE — 99283 EMERGENCY DEPT VISIT LOW MDM: CPT

## 2021-09-01 PROCEDURE — 83690 ASSAY OF LIPASE: CPT

## 2021-09-01 PROCEDURE — 87635 SARS-COV-2 COVID-19 AMP PRB: CPT

## 2021-09-01 PROCEDURE — 71045 X-RAY EXAM CHEST 1 VIEW: CPT

## 2021-09-01 PROCEDURE — 96375 TX/PRO/DX INJ NEW DRUG ADDON: CPT

## 2021-09-01 PROCEDURE — 80053 COMPREHEN METABOLIC PANEL: CPT

## 2021-09-01 PROCEDURE — 84484 ASSAY OF TROPONIN QUANT: CPT

## 2021-09-01 PROCEDURE — 74011250636 HC RX REV CODE- 250/636: Performed by: EMERGENCY MEDICINE

## 2021-09-01 PROCEDURE — 96365 THER/PROPH/DIAG IV INF INIT: CPT

## 2021-09-01 PROCEDURE — 85027 COMPLETE CBC AUTOMATED: CPT

## 2021-09-01 PROCEDURE — U0005 INFEC AGEN DETEC AMPLI PROBE: HCPCS

## 2021-09-01 PROCEDURE — 93005 ELECTROCARDIOGRAM TRACING: CPT

## 2021-09-01 RX ORDER — ONDANSETRON 2 MG/ML
4 INJECTION INTRAMUSCULAR; INTRAVENOUS
Status: DISCONTINUED | OUTPATIENT
Start: 2021-09-01 | End: 2021-09-04 | Stop reason: HOSPADM

## 2021-09-01 RX ORDER — POLYETHYLENE GLYCOL 3350 17 G/17G
17 POWDER, FOR SOLUTION ORAL DAILY PRN
Status: DISCONTINUED | OUTPATIENT
Start: 2021-09-01 | End: 2021-09-04 | Stop reason: HOSPADM

## 2021-09-01 RX ORDER — MORPHINE SULFATE 2 MG/ML
4 INJECTION, SOLUTION INTRAMUSCULAR; INTRAVENOUS
Status: COMPLETED | OUTPATIENT
Start: 2021-09-01 | End: 2021-09-01

## 2021-09-01 RX ORDER — ONDANSETRON 2 MG/ML
4 INJECTION INTRAMUSCULAR; INTRAVENOUS
Status: COMPLETED | OUTPATIENT
Start: 2021-09-01 | End: 2021-09-01

## 2021-09-01 RX ORDER — ALBUTEROL SULFATE 0.83 MG/ML
2.5 SOLUTION RESPIRATORY (INHALATION)
Status: DISCONTINUED | OUTPATIENT
Start: 2021-09-01 | End: 2021-09-04 | Stop reason: HOSPADM

## 2021-09-01 RX ORDER — ENOXAPARIN SODIUM 100 MG/ML
40 INJECTION SUBCUTANEOUS DAILY
Status: DISCONTINUED | OUTPATIENT
Start: 2021-09-02 | End: 2021-09-04 | Stop reason: HOSPADM

## 2021-09-01 RX ORDER — SODIUM CHLORIDE 0.9 % (FLUSH) 0.9 %
5-40 SYRINGE (ML) INJECTION EVERY 8 HOURS
Status: DISCONTINUED | OUTPATIENT
Start: 2021-09-01 | End: 2021-09-04 | Stop reason: HOSPADM

## 2021-09-01 RX ORDER — SODIUM CHLORIDE 0.9 % (FLUSH) 0.9 %
5-40 SYRINGE (ML) INJECTION AS NEEDED
Status: DISCONTINUED | OUTPATIENT
Start: 2021-09-01 | End: 2021-09-04 | Stop reason: HOSPADM

## 2021-09-01 RX ORDER — METRONIDAZOLE 500 MG/100ML
500 INJECTION, SOLUTION INTRAVENOUS EVERY 12 HOURS
Status: DISCONTINUED | OUTPATIENT
Start: 2021-09-01 | End: 2021-09-04 | Stop reason: HOSPADM

## 2021-09-01 RX ORDER — LEVOFLOXACIN 5 MG/ML
750 INJECTION, SOLUTION INTRAVENOUS EVERY 24 HOURS
Status: DISCONTINUED | OUTPATIENT
Start: 2021-09-02 | End: 2021-09-02

## 2021-09-01 RX ORDER — ACETAMINOPHEN 650 MG/1
650 SUPPOSITORY RECTAL
Status: DISCONTINUED | OUTPATIENT
Start: 2021-09-01 | End: 2021-09-04 | Stop reason: HOSPADM

## 2021-09-01 RX ORDER — ACETAMINOPHEN 325 MG/1
650 TABLET ORAL
Status: DISCONTINUED | OUTPATIENT
Start: 2021-09-01 | End: 2021-09-04 | Stop reason: HOSPADM

## 2021-09-01 RX ORDER — ONDANSETRON 4 MG/1
4 TABLET, ORALLY DISINTEGRATING ORAL
Status: DISCONTINUED | OUTPATIENT
Start: 2021-09-01 | End: 2021-09-04 | Stop reason: HOSPADM

## 2021-09-01 RX ORDER — MORPHINE SULFATE 2 MG/ML
1 INJECTION, SOLUTION INTRAMUSCULAR; INTRAVENOUS
Status: DISCONTINUED | OUTPATIENT
Start: 2021-09-01 | End: 2021-09-04 | Stop reason: HOSPADM

## 2021-09-01 RX ORDER — ACETAMINOPHEN 325 MG/1
650 TABLET ORAL
Status: DISCONTINUED | OUTPATIENT
Start: 2021-09-01 | End: 2021-09-01 | Stop reason: SDUPTHER

## 2021-09-01 RX ORDER — FENTANYL CITRATE 50 UG/ML
50 INJECTION, SOLUTION INTRAMUSCULAR; INTRAVENOUS
Status: COMPLETED | OUTPATIENT
Start: 2021-09-01 | End: 2021-09-01

## 2021-09-01 RX ADMIN — METHYLPREDNISOLONE SODIUM SUCCINATE 125 MG: 125 INJECTION, POWDER, FOR SOLUTION INTRAMUSCULAR; INTRAVENOUS at 19:42

## 2021-09-01 RX ADMIN — Medication 10 ML: at 22:23

## 2021-09-01 RX ADMIN — ONDANSETRON 4 MG: 2 INJECTION INTRAMUSCULAR; INTRAVENOUS at 20:36

## 2021-09-01 RX ADMIN — FENTANYL CITRATE 50 MCG: 50 INJECTION, SOLUTION INTRAMUSCULAR; INTRAVENOUS at 19:41

## 2021-09-01 RX ADMIN — PIPERACILLIN AND TAZOBACTAM 3.38 G: 3; .375 INJECTION, POWDER, LYOPHILIZED, FOR SOLUTION INTRAVENOUS at 19:42

## 2021-09-01 RX ADMIN — METRONIDAZOLE 500 MG: 500 INJECTION, SOLUTION INTRAVENOUS at 22:22

## 2021-09-01 RX ADMIN — MORPHINE SULFATE 4 MG: 2 INJECTION, SOLUTION INTRAMUSCULAR; INTRAVENOUS at 21:08

## 2021-09-01 NOTE — ED PROVIDER NOTES
EMERGENCY DEPARTMENT HISTORY AND PHYSICAL EXAM      Date: 9/1/2021  Patient Name: Wilian Gordon  Patient Age and Sex: 50 y.o. male     History of Presenting Illness     Chief Complaint   Patient presents with    Positive For Covid-19     + for COVID on Tuesday. reports cough has resolved but hes having CP going into his R shoulder/arm    Abdominal Pain     seen at "[facility]" and had a ct of abdomen that showed inflamation and infection. They were going to admitt him but he left because they did not have a room       History Provided By: Patient    HPI: Wilian Gordon is a 27-year-old male presenting for abdominal pain. Patient states that for the last couple of days has been having severe lower abdominal pain. He has a history of primary sclerosing cholangitis as well as history of Crohn's disease followed by Dr. Mahesh Burk. Patient states that he went to [facility] and a CT showed that he had signs of Crohn's disease. They try to transfer him to "[facility]" for admission however there were no beds so they tried outpatient. Denies being placed on any steroids, any antibiotics. He takes Humira at baseline. Follow-up with Dr. Mahesh Burk who told him to come to the ER if he felt worse than he did. He did have a test for Covid for screening purposes and it came back positive though he is not having any symptoms currently. Denies any diarrhea, constipation, nausea, vomiting with this pain. There are no other complaints, changes, or physical findings at this time. PCP: Jarad Hayes MD    No current facility-administered medications on file prior to encounter. Current Outpatient Medications on File Prior to Encounter   Medication Sig Dispense Refill    naproxen (NAPROSYN) 500 mg tablet Take 1 Tablet by mouth two (2) times daily (with meals). 20 Tablet 0    adalimumab (HUMIRA) 40 mg/0.8 mL injection by SubCUTAneous route once.       ondansetron (ZOFRAN ODT) 4 mg disintegrating tablet Take 1 Tab by mouth every six (6) hours as needed for Nausea. 20 Tab 0    mesalamine (CANASA) 1,000 mg suppository Insert 1 Suppository into rectum nightly. 30 Suppository 5    halobetasol (ULTRAVATE) 0.05 % topical cream Apply  to affected area two (2) times a day. use thin layer. Apply to ulcers.  15 g 0       Past History     Past Medical History:  Past Medical History:   Diagnosis Date    Arthritis     all joints and spine    C. difficile colitis 1/21/2019    Chest pain 02/18/2013    as of 10/14/14 pt denies any CP since 2/13    Crohn's colitis ([facility]     Dr. Nathalia Green H/O Clostridium difficile infection 01/2017    as of 3/30/17 pt denies abd pain, diarrhea > 1 week    Iron deficiency anemia 2/18/2013    Kidney infection 2018    Liver disease     Pancreatitis     Polyarteritis nodosa ([facility]     Pouchitis ([facility] [address] 12/08/2011    PSC (primary sclerosing cholangitis)     Dr Dc Aguilera [phone]    Pyoderma gangrenosum        Past Surgical History:  Past Surgical History:   Procedure Laterality Date    COLONOSCOPY N/A 4/4/2017    COLONOSCOPY performed by [facility]ha Brandon MD at [facility] AMBULATORY OR    COLONOSCOPY N/A 1/15/2018    COLONOSCOPY performed by Radha Brandon MD at [facility] ENDOSCOPY    COLONOSCOPY N/A 9/13/2018    COLONOSCOPY performed by Radha Brandon MD at [facility] ENDOSCOPY    COLONOSCOPY N/A 1/21/2019    COLONOSCOPY W/ FECAL TRANSPLANT performed by Perla Mauricio MD at [facility] ENDOSCOPY    COLONOSCOPY N/A 6/13/2019    COLONOSCOPY performed by Perla Mauricio MD at [facility] ENDOSCOPY    COLONOSCOPY,DIAGNOSTIC  1/15/2018         COLONOSCOPY,DIAGNOSTIC  9/13/2018         COLONOSCOPY,DIAGNOSTIC  1/21/2019         COLONOSCOPY,DIAGNOSTIC  6/13/2019         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         HX GI      ercp    HX ROTATOR CUFF REPAIR Right 1999    SC ABDOMEN SURGERY PROC UNLISTED  08/2002    colostomy reversal & J Pouch    SC ABDOMEN SURGERY PROC UNLISTED  06/2002    colostomy    SC COLONOSCOPY W/BIOPSY SINGLE/MULTIPLE  12/8/2011         SC EGD TRANSORAL BIOPSY SINGLE/MULTIPLE  2/18/2013            Family History:  Family History   Problem Relation Age of Onset    Diabetes Mother     Hypertension Mother     Arthritis-osteo Mother     Diabetes Father     Hypertension Father     Stroke Father     Arthritis-osteo Father     COPD Father         smoker    No Known Problems Sister     No Known Problems Brother     No Known Problems Maternal Aunt     No Known Problems Maternal Uncle     No Known Problems Paternal Aunt     No Known Problems Paternal Uncle     No Known Problems Maternal Grandmother     No Known Problems Maternal Grandfather     No Known Problems Paternal Grandmother     No Known Problems Paternal Grandfather        Social History:  Social History     Tobacco Use    Smoking status: Never Smoker    Smokeless tobacco: Never Used   Substance Use Topics    Alcohol use: No     Alcohol/week: 0.0 standard drinks    Drug use: No       Allergies: Allergies   Allergen Reactions    Cafergot [Ergotamine-Caffeine] Hives         Review of Systems   Review of Systems   Constitutional: Negative for chills and fever. Respiratory: Negative for cough and shortness of breath. Cardiovascular: Negative for chest pain. Gastrointestinal: Positive for abdominal pain. Negative for constipation, diarrhea, nausea and vomiting. Genitourinary: Negative for dysuria, frequency and hematuria. Neurological: Negative for weakness and numbness. All other systems reviewed and are negative. Physical Exam   Physical Exam  Vitals and nursing note reviewed. Constitutional:       Appearance: He is well-developed. HENT:      Head: Normocephalic and atraumatic. Nose: Nose normal.      Mouth/Throat:      Mouth: Mucous membranes are moist.   Eyes:      Extraocular Movements: Extraocular movements intact. Conjunctiva/sclera: Conjunctivae normal.   Cardiovascular:      Rate and Rhythm: Normal rate and regular rhythm.    Pulmonary:      Effort: Pulmonary effort is normal. No respiratory distress. Breath sounds: Normal breath sounds. Abdominal:      General: There is no distension. Palpations: Abdomen is soft. Tenderness: There is abdominal tenderness in the right lower quadrant, suprapubic area and left lower quadrant. Musculoskeletal:         General: Normal range of motion. Cervical back: Normal range of motion and neck supple. Skin:     General: Skin is warm and dry. Neurological:      General: No focal deficit present. Mental Status: He is alert and oriented to person, place, and time. Mental status is at baseline.    Psychiatric:         Mood and Affect: Mood normal.          Diagnostic Study Results     Labs -     Recent Results (from the past 12 hour(s))   EKG, 12 LEAD, INITIAL    Collection Time: 09/01/21  6:09 PM   Result Value Ref Range    Ventricular Rate 74 BPM    Atrial Rate 74 BPM    P-R Interval 140 ms    QRS Duration 78 ms    Q-T Interval 364 ms    QTC Calculation (Bezet) 404 ms    Calculated P Axis 33 degrees    Calculated R Axis 21 degrees    Calculated T Axis 45 degrees    Diagnosis       Sinus rhythm with frequent premature ventricular complexes  When compared with ECG of 22-MAY-2021 22:39,  premature ventricular complexes are now present  Non-specific change in ST segment in Inferior leads     CBC W/O DIFF    Collection Time: 09/01/21  6:39 PM   Result Value Ref Range    WBC 13.6 (H) 4.1 - 11.1 K/uL    RBC 3.83 (L) 4.10 - 5.70 M/uL    HGB 10.5 (L) 12.1 - 17.0 g/dL    HCT 32.6 (L) 36.6 - 50.3 %    MCV 85.1 80.0 - 99.0 FL    MCH 27.4 26.0 - 34.0 PG    MCHC 32.2 30.0 - 36.5 g/dL    RDW 17.9 (H) 11.5 - 14.5 %    PLATELET 174 (L) 699 - 400 K/uL    MPV 11.9 8.9 - 12.9 FL    NRBC 0.0 0  WBC    ABSOLUTE NRBC 0.00 0.00 - 9.87 K/uL   METABOLIC PANEL, COMPREHENSIVE    Collection Time: 09/01/21  6:39 PM   Result Value Ref Range    Sodium 138 136 - 145 mmol/L    Potassium 3.6 3.5 - 5.1 mmol/L    Chloride 106 97 - 108 mmol/L    CO2 30 21 - 32 mmol/L    Anion gap 2 (L) 5 - 15 mmol/L    Glucose 98 65 - 100 mg/dL    BUN 13 6 - 20 MG/DL    Creatinine 0.84 0.70 - 1.30 MG/DL    BUN/Creatinine ratio 15 12 - 20      GFR est AA >60 >60 ml/min/1.73m2    GFR est non-AA >60 >60 ml/min/1.73m2    Calcium 7.7 (L) 8.5 - 10.1 MG/DL    Bilirubin, total 1.9 (H) 0.2 - 1.0 MG/DL    ALT (SGPT) 50 12 - 78 U/L    AST (SGOT) 62 (H) 15 - 37 U/L    Alk. phosphatase 204 (H) 45 - 117 U/L    Protein, total 7.6 6.4 - 8.2 g/dL    Albumin 2.3 (L) 3.5 - 5.0 g/dL    Globulin 5.3 (H) 2.0 - 4.0 g/dL    A-G Ratio 0.4 (L) 1.1 - 2.2     LIPASE    Collection Time: 09/01/21  6:39 PM   Result Value Ref Range    Lipase 177 73 - 393 U/L   TROPONIN I    Collection Time: 09/01/21  6:39 PM   Result Value Ref Range    Troponin-I, Qt. <0.05 <0.05 ng/mL   URINALYSIS W/ REFLEX CULTURE    Collection Time: 09/01/21  8:38 PM    Specimen: Urine   Result Value Ref Range    Color DARK YELLOW      Appearance CLEAR CLEAR      Specific gravity 1.026 1.003 - 1.030      pH (UA) 8.0 5.0 - 8.0      Protein TRACE (A) NEG mg/dL    Glucose Negative NEG mg/dL    Ketone Negative NEG mg/dL    Bilirubin Negative NEG      Blood Negative NEG      Urobilinogen 1.0 0.2 - 1.0 EU/dL    Nitrites Negative NEG      Leukocyte Esterase Negative NEG      WBC 0-4 0 - 4 /hpf    RBC 0-5 0 - 5 /hpf    Epithelial cells FEW FEW /lpf    Bacteria Negative NEG /hpf    UA:UC IF INDICATED CULTURE NOT INDICATED BY UA RESULT CNI      Hyaline cast 0-2 0 - 5 /lpf   SARS-COV-2    Collection Time: 09/01/21  8:38 PM   Result Value Ref Range    SARS-CoV-2 Nasopharyngeal     COVID-19 RAPID TEST    Collection Time: 09/01/21  8:38 PM   Result Value Ref Range    Specimen source Nasopharyngeal      COVID-19 rapid test Not detected NOTD         Radiologic Studies -   XR CHEST PORT   Final Result   No acute process.            CT Results  (Last 48 hours)    None        CXR Results  (Last 48 hours)               09/01/21 1906  XR CHEST PORT Final result    Impression:  No acute process. Narrative: Indication: Covid positive       Comparison: 4/22/2020       Portable exam of the chest obtained at 1906 demonstrates normal heart size. There is no acute process in the lung fields. The osseous structures are   unremarkable. Medical Decision Making   I am the first provider for this patient. I reviewed the vital signs, available nursing notes, past medical history, past surgical history, family history and social history. Vital Signs-Reviewed the patient's vital signs. Patient Vitals for the past 12 hrs:   Temp Pulse Resp BP SpO2   09/01/21 1755 98 °F (36.7 °C) 74 15 131/76 100 %       Records Reviewed: Nursing Notes and Old Medical Records    Provider Notes (Medical Decision Making):   Patient presenting with lower abdominal pain over the last couple of days. He showed me the CT results on his phone and it did show signs of his chronic PSC but also signs of acute Crohn's flare. We will give him IV steroids, IV antibiotics and admit him given the severe pain. Now also has a white count which she did not have at [facility] ER 2 days ago. ED Course:   Initial assessment performed. The patients presenting problems have been discussed, and they are in agreement with the care plan formulated and outlined with them. I have encouraged them to ask questions as they arise throughout their visit. Critical Care Time:   0    Disposition:    Admission Note:  Patient is being admitted to the hospital by Dr. Dixon Hernandez, Service: Hospitalist.  The results of their tests and reasons for their admission have been discussed with them and available family. They convey agreement and understanding for the need to be admitted and for their admission diagnosis. Diagnosis     Clinical Impression:   1. Exacerbation of Cro[facility]'s disease without complication ([facility]        Attestations:  Leslie Freeman M.D.         Please note that this dictation was completed with DataRPM, the [employer] voice recognition software. Quite often unanticipated grammatical, syntax, homophones, and other interpretive errors are inadvertently transcribed by the computer software. Please disregard these errors. Please excuse any errors that have escaped final proofreading. Thank you.

## 2021-09-02 LAB
ALBUMIN SERPL-MCNC: 2.1 G/DL (ref 3.5–5)
ALBUMIN/GLOB SERPL: 0.4 {RATIO} (ref 1.1–2.2)
ALP SERPL-CCNC: 192 U/L (ref 45–117)
ALT SERPL-CCNC: 45 U/L (ref 12–78)
ANION GAP SERPL CALC-SCNC: 3 MMOL/L (ref 5–15)
AST SERPL-CCNC: 48 U/L (ref 15–37)
ATRIAL RATE: 74 BPM
BASOPHILS # BLD: 0 K/UL (ref 0–0.1)
BASOPHILS NFR BLD: 0 % (ref 0–1)
BILIRUB DIRECT SERPL-MCNC: 0.5 MG/DL (ref 0–0.2)
BILIRUB SERPL-MCNC: 1.3 MG/DL (ref 0.2–1)
BUN SERPL-MCNC: 12 MG/DL (ref 6–20)
BUN/CREAT SERPL: 15 (ref 12–20)
CALCIUM SERPL-MCNC: 7.6 MG/DL (ref 8.5–10.1)
CALCULATED P AXIS, ECG09: 33 DEGREES
CALCULATED R AXIS, ECG10: 21 DEGREES
CALCULATED T AXIS, ECG11: 45 DEGREES
CHLORIDE SERPL-SCNC: 104 MMOL/L (ref 97–108)
CO2 SERPL-SCNC: 29 MMOL/L (ref 21–32)
CREAT SERPL-MCNC: 0.78 MG/DL (ref 0.7–1.3)
CRP SERPL HS-MCNC: >9.5 MG/L
DIAGNOSIS, 93000: NORMAL
DIFFERENTIAL METHOD BLD: ABNORMAL
EOSINOPHIL # BLD: 0 K/UL (ref 0–0.4)
EOSINOPHIL NFR BLD: 0 % (ref 0–7)
ERYTHROCYTE [DISTWIDTH] IN BLOOD BY AUTOMATED COUNT: 17.9 % (ref 11.5–14.5)
GLOBULIN SER CALC-MCNC: 5.6 G/DL (ref 2–4)
GLUCOSE SERPL-MCNC: 126 MG/DL (ref 65–100)
HCT VFR BLD AUTO: 32.9 % (ref 36.6–50.3)
HGB BLD-MCNC: 10.3 G/DL (ref 12.1–17)
IMM GRANULOCYTES # BLD AUTO: 0.1 K/UL (ref 0–0.04)
IMM GRANULOCYTES NFR BLD AUTO: 1 % (ref 0–0.5)
LYMPHOCYTES # BLD: 0.8 K/UL (ref 0.8–3.5)
LYMPHOCYTES NFR BLD: 9 % (ref 12–49)
MCH RBC QN AUTO: 27 PG (ref 26–34)
MCHC RBC AUTO-ENTMCNC: 31.3 G/DL (ref 30–36.5)
MCV RBC AUTO: 86.1 FL (ref 80–99)
MONOCYTES # BLD: 0.3 K/UL (ref 0–1)
MONOCYTES NFR BLD: 3 % (ref 5–13)
NEUTS SEG # BLD: 7.7 K/UL (ref 1.8–8)
NEUTS SEG NFR BLD: 87 % (ref 32–75)
NRBC # BLD: 0 K/UL (ref 0–0.01)
NRBC BLD-RTO: 0 PER 100 WBC
P-R INTERVAL, ECG05: 140 MS
PLATELET # BLD AUTO: 95 K/UL (ref 150–400)
PMV BLD AUTO: 12 FL (ref 8.9–12.9)
POTASSIUM SERPL-SCNC: 3.8 MMOL/L (ref 3.5–5.1)
PROT SERPL-MCNC: 7.7 G/DL (ref 6.4–8.2)
Q-T INTERVAL, ECG07: 364 MS
QRS DURATION, ECG06: 78 MS
QTC CALCULATION (BEZET), ECG08: 404 MS
RBC # BLD AUTO: 3.82 M/UL (ref 4.1–5.7)
SARS-COV-2, XPLCVT: NOT DETECTED
SODIUM SERPL-SCNC: 136 MMOL/L (ref 136–145)
SOURCE, COVRS: NORMAL
VENTRICULAR RATE, ECG03: 74 BPM
WBC # BLD AUTO: 8.9 K/UL (ref 4.1–11.1)

## 2021-09-02 PROCEDURE — 2709999900 HC NON-CHARGEABLE SUPPLY

## 2021-09-02 PROCEDURE — 36415 COLL VENOUS BLD VENIPUNCTURE: CPT

## 2021-09-02 PROCEDURE — 83993 ASSAY FOR CALPROTECTIN FECAL: CPT

## 2021-09-02 PROCEDURE — 74011250636 HC RX REV CODE- 250/636: Performed by: INTERNAL MEDICINE

## 2021-09-02 PROCEDURE — 80145 DRUG ASSAY ADALIMUMAB: CPT

## 2021-09-02 PROCEDURE — 85025 COMPLETE CBC W/AUTO DIFF WBC: CPT

## 2021-09-02 PROCEDURE — 87493 C DIFF AMPLIFIED PROBE: CPT

## 2021-09-02 PROCEDURE — 80076 HEPATIC FUNCTION PANEL: CPT

## 2021-09-02 PROCEDURE — 86141 C-REACTIVE PROTEIN HS: CPT

## 2021-09-02 PROCEDURE — 80048 BASIC METABOLIC PNL TOTAL CA: CPT

## 2021-09-02 PROCEDURE — 87324 CLOSTRIDIUM AG IA: CPT

## 2021-09-02 PROCEDURE — 82397 CHEMILUMINESCENT ASSAY: CPT

## 2021-09-02 PROCEDURE — 87506 IADNA-DNA/RNA PROBE TQ 6-11: CPT

## 2021-09-02 PROCEDURE — 65270000029 HC RM PRIVATE

## 2021-09-02 RX ADMIN — ENOXAPARIN SODIUM 40 MG: 40 INJECTION SUBCUTANEOUS at 10:12

## 2021-09-02 RX ADMIN — ONDANSETRON 4 MG: 2 INJECTION INTRAMUSCULAR; INTRAVENOUS at 02:25

## 2021-09-02 RX ADMIN — LEVOFLOXACIN 750 MG: 5 INJECTION, SOLUTION INTRAVENOUS at 04:26

## 2021-09-02 RX ADMIN — ONDANSETRON 4 MG: 2 INJECTION INTRAMUSCULAR; INTRAVENOUS at 10:14

## 2021-09-02 RX ADMIN — MORPHINE SULFATE 1 MG: 2 INJECTION, SOLUTION INTRAMUSCULAR; INTRAVENOUS at 02:29

## 2021-09-02 RX ADMIN — Medication 5 ML: at 21:21

## 2021-09-02 RX ADMIN — MORPHINE SULFATE 1 MG: 2 INJECTION, SOLUTION INTRAMUSCULAR; INTRAVENOUS at 10:12

## 2021-09-02 RX ADMIN — METRONIDAZOLE 500 MG: 500 INJECTION, SOLUTION INTRAVENOUS at 10:12

## 2021-09-02 RX ADMIN — METHYLPREDNISOLONE SODIUM SUCCINATE 60 MG: 40 INJECTION, POWDER, FOR SOLUTION INTRAMUSCULAR; INTRAVENOUS at 10:12

## 2021-09-02 RX ADMIN — METRONIDAZOLE 500 MG: 500 INJECTION, SOLUTION INTRAVENOUS at 21:21

## 2021-09-02 NOTE — PROGRESS NOTES
Pharmacy Medication Reconciliation     The patient was interviewed regarding current PTA medication list, use and drug allergies;  patient present in room and obtained permission from patient to discuss drug regimen with visitor(s) present. The patient was questioned regarding use of any other inhalers, topical products, over the counter medications, herbal medications, vitamin products or ophthalmic/nasal/otic medication use. Allergy Update: Cafergot [ergotamine-caffeine]    Recommendations/Findings: The following amendments were made to the patient's active medication list on file at [facility]:   1) Additions: none  2) Deletions:   -mesalamine suppository  -naproxen  -ondansetron  3) Changes: none  Pertinent Findings: none    Clarified PTA med list with patient interview, rx query. PTA medication list was corrected to the following:     Prior to Admission Medications   Prescriptions Last Dose Informant Taking? adalimumab (HUMIRA) 40 mg/0.8 mL injection 2021 at Unknown time Self Yes   Si mg by SubCUTAneous route Once every 2 weeks. halobetasol (ULTRAVATE) 0.05 % topical cream 2021 at Unknown time Self Yes   Sig: Apply  to affected area two (2) times a day. use thin layer. Apply to ulcers.       Facility-Administered Medications: None        Thank you,  ROSALBA Flores

## 2021-09-02 NOTE — ED NOTES
01:45  Pt resting in bed with eyes closed. Pt provided with pillow. 02:00  Bedside shift change report given to Eliseo Nugent  (oncoming nurse) by Tonia Huerta RN  (offgoing nurse). Report included the following information SBAR, MAR and Recent Results.

## 2021-09-02 NOTE — PROGRESS NOTES
Hospitalist Progress Note    NAME: Feroz Sultana   :  1975   MRN:  [id number]       Assessment / Plan:  Acute Crohn's flare, enteritis POA  History of Crohn's disease, s/p total colectomy, with ileal anal anastomosis. H/O PSC  CT abdomen at OSH:  showed duodenitis and inflammatory/infectious process involving multiple areas of small bowel, without abscess or fistula. Also showed stable finding of PSC, with cirrhosis    S/p IV Solu-Medrol in the ED, Continue Solu-Medrol 60 mg IV daily for now  s/p IV Zosyn in the ED, placed on Levaquin and Flagyl initially- taken off Levaquin with h/o CDiff in past  Per GI recommendations today  -Full liquid diet for now  IP GI consult noted- following  -Takes Humira as outpatient, last dose around 2 weeks ago        COVID-19 positive, 2021 POA  Rapid COVID in our ER this admission neg  -Has been vaccinated  -Recheck PCR- pending, droplet plus precaution for now     History of cirrhosis,      -Pharmacy to reconcile meds, and resume if able        Code Status: Full Code  Surrogate Decision Maker:     DVT Prophylaxis: Lovenox  GI Prophylaxis: not indicated     Baseline: ambulatory        30.0 - 39.9 Obese / Body mass index is 37.97 kg/m². Weight  Loss recommended    Estimated discharge date:   Barriers: none    Recommended Disposition: Home w/Family     Subjective:     Chief Complaint / Reason for Physician Visit: f/u Crohn's flare, Cirrhosis, PSC, COVID Infection  \"I am ok\". Discussed with RN events overnight. Review of Systems:  Symptom Y/N Comments  Symptom Y/N Comments   Fever/Chills n   Chest Pain n    Poor Appetite y   Edema n    Cough n   Abdominal Pain y    Sputum n   Joint Pain     SOB/JARRELL    Pruritis/Rash     Nausea/vomit y   Tolerating PT/OT y    Diarrhea    Tolerating Diet y liquids   Constipation    Other       Could NOT obtain due to:      Objective:     VITALS:   Last 24hrs VS reviewed since prior progress note.  Most recent are:  Patient Vitals for the past 24 hrs:   Temp Pulse Resp BP SpO2   09/02/21 1200  78 16 118/80 96 %   09/02/21 1100  83 13 136/85 95 %   09/02/21 1000  76 14 133/85 96 %   09/02/21 0900  80 14 123/79 94 %   09/02/21 0800 97.5 °F (36.4 °C) 70 9 107/66 95 %   09/02/21 0600  79 15 111/79    09/02/21 0500  77 22 126/74    09/02/21 0400  77 14 (!) 128/93    09/02/21 0345  79 11 (!) 143/93    09/02/21 0330  80 15 (!) 147/85    09/02/21 0315  77 21 (!) 140/76    09/02/21 0300  81 12 (!) 150/77    09/02/21 0229  82 16 136/78 95 %   09/02/21 0215  85 14 125/69    09/02/21 0210  75 12 118/72    09/02/21 0208 98.1 °F (36.7 °C) 82 16 118/72 94 %   09/02/21 0100  82  (!) 154/88 93 %   09/01/21 2300  97  (!) 148/78 94 %   09/01/21 2230  83  (!) 147/87 95 %   09/01/21 2200  80  (!) 143/89 95 %   09/01/21 1755 98 °F (36.7 °C) 74 15 131/76 100 %       Intake/Output Summary (Last 24 hours) at 9/2/2021 1355  Last data filed at 9/2/2021 0556  Gross per 24 hour   Intake 350 ml   Output 200 ml   Net 150 ml        I had a face to face encounter and independently examined this patient on 9/2/2021, as outlined below:  PHYSICAL EXAM:  General: WD, WN. Alert, cooperative, no acute distress    EENT:  EOMI. Anicteric sclerae. MMM  Resp:  CTA bilaterally, no wheezing or rales. No accessory muscle use  CV:  Regular  rhythm,  No edema  GI:  Soft, Non distended, Non tender. +Bowel sounds  Neurologic:  Alert and oriented X 3, normal speech,   Psych:   Good insight. Not anxious nor agitated  Skin:  No rashes.   No jaundice    Reviewed most current lab test results and cultures  YES  Reviewed most current radiology test results   YES  Review and summation of old records today    NO  Reviewed patient's current orders and MAR    YES  PMH/ reviewed - no change compared to H&P  ________________________________________________________________________  Care Plan discussed with:    Comments   Patient x    Family      RN x Care Manager     Consultant  carmela Fofana                     Multidiciplinary team rounds were held today with , nursing, pharmacist and clinical coordinator. Patient's plan of care was discussed; medications were reviewed and discharge planning was addressed. ________________________________________________________________________  Total NON critical care TIME:  36   Minutes    Total CRITICAL CARE TIME Spent:   Minutes non procedure based      Comments   >50% of visit spent in counseling and coordination of care     ________________________________________________________________________  Amanda Walsh MD     Procedures: see electronic medical records for all procedures/Xrays and details which were not copied into this note but were reviewed prior to creation of Plan. LABS:  I reviewed today's most current labs and imaging studies.   Pertinent labs include:  Recent Labs     09/02/21 0848 09/01/21  1839   WBC 8.9 13.6*   HGB 10.3* 10.5*   HCT 32.9* 32.6*   PLT 95* 112*     Recent Labs     09/02/21 0848 09/01/21  1839    138   K 3.8 3.6    106   CO2 29 30   * 98   BUN 12 13   CREA 0.78 0.84   CA 7.6* 7.7*   ALB 2.1* 2.3*   TBILI 1.3* 1.9*   ALT 45 50       Signed: Amanda Walsh MD

## 2021-09-02 NOTE — ED NOTES
Assumed care of pt at this time, bedside report received from Deng Colon RN; pt in nad, vss, updated on plan of care, states understanding of clear liquid diet overnight, GI consult pending in AM, requesting pain medication.

## 2021-09-02 NOTE — ED NOTES
Report given to OLINDA Fleming. They were informed of patient chief complaint, current status, orders completed (to include IV access/medications/radiology testing), outstanding orders that still need to be completed, and the treatment plan. Ensured no questions or concerns regarding the patient prior to departure.

## 2021-09-02 NOTE — H&P
Hospitalist Admission Note    NAME: Araseli Blackwell   :  1975   MRN:  [id number]     Date/Time:  2021 9:25 PM    Patient PCP: Raine Tirado MD  ______________________________________________________________________  Given the patient's current clinical presentation, I have a high level of concern for decompensation if discharged from the emergency department. Complex decision making was performed, which includes reviewing the patient's available past medical records, laboratory results, and x-ray films. My assessment of this patient's clinical condition and my plan of care is as follows. Assessment / Plan:  Acute Crohn's flare, enteritis POA  History of Crohn's disease, s/p total colectomy, with ileal anal anastomosis. H/O PSC    -CT abdomen at OSH, showed duodenitis and inflammatory/infectious process involving multiple areas of small bowel, without abscess or fistula. Also showed stable finding of [facility], with cirrhosis  -Received IV Solu-Medrol in the ED, continue Solu-Medrol 60 mg IV daily  -Received IV Zosyn in the ED, placed on Levaquin and Flagyl  -Full liquid diet  -GI consult  -Takes Humira as outpatient, last dose around 2 weeks ago      COVID-19 positive, 2021  -Has been vaccinated  -Recheck PCR, droplet plus precaution    History of cirrhosis,     -Pharmacy to reconcile meds, and resume if able      Code Status: Full Code  Surrogate Decision Maker:    DVT Prophylaxis: Lovenox  GI Prophylaxis: not indicated    Baseline: ambulatory      Subjective:   CHIEF COMPLAINT: Abdominal pain    HISTORY OF PRESENT ILLNESS:     Araseli Blackwell is a 50 y.o.  male with a past medical history of PSc, Crohn's disease, pyoderma gangrenosum, cirrhosis who presented to ED with a chief complaint of abdominal pain and nausea. He reports that for past couple of days he has been having severe lower abdominal pain associated with nausea.   He went to [address] [place] [address] freestanding ER at [facility], had a CT abdomen done which showed duodenitis and small bowel enteritis, without abscess. Over there initially he was recommended admission, but his rapid Covid came back positive, and he said he had to wait a while to get admitted, so he was discharged with follow-up with the GI. He has saw Dr. Jose Velasquez and he was advised to come to ED if he feels worse. He presented to ED because his pain was not resolved, he reports and nausea. He also says he had a vomiting after he had his pill. He denies any diarrhea, chest pain, palpitation. We were asked to admit for work up and evaluation of the above problems.      Past Medical History:   Diagnosis Date    Arthritis     all joints and spine    C. difficile colitis 1/21/2019    Chest pain 02/18/2013    as of 10/14/14 pt denies any CP since 2/13    Crohn's colitis ([facility] [address]     Dr. Lorena Alcantar H/O Clostridium difficile infection 01/2017    as of 3/30/17 pt denies abd pain, diarrhea > 1 week    Iron deficiency anemia 2/18/2013    Kidney infection 2018    Liver disease     Pancreatitis     Polyarteritis nodosa ([address]     Pouchitis ([facility] [address] 12/08/2011    PSC (primary sclerosing cholangitis)     Dr Ricardo Ibrahim [phone]    Pyoderma gangrenosum         Past Surgical History:   Procedure Laterality Date    COLONOSCOPY N/A 4/4/2017    COLONOSCOPY performed by Domenica Higginbotham MD at [place] [address] COLONOSCOPY N/A 1/15/2018    COLONOSCOPY performed by Domenica Higginbotham MD at [facility] ENDOSCOPY    COLONOSCOPY N/A 9/13/2018    COLONOSCOPY performed by Domenica Higginbotham MD at [facility] ENDOSCOPY    COLONOSCOPY N/A 1/21/2019    COLONOSCOPY W/ FECAL TRANSPLANT performed by Ayad Serrano MD at [facility] ENDOSCOPY    COLONOSCOPY N/A 6/13/2019    COLONOSCOPY performed by [facility]ad Serrano MD at [facility]  1/15/2018         COLONOSCOPY,DIAGNOSTIC  9/13/2018         COLONOSCOPY,DIAGNOSTIC  1/21/2019         COLONOSCOPY,DIAGNOSTIC  6/13/2019  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         HX GI      ercp    HX ROTATOR CUFF REPAIR Right 1999    MT ABDOMEN SURGERY PROC UNLISTED  08/2002    colostomy reversal & J Pouch    MT ABDOMEN SURGERY PROC UNLISTED  06/2002    colostomy    MT COLONOSCOPY W/BIOPSY SINGLE/MULTIPLE  12/8/2011         MT EGD TRANSORAL BIOPSY SINGLE/MULTIPLE  2/18/2013            Social History     Tobacco Use    Smoking status: Never Smoker    Smokeless tobacco: Never Used   Substance Use Topics    Alcohol use: No     Alcohol/week: 0.0 standard drinks        Family History   Problem Relation Age of Onset    Diabetes Mother     Hypertension Mother     Arthritis-osteo Mother     Diabetes Father     Hypertension Father     Stroke Father     Arthritis-osteo Father     COPD Father         smoker    No Known Problems Sister     No Known Problems Brother     No Known Problems Maternal Aunt     No Known Problems Maternal Uncle     No Known Problems Paternal Aunt     No Known Problems Paternal Uncle     No Known Problems Maternal Grandmother     No Known Problems Maternal Grandfather     No Known Problems Paternal Grandmother     No Known Problems Paternal Grandfather      Allergies   Allergen Reactions    Cafergot [Ergotamine-Caffeine] Hives        Prior to Admission medications    Medication Sig Start Date End Date Taking? Authorizing Provider   naproxen (NAPROSYN) 500 mg tablet Take 1 Tablet by mouth two (2) times daily (with meals). 5/23/21   Yamil Adhikari MD   adalimumab (HUMIRA) 40 mg/0.8 mL injection by SubCUTAneous route once. Other, MD Antonio   ondansetron (ZOFRAN ODT) 4 mg disintegrating tablet Take 1 Tab by mouth every six (6) hours as needed for Nausea. 9/9/19   Bertha Odell MD   mesalamine (CANASA) 1,000 mg suppository Insert 1 Suppository into rectum nightly. 6/13/19   Mamta Messer MD   halobetasol (ULTRAVATE) 0.05 % topical cream Apply  to affected area two (2) times a day. use thin layer.  Apply to ulcers. 1/28/19   Bethany Mitchell MD       REVIEW OF SYSTEMS:     I am not able to complete the review of systems because: The patient is intubated and sedated    The patient has altered mental status due to his acute medical problems    The patient has baseline aphasia from prior stroke(s)    The patient has baseline dementia and is not reliable historian    The patient is in acute medical distress and unable to provide information           Total of 12 systems reviewed as follows:       POSITIVE= underlined text  Negative = text not underlined  General:  fever, chills, sweats, generalized weakness, weight loss/gain,      loss of appetite   Eyes:    blurred vision, eye pain, loss of vision, double vision  ENT:    rhinorrhea, pharyngitis   Respiratory:   cough, sputum production, SOB, JARRELL, wheezing, pleuritic pain   Cardiology:   chest pain, palpitations, orthopnea, PND, edema, syncope   Gastrointestinal:  abdominal pain , N/V, diarrhea, dysphagia, constipation, bleeding   Genitourinary:  frequency, urgency, dysuria, hematuria, incontinence   Muskuloskeletal :  arthralgia, myalgia, back pain  Hematology:  easy bruising, nose or gum bleeding, lymphadenopathy   Dermatological: rash, ulceration, pruritis, color change / jaundice  Endocrine:   hot flashes or polydipsia   Neurological:  headache, dizziness, confusion, focal weakness, paresthesia,     Speech difficulties, memory loss, gait difficulty  Psychological: Feelings of anxiety, depression, agitation    Objective:   VITALS:    Visit Vitals  /76 (BP 1 Location: Left upper arm, BP Patient Position: At rest)   Pulse 74   Temp 98 °F (36.7 °C)   Resp 15   Wt 127 kg (279 lb 15.8 oz)   SpO2 100%   BMI 37.97 kg/m²       PHYSICAL EXAM:    General:    Alert, cooperative, no distress, appears stated age.      HEENT: Atraumatic, anicteric sclerae, pink conjunctivae     No oral ulcers, mucosa moist, throat clear, dentition fair  Neck:  Supple, symmetrical, thyroid: non tender  Lungs:   Clear to auscultation bilaterally. No Wheezing or Rhonchi. No rales. Chest wall:  No tenderness  No Accessory muscle use. Heart:   Regular  rhythm,  No  murmur   No edema  Abdomen:   Soft, minimally distended, non tender  Extremities: No cyanosis. No clubbing,    Skin:     Not pale. Not Jaundiced  No rashes   Psych:  Good insight. Not depressed. Not anxious or agitated. Neurologic: EOMs intact. No facial asymmetry. No aphasia or slurred speech. Symmetrical strength, Sensation grossly intact. Alert and oriented X 4.     _______________________________________________________________________  Care Plan discussed with:    Comments   Patient x    Family      RN x    Care Manager                    Consultant:      _______________________________________________________________________  Expected  Disposition:   Home with Family x   HH/PT/OT/RN    SNF/LTC    KATY    ________________________________________________________________________  TOTAL TIME:  39 Minutes    Critical Care Provided     Minutes non procedure based      Comments     Reviewed previous records   >50% of visit spent in counseling and coordination of care  Discussion with patient and/or family and questions answered       ________________________________________________________________________  Signed: Jory De La Cruz MD    Procedures: see electronic medical records for all procedures/Xrays and details which were not copied into this note but were reviewed prior to creation of Plan.     LAB DATA REVIEWED:    Recent Results (from the past 24 hour(s))   EKG, 12 LEAD, INITIAL    Collection Time: 09/01/21  6:09 PM   Result Value Ref Range    Ventricular Rate 74 BPM    Atrial Rate 74 BPM    P-R Interval 140 ms    QRS Duration 78 ms    Q-T Interval 364 ms    QTC Calculation (Bezet) 404 ms    Calculated P Axis 33 degrees    Calculated R Axis 21 degrees    Calculated T Axis 45 degrees    Diagnosis       Sinus rhythm with frequent premature ventricular complexes  When compared with ECG of 22-MAY-2021 22:39,  premature ventricular complexes are now present  Non-specific change in ST segment in Inferior leads     CBC W/O DIFF    Collection Time: 09/01/21  6:39 PM   Result Value Ref Range    WBC 13.6 (H) 4.1 - 11.1 K/uL    RBC 3.83 (L) 4.10 - 5.70 M/uL    HGB 10.5 (L) 12.1 - 17.0 g/dL    HCT 32.6 (L) 36.6 - 50.3 %    MCV 85.1 80.0 - 99.0 FL    MCH 27.4 26.0 - 34.0 PG    MCHC 32.2 30.0 - 36.5 g/dL    RDW 17.9 (H) 11.5 - 14.5 %    PLATELET 128 (L) 148 - 400 K/uL    MPV 11.9 8.9 - 12.9 FL    NRBC 0.0 0  WBC    ABSOLUTE NRBC 0.00 0.00 - 9.36 K/uL   METABOLIC PANEL, COMPREHENSIVE    Collection Time: 09/01/21  6:39 PM   Result Value Ref Range    Sodium 138 136 - 145 mmol/L    Potassium 3.6 3.5 - 5.1 mmol/L    Chloride 106 97 - 108 mmol/L    CO2 30 21 - 32 mmol/L    Anion gap 2 (L) 5 - 15 mmol/L    Glucose 98 65 - 100 mg/dL    BUN 13 6 - 20 MG/DL    Creatinine 0.84 0.70 - 1.30 MG/DL    BUN/Creatinine ratio 15 12 - 20      GFR est AA >60 >60 ml/min/1.73m2    GFR est non-AA >60 >60 ml/min/1.73m2    Calcium 7.7 (L) 8.5 - 10.1 MG/DL    Bilirubin, total 1.9 (H) 0.2 - 1.0 MG/DL    ALT (SGPT) 50 12 - 78 U/L    AST (SGOT) 62 (H) 15 - 37 U/L    Alk.  phosphatase 204 (H) 45 - 117 U/L    Protein, total 7.6 6.4 - 8.2 g/dL    Albumin 2.3 (L) 3.5 - 5.0 g/dL    Globulin 5.3 (H) 2.0 - 4.0 g/dL    A-G Ratio 0.4 (L) 1.1 - 2.2     LIPASE    Collection Time: 09/01/21  6:39 PM   Result Value Ref Range    Lipase 177 73 - 393 U/L   TROPONIN I    Collection Time: 09/01/21  6:39 PM   Result Value Ref Range    Troponin-I, Qt. <0.05 <0.05 ng/mL   URINALYSIS W/ REFLEX CULTURE    Collection Time: 09/01/21  8:38 PM    Specimen: Urine   Result Value Ref Range    Color DARK YELLOW      Appearance CLEAR CLEAR      Specific gravity 1.026 1.003 - 1.030      pH (UA) 8.0 5.0 - 8.0      Protein TRACE (A) NEG mg/dL    Glucose Negative NEG mg/dL    Ketone Negative NEG mg/dL Bilirubin Negative NEG      Blood Negative NEG      Urobilinogen 1.0 0.2 - 1.0 EU/dL    Nitrites Negative NEG      Leukocyte Esterase Negative NEG      WBC 0-4 0 - 4 /hpf    RBC 0-5 0 - 5 /hpf    Epithelial cells FEW FEW /lpf    Bacteria Negative NEG /hpf    UA:UC IF INDICATED CULTURE NOT INDICATED BY UA RESULT CNI      Hyaline cast 0-2 0 - 5 /lpf   SARS-COV-2    Collection Time: 09/01/21  8:38 PM   Result Value Ref Range    SARS-CoV-2 Nasopharyngeal     COVID-19 RAPID TEST    Collection Time: 09/01/21  8:38 PM   Result Value Ref Range    Specimen source Nasopharyngeal      COVID-19 rapid test Not detected NOTD

## 2021-09-02 NOTE — CONSULTS
Gastroenterology Consultation Note  TEZ Luna   for Jose Haney    NAME: Axel Amaro : 1975 MRN: [id number]   ATTG: Dr. Luz Cm PCP: Africa Brice MD  Date/Time:  2021 11:46 AM  Subjective:   REASON FOR CONSULT:      Axel Amaro is a 50 y.o.  male who I was asked to see for Crohn's flair. Hx of colectomy with ileoanal pouch in '10. Now on Humira q2 weeks but has been three weeks since last dose (). He was having more infequent BM and hard piece of stool with abdominal pain. Went to [facility] ED on the . He had a CT w/ contrast: Cirrhosis of the liver with portal HTN, hx of PSC. Acute inflamtory/infection enteritis and duodenitis involving multiple SB loops in left hemiabdomen. [facility] was on diversion so sent home. He was not d/urban with any new meds but advised to f/u with Dr. Jose Velasquez the following day. He was also tested for COVID and rapid was positive. He was retested yesterday and rapid was negtive but PCR is pending. No F/C, nausea but no significant vomtiing. LBM was yesterday AM, looser stool as it normally is. No melena or hematochezia. Also with hx of PSC and Reports ERCP on  with sphinncterotomy done recently at [facility] by Dr. Verónica Rosenthal. He was treated with amoxacillin leading up to procedure and cipro after ERCP. WBC initially 13.6, normal today, hgb 10.3, plt 95, some elevations in LFts that are trending down:    Results for Petar Walker (MRN [id number]) as of 2021 12:13   Ref. Range 2021 18:39 2021 08:48   ALT Latest Ref Range: 12 - 78 U/L 50 45   AST Latest Ref Range: 15 - 37 U/L 62 (H) 48 (H)   Alk. phosphatase Latest Ref Range: 45 - 117 U/L 204 (H) 192 (H)   Results for Petar Walker (MRN [id number]) as of 2021 12:13   Ref.  Range 2021 18:39 2021 08:48   Bilirubin, total Latest Ref Range: 0.2 - 1.0 MG/DL 1.9 (H) 1.3 (H)   Bilirubin, direct Latest Ref Range: 0.0 - 0.2 MG/DL  0.5 (H)     Has received COVID vaccine, has not been able to smell for awhile, not new. No other typical COVID sx. Hx of C. Diff in July    Mother has IBS, no family hx of IBD or liver disease. No tobacco use or EtOH use.      Past Medical History:   Diagnosis Date    Arthritis     all joints and spine    C. difficile colitis 1/21/2019    Chest pain 02/18/2013    as of 10/14/14 pt denies any CP since 2/13    Crohn's colitis ([facility] [address]     Dr. Mikala Win H/O Clostridium difficile infection 01/2017    as of 3/30/17 pt denies abd pain, diarrhea > 1 week    Iron deficiency anemia 2/18/2013    Kidney infection 2018    Liver disease     Pancreatitis     Polyarteritis nodosa ([facility] [address]     Pouchitis ([facility] [address] 12/08/2011    [facility] (primary sclerosing cholangitis)     Dr Renteria [facility] [phone]    Pyoderma gangrenosum       Past Surgical History:   Procedure Laterality Date    COLONOSCOPY N/A 4/4/2017    COLONOSCOPY performed by Neftaly Reyna MD at [place] [address] COLONOSCOPY N/A 1/15/2018    COLONOSCOPY performed by [facility]ftaly Reyna MD at [facility] ENDOSCOPY    COLONOSCOPY N/A 9/13/2018    COLONOSCOPY performed by [facility]aly Reyna MD at [facility] ENDOSCOPY    COLONOSCOPY N/A 1/21/2019    COLONOSCOPY W/ FECAL TRANSPLANT performed by Jessica Mendez MD at [facility] ENDOSCOPY    COLONOSCOPY N/A 6/13/2019    COLONOSCOPY performed by [facility]endez MD at [facility] ENDOSCOPY    COLONOSCOPY,DIAGNOSTIC  1/15/2018         COLONOSCOPY,DIAGNOSTIC  9/13/2018         COLONOSCOPY,DIAGNOSTIC  1/21/2019         COLONOSCOPY,DIAGNOSTIC  6/13/2019         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         HX GI      ercp    HX ROTATOR CUFF REPAIR Right 1999    TN ABDOMEN SURGERY PROC UNLISTED  08/2002    colostomy reversal & J Pouch    TN ABDOMEN SURGERY PROC UNLISTED  06/2002    colostomy    TN COLONOSCOPY W/BIOPSY SINGLE/MULTIPLE  12/8/2011         TN EGD TRANSORAL BIOPSY SINGLE/MULTIPLE  2/18/2013          Social History     Tobacco Use    Smoking status: Never Smoker    Smokeless tobacco: Never Used Substance Use Topics    Alcohol use: No     Alcohol/week: 0.0 standard drinks      Family History   Problem Relation Age of Onset    Diabetes Mother     Hypertension Mother     Arthritis-osteo Mother     Diabetes Father     Hypertension Father     Stroke Father     Arthritis-osteo Father     COPD Father         smoker    No Known Problems Sister     No Known Problems Brother     No Known Problems Maternal Aunt     No Known Problems Maternal Uncle     No Known Problems Paternal Aunt     No Known Problems Paternal Uncle     No Known Problems Maternal Grandmother     No Known Problems Maternal Grandfather     No Known Problems Paternal Grandmother     No Known Problems Paternal Grandfather       Allergies   Allergen Reactions    Cafergot [Ergotamine-Caffeine] Hives      Home Medications:  Prior to Admission Medications   Prescriptions Last Dose Informant Patient Reported? Taking? adalimumab (HUMIRA) 40 mg/0.8 mL injection 2021 at Unknown time Self Yes Yes   Si mg by SubCUTAneous route Once every 2 weeks. halobetasol (ULTRAVATE) 0.05 % topical cream 2021 at Unknown time Self No Yes   Sig: Apply  to affected area two (2) times a day. use thin layer. Apply to ulcers.       Facility-Administered Medications: None     Hospital medications:  Current Facility-Administered Medications   Medication Dose Route Frequency    sodium chloride (NS) flush 5-40 mL  5-40 mL IntraVENous Q8H    sodium chloride (NS) flush 5-40 mL  5-40 mL IntraVENous PRN    acetaminophen (TYLENOL) tablet 650 mg  650 mg Oral Q6H PRN    Or    acetaminophen (TYLENOL) suppository 650 mg  650 mg Rectal Q6H PRN    polyethylene glycol (MIRALAX) packet 17 g  17 g Oral DAILY PRN    ondansetron (ZOFRAN ODT) tablet 4 mg  4 mg Oral Q8H PRN    Or    ondansetron (ZOFRAN) injection 4 mg  4 mg IntraVENous Q6H PRN    enoxaparin (LOVENOX) injection 40 mg  40 mg SubCUTAneous DAILY    methylPREDNISolone (PF) (SOLU-MEDROL) injection 60 mg  60 mg IntraVENous DAILY    levoFLOXacin (LEVAQUIN) 750 mg in D5W IVPB  750 mg IntraVENous Q24H    metroNIDAZOLE (FLAGYL) IVPB premix 500 mg  500 mg IntraVENous Q12H    morphine injection 1 mg  1 mg IntraVENous Q4H PRN    albuterol (PROVENTIL VENTOLIN) nebulizer solution 2.5 mg  2.5 mg Nebulization Q4H PRN     Current Outpatient Medications   Medication Sig    adalimumab (HUMIRA) 40 mg/0.8 mL injection 40 mg by SubCUTAneous route Once every 2 weeks.  halobetasol (ULTRAVATE) 0.05 % topical cream Apply  to affected area two (2) times a day. use thin layer. Apply to ulcers. REVIEW OF SYSTEMS:     []     Unable to obtain  ROS due to  []    mental status change  []    sedated   []    intubated  Review of Systems -   History obtained from the patient  General ROS: positive for  - fatigue  Psychological ROS: negative for - anxiety or depression  Hematological and Lymphatic ROS: negative for - bleeding problems  Respiratory ROS: no cough, shortness of breath, or wheezing  Cardiovascular ROS: no chest pain or dyspnea on exertion  Gastrointestinal ROS:See HPi  Genito-Urinary ROS: no dysuria, trouble voiding, or hematuria  Musculoskeletal ROS: negative for - joint pain  Neurological ROS: no TIA or stroke symptoms  Dermatological ROS: negative for - rash    Objective:   VITALS:    Visit Vitals  /85 (BP 1 Location: Left arm, BP Patient Position: At rest)   Pulse 83   Temp 97.5 °F (36.4 °C)   Resp 13   Ht 6' (1.829 m)   Wt 127 kg (279 lb 15.8 oz)   SpO2 95%   BMI 37.97 kg/m²     Temp (24hrs), Av.9 °F (36.6 °C), Min:97.5 °F (36.4 °C), Max:98.1 °F (36.7 °C)    PHYSICAL EXAM:   General:    Alert, cooperative, obese BM, no distress, appears stated age. Head:   Normocephalic, without obvious abnormality, atraumatic. Eyes:   Conjunctivae clear, anicteric sclerae. Pupils are equal  Nose:  Nares normal. No drainage or sinus tenderness.   Throat:    Lips, mucosa, and tongue normal.  No Thrush  Neck:  Supple, symmetrical,  no adenopathy, thyroid: non tender  Back:    Symmetric,  No CVA tenderness. Lungs:   CTA bilaterally. No wheezing/rhonchi/rales. Heart:   Regular rate and rhythm,  no murmur, rub or gallop. Abdomen:   Obese, soft, generalized abdominal tenderness worse in RLQ without guarding or rebound. Not distended. Bowel sounds normal. No masses. No hepatosplenomegaly. Rectal:  Deferred  Extremities: No cyanosis. No edema. No clubbing  Skin:     Texture, turgor normal. No rashes/lesions/jaundice  Lymph: Cervical, supraclavicular normal.  Psych:  Good insight. Not depressed. Not anxious or agitated. Neurologic: EOMs intact. No facial asymmetry. No aphasia or slurred speech normal strength, A/O X 3. LAB DATA REVIEWED:    Lab Results   Component Value Date/Time    WBC 8.9 09/02/2021 08:48 AM    HGB 10.3 (L) 09/02/2021 08:48 AM    HCT 32.9 (L) 09/02/2021 08:48 AM    PLATELET 95 (L) 06/07/8514 08:48 AM    MCV 86.1 09/02/2021 08:48 AM     Lab Results   Component Value Date/Time    ALT (SGPT) 45 09/02/2021 08:48 AM    Alk.  phosphatase 192 (H) 09/02/2021 08:48 AM    Bilirubin, direct 0.5 (H) 09/02/2021 08:48 AM    Bilirubin, total 1.3 (H) 09/02/2021 08:48 AM     Lab Results   Component Value Date/Time    Sodium 136 09/02/2021 08:48 AM    Potassium 3.8 09/02/2021 08:48 AM    Chloride 104 09/02/2021 08:48 AM    CO2 29 09/02/2021 08:48 AM    Anion gap 3 (L) 09/02/2021 08:48 AM    Glucose 126 (H) 09/02/2021 08:48 AM    BUN 12 09/02/2021 08:48 AM    Creatinine 0.78 09/02/2021 08:48 AM    BUN/Creatinine ratio 15 09/02/2021 08:48 AM    GFR est AA >60 09/02/2021 08:48 AM    GFR est non-AA >60 09/02/2021 08:48 AM    Calcium 7.6 (L) 09/02/2021 08:48 AM     Lab Results   Component Value Date/Time    Lipase 177 09/01/2021 06:39 PM     Lab Results   Component Value Date/Time    INR 1.0 11/29/2015 09:15 AM    INR 1.1 07/23/2011 01:00 AM    INR (POC) 1.0 07/24/2018 02:31 AM    Prothrombin time 10.3 11/29/2015 09:15 AM    Prothrombin time 10.7 07/23/2011 01:00 AM       XR Results (most recent):  Results from [place] encounter on 09/01/21    XR CHEST PORT    Narrative  Indication: Covid positive    Comparison: 4/22/2020    Portable exam of the chest obtained at 1906 demonstrates normal heart size. There is no acute process in the lung fields. The osseous structures are  unremarkable. Impression  No acute process. Impression:  Active Problems:    Crohn disease ([address] (9/1/2021)         Plan:  Patient with worsening abdominal pain and CT on the 30th (at [facility]) showing enteritis involving the duodenum and small bowel likely related to Crohn's but possible infectious in origin. Agree with IV steroids, will r/o infection with C. Diff and enteric pathogen stool studies however seems less liekly. Consider stopping Levoquin given hx of C. Diff. Check Humira drug and AB level. If no improvement may consider further evaluation of pouch with scope. Awaiting COVID PCR testing, may be enteritis due to COVID. Supportive treatment per primary care team.        ___________________________________________________  Care Plan discussed with:    [x]    Patient   []    Family   []    Nursing   [x]    Attending  Total Time : 30  minutes   ___________________________________________________  GI: TEZ Wilkinson       Mr. Anushka Hernandez has a history of complex and chronic Crohn's disease ending up in a subtotal colectomy with an ileal anal pouch anastomosis reportedly in 2010. He has been on subcutaneous adalimumab every 2 weeks under Dr. Bonnie Evans guidance. He was seen in [facility] ED 2 days ago where he had a CT scan of the abdomen pelvis revealing inflammatory/infectious enteritis in the duodenal/small bowel loops in the left abdomen. CT also showed a cirrhotic appearing liver with portal hypertension. Patient has a history of [facility].   His rapid test was also positive for COVID and he is currently in contact isolation. Agree with IV steroids as you are doing. We will check stool C. Difficile PCR and enteric pathogens. Optimize Humira dosing by checking drug and antibody level. Hold off on giving biologic agent till infectious cause/s ruled out. Would avoid Levaquin for previous history of C. Difficile, needing FMT. Ok to use IV Metronidazole. Agree that he may need a pouchoscopy if he is not better. I discussed the case with the Advance Practice Provider. I have personally reviewed the history and independently examined the patient. I have reviewed the chart and agree with the documentation recorded by the Mid Level Provider, including the assessment, treatment plan, and disposition. Stephanie Crandall MD

## 2021-09-02 NOTE — ED NOTES
23:14 Assumed care of pt. White board updated. Plan of care discussed. Call bell in reach. Will continue to monitor. IV Pump beeping , asked pt to straighten L arm and restarted pump. Pt requesting crackers.

## 2021-09-02 NOTE — ED NOTES
0715: Assumed care of patient. Patient placed in position of comfort. Call bell in reach. Skin warm and dry. Respirations even and unlabored. In no apparent distress at this time. 1110: Called GI consult - awaiting call back. 1300: Covid PCR test is negative - notified Dr. Sue Springer. Will d/c contact precautions, per provider. 1730: TRANSFER - OUT REPORT:    Verbal report given to Marli Butcher RN (name) on [facility]  being transferred to  (unit) for routine progression of care       Report consisted of patients Situation, Background, Assessment and   Recommendations(SBAR). Information from the following report(s) SBAR was reviewed with the receiving nurse. Lines:   Peripheral IV 09/01/21 Anterior;Left;Proximal Forearm (Active)        Opportunity for questions and clarification was provided.

## 2021-09-02 NOTE — PROGRESS NOTES
Transition of Care Plan:    RUR: 10% low risk for readmission   Disposition: Home   Follow up appointments: PCP, GI?  DME needed: None anticipated, pt uses no DME at baseline   Transportation at Discharge: Pt's niece to transport  [place] or means to access home: Pt has keys       IM Medicare Letter: N/A  Is patient a BCPI-A Bundle: N/A       If yes, was Bundle Letter given?:     Caregiver Contact: Pt's spouse, Shelbie Hammer) [phone]  Discharge Caregiver contacted prior to discharge? Reason for Admission: Acute chrohn's flare, enteritis POA, hx of Crohn's disease, s/p total colectomy with ileal anal anastomosis. COVID-19 positive                       RUR Score: 10% low risk for readmission                     Plan for utilizing home health:  No home health needs identified at this time. PCP: First and Last name:  Africa Brice MD     [facility] of Practice: [facility]   Are you a current patient: Yes/No: Yes   Approximate date of last visit: Couple months ago   Can you participate in a virtual visit with your PCP: No                    Current Advanced Directive/Advance Care Plan: Full Code   Jimmy 13 (ACP) Conversation      Date of Conversation: 9/2/2012  Conducted with: Patient with Decision Making Capacity    Healthcare Decision Maker: Spouse is LNOK, no AMD on file     Primary Decision Maker: Patty Reece - Spouse - [phone]  Click here to complete [address] including selection of the [address] Relationship (ie \"Primary\")      Today we documented Decision Maker(s) consistent with Legal Next of Kin hierarchy.     Content/Action Overview:   DECLINED ACP conversation - will revisit periodically   Reviewed DNR/DNI and patient elects Full Code (Attempt Resuscitation)     Length of Voluntary ACP Conversation in minutes:  <16 minutes (Non-Billable)    [address]ell[address]: Primary Decision Maker: Patty Reece - Spouse - [phone]                  Transition of Care Plan: Home with outpatient follow up    CM reviewed chart. CM completed assessment with pt via room phone d/t COVID-19 isolation precautions. CM introduced self/role, verified demographics, and discussed discharge planning. Pt resides alone in 2 level home with 2-4 YASMEEN. Pt uses no DME at baseline and is independent with ADLs/IADLs to include driving. Pt's niece to transport at d/c, pt to drive himself to outpatient f/u appts. Pharmacy preference is [facility] [address] . No concerns with transition of care plan voiced. Care Management Interventions  PCP Verified by CM: Yes  Palliative Care Criteria Met (RRAT>21 & CHF Dx)?: No  Mode of Transport at Discharge: Other (see comment) (Pt's niece)  Transition of Care Consult (CM Consult): Discharge Planning  Discharge Durable Medical Equipment: No  Physical Therapy Consult: No  Occupational Therapy Consult: No  Speech Therapy Consult: No  Current Support Network: Lives Alone  Confirm Follow Up Transport: Self  Discharge Location  Discharge Placement: Home    Unit care management will continue to follow for transition of care planning needs.       Stephany Hernandez [address], [facility]

## 2021-09-02 NOTE — ED NOTES
Report given to Toy Curling, RN. They were informed of patient chief complaint, current status, orders completed (to include IV access/medications/radiology testing), outstanding orders that still need to be completed, and the treatment plan. Ensured no questions or concerns regarding the patient prior to departure.

## 2021-09-03 LAB
C DIFF TOX GENS STL QL NAA+PROBE: NEGATIVE
CAMPYLOBACTER SPECIES, DNA: NEGATIVE
ENTEROTOXIGEN E COLI, DNA: NEGATIVE
INTERPRETATION: ABNORMAL
P SHIGELLOIDES DNA STL QL NAA+PROBE: NEGATIVE
PCR REFLEX: ABNORMAL
SALMONELLA SPECIES, DNA: NEGATIVE
SHIGA TOXIN PRODUCING, DNA: NEGATIVE
SHIGELLA SP+EIEC IPAH STL QL NAA+PROBE: NEGATIVE
VIBRIO SPECIES, DNA: NEGATIVE
Y. ENTEROCOLITICA, DNA: NEGATIVE

## 2021-09-03 PROCEDURE — 77030040393 HC DRSG OPTIFOAM GENT MDII -B

## 2021-09-03 PROCEDURE — 74011250636 HC RX REV CODE- 250/636: Performed by: INTERNAL MEDICINE

## 2021-09-03 PROCEDURE — 65270000029 HC RM PRIVATE

## 2021-09-03 RX ADMIN — METHYLPREDNISOLONE SODIUM SUCCINATE 60 MG: 40 INJECTION, POWDER, FOR SOLUTION INTRAMUSCULAR; INTRAVENOUS at 09:04

## 2021-09-03 RX ADMIN — Medication 5 ML: at 05:58

## 2021-09-03 RX ADMIN — ENOXAPARIN SODIUM 40 MG: 40 INJECTION SUBCUTANEOUS at 09:04

## 2021-09-03 RX ADMIN — Medication 10 ML: at 22:04

## 2021-09-03 RX ADMIN — Medication 10 ML: at 13:36

## 2021-09-03 RX ADMIN — METRONIDAZOLE 500 MG: 500 INJECTION, SOLUTION INTRAVENOUS at 09:04

## 2021-09-03 RX ADMIN — METRONIDAZOLE 500 MG: 500 INJECTION, SOLUTION INTRAVENOUS at 22:03

## 2021-09-03 NOTE — PROGRESS NOTES
Hospitalist Progress Note    NAME: Leslye Flores   :  1975   MRN:  [id number]       Assessment / Plan:  Acute Crohn's flare, enteritis POA  History of Crohn's disease, s/p total colectomy, with ileal anal anastomosis. H/O PSC  Presented with abdominal pain and nausea  CT abdomen at OSH:  showed duodenitis and inflammatory/infectious process involving multiple areas of small bowel, without abscess or fistula. Also showed stable finding of PSC, with cirrhosis    Continue Solu-Medrol 60 mg IV daily for now  Placed on Levaquin and Flagyl initially- taken off Levaquin with h/o CDiff in past  Per GI recommendations today  -diet advanced to GI lite  IP GI consult noted- following  -Takes Humira as outpatient, last dose around 2 weeks ago        COVID-19 positive, 2021 POA ? False positive   rapid Covid test positive at OSH  Rapid Covid test and PCR here negative. Suspect false positive test at OSH. He is vaccinated against Covid     Cirrhosis   of [facility]  -Pharmacy to reconcile meds        Code Status: Full Code  Surrogate Decision Maker:     DVT Prophylaxis: Lovenox  GI Prophylaxis: not indicated     Baseline: ambulatory        30.0 - 39.9 Obese / Body mass index is 37.97 kg/m². Weight  Loss recommended    Estimated discharge date:   Barriers: none    Recommended Disposition: Home w/Family     Subjective:     Chief Complaint / Reason for Physician Visit: f/u Crohn's flare, Cirrhosis, PSC, COVID Infection  Abdominal pain has improved. Had a BM this morning, no blood. Review of Systems:  Symptom Y/N Comments  Symptom Y/N Comments   Fever/Chills n   Chest Pain n    Poor Appetite y   Edema n    Cough n   Abdominal Pain y    Sputum n   Joint Pain     SOB/JARRELL    Pruritis/Rash     Nausea/vomit y   Tolerating PT/OT y    Diarrhea    Tolerating Diet y liquids   Constipation    Other       Could NOT obtain due to:      Objective:     VITALS:   Last 24hrs VS reviewed since prior progress note. Most recent are:  Patient Vitals for the past 24 hrs:   Temp Pulse Resp BP SpO2   09/03/21 0809 97.9 °F (36.6 °C) 75 18 108/68 100 %   09/03/21 0031 98 °F (36.7 °C) 75 16 128/70 100 %   09/02/21 1801 98 °F (36.7 °C) 83 14 136/74 97 %   09/02/21 1700 97.8 °F (36.6 °C) 87 12 130/75 94 %   09/02/21 1600  97 21 129/84 95 %   09/02/21 1501  87 15 138/88 94 %   09/02/21 1400  87 23 117/79 92 %   09/02/21 1300  74 14 127/70 92 %   09/02/21 1200  78 16 118/80 96 %   09/02/21 1100  83 13 136/85 95 %   09/02/21 1000  76 14 133/85 96 %       Intake/Output Summary (Last 24 hours) at 9/3/2021 0917  Last data filed at 9/2/2021 2121  Gross per 24 hour   Intake 100 ml   Output    Net 100 ml        I had a face to face encounter and independently examined this patient on 9/3/2021, as outlined below:  PHYSICAL EXAM:  General: WD, WN. Alert, cooperative, no acute distress    EENT:  EOMI. Anicteric sclerae. MMM  Resp:  CTA bilaterally, no wheezing or rales. No accessory muscle use  CV:  Regular  rhythm,  No edema  GI:  Soft, Non distended, Non tender. +Bowel sounds  Neurologic:  Alert and oriented X 3, normal speech,   Psych:   Good insight. Not anxious nor agitated  Skin:  No rashes. No jaundice    Reviewed most current lab test results and cultures  YES  Reviewed most current radiology test results   YES  Review and summation of old records today    NO  Reviewed patient's current orders and MAR    YES  PMH/SH reviewed - no change compared to H&P  ________________________________________________________________________  Care Plan discussed with:    Comments   Patient x    Family      RN x    Care Manager     Consultant  x GI NP Kenneth Johnson                     Multidiciplinary team rounds were held today with , nursing, pharmacist and clinical coordinator. Patient's plan of care was discussed; medications were reviewed and discharge planning was addressed. ________________________________________________________________________  Total NON critical care TIME:  36   Minutes    Total CRITICAL CARE TIME Spent:   Minutes non procedure based      Comments   >50% of visit spent in counseling and coordination of care     ________________________________________________________________________  Matias Martinez MD     Procedures: see electronic medical records for all procedures/Xrays and details which were not copied into this note but were reviewed prior to creation of Plan. LABS:  I reviewed today's most current labs and imaging studies.   Pertinent labs include:  Recent Labs     09/02/21 0848 09/01/21 1839   WBC 8.9 13.6*   HGB 10.3* 10.5*   HCT 32.9* 32.6*   PLT 95* 112*     Recent Labs     09/02/21 0848 09/01/21 1839    138   K 3.8 3.6    106   CO2 29 30   * 98   BUN 12 13   CREA 0.78 0.84   CA 7.6* 7.7*   ALB 2.1* 2.3*   TBILI 1.3* 1.9*   ALT 45 50       Signed: Matias Martinez MD

## 2021-09-03 NOTE — PROGRESS NOTES
Gastroenterology Daily Progress Note   Scooby Joma   for Dr. Emanuel Doe)   [facility]    Admit Date: 9/1/2021     F/u enteritis    Subjective:       Feeling better. Abd pain is 1/10. Hasn't had pain medication since yesterday afternoon. Small solid BM's without blood. Tolerating clears and would like more. Afebrile  Stool for enteric pathogens and C. Diff pending  Fecal calprotectin and Humira levels/ab pending  CRP elevated  Rapid Covid19 negative on 9/1 - off droplet precautions  On Solumedrol 60mg daily and IV Flagyl    CT w/ contrast 8/30/21 at [facility]:  Cirrhosis of the liver with portal HTN, hx of PSC. Acute inflamtory/infection enteritis and duodenitis involving multiple SB loops in left hemiabdomen    Current Facility-Administered Medications   Medication Dose Route Frequency    sodium chloride (NS) flush 5-40 mL  5-40 mL IntraVENous Q8H    sodium chloride (NS) flush 5-40 mL  5-40 mL IntraVENous PRN    acetaminophen (TYLENOL) tablet 650 mg  650 mg Oral Q6H PRN    Or    acetaminophen (TYLENOL) suppository 650 mg  650 mg Rectal Q6H PRN    polyethylene glycol (MIRALAX) packet 17 g  17 g Oral DAILY PRN    ondansetron (ZOFRAN ODT) tablet 4 mg  4 mg Oral Q8H PRN    Or    ondansetron (ZOFRAN) injection 4 mg  4 mg IntraVENous Q6H PRN    enoxaparin (LOVENOX) injection 40 mg  40 mg SubCUTAneous DAILY    methylPREDNISolone (PF) (SOLU-MEDROL) injection 60 mg  60 mg IntraVENous DAILY    metroNIDAZOLE (FLAGYL) IVPB premix 500 mg  500 mg IntraVENous Q12H    morphine injection 1 mg  1 mg IntraVENous Q4H PRN    albuterol (PROVENTIL VENTOLIN) nebulizer solution 2.5 mg  2.5 mg Nebulization Q4H PRN        Objective:     Visit Vitals  /68   Pulse 75   Temp 97.9 °F (36.6 °C)   Resp 18   Ht 6' (1.829 m)   Wt 127 kg (279 lb 15.8 oz)   SpO2 100%   BMI 37.97 kg/m²   Blood pressure 108/68, pulse 75, temperature 97.9 °F (36.6 °C), resp.  rate 18, height 6' (1.829 m), weight 127 kg (279 lb 15.8 oz), SpO2 100 %. No intake/output data recorded. 09/01 1901 - 09/03 0700  In: 450 [I.V.:450]  Out: 200 [Urine:200]      Intake/Output Summary (Last 24 hours) at 9/3/2021 0817  Last data filed at 9/2/2021 2121  Gross per 24 hour   Intake 100 ml   Output    Net 100 ml         Physical Exam:       General: black male resting comfortably in NAD  Chest:  CTA, No rhonchi, rales or rubs. Heart: S1, S2, RRR  GI: Soft, NT, ND + bowel sounds  Extremities: no edema   CNS: CN II-XII normal.      Labs:       Recent Results (from the past 24 hour(s))   CBC WITH AUTOMATED DIFF    Collection Time: 09/02/21  8:48 AM   Result Value Ref Range    WBC 8.9 4.1 - 11.1 K/uL    RBC 3.82 (L) 4.10 - 5.70 M/uL    HGB 10.3 (L) 12.1 - 17.0 g/dL    HCT 32.9 (L) 36.6 - 50.3 %    MCV 86.1 80.0 - 99.0 FL    MCH 27.0 26.0 - 34.0 PG    MCHC 31.3 30.0 - 36.5 g/dL    RDW 17.9 (H) 11.5 - 14.5 %    PLATELET 95 (L) 896 - 400 K/uL    MPV 12.0 8.9 - 12.9 FL    NRBC 0.0 0  WBC    ABSOLUTE NRBC 0.00 0.00 - 0.01 K/uL    NEUTROPHILS 87 (H) 32 - 75 %    LYMPHOCYTES 9 (L) 12 - 49 %    MONOCYTES 3 (L) 5 - 13 %    EOSINOPHILS 0 0 - 7 %    BASOPHILS 0 0 - 1 %    IMMATURE GRANULOCYTES 1 (H) 0.0 - 0.5 %    ABS. NEUTROPHILS 7.7 1.8 - 8.0 K/UL    ABS. LYMPHOCYTES 0.8 0.8 - 3.5 K/UL    ABS. MONOCYTES 0.3 0.0 - 1.0 K/UL    ABS. EOSINOPHILS 0.0 0.0 - 0.4 K/UL    ABS. BASOPHILS 0.0 0.0 - 0.1 K/UL    ABS. IMM.  GRANS. 0.1 (H) 0.00 - 0.04 K/UL    DF AUTOMATED     METABOLIC PANEL, BASIC    Collection Time: 09/02/21  8:48 AM   Result Value Ref Range    Sodium 136 136 - 145 mmol/L    Potassium 3.8 3.5 - 5.1 mmol/L    Chloride 104 97 - 108 mmol/L    CO2 29 21 - 32 mmol/L    Anion gap 3 (L) 5 - 15 mmol/L    Glucose 126 (H) 65 - 100 mg/dL    BUN 12 6 - 20 MG/DL    Creatinine 0.78 0.70 - 1.30 MG/DL    BUN/Creatinine ratio 15 12 - 20      GFR est AA >60 >60 ml/min/1.73m2    GFR est non-AA >60 >60 ml/min/1.73m2    Calcium 7.6 (L) 8.5 - 10.1 MG/DL   HEPATIC FUNCTION PANEL    Collection Time: 09/02/21  8:48 AM   Result Value Ref Range    Protein, total 7.7 6.4 - 8.2 g/dL    Albumin 2.1 (L) 3.5 - 5.0 g/dL    Globulin 5.6 (H) 2.0 - 4.0 g/dL    A-G Ratio 0.4 (L) 1.1 - 2.2      Bilirubin, total 1.3 (H) 0.2 - 1.0 MG/DL    Bilirubin, direct 0.5 (H) 0.0 - 0.2 MG/DL    Alk. phosphatase 192 (H) 45 - 117 U/L    AST (SGOT) 48 (H) 15 - 37 U/L    ALT (SGPT) 45 12 - 78 U/L   CRP, HIGH SENSITIVITY    Collection Time: 09/02/21  3:21 PM   Result Value Ref Range    CRP, High sensitivity >9.5 mg/L   LABRCNT(wbc:2,hgb:2,hct:2,plt:2,)  Recent Labs     09/02/21  0848 09/01/21  1839    138   K 3.8 3.6    106   CO2 29 30   BUN 12 13   CREA 0.78 0.84   * 98   CA 7.6* 7.7*   LABRCNT(sgot:3,gpt:3,ap:3,tbiL:3,TP:3,ALB:3,GLOB:3,ggt:3,aml:3,amyp:3,lpse:3,hlpse:3)No results for input(s): INR, PTP, APTT, INREXT in the last 72 hours. Recent Labs     09/02/21 0848 09/01/21  1839   * 204*   TP 7.7 7.6   ALB 2.1* 2.3*   GLOB 5.6* 5.3*   LPSE  --  177   BRIEFLAB(B12,FOL,FOLAT,RBCF)No results found for: FOL, RBCFLABRCNT(CPK:3,CpKMB:3,ckndx:3,troiq:3)No components found for: GLPOCBRIEFLAB(CHOL,CHOLX,CHOLP,CHLST,CHOLV,HDL,HDLC,HDLP,LDL,DLDL,LDLC,DLDLP,TGL,TGLX,TRIGL,TRIGP,CHHD,CHHDX)No results for input(s): PH, PCO2, PO2 in the last 72 hours. LABRCNT(CPK:3,CpKMB:3,ckndx:3,troiq:3)Jovanna Wheeler  Recent Labs     09/01/21  1839   TROIQ <0.05   Jovanna Moody      Problem List:     Active Problems:    Crohn disease ([facility] [address] (9/1/2021)        Impression:  Enteritis  Recent Covid19 infection  Crohn's s/p colectomy with ileoanal pouch on Humira  Cirrhosis secondary to [facility]           Plan:  Mr. Ambar Reeder has a complex history of chronic Crohn's disease ending up in a subtotal colectomy with an ileal anal pouch anastomosis in 2010. He has been on subcutaneous adalimumab every 2 weeks under Dr. Anjum Perez guidance but his last injection was 3 weeks ago.   He was seen in [facility] 8/30/21 where he had a CT scan of the abdomen pelvis revealing inflammatory/infectious enteritis in the duodenal/small bowel loops in the left abdomen. CT also showed a cirrhotic appearing liver with portal hypertension. Patient has a history of [facility]. His rapid test was positive for COVID and he is currently in contact isolation. Agree with IV steroids, change to oral at discharge. Advance diet to GI lite. If tolerating diet and stool is negative for infection, can be discharged home with close follow up. Once stool is negative for infection, patient should receive his next Humira injection. Humira drug and antibody level pending. Would avoid Levaquin for previous history of C. Difficile, needing FMT. Ok to use IV Metronidazole. Appears that he is improving quickly and will not require pouchoscopy. TEZ Shah  9/3/2021   8:35 AM   [id number] [facility], [address] [place] [address] [place]: [phone]      He has responded very well to IV steroids and IV Flagyl. His pain is remarkably better. Covid was ruled out. Stool being rule out for infectious causes of diarrhea. His Humira antibody titer and drug level is pending. Suggest advancing his diet and if he tolerates it without any difficulty to change IV steroids to oral prednisone (40 mg x 2 weeks, 30 mg x 2 weeks, 20 mg x 2 weeks and then 10 mg x 1 week and then stop). Start over-the-counter calcium and vitamin D supplementation. Daily probiotic as an outpatient. Follow-up in office with Dr. Bess Prado in 2 weeks time to decide on continuing Humira, escalating the dose or changing  biologic agent as an outpatient. Call partners to see him on request this weekend as needed. I discussed the case with the [facility] Provider. I have personally reviewed the history and independently examined the patient.    I have reviewed the chart and agree with the documentation recorded by the Mid Level Provider, including the assessment, treatment plan, and disposition. Stephanie Gupta MD

## 2021-09-04 VITALS
HEART RATE: 79 BPM | TEMPERATURE: 98.5 F | WEIGHT: 279.98 LBS | RESPIRATION RATE: 16 BRPM | HEIGHT: 72 IN | OXYGEN SATURATION: 100 % | DIASTOLIC BLOOD PRESSURE: 78 MMHG | BODY MASS INDEX: 37.92 KG/M2 | SYSTOLIC BLOOD PRESSURE: 135 MMHG

## 2021-09-04 LAB
ANION GAP SERPL CALC-SCNC: 3 MMOL/L (ref 5–15)
BUN SERPL-MCNC: 16 MG/DL (ref 6–20)
BUN/CREAT SERPL: 22 (ref 12–20)
CALCIUM SERPL-MCNC: 7.3 MG/DL (ref 8.5–10.1)
CHLORIDE SERPL-SCNC: 106 MMOL/L (ref 97–108)
CO2 SERPL-SCNC: 29 MMOL/L (ref 21–32)
CREAT SERPL-MCNC: 0.72 MG/DL (ref 0.7–1.3)
GLUCOSE SERPL-MCNC: 114 MG/DL (ref 65–100)
POTASSIUM SERPL-SCNC: 3.8 MMOL/L (ref 3.5–5.1)
SODIUM SERPL-SCNC: 138 MMOL/L (ref 136–145)

## 2021-09-04 PROCEDURE — 74011250636 HC RX REV CODE- 250/636: Performed by: INTERNAL MEDICINE

## 2021-09-04 PROCEDURE — 36415 COLL VENOUS BLD VENIPUNCTURE: CPT

## 2021-09-04 PROCEDURE — 80048 BASIC METABOLIC PNL TOTAL CA: CPT

## 2021-09-04 RX ORDER — METRONIDAZOLE 500 MG/1
500 TABLET ORAL 3 TIMES DAILY
Qty: 21 TABLET | Refills: 0 | Status: SHIPPED | OUTPATIENT
Start: 2021-09-04 | End: 2021-09-11

## 2021-09-04 RX ORDER — PREDNISONE 10 MG/1
TABLET ORAL
Qty: 133 TABLET | Refills: 0 | Status: SHIPPED | OUTPATIENT
Start: 2021-09-04 | End: 2021-10-23

## 2021-09-04 RX ADMIN — ENOXAPARIN SODIUM 40 MG: 40 INJECTION SUBCUTANEOUS at 09:23

## 2021-09-04 RX ADMIN — METRONIDAZOLE 500 MG: 500 INJECTION, SOLUTION INTRAVENOUS at 09:23

## 2021-09-04 RX ADMIN — METHYLPREDNISOLONE SODIUM SUCCINATE 60 MG: 40 INJECTION, POWDER, FOR SOLUTION INTRAMUSCULAR; INTRAVENOUS at 09:23

## 2021-09-04 RX ADMIN — Medication 10 ML: at 09:24

## 2021-09-04 NOTE — PROGRESS NOTES
DISCHARGE NOTE FROM [facility]    Patient determined to be stable for discharge by attending provider. I have reviewed the discharge instructions with the patient. They verbalized understanding and all questions were answered to their satisfaction. No complaints or further questions were expressed. Medications sent to pharmacy. Appropriate educational materials and medication side effect teaching were provided. PIV were removed prior to discharge. Patient did not discharge with any line, wing, or drain.      Personal items and valuables accounted for at discharge by patient and/or family: YES    Post-op patient: No      Laura Recinos

## 2021-09-04 NOTE — DISCHARGE SUMMARY
Hospitalist Discharge Summary     Patient ID:  Logan Long  [id number]  3 y.o.  1975 9/1/2021    PCP on record: Jared Aguilar MD    Admit date: 9/1/2021  Discharge date and time: 9/4/2021    DISCHARGE DIAGNOSIS:    Acute Crohn's flare, enteritis POA  History of Crohn's disease, s/p total colectomy, with ileal anal anastomosis. Cirrhosis secondary to [facility]   Obesity     CONSULTATIONS:  IP CONSULT TO HOSPITALIST  IP CONSULT TO GASTROENTEROLOGY    Excerpted HPI from H&P of Lucho Mejía MD:  Logan Long is a 50 y.o.  male with a past medical history of PSc, Crohn's disease, pyoderma gangrenosum, cirrhosis who presented to ED with a chief complaint of abdominal pain and nausea. He reports that for past couple of days he has been having severe lower abdominal pain associated with nausea. He went to [facility] ER at [facility], had a CT abdomen done which showed duodenitis and small bowel enteritis, without abscess. Over there initially he was recommended admission, but his rapid Covid came back positive, and he said he had to wait a while to get admitted, so he was discharged with follow-up with the GI. He has saw Dr. Bojorquez Person and he was advised to come to ED if he feels worse. He presented to ED because his pain was not resolved, he reports and nausea. He also says he had a vomiting after he had his pill. He denies any diarrhea, chest pain, palpitation. ______________________________________________________________________  DISCHARGE SUMMARY/HOSPITAL COURSE:  for full details see H&P, daily progress notes, labs, consult notes. Acute Crohn's flare, enteritis POA  History of Crohn's disease, s/p total colectomy, with ileal anal anastomosis.   H/O PSC  Presented with abdominal pain and nausea  CT abdomen at OSH:  showed duodenitis and inflammatory/infectious process involving multiple areas of small bowel, without abscess or fistula.  Also showed stable finding of PSC, with cirrhosis     Patient was started on Solu-Medrol 60 mg IV daily. He was started on Levaquin and Flagyl initially. Levaquin discontinued due to history of C. difficile in the past  Patient was started on clear liquid diets. Patient's abdominal pain improved. No nausea, vomiting, or change in stool consistency, no blood in stool. Patient's diet was advanced to GI light diet which he tolerated very well. C. difficile test was negative  GI was on board  Patient is discharged on p.o. Flagyl and prednisone alise over the course of 7 weeks. He is advised to take calcium and vitamin D supplements over-the-counter and also probiotics. He is to follow-up with GI on Humira drug level and antibody titer. He will follow up with Dr. Codey Newton in 2 weeks to decide on biologic treatment.        COVID-19 positive, August 30, 2021 POA ? False positive   rapid Covid test positive at OSH  Rapid Covid test and PCR here negative. Suspect false positive test at OSH. He is vaccinated against Covid     Cirrhosis secondary to [facility]  -Recently diagnosed  -He is to follow-up outpatient for this. He was told that he will need liver transplant at some point    Obesity  -Counseled about lifestyle modification including diet, exercise, and weight loss. _______________________________________________________________________  Patient seen and examined by me on discharge day. Pertinent Findings:  Gen:    Not in distress  Chest: Clear lungs  CVS:   Regular rhythm. No edema  Abd:  Soft, not distended, not tender  Neuro:  Alert, oriented x3  _______________________________________________________________________  DISCHARGE MEDICATIONS:   Current Discharge Medication List      START taking these medications    Details   metroNIDAZOLE (FLAGYL) 500 mg tablet Take 1 Tablet by mouth three (3) times daily for 7 days.   Qty: 21 Tablet, Refills: 0  Start date: 9/4/2021, End date: 9/11/2021      predniSONE (DELTASONE) 10 mg tablet Take 40 mg by mouth daily (with breakfast) for 14 days, THEN 30 mg daily (with breakfast) for 14 days, THEN 20 mg daily (with breakfast) for 14 days, THEN 10 mg daily (with breakfast) for 7 days. Qty: 133 Tablet, Refills: 0  Start date: 9/4/2021, End date: 10/23/2021         CONTINUE these medications which have NOT CHANGED    Details   adalimumab (HUMIRA) 40 mg/0.8 mL injection 40 mg by SubCUTAneous route Once every 2 weeks. halobetasol (ULTRAVATE) 0.05 % topical cream Apply  to affected area two (2) times a day. use thin layer. Apply to ulcers. Qty: 15 g, Refills: 0               Patient Follow Up Instructions:    Activity: Activity as tolerated  Diet: Regular Diet  Wound Care: None needed    Follow-up Information     Follow up With Specialties Details Why Contact Greg Mauricio MD Gastroenterology Schedule an appointment as soon as possible for a visit in 2 weeks  [address] [phone]      Morgan Severance, [address]   [facility]  [phone]          ________________________________________________________________    Risk of deterioration: Low    Condition at Discharge:  Stable  __________________________________________________________________    Disposition  Home with family, no needs    ____________________________________________________________________    Code Status: Full Code  ___________________________________________________________________      Total time in minutes spent coordinating this discharge (includes going over instructions, follow-up, prescriptions, and preparing report for sign off to her PCP) :  35 minutes    Signed:  Nirmala Griffin MD

## 2021-09-04 NOTE — PROGRESS NOTES
Transition of Care Plan:     RUR: 10% low risk for readmission   Disposition: Home   Follow up appointments: Scheduled  DME needed: None    Transportation at Discharge: Self Transport   [place] or means to access home: Pt has keys       IM Medicare Letter: N/A  Is patient a BCPI-A Bundle: N/A                  If yes, was Bundle Letter given?:     Caregiver Contact: Pt's spouse, Lyudmila houston) [phone]      CM acknowledges discharge. Patient truck at the hospital from admission and pt. Will transport himself home. Patient to schedule follow up appointments with PCP and GI. No further CM needs identified.      Derick Cardona, MSN, RN  Care Manager

## 2021-09-09 LAB — CALPROTECTIN STL-MCNT: 138 UG/G (ref 0–120)

## 2021-09-13 LAB
Lab: NORMAL
REFERENCE LAB,REFLB: NORMAL
TEST DESCRIPTION:,ATST: NORMAL

## 2021-10-25 ENCOUNTER — HOSPITAL ENCOUNTER (OUTPATIENT)
Dept: PREADMISSION TESTING | Age: 46
Discharge: HOME | End: 2021-10-25

## 2022-03-19 PROBLEM — A04.72 C. DIFFICILE COLITIS: Status: ACTIVE | Noted: 2019-01-21

## 2022-03-19 PROBLEM — R79.89 LFT ELEVATION: Status: ACTIVE | Noted: 2018-08-23

## 2022-03-19 PROBLEM — K50.919 CROHN'S DISEASE WITH COMPLICATION (HCC): Status: ACTIVE | Noted: 2018-07-20

## 2022-03-19 PROBLEM — L08.0 PYODERMA: Status: ACTIVE | Noted: 2018-07-20

## 2022-03-19 PROBLEM — R10.31 RLQ ABDOMINAL PAIN: Status: ACTIVE | Noted: 2018-08-23

## 2022-03-20 PROBLEM — K50.90 CROHN DISEASE (HCC): Status: ACTIVE | Noted: 2021-09-01

## 2022-03-20 PROBLEM — R10.9 ABDOMINAL PAIN: Status: ACTIVE | Noted: 2019-01-24

## 2022-03-20 PROBLEM — K83.1 BILIARY OBSTRUCTION: Status: ACTIVE | Noted: 2018-08-22

## 2024-02-28 NOTE — ED NOTES
Pt resting in stretcher. Call bell within reach. Pt reporting woke up this morning with abdominal cramping. HAs since gotten worse and is currently a 7/10 abdominal cramping. Denies any f/c, n/v or urinary sxs. Does have a J- pouch which has decreased output today. 0

## 2025-01-29 ENCOUNTER — HOSPITAL ENCOUNTER (OUTPATIENT)
Facility: HOSPITAL | Age: 50
Discharge: HOME | End: 2025-02-01
Payer: MEDICARE

## 2025-01-29 DIAGNOSIS — R05.1 ACUTE COUGH: ICD-10-CM

## 2025-01-29 PROCEDURE — 71250 CT THORAX DX C-: CPT

## (undated) DEVICE — SOLIDIFIER MEDC 1200ML -- CONVERT TO 356117

## (undated) DEVICE — SET ADMIN 16ML TBNG L100IN 2 Y INJ SITE IV PIGGY BK DISP

## (undated) DEVICE — BAG SPEC BIOHZRD 10 X 10 IN --

## (undated) DEVICE — BASIN EMSIS 16OZ GRAPHITE PLAS KID SHP MOLD GRAD FOR ORAL

## (undated) DEVICE — FORCEPS BX L240CM JAW DIA2.8MM L CAP W/ NDL MIC MESH TOOTH

## (undated) DEVICE — CONTAINER SPEC 20 ML LID NEUT BUFF FORMALIN 10 % POLYPR STS

## (undated) DEVICE — 1200 GUARD II KIT W/5MM TUBE W/O VAC TUBE: Brand: GUARDIAN

## (undated) DEVICE — KENDALL RADIOLUCENT FOAM MONITORING ELECTRODE RECTANGULAR SHAPE: Brand: KENDALL

## (undated) DEVICE — Device

## (undated) DEVICE — (D)SYR 10ML 1/5ML GRAD NSAF -- PKGING CHANGE USE ITEM 338027

## (undated) DEVICE — NEEDLE HYPO 18GA L1.5IN PNK S STL HUB POLYPR SHLD REG BVL

## (undated) DEVICE — Device: Brand: MEDEX

## (undated) DEVICE — SYR 10ML LUER LOK 1/5ML GRAD --

## (undated) DEVICE — NEONATAL-ADULT SPO2 SENSOR: Brand: NELLCOR

## (undated) DEVICE — SYR 3ML LL TIP 1/10ML GRAD --

## (undated) DEVICE — Z DISCONTINUED PER MEDLINE LINE GAS SAMPLING O2/CO2 LNG AD 13 FT NSL W/ TBNG FILTERLINE

## (undated) DEVICE — TOWEL 4 PLY TISS 19X30 SUE WHT

## (undated) DEVICE — ENDO CARRY-ON PROCEDURE KIT INCLUDES ENZYMATIC SPONGE, GAUZE, BIOHAZARD LABEL, TRAY, LUBRICANT, DIRTY SCOPE LABEL, WATER LABEL, TRAY, DRAWSTRING PAD, AND DEFENDO 4-PIECE KIT.: Brand: ENDO CARRY-ON PROCEDURE KIT

## (undated) DEVICE — CATH IV AUTOGRD BC BLU 22GA 25 -- INSYTE

## (undated) DEVICE — CATH IV AUTOGRD BC PNK 20GA 25 -- INSYTE

## (undated) DEVICE — FORCEPS BX L160CM DIA8MM GRSP DISECT CUP TIP NONLOCKING ROT